# Patient Record
Sex: MALE | URBAN - METROPOLITAN AREA
[De-identification: names, ages, dates, MRNs, and addresses within clinical notes are randomized per-mention and may not be internally consistent; named-entity substitution may affect disease eponyms.]

---

## 2017-03-06 ENCOUNTER — APPOINTMENT (OUTPATIENT)
Age: 72
Setting detail: DERMATOLOGY
End: 2017-03-18

## 2017-03-06 DIAGNOSIS — L57.0 ACTINIC KERATOSIS: ICD-10-CM

## 2017-03-06 DIAGNOSIS — L82.0 INFLAMED SEBORRHEIC KERATOSIS: ICD-10-CM

## 2017-03-06 DIAGNOSIS — L82.1 OTHER SEBORRHEIC KERATOSIS: ICD-10-CM

## 2017-03-06 DIAGNOSIS — L85.3 XEROSIS CUTIS: ICD-10-CM

## 2017-03-06 DIAGNOSIS — L28.0 LICHEN SIMPLEX CHRONICUS: ICD-10-CM

## 2017-03-06 DIAGNOSIS — L81.4 OTHER MELANIN HYPERPIGMENTATION: ICD-10-CM

## 2017-03-06 DIAGNOSIS — D18.0 HEMANGIOMA: ICD-10-CM

## 2017-03-06 DIAGNOSIS — D22 MELANOCYTIC NEVI: ICD-10-CM

## 2017-03-06 PROBLEM — L30.9 DERMATITIS, UNSPECIFIED: Status: ACTIVE | Noted: 2017-03-06

## 2017-03-06 PROBLEM — D18.01 HEMANGIOMA OF SKIN AND SUBCUTANEOUS TISSUE: Status: ACTIVE | Noted: 2017-03-06

## 2017-03-06 PROBLEM — D22.5 MELANOCYTIC NEVI OF TRUNK: Status: ACTIVE | Noted: 2017-03-06

## 2017-03-06 PROBLEM — D48.5 NEOPLASM OF UNCERTAIN BEHAVIOR OF SKIN: Status: ACTIVE | Noted: 2017-03-06

## 2017-03-06 PROCEDURE — 17003 DESTRUCT PREMALG LES 2-14: CPT

## 2017-03-06 PROCEDURE — OTHER PRESCRIPTION: OTHER

## 2017-03-06 PROCEDURE — 11100: CPT | Mod: 59

## 2017-03-06 PROCEDURE — OTHER COUNSELING: OTHER

## 2017-03-06 PROCEDURE — 99214 OFFICE O/P EST MOD 30 MIN: CPT | Mod: 25

## 2017-03-06 PROCEDURE — 17000 DESTRUCT PREMALG LESION: CPT

## 2017-03-06 PROCEDURE — OTHER LIQUID NITROGEN: OTHER

## 2017-03-06 PROCEDURE — OTHER BIOPSY BY SHAVE METHOD: OTHER

## 2017-03-06 RX ORDER — TRIAMCINOLONE ACETONIDE 1 MG/ML
0.1% LOTION TOPICAL BID
Qty: 60 | Refills: 1 | Status: ERX | COMMUNITY
Start: 2017-03-06

## 2017-03-06 ASSESSMENT — LOCATION DETAILED DESCRIPTION DERM
LOCATION DETAILED: RIGHT INFERIOR MEDIAL UPPER BACK
LOCATION DETAILED: RIGHT MEDIAL UPPER BACK
LOCATION DETAILED: RIGHT LATERAL EYEBROW
LOCATION DETAILED: RIGHT SUPERIOR MEDIAL MIDBACK
LOCATION DETAILED: RIGHT SUPERIOR LATERAL MALAR CHEEK
LOCATION DETAILED: SUPERIOR THORACIC SPINE
LOCATION DETAILED: LEFT SUPERIOR LATERAL LOWER BACK

## 2017-03-06 ASSESSMENT — LOCATION SIMPLE DESCRIPTION DERM
LOCATION SIMPLE: RIGHT UPPER BACK
LOCATION SIMPLE: RIGHT LOWER BACK
LOCATION SIMPLE: UPPER BACK
LOCATION SIMPLE: RIGHT CHEEK
LOCATION SIMPLE: LEFT LOWER BACK
LOCATION SIMPLE: RIGHT EYEBROW

## 2017-03-06 ASSESSMENT — LOCATION ZONE DERM
LOCATION ZONE: TRUNK
LOCATION ZONE: FACE

## 2017-03-06 NOTE — PROCEDURE: BIOPSY BY SHAVE METHOD
Electrodesiccation And Curettage Text: The wound bed was treated with electrodesiccation and curettage after the biopsy was performed.
Wound Care: Vaseline
Dressing: bandage
Body Location Override (Optional - Billing Will Still Be Based On Selected Body Map Location If Applicable): R. Lateral upper lid
Cryotherapy Text: The wound bed was treated with cryotherapy after the biopsy was performed.
Destruction After The Procedure: No
Size Of Lesion In Cm: 0.5
Curettage Text: The wound bed was treated with curettage after the biopsy was performed.
Notification Instructions: Patient will be notified of biopsy results. However, patient instructed to call the office if not contacted within 2 weeks.
Billing Type: Third-Party Bill
Biopsy Method: Double edge Personna blades
Hemostasis: Drysol
X Size Of Lesion In Cm: 0
Biopsy Type: H and E
Electrodesiccation Text: The wound bed was treated with electrodesiccation after the biopsy was performed.
Consent: Written consent was obtained and risks were reviewed including but not limited to scarring, infection, bleeding, scabbing, incomplete removal, nerve damage and allergy to anesthesia.
Post-Care Instructions: I reviewed with the patient in detail post-care instructions. Patient is to keep the biopsy site dry overnight, and then apply bacitracin twice daily until healed. Patient may apply hydrogen peroxide soaks to remove any crusting.
Type Of Destruction Used: Curettage
Silver Nitrate Text: The wound bed was treated with silver nitrate after the biopsy was performed.
Detail Level: Detailed
Anesthesia Type: 1% lidocaine with epinephrine and a 1:10 solution of 8.4% sodium bicarbonate

## 2017-03-06 NOTE — PROCEDURE: LIQUID NITROGEN
Total Number Of Aks Treated: 5
Post-Care Instructions: I reviewed with the patient in detail post-care instructions. (Written instructions given) Patient is to wear sun protection, and avoid picking at any of the treated lesions. Pt may apply Vaseline to crusted or scabbing areas.
Number Of Freeze-Thaw Cycles: 1 freeze-thaw cycle
Detail Level: Simple
Duration Of Freeze Thaw-Cycle (Seconds): 10
Render Post-Care Instructions In Note?: no
Consent: The patient's consent was obtained including but not limited to risks of crusting, scabbing, blistering, scarring, darker or lighter pigmentary change, recurrence, incomplete removal and infection.

## 2021-01-23 ENCOUNTER — HOSPITAL ENCOUNTER (EMERGENCY)
Facility: CLINIC | Age: 76
Discharge: HOME | End: 2021-01-23
Attending: EMERGENCY MEDICINE | Admitting: EMERGENCY MEDICINE
Payer: COMMERCIAL

## 2021-01-23 ENCOUNTER — APPOINTMENT (OUTPATIENT)
Dept: GENERAL RADIOLOGY | Facility: CLINIC | Age: 76
End: 2021-01-23
Attending: EMERGENCY MEDICINE
Payer: COMMERCIAL

## 2021-01-23 VITALS
HEART RATE: 90 BPM | OXYGEN SATURATION: 95 % | RESPIRATION RATE: 18 BRPM | DIASTOLIC BLOOD PRESSURE: 73 MMHG | TEMPERATURE: 98.9 F | SYSTOLIC BLOOD PRESSURE: 100 MMHG

## 2021-01-23 DIAGNOSIS — S22.42XA CLOSED FRACTURE OF MULTIPLE RIBS OF LEFT SIDE, INITIAL ENCOUNTER: ICD-10-CM

## 2021-01-23 PROCEDURE — 71101 X-RAY EXAM UNILAT RIBS/CHEST: CPT | Mod: LT

## 2021-01-23 PROCEDURE — 250N000013 HC RX MED GY IP 250 OP 250 PS 637: Performed by: EMERGENCY MEDICINE

## 2021-01-23 PROCEDURE — 99285 EMERGENCY DEPT VISIT HI MDM: CPT

## 2021-01-23 RX ORDER — LIDOCAINE 4 G/G
1 PATCH TOPICAL EVERY 24 HOURS
Qty: 20 PATCH | Refills: 0 | Status: SHIPPED | OUTPATIENT
Start: 2021-01-23 | End: 2022-12-06

## 2021-01-23 RX ORDER — CYCLOBENZAPRINE HCL 10 MG
10 TABLET ORAL ONCE
Status: COMPLETED | OUTPATIENT
Start: 2021-01-23 | End: 2021-01-23

## 2021-01-23 RX ORDER — CYCLOBENZAPRINE HCL 10 MG
5 TABLET ORAL 3 TIMES DAILY PRN
Qty: 20 TABLET | Refills: 0 | Status: SHIPPED | OUTPATIENT
Start: 2021-01-23 | End: 2022-12-06

## 2021-01-23 RX ORDER — LIDOCAINE 4 G/G
1 PATCH TOPICAL ONCE
Status: DISCONTINUED | OUTPATIENT
Start: 2021-01-23 | End: 2021-01-23 | Stop reason: HOSPADM

## 2021-01-23 RX ORDER — OXYCODONE HYDROCHLORIDE 5 MG/1
2.5 TABLET ORAL EVERY 6 HOURS PRN
Qty: 12 TABLET | Refills: 0 | Status: SHIPPED | OUTPATIENT
Start: 2021-01-23 | End: 2022-11-19

## 2021-01-23 RX ORDER — NAPROXEN 250 MG/1
250 TABLET ORAL ONCE
Status: COMPLETED | OUTPATIENT
Start: 2021-01-23 | End: 2021-01-23

## 2021-01-23 RX ADMIN — NAPROXEN 250 MG: 250 TABLET ORAL at 09:26

## 2021-01-23 RX ADMIN — LIDOCAINE 1 PATCH: 560 PATCH PERCUTANEOUS; TOPICAL; TRANSDERMAL at 10:10

## 2021-01-23 RX ADMIN — CYCLOBENZAPRINE HYDROCHLORIDE 10 MG: 10 TABLET, FILM COATED ORAL at 10:08

## 2021-01-23 ASSESSMENT — ENCOUNTER SYMPTOMS
NECK PAIN: 0
BACK PAIN: 0
ABDOMINAL PAIN: 0

## 2021-01-23 NOTE — DISCHARGE INSTRUCTIONS
Alternate Tylenol and ibuprofen (dosage as directed on the bottle) every 4-6 hours as needed for pain.    Apply ice or heat to the painful area for 15 minutes 4-5 times a day.    Opioid Medication Information    You have been given a prescription for an opioid (narcotic) pain medicine and/or have received a pain medicine while here in the Emergency Department. These medicines can make you drowsy or impaired. You must not drive, operate dangerous equipment, or engage in any other dangerous activities while taking these medications. If you drive while taking these medications, you could be arrested for driving under the influence (DUI). Do not drink any alcohol while you are taking these medications.     Opioid pain medications can cause addiction. If you have a history of chemical dependency of any type, you are at a higher risk of becoming addicted to pain medications.  Only take these prescribed medications to treat your pain when all other options have been tried. Take it for as short a time and as few doses as possible. Store your pain pills in a secure place, as they are frequently stolen and provide a dangerous opportunity for children or visitors in your house to start abusing these powerful medications. We will not replace any lost or stolen medicine.    If you do not finish your medication, it is a good idea to get rid of it but please do not flush it down the toilet. Please dispose of the remaining medication at a local pharmacy or law enforcement facility. The [place] [employer] has additional information on medication disposal: https://www.pca.[email or web address]/living-green/managing-unwanted-medications.      Many prescription pain medications contain Tylenol  (acetaminophen), including Vicodin , Tylenol #3 , Norco , Lortab , and Percocet .  You should not take any extra pills of Tylenol  if you are using these prescription medications or you can get very sick.  Do not ever take more than 3000  mg of acetaminophen in any 24 hour period.    All opioids tend to cause constipation. Drink plenty of water and eat foods that have a lot of fiber, such as fruits, vegetables, prune juice, apple juice and high fiber cereal.  Take a laxative if you don t move your bowels at least every other day. Miralax , Milk of Magnesia, Colace , or Senna  can be used to keep you regular.

## 2021-01-23 NOTE — ED TRIAGE NOTES
Patient presents to the ED with left rib pain. States slipped and fell on Wednesday, landing on left side. States pain feels worse this morning.

## 2021-01-23 NOTE — ED PROVIDER NOTES
History   Chief Complaint:  Rib Pain       The history is provided by the patient.      Pacheco Torres is a 75 year old male with history of hypertension, hyperlipidemia, CAD who presents for evaluation of left sided rib pain starting three days ago after a slip and fall on his driveway. The patient states that the pain is worse at night and was worst this morning, noting pain with breathing that started today. Lying down exacerbates his pain while sitting down in the proper position improves his pain. The patient notes no head injury or loss of consciousness. He notes no neck pain, back pain or arm injury. He denies any abdominal pain or chest pain outside of his rib pain. The patient treated with 1 tablet of Percocet and 500mg Tylenol two hours prior to arrival. He notes that he usually takes oxycodone at night. The patient notes that he has broken 6 ribs in the past around 10 years ago and presents today concerned that he may have broken a rib.       Review of Systems   Cardiovascular: Negative for chest pain.   Gastrointestinal: Negative for abdominal pain.   Musculoskeletal: Negative for back pain and neck pain.        (+) left rib pain   Neurological:        (-) loss of consciousness    All other systems reviewed and are negative.        Allergies:  No Known Drug Allergies     Medications:  Percocet    Past Medical History:    Hypertension  CAD  Hyperlipidemia  Recurrent major depression  Morbid obesity  Osteoarthritis, left knee  ED   GERD  Arthritis    Past Surgical History:    Hernia repair  Coronary stent placement  [place] teeth extraction  Meniscectomy, left     Family History:    Alcoholism  Dementia  Kidney failure  Hypertension  Heart disease    Social History:  The patient presents to the emergency department alone.   Current smoker      Physical Exam     Patient Vitals for the past 24 hrs:   BP Temp Pulse Resp SpO2   01/23/21 0953 100/73 -- 90 -- 95 %   01/23/21 0855 (!) 163/89 98.9  F  (37.2  C) 90 18 95 %       Physical Exam  General: Alert, well appearing; occasional waves of pain noted  Neuro:  Gait stable, no focal deficits; GCS 15  HEENT:  Atraumatic. Moist mucous membranes. Conjunctiva normal.   CV:  RRR, no m/r/g, skin warm and well perfused  Pulm:  CTAB, no wheezes/ronchi/rales.  No acute distress, breathing comfortably  GI:  Soft, nontender, nondistended.  No rebound or guarding.  Normal bowel sounds  MSK:  Moving all extremities.  No cervical/thoracic/lumbar midline tenderness.  Left chest wall tenderness below left axilla and left breast fold.  No chest wall crepitus or skin bruising.  Skin:  WWP, no lower extremity edema, skin color normal, no diaphoresis    Emergency Department Course     Imaging:  XR Ribs, Unilateral 3 Views + PA Chest, left:  Acute nondisplaced fracture of the left anterior eighth and likely ninth ribs. No pneumothorax. Left basilar consolidation/atelectasis with left-sided pleural thickening remain stable. The right basilar atelectasis has cleared. Normal heart size and pulmonary vascularity.   As per radiology.     Emergency Department Course:    Reviewed:  0909: I reviewed the patient's nursing notes, vitals, past medical records, Care Everywhere.     Assessments:  0910: I assessed the patient and performed a physical exam at this time.  1029: I reassessed the patient. At this point I feel that the patient is safe for discharge, and the patient agrees.     Interventions:  0926 Naproxen 250mg PO  1008 Flexeril 10mg PO  1010 Lidocare 4% Patch 1 Patch Transdermal    Disposition:  The patient was discharged to home.     Impression & Plan     Medical Decision Making:  Pacheco Torres is a 75 year old male who presents for evaluation of left sided rib pain after a slip and fall in his driveway three days ago.  Given trauma and reproducible nature of his pain, I doubt another underlying cause of chest pain. X-ray shows 8th and 9th rib fractures without any sign of  pneumothorax. No abdominal pain or signs to suggest solid organ injury requiring CT imaging.  Patient did not sustain head injury with the fall, he has no neck pain or cervical tenderness.  Head and cervical spine are cleared clinically.  The patients head to toe trauma exam is otherwise normal at this time and no further trauma workup is needed as I believe there is no signs of serious head, neck, chest, spinal, extremity or abdominal injuries. Discussed findings with the patient. I offered hospital observation given his multiple rib fractures, but with shared decision making, patient would like to try symptom management at home which I feel is reasonable.  Patient was educated on natural course of this fracture, provided pain medication for breakthrough/severe pain but he will alternate Tylenol/ibupfoen as discussed at bedside and use ice/heat. The patient was instructed to follow up with his primary care provider as instructed in the discharge summary. The patient understood the plan and was discharged home in good condition. All questions answered.     Diagnosis:    ICD-10-CM    1. Closed fracture of multiple ribs of left side, initial encounter  S22.42XA        Discharge Medications:  New Prescriptions    CYCLOBENZAPRINE (FLEXERIL) 10 MG TABLET    Take 0.5 tablets (5 mg) by mouth 3 times daily as needed for muscle spasms    LIDOCAINE (LIDOCARE) 4 % PATCH    Place 1 patch onto the skin every 24 hours To prevent lidocaine toxicity, patient should be patch free for 12 hrs daily.    OXYCODONE (ROXICODONE) 5 MG TABLET    Take 0.5 tablets (2.5 mg) by mouth every 6 hours as needed for breakthrough pain or severe pain       Scribe Disclosure:  I, Carl Smith, am serving as a scribe at 9:09 AM on 1/23/2021 to document services personally performed by Gallito Cedeno MD based on my observations and the provider's statements to me.          Gallito Cedeno MD  01/23/21 4152

## 2021-03-03 ENCOUNTER — IMMUNIZATION (OUTPATIENT)
Dept: NURSING | Facility: CLINIC | Age: 76
End: 2021-03-03
Payer: COMMERCIAL

## 2021-03-03 PROCEDURE — 91301 PR COVID VAC MODERNA 100 MCG/0.5 ML IM: CPT

## 2021-03-03 PROCEDURE — 0011A PR COVID VAC MODERNA 100 MCG/0.5 ML IM: CPT

## 2021-03-14 ENCOUNTER — HEALTH MAINTENANCE LETTER (OUTPATIENT)
Age: 76
End: 2021-03-14

## 2021-03-31 ENCOUNTER — IMMUNIZATION (OUTPATIENT)
Dept: NURSING | Facility: CLINIC | Age: 76
End: 2021-03-31
Attending: INTERNAL MEDICINE
Payer: COMMERCIAL

## 2021-03-31 PROCEDURE — 91301 PR COVID VAC MODERNA 100 MCG/0.5 ML IM: CPT

## 2021-03-31 PROCEDURE — 0012A PR COVID VAC MODERNA 100 MCG/0.5 ML IM: CPT

## 2021-06-28 ENCOUNTER — APPOINTMENT (OUTPATIENT)
Dept: URBAN - METROPOLITAN AREA CLINIC 255 | Age: 76
Setting detail: DERMATOLOGY
End: 2021-06-29

## 2021-06-28 DIAGNOSIS — L82.1 OTHER SEBORRHEIC KERATOSIS: ICD-10-CM

## 2021-06-28 DIAGNOSIS — D22 MELANOCYTIC NEVI: ICD-10-CM

## 2021-06-28 DIAGNOSIS — L81.4 OTHER MELANIN HYPERPIGMENTATION: ICD-10-CM

## 2021-06-28 DIAGNOSIS — D84.9 IMMUNODEFICIENCY, UNSPECIFIED: ICD-10-CM

## 2021-06-28 DIAGNOSIS — D18.0 HEMANGIOMA: ICD-10-CM

## 2021-06-28 PROBLEM — D22.5 MELANOCYTIC NEVI OF TRUNK: Status: ACTIVE | Noted: 2021-06-28

## 2021-06-28 PROBLEM — D18.01 HEMANGIOMA OF SKIN AND SUBCUTANEOUS TISSUE: Status: ACTIVE | Noted: 2021-06-28

## 2021-06-28 PROCEDURE — OTHER COUNSELING: OTHER

## 2021-06-28 PROCEDURE — OTHER MIPS QUALITY: OTHER

## 2021-06-28 PROCEDURE — 99203 OFFICE O/P NEW LOW 30 MIN: CPT

## 2021-06-28 ASSESSMENT — LOCATION ZONE DERM
LOCATION ZONE: EYELID
LOCATION ZONE: FACE
LOCATION ZONE: TRUNK

## 2021-06-28 ASSESSMENT — LOCATION SIMPLE DESCRIPTION DERM
LOCATION SIMPLE: LEFT INFERIOR EYELID
LOCATION SIMPLE: RIGHT CHEEK
LOCATION SIMPLE: UPPER BACK
LOCATION SIMPLE: RIGHT UPPER BACK
LOCATION SIMPLE: LEFT FOREHEAD

## 2021-06-28 ASSESSMENT — LOCATION DETAILED DESCRIPTION DERM
LOCATION DETAILED: SUPERIOR THORACIC SPINE
LOCATION DETAILED: RIGHT SUPERIOR CENTRAL MALAR CHEEK
LOCATION DETAILED: RIGHT INFERIOR MEDIAL UPPER BACK
LOCATION DETAILED: INFERIOR THORACIC SPINE
LOCATION DETAILED: LEFT MEDIAL INFERIOR EYELID
LOCATION DETAILED: RIGHT MEDIAL UPPER BACK
LOCATION DETAILED: LEFT LATERAL FOREHEAD

## 2021-06-28 NOTE — PROCEDURE: MIPS QUALITY
Quality 431: Preventive Care And Screening: Unhealthy Alcohol Use - Screening: Patient screened for unhealthy alcohol use using a single question and scores 2 or greater episodes per year and brief intervention occurred
Quality 226: Preventive Care And Screening: Tobacco Use: Screening And Cessation Intervention: Patient screened for tobacco use, is a smoker AND received Cessation Counseling
Quality 130: Documentation Of Current Medications In The Medical Record: Current Medications Documented
Detail Level: Detailed
Quality 110: Preventive Care And Screening: Influenza Immunization: Influenza Immunization previously received during influenza season

## 2021-10-24 ENCOUNTER — HEALTH MAINTENANCE LETTER (OUTPATIENT)
Age: 76
End: 2021-10-24

## 2022-04-10 ENCOUNTER — HEALTH MAINTENANCE LETTER (OUTPATIENT)
Age: 77
End: 2022-04-10

## 2022-07-11 ENCOUNTER — APPOINTMENT (OUTPATIENT)
Dept: URBAN - METROPOLITAN AREA CLINIC 255 | Age: 77
Setting detail: DERMATOLOGY
End: 2022-07-25

## 2022-07-11 DIAGNOSIS — L82.1 OTHER SEBORRHEIC KERATOSIS: ICD-10-CM

## 2022-07-11 DIAGNOSIS — L81.4 OTHER MELANIN HYPERPIGMENTATION: ICD-10-CM

## 2022-07-11 DIAGNOSIS — D22 MELANOCYTIC NEVI: ICD-10-CM

## 2022-07-11 DIAGNOSIS — B07.8 OTHER VIRAL WARTS: ICD-10-CM

## 2022-07-11 DIAGNOSIS — L57.0 ACTINIC KERATOSIS: ICD-10-CM

## 2022-07-11 DIAGNOSIS — L91.8 OTHER HYPERTROPHIC DISORDERS OF THE SKIN: ICD-10-CM

## 2022-07-11 DIAGNOSIS — D84.9 IMMUNODEFICIENCY, UNSPECIFIED: ICD-10-CM

## 2022-07-11 DIAGNOSIS — D18.0 HEMANGIOMA: ICD-10-CM

## 2022-07-11 PROBLEM — D22.5 MELANOCYTIC NEVI OF TRUNK: Status: ACTIVE | Noted: 2022-07-11

## 2022-07-11 PROBLEM — D18.01 HEMANGIOMA OF SKIN AND SUBCUTANEOUS TISSUE: Status: ACTIVE | Noted: 2022-07-11

## 2022-07-11 PROCEDURE — OTHER COUNSELING: OTHER

## 2022-07-11 PROCEDURE — 17000 DESTRUCT PREMALG LESION: CPT

## 2022-07-11 PROCEDURE — OTHER LIQUID NITROGEN: OTHER

## 2022-07-11 PROCEDURE — OTHER MIPS QUALITY: OTHER

## 2022-07-11 PROCEDURE — 17003 DESTRUCT PREMALG LES 2-14: CPT

## 2022-07-11 PROCEDURE — 99213 OFFICE O/P EST LOW 20 MIN: CPT | Mod: 25

## 2022-07-11 ASSESSMENT — LOCATION SIMPLE DESCRIPTION DERM
LOCATION SIMPLE: LEFT UPPER BACK
LOCATION SIMPLE: LEFT CHEEK
LOCATION SIMPLE: LEFT LOWER BACK
LOCATION SIMPLE: LEFT FOREHEAD
LOCATION SIMPLE: RIGHT UPPER BACK
LOCATION SIMPLE: RIGHT FOREHEAD
LOCATION SIMPLE: RIGHT MIDDLE FINGER
LOCATION SIMPLE: RIGHT CHEEK
LOCATION SIMPLE: RIGHT TEMPLE
LOCATION SIMPLE: LEFT CALF

## 2022-07-11 ASSESSMENT — LOCATION DETAILED DESCRIPTION DERM
LOCATION DETAILED: RIGHT LATERAL MALAR CHEEK
LOCATION DETAILED: RIGHT SUPERIOR MEDIAL MALAR CHEEK
LOCATION DETAILED: RIGHT CENTRAL TEMPLE
LOCATION DETAILED: RIGHT MEDIAL UPPER BACK
LOCATION DETAILED: LEFT SUPERIOR MEDIAL UPPER BACK
LOCATION DETAILED: RIGHT INFERIOR CENTRAL MALAR CHEEK
LOCATION DETAILED: RIGHT MID DORSAL MIDDLE FINGER
LOCATION DETAILED: LEFT LATERAL FOREHEAD
LOCATION DETAILED: LEFT PROXIMAL CALF
LOCATION DETAILED: RIGHT INFERIOR MEDIAL UPPER BACK
LOCATION DETAILED: LEFT SUPERIOR CENTRAL MALAR CHEEK
LOCATION DETAILED: LEFT SUPERIOR MEDIAL LOWER BACK
LOCATION DETAILED: RIGHT LATERAL FOREHEAD

## 2022-07-11 ASSESSMENT — LOCATION ZONE DERM
LOCATION ZONE: TRUNK
LOCATION ZONE: FINGER
LOCATION ZONE: LEG
LOCATION ZONE: FACE

## 2022-07-11 NOTE — PROCEDURE: LIQUID NITROGEN
Duration Of Freeze Thaw-Cycle (Seconds): 10
Post-Care Instructions: I reviewed with the patient in detail post-care instructions. Patient is to wear sunprotection, and avoid picking at any of the treated lesions. Pt may apply Vaseline to crusted or scabbing areas.
Show Applicator Variable?: Yes
Detail Level: Detailed
Render Note In Bullet Format When Appropriate: No
Consent: The patient's consent was obtained including but not limited to risks of crusting, scabbing, blistering, scarring, darker or lighter pigmentary change, recurrence, incomplete removal and infection.
Number Of Freeze-Thaw Cycles: 1 freeze-thaw cycle

## 2022-07-11 NOTE — PROCEDURE: MIPS QUALITY
Quality 431: Preventive Care And Screening: Unhealthy Alcohol Use - Screening: Patient not identified as an unhealthy alcohol user when screened for unhealthy alcohol use using a systematic screening method
Detail Level: Detailed
Quality 130: Documentation Of Current Medications In The Medical Record: Current Medications Documented
Quality 110: Preventive Care And Screening: Influenza Immunization: Influenza Immunization previously received during influenza season
Quality 226: Preventive Care And Screening: Tobacco Use: Screening And Cessation Intervention: Patient screened for tobacco use, is a smoker AND received Cessation Counseling within the Previous 12 Months

## 2022-10-15 ENCOUNTER — HEALTH MAINTENANCE LETTER (OUTPATIENT)
Age: 77
End: 2022-10-15

## 2022-11-02 ENCOUNTER — HOSPITAL ENCOUNTER (EMERGENCY)
Facility: CLINIC | Age: 77
Discharge: LEFT BEFORE DISCHARGE | End: 2022-11-02
Attending: EMERGENCY MEDICINE | Admitting: EMERGENCY MEDICINE
Payer: COMMERCIAL

## 2022-11-02 ENCOUNTER — APPOINTMENT (OUTPATIENT)
Dept: GENERAL RADIOLOGY | Facility: CLINIC | Age: 77
End: 2022-11-02
Attending: EMERGENCY MEDICINE
Payer: COMMERCIAL

## 2022-11-02 VITALS
RESPIRATION RATE: 20 BRPM | TEMPERATURE: 98 F | OXYGEN SATURATION: 96 % | SYSTOLIC BLOOD PRESSURE: 173 MMHG | HEART RATE: 94 BPM | DIASTOLIC BLOOD PRESSURE: 104 MMHG

## 2022-11-02 DIAGNOSIS — R06.09 EXERTIONAL DYSPNEA: ICD-10-CM

## 2022-11-02 LAB
ALBUMIN SERPL BCG-MCNC: 3.7 G/DL (ref 3.5–5.2)
ALP SERPL-CCNC: 147 U/L (ref 40–129)
ALT SERPL W P-5'-P-CCNC: 14 U/L (ref 10–50)
ANION GAP SERPL CALCULATED.3IONS-SCNC: 9 MMOL/L (ref 7–15)
AST SERPL W P-5'-P-CCNC: 74 U/L (ref 10–50)
BASOPHILS # BLD AUTO: 0 10E3/UL (ref 0–0.2)
BASOPHILS NFR BLD AUTO: 0 %
BILIRUB SERPL-MCNC: 0.2 MG/DL
BUN SERPL-MCNC: 8.8 MG/DL (ref 8–23)
CALCIUM SERPL-MCNC: 10.1 MG/DL (ref 8.8–10.2)
CHLORIDE SERPL-SCNC: 94 MMOL/L (ref 98–107)
CREAT SERPL-MCNC: 0.69 MG/DL (ref 0.67–1.17)
D DIMER PPP FEU-MCNC: 1.47 UG/ML FEU (ref 0–0.5)
DEPRECATED HCO3 PLAS-SCNC: 29 MMOL/L (ref 22–29)
EOSINOPHIL # BLD AUTO: 0 10E3/UL (ref 0–0.7)
EOSINOPHIL NFR BLD AUTO: 0 %
ERYTHROCYTE [DISTWIDTH] IN BLOOD BY AUTOMATED COUNT: 12.7 % (ref 10–15)
FLUAV RNA SPEC QL NAA+PROBE: NEGATIVE
FLUBV RNA RESP QL NAA+PROBE: NEGATIVE
GFR SERPL CREATININE-BSD FRML MDRD: >90 ML/MIN/1.73M2
GLUCOSE SERPL-MCNC: 95 MG/DL (ref 70–99)
HCT VFR BLD AUTO: 36.1 % (ref 40–53)
HGB BLD-MCNC: 11.6 G/DL (ref 13.3–17.7)
HOLD SPECIMEN: NORMAL
IMM GRANULOCYTES # BLD: 0 10E3/UL
IMM GRANULOCYTES NFR BLD: 0 %
LYMPHOCYTES # BLD AUTO: 1 10E3/UL (ref 0.8–5.3)
LYMPHOCYTES NFR BLD AUTO: 21 %
MCH RBC QN AUTO: 34.9 PG (ref 26.5–33)
MCHC RBC AUTO-ENTMCNC: 32.1 G/DL (ref 31.5–36.5)
MCV RBC AUTO: 109 FL (ref 78–100)
MONOCYTES # BLD AUTO: 0.4 10E3/UL (ref 0–1.3)
MONOCYTES NFR BLD AUTO: 9 %
NEUTROPHILS # BLD AUTO: 3.4 10E3/UL (ref 1.6–8.3)
NEUTROPHILS NFR BLD AUTO: 70 %
NRBC # BLD AUTO: 0 10E3/UL
NRBC BLD AUTO-RTO: 0 /100
NT-PROBNP SERPL-MCNC: 151 PG/ML (ref 0–1800)
PLATELET # BLD AUTO: 342 10E3/UL (ref 150–450)
POTASSIUM SERPL-SCNC: 4.3 MMOL/L (ref 3.4–5.3)
PROT SERPL-MCNC: 7.1 G/DL (ref 6.4–8.3)
RBC # BLD AUTO: 3.32 10E6/UL (ref 4.4–5.9)
RSV RNA SPEC NAA+PROBE: NEGATIVE
SARS-COV-2 RNA RESP QL NAA+PROBE: NEGATIVE
SODIUM SERPL-SCNC: 132 MMOL/L (ref 136–145)
TROPONIN T SERPL HS-MCNC: 13 NG/L
TSH SERPL DL<=0.005 MIU/L-ACNC: 1.36 UIU/ML (ref 0.3–4.2)
WBC # BLD AUTO: 4.9 10E3/UL (ref 4–11)

## 2022-11-02 PROCEDURE — 80053 COMPREHEN METABOLIC PANEL: CPT | Performed by: EMERGENCY MEDICINE

## 2022-11-02 PROCEDURE — 36415 COLL VENOUS BLD VENIPUNCTURE: CPT | Performed by: EMERGENCY MEDICINE

## 2022-11-02 PROCEDURE — 99285 EMERGENCY DEPT VISIT HI MDM: CPT | Mod: 25

## 2022-11-02 PROCEDURE — 71046 X-RAY EXAM CHEST 2 VIEWS: CPT

## 2022-11-02 PROCEDURE — C9803 HOPD COVID-19 SPEC COLLECT: HCPCS

## 2022-11-02 PROCEDURE — 83880 ASSAY OF NATRIURETIC PEPTIDE: CPT | Performed by: EMERGENCY MEDICINE

## 2022-11-02 PROCEDURE — 84443 ASSAY THYROID STIM HORMONE: CPT | Performed by: EMERGENCY MEDICINE

## 2022-11-02 PROCEDURE — 87637 SARSCOV2&INF A&B&RSV AMP PRB: CPT | Performed by: EMERGENCY MEDICINE

## 2022-11-02 PROCEDURE — 84484 ASSAY OF TROPONIN QUANT: CPT | Performed by: EMERGENCY MEDICINE

## 2022-11-02 PROCEDURE — 85379 FIBRIN DEGRADATION QUANT: CPT | Performed by: EMERGENCY MEDICINE

## 2022-11-02 PROCEDURE — 93005 ELECTROCARDIOGRAM TRACING: CPT

## 2022-11-02 PROCEDURE — 85025 COMPLETE CBC W/AUTO DIFF WBC: CPT | Performed by: EMERGENCY MEDICINE

## 2022-11-02 ASSESSMENT — ACTIVITIES OF DAILY LIVING (ADL)
ADLS_ACUITY_SCORE: 35
ADLS_ACUITY_SCORE: 33

## 2022-11-02 ASSESSMENT — ENCOUNTER SYMPTOMS
DYSURIA: 0
WEAKNESS: 0
SHORTNESS OF BREATH: 1
PALPITATIONS: 0
ABDOMINAL PAIN: 0
BACK PAIN: 0
FREQUENCY: 0
FEVER: 0
FLANK PAIN: 0

## 2022-11-02 NOTE — ED TRIAGE NOTES
SOB per wife. When asked patient to explain SOB, he spoke a very long winded roundabout story without respiratory distress. Hx of anemia.    Per wife and patient, in disagreement if he is really sob. He states it is pain related from back pain.     Per wife, she notices change in his breathing in the last 3 months. 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 Sleeping more.    Hx of MGUS    No pain at rest

## 2022-11-02 NOTE — ED NOTES
Patient chose to leave AMA. The risks of leaving without continuing care were explained to the patient. The patient chose to leave anyway. He signed the AMA form

## 2022-11-02 NOTE — ED PROVIDER NOTES
History     Chief Complaint:  Shortness of Breath     HPI   Pacheco Torres is a 77 year old male who presents with shortness of breath progressive over the last 3 months.  Patient notes that in the last 1 week his shortness of breath has worsened.  His wife notes that he has been having puffing of his cheeks and increased respiratory rate in the last 1 week.  He has had a intermittent cough associated with this.  He does have a history of CAD with prior stents placed.  He also has a history of high blood pressure and high cholesterol.  He denies any history of diabetes.  Patient has been taking all of his meds compliantly.  Denies any history of heart failure.  Notes that he is overweight but has had relatively stable weights for the last several months.  He notes that he becomes exertionally dyspneic with any kind of movement in the house.  He does use a walker.  Of note he did have thoracic spine compression fractures for which she wears a TLSO device and is also working with [facility] orthopedics and recovery from these injuries.  There is no acute trauma related to these injuries.  He denies any chest pain, neck pain, arm pain, tingling or numbness.  He denies fever and constitutional symptoms.    ROS:  Review of Systems   Constitutional: Negative for fever.   Respiratory: Positive for shortness of breath.    Cardiovascular: Negative for chest pain and palpitations.   Gastrointestinal: Negative for abdominal pain.   Genitourinary: Negative for dysuria, flank pain and frequency.   Musculoskeletal: Negative for back pain.   Neurological: Negative for weakness.   All other systems reviewed and are negative.    Allergies:  No Known Allergies     Medications:    cyclobenzaprine (FLEXERIL) 10 MG tablet  Lidocaine (LIDOCARE) 4 % Patch  oxyCODONE (ROXICODONE) 5 MG tablet  oxyCODONE-acetaminophen (PERCOCET) 5-325 MG per tablet  oxyCODONE-acetaminophen (PERCOCET) 5-325 MG per tablet  oxyCODONE-acetaminophen  (PERCOCET) 5-325 MG per tablet        Past Medical History:    Prostate cancer  Hypertension  Hyperlipidemia  Obesity    Past Surgical History:    No pertinent past surgical history for this visit    Family History:    family history is not on file.    Social History:   reports that he quit smoking about 12 years ago. He does not have any smokeless tobacco history on file.  PCP: Stuart Wolfe   Presents with significant other, wife.  Arrives by private vehicle    Physical Exam     Patient Vitals for the past 24 hrs:   BP Temp Pulse Resp SpO2   11/02/22 1214 (!) 173/104 98  F (36.7  C) 94 20 96 %      Physical Exam  General: Alert, appears well-developed and well-nourished. Cooperative.     In mild distress  HEENT:  Head:  Atraumatic  Ears:  External ears are normal  Mouth/Throat:  Oropharynx is without erythema or exudate and mucous membranes are moist.   Eyes:   Conjunctivae normal and EOM are normal. No scleral icterus.  CV:  Normal rate, regular rhythm, normal heart sounds and radial pulses are 2+ and symmetric.  No murmur.  Resp:  Breath sounds are clear bilaterally, no wheezing.     Non-labored, no retractions or accessory muscle use  GI:  Obese.  Abdomen is soft, no distension, no tenderness. No rebound or guarding.  No CVA tenderness bilaterally  MS:  Normal range of motion. No edema.    Normal strength in all 4 extremities.     Back atraumatic.    No midline cervical, thoracic, or lumbar tenderness  Skin:  Warm and dry.  No rash or lesions noted.  Neuro: Alert. Normal strength.  GCS: 15  Psych:  Normal mood and affect.    Emergency Department Course   ECG:  ECG results from 11/02/22   EKG 12 lead     Value    Systolic Blood Pressure     Diastolic Blood Pressure     Ventricular Rate 88    Atrial Rate 88    NV Interval 182    QRS Duration 72        QTc 416    P Axis 32    R AXIS 4    T Axis 30    Interpretation ECG      Sinus rhythm  Normal ECG  When compared with ECG of 25-SEP-2008  07:11,  Premature supraventricular complexes are no longer Present  Vent. rate has increased BY  30 BPM         Imaging:  XR Chest 2 Views   Final Result   IMPRESSION: No focal infiltrate, pleural effusion or pneumothorax   normal heart size. Tortuous aorta with atherosclerotic calcifications,   stable.      EMELY MARTINEZ MD            SYSTEM ID:  [id number]         Report per radiology    Laboratory:  Labs Ordered and Resulted from Time of ED Arrival to Time of ED Departure   D DIMER QUANTITATIVE - Abnormal       Result Value    D-Dimer Quantitative 1.47 (*)    COMPREHENSIVE METABOLIC PANEL - Abnormal    Sodium 132 (*)     Potassium 4.3      Chloride 94 (*)     Carbon Dioxide (CO2) 29      Anion Gap 9      Urea Nitrogen 8.8      Creatinine 0.69      Calcium 10.1      Glucose 95      Alkaline Phosphatase 147 (*)     AST 74 (*)     ALT 14      Protein Total 7.1      Albumin 3.7      Bilirubin Total 0.2      GFR Estimate >90     CBC WITH PLATELETS AND DIFFERENTIAL - Abnormal    WBC Count 4.9      RBC Count 3.32 (*)     Hemoglobin 11.6 (*)     Hematocrit 36.1 (*)      (*)     MCH 34.9 (*)     MCHC 32.1      RDW 12.7      Platelet Count 342      % Neutrophils 70      % Lymphocytes 21      % Monocytes 9      % Eosinophils 0      % Basophils 0      % Immature Granulocytes 0      NRBCs per 100 WBC 0      Absolute Neutrophils 3.4      Absolute Lymphocytes 1.0      Absolute Monocytes 0.4      Absolute Eosinophils 0.0      Absolute Basophils 0.0      Absolute Immature Granulocytes 0.0      Absolute NRBCs 0.0     INFLUENZA A/B & SARS-COV2 PCR MULTIPLEX - Normal    Influenza A PCR Negative      Influenza B PCR Negative      RSV PCR Negative      SARS CoV2 PCR Negative     TROPONIN T, HIGH SENSITIVITY - Normal    Troponin T, High Sensitivity 13     NT PROBNP INPATIENT - Normal    N terminal Pro BNP Inpatient 151     TSH WITH FREE T4 REFLEX - Normal    TSH 1.36        Procedures     Emergency Department Course:      Reviewed:  I reviewed nursing notes, vitals and past medical history    Assessments:  1745 I obtained history and examined the patient as noted above.   1902 I attempted to recheck the patient but he had left after signing an AMA form.  I did leave a message for him over the phone.     Consults:   None    Interventions:  Medications - No data to display     Disposition:  The patient left AMA.     Impression & Plan    CMS Diagnoses: None    Medical Decision Making:  Patient is a 77-year-old male with a complex past medical history pertinent for prostate cancer, hypertension, hyperlipidemia, amongst other chronic medical problems who presents with 3 months of progressive shortness of breath.  Patient does seem quite dyspneic on initial examination with mild tachypnea.  Thankfully he has no wheezing on examination.  No evidence of heart murmur on initial examination.  Patient had no significant lower extremity swelling.  Given his history of ongoing and progressive dyspnea I did have concern for potential ACS versus pulmonary embolism versus pneumonia versus influenza versus CHF versus electrolyte abnormality versus renal failure.  Differential includes many other sinister diagnoses.  EKG reassuringly showed no acute ischemic changes although no prior EKGs to interpret alongside.  Initial laboratory work reassuring except for elevated D-dimer.  Thankfully his initial troponin returned at 13.  BNP within normal range.  Thyroid function appears normal.  CMP and CBC relatively unremarkable except for mild anemia.  Patient is not having any evidence of GI bleed tonight.  Influenza, COVID, and RSV negative.  Given the elevated D-dimer I did recommend CT imaging of the chest to evaluate for potential pulmonary embolism versus other sinister intrathoracic processes.  Chest x-ray was initially obtained relatively unremarkable.  There was a noted tortuous aorta with atherosclerotic calcifications although stable in comparison  with historic imaging.  Unfortunate the patient was unwilling to remain in the emergency department for further CT imaging of the chest.  I was not able to visit with the patient at bedside but did attempt to contact the patient via phone as he had left AGAINST MEDICAL ADVICE and signed paperwork prior to my reassessment of the patient in the ED.  Unfortunate he did not answer his phone but I did leave a voicemail communication encouraging the patient to return at any time for reassessment and continued assessment of his dyspnea that has been progressive and worsening over the last 3 months.  Patient left AGAINST MEDICAL ADVICE prior to completion of CT imaging and final ED cares.  Still remains unclear at this time as to what is causing the patient's persistent exertional dyspnea.  Although his clinical examination and BMP are unremarkable CHF certainly remains in the differential in addition to pulmonary embolism or other sinister intrathoracic etiologies.      Diagnosis:    ICD-10-CM    1. Exertional dyspnea  R06.09            Discharge Medications:  Discharge Medication List as of 11/2/2022  6:58 PM           11/2/2022   Baltazar Harris MD White, Scott, MD  11/03/22 0147       Baltazar Harris MD  11/03/22 0147

## 2022-11-02 NOTE — ED NOTES
Rapid Assessment Note    History:   Pacheco Torres is a 77 year old male who presents with shortness of breath. He notes that he feels short of breath when walking and has back pain due to several compression fractures in his spine. He denies chest pain/tightness, arm pain, diaphoresis, lightheadedness, nausea, cough, cold, rhinorrhea, taking blood thinners, taking diuretics, history of CHF, and history of any blood clots. His wife states that he has a breathing issues even when resting. The patient smokes.      Exam:   General:  Alert, interactive No leg swelling  Cardiovascular:  Well perfused. Heart sounds normal.   Lungs:  No respiratory distress, no accessory muscle use. Mild tachypnia  Neuro:  Moving all 4 extremities  Skin:  Warm, dry  Psych:  Normal affect    Plan of Care:   I evaluated the patient and developed an initial plan of care. I discussed this plan and explained that I, or one of my partners, would be returning to complete the evaluation.     Patient presents with shortness of breath over the past 3 months.  This is gotten worse.  He has had a recent PE study in September which was negative.  I did order cardiac labs, chest x-ray and COVID/influenza.    I, Zacarias Antwan, am serving as a scribe to document services personally performed by Yaron Farley MD  , based on my observations and the provider's statements to me.    11/2/2022  [employer]    Portions of this medical record were completed by a scribe. UPON MY REVIEW AND AUTHENTICATION BY ELECTRONIC SIGNATURE, this confirms (a) I performed the applicable clinical services, and (b) the record is accurate.        Yaron Farley MD  11/02/22 [id number]

## 2022-11-03 ENCOUNTER — APPOINTMENT (OUTPATIENT)
Dept: CT IMAGING | Facility: CLINIC | Age: 77
End: 2022-11-03
Attending: EMERGENCY MEDICINE
Payer: COMMERCIAL

## 2022-11-03 ENCOUNTER — HOSPITAL ENCOUNTER (EMERGENCY)
Facility: CLINIC | Age: 77
Discharge: HOME | End: 2022-11-03
Attending: EMERGENCY MEDICINE | Admitting: EMERGENCY MEDICINE
Payer: COMMERCIAL

## 2022-11-03 VITALS
DIASTOLIC BLOOD PRESSURE: 97 MMHG | TEMPERATURE: 97.4 F | HEART RATE: 99 BPM | SYSTOLIC BLOOD PRESSURE: 178 MMHG | OXYGEN SATURATION: 95 % | RESPIRATION RATE: 20 BRPM

## 2022-11-03 DIAGNOSIS — R91.8 PULMONARY NODULES: ICD-10-CM

## 2022-11-03 DIAGNOSIS — R06.02 SHORTNESS OF BREATH: Primary | ICD-10-CM

## 2022-11-03 LAB
ALBUMIN SERPL BCG-MCNC: 3.7 G/DL (ref 3.5–5.2)
ALP SERPL-CCNC: 143 U/L (ref 40–129)
ALT SERPL W P-5'-P-CCNC: 16 U/L (ref 10–50)
ANION GAP SERPL CALCULATED.3IONS-SCNC: 9 MMOL/L (ref 7–15)
AST SERPL W P-5'-P-CCNC: 86 U/L (ref 10–50)
ATRIAL RATE - MUSE: 88 BPM
BASOPHILS # BLD AUTO: 0 10E3/UL (ref 0–0.2)
BASOPHILS NFR BLD AUTO: 0 %
BILIRUB SERPL-MCNC: 0.3 MG/DL
BUN SERPL-MCNC: 10.4 MG/DL (ref 8–23)
CALCIUM SERPL-MCNC: 9.5 MG/DL (ref 8.8–10.2)
CHLORIDE SERPL-SCNC: 94 MMOL/L (ref 98–107)
CREAT SERPL-MCNC: 0.65 MG/DL (ref 0.67–1.17)
DEPRECATED HCO3 PLAS-SCNC: 28 MMOL/L (ref 22–29)
DIASTOLIC BLOOD PRESSURE - MUSE: NORMAL MMHG
EOSINOPHIL # BLD AUTO: 0 10E3/UL (ref 0–0.7)
EOSINOPHIL NFR BLD AUTO: 0 %
ERYTHROCYTE [DISTWIDTH] IN BLOOD BY AUTOMATED COUNT: 13.2 % (ref 10–15)
GFR SERPL CREATININE-BSD FRML MDRD: >90 ML/MIN/1.73M2
GLUCOSE SERPL-MCNC: 98 MG/DL (ref 70–99)
HCT VFR BLD AUTO: 35.3 % (ref 40–53)
HGB BLD-MCNC: 11.5 G/DL (ref 13.3–17.7)
IMM GRANULOCYTES # BLD: 0 10E3/UL
IMM GRANULOCYTES NFR BLD: 0 %
INTERPRETATION ECG - MUSE: NORMAL
LYMPHOCYTES # BLD AUTO: 1 10E3/UL (ref 0.8–5.3)
LYMPHOCYTES NFR BLD AUTO: 18 %
MAGNESIUM SERPL-MCNC: 2 MG/DL (ref 1.7–2.3)
MCH RBC QN AUTO: 35.3 PG (ref 26.5–33)
MCHC RBC AUTO-ENTMCNC: 32.6 G/DL (ref 31.5–36.5)
MCV RBC AUTO: 108 FL (ref 78–100)
MONOCYTES # BLD AUTO: 0.5 10E3/UL (ref 0–1.3)
MONOCYTES NFR BLD AUTO: 9 %
NEUTROPHILS # BLD AUTO: 3.7 10E3/UL (ref 1.6–8.3)
NEUTROPHILS NFR BLD AUTO: 73 %
NRBC # BLD AUTO: 0 10E3/UL
NRBC BLD AUTO-RTO: 0 /100
NT-PROBNP SERPL-MCNC: 114 PG/ML (ref 0–1800)
P AXIS - MUSE: 32 DEGREES
PLATELET # BLD AUTO: 306 10E3/UL (ref 150–450)
POTASSIUM SERPL-SCNC: 4.7 MMOL/L (ref 3.4–5.3)
PR INTERVAL - MUSE: 182 MS
PROT SERPL-MCNC: 7 G/DL (ref 6.4–8.3)
QRS DURATION - MUSE: 72 MS
QT - MUSE: 344 MS
QTC - MUSE: 416 MS
R AXIS - MUSE: 4 DEGREES
RBC # BLD AUTO: 3.26 10E6/UL (ref 4.4–5.9)
SODIUM SERPL-SCNC: 131 MMOL/L (ref 136–145)
SYSTOLIC BLOOD PRESSURE - MUSE: NORMAL MMHG
T AXIS - MUSE: 30 DEGREES
TROPONIN T SERPL HS-MCNC: 12 NG/L
VENTRICULAR RATE- MUSE: 88 BPM
WBC # BLD AUTO: 5.2 10E3/UL (ref 4–11)

## 2022-11-03 PROCEDURE — 80053 COMPREHEN METABOLIC PANEL: CPT | Performed by: EMERGENCY MEDICINE

## 2022-11-03 PROCEDURE — 84484 ASSAY OF TROPONIN QUANT: CPT | Performed by: EMERGENCY MEDICINE

## 2022-11-03 PROCEDURE — 36415 COLL VENOUS BLD VENIPUNCTURE: CPT | Performed by: EMERGENCY MEDICINE

## 2022-11-03 PROCEDURE — 99285 EMERGENCY DEPT VISIT HI MDM: CPT | Mod: 25

## 2022-11-03 PROCEDURE — 85025 COMPLETE CBC W/AUTO DIFF WBC: CPT | Performed by: EMERGENCY MEDICINE

## 2022-11-03 PROCEDURE — 250N000009 HC RX 250: Performed by: EMERGENCY MEDICINE

## 2022-11-03 PROCEDURE — 83735 ASSAY OF MAGNESIUM: CPT | Performed by: EMERGENCY MEDICINE

## 2022-11-03 PROCEDURE — 250N000011 HC RX IP 250 OP 636: Performed by: EMERGENCY MEDICINE

## 2022-11-03 PROCEDURE — 82040 ASSAY OF SERUM ALBUMIN: CPT | Performed by: EMERGENCY MEDICINE

## 2022-11-03 PROCEDURE — 71275 CT ANGIOGRAPHY CHEST: CPT

## 2022-11-03 PROCEDURE — 83880 ASSAY OF NATRIURETIC PEPTIDE: CPT | Performed by: EMERGENCY MEDICINE

## 2022-11-03 RX ORDER — IOPAMIDOL 755 MG/ML
500 INJECTION, SOLUTION INTRAVASCULAR ONCE
Status: COMPLETED | OUTPATIENT
Start: 2022-11-03 | End: 2022-11-03

## 2022-11-03 RX ADMIN — IOPAMIDOL 70 ML: 755 INJECTION, SOLUTION INTRAVENOUS at 13:01

## 2022-11-03 RX ADMIN — SODIUM CHLORIDE 88 ML: 9 INJECTION, SOLUTION INTRAVENOUS at 13:01

## 2022-11-03 ASSESSMENT — ENCOUNTER SYMPTOMS
BACK PAIN: 1
EYE PAIN: 0
SHORTNESS OF BREATH: 1
BLOOD IN STOOL: 0
NAUSEA: 0
PALPITATIONS: 0
SORE THROAT: 0
COUGH: 0
RHINORRHEA: 0
COLOR CHANGE: 0
DYSURIA: 0
HEMATURIA: 0
CHEST TIGHTNESS: 0
NECK PAIN: 0
DIARRHEA: 0
FEVER: 0
ABDOMINAL PAIN: 0
VOMITING: 0
FREQUENCY: 1
AGITATION: 0
CHILLS: 0

## 2022-11-03 ASSESSMENT — ACTIVITIES OF DAILY LIVING (ADL): ADLS_ACUITY_SCORE: 35

## 2022-11-03 NOTE — ED TRIAGE NOTES
"Seen in ED for SOB yesterday. Pt reports dimer was elevated and MD wanted to do a CT scan, but \"I was ready to go home, so I told the doctor I would be back in the morning to finish up.\" Reports SOB on exertion. Denies chest pain, N/V/D. VSS. ABC's intact.       "

## 2022-11-03 NOTE — ED PROVIDER NOTES
History   Chief Complaint:  Shortness of Breath    The history is provided by the patient.      Pacheco Torres is a 77 year old male who presents with shortness of breath. The patient reports experiencing shortness of breath for 3 months that worsened after onset of back pain last week. States that his shortness of breath is worsened when he gets up from sitting and with ambulation. Of note, the patient was evaluated here yesterday for shortness of breath and was called last evening to be notified of an elevated dimer level after he left. He reports that he had an elevated dimer one month ago after being diagnosed with 4 lumbar compression fractures. Reports history of prostate cancer and has been experiencing urinary frequency. Notes he was on hormone supplements for his cancer. Denies fever, chills, cough, sore throat, runny nose, congestion, chest pain, chest tightness, palpitations, nausea, vomiting, abdominal pain, dysuria, hematuria, blood in stool, and black stools.    Review of Systems   Constitutional: Negative for chills and fever.   HENT: Negative for congestion, rhinorrhea and sore throat.    Eyes: Negative for pain and visual disturbance.   Respiratory: Positive for shortness of breath. Negative for cough and chest tightness.    Cardiovascular: Negative for chest pain and palpitations.   Gastrointestinal: Negative for abdominal pain, blood in stool, diarrhea, nausea and vomiting.   Genitourinary: Positive for frequency. Negative for dysuria and hematuria.   Musculoskeletal: Positive for back pain (Compression fractures). Negative for neck pain.   Skin: Negative for color change and pallor.   Psychiatric/Behavioral: Negative for agitation and behavioral problems.   All other systems reviewed and are negative.    Allergies:  Codeine    Medications:  Lisinopril  Simvastatin  Tamsulosin  Sertraline   Nitroglycerin   Aspirin 81 MG    Past Medical History:     Monoclonal  gammopathy  Hypertension  Obesity  Polymyalgia rheumatica  CAD  Hypercholesterolemia  Depression  Erectile dysfunction  GERD  Nicotine dependence  Hernia  Coronary atherosclerosis  Osteoarthritis   Prostate cancer     Past Surgical History:    Bilateral hernia repair  Coronary stent placement  [place] teeth extraction  Meniscectomy, left    Family History:    Father- dementia, kidney failure  Mother- MI    Social History:  Presents with spouse  Presents via private vehicle     Physical Exam     Patient Vitals for the past 24 hrs:   BP Temp Temp src Pulse Resp SpO2   22 1400 -- -- -- 99 20 95 %   22 1012 (!) 178/97 97.4  F (36.3  C) Temporal 98 18 100 %     Physical Exam  Constitutional:       Appearance: Normal appearance.   HENT:      Head: Normocephalic and atraumatic.   Eyes:      Extraocular Movements: Extraocular movements intact.      Conjunctiva/sclera: Conjunctivae normal.   Cardiovascular:      Rate and Rhythm: Normal rate and regular rhythm.   Pulmonary:      Effort: Pulmonary effort is normal. Not in respiratory distress.      Breath sounds:   Abdominal:      General: Abdomen is flat. There is no distension.      Palpations: Abdomen is soft.      Tenderness: There is no abdominal tenderness.   Musculoskeletal:      General: Thoracic brace in place.     Cervical back: Normal range of motion. No rigidity.       Right lower le+ edema.      Left lower le+ edema.   Skin:     General: Skin is warm and dry.   Neurological:      General: No focal deficit present.      Mental Status: Alert and oriented to person, place, and time.   Psychiatric:         Mood and Affect: Mood normal.         Behavior: Behavior normal.    Emergency Department Course   Imaging:  CT Chest Pulmonary Embolism w Contrast   Final Result   IMPRESSION:    1. No evidence of pulmonary embolism.   2. Mild cardiomegaly and moderate atherosclerotic vascular   calcification of the coronary arteries.   3. 3 mm left upper lobe  nodule, as per Fleischner's Society criteria,   for low risk patient no routine follow-up is recommended, and for   high-risk patient, optional chest CT in 12 months can be considered.      YAKOV GRIFFIN MD            SYSTEM ID:  [id number]        Report per radiology    Laboratory:  Labs Ordered and Resulted from Time of ED Arrival to Time of ED Departure   COMPREHENSIVE METABOLIC PANEL - Abnormal       Result Value    Sodium 131 (*)     Potassium 4.7      Chloride 94 (*)     Carbon Dioxide (CO2) 28      Anion Gap 9      Urea Nitrogen 10.4      Creatinine 0.65 (*)     Calcium 9.5      Glucose 98      Alkaline Phosphatase 143 (*)     AST 86 (*)     ALT 16      Protein Total 7.0      Albumin 3.7      Bilirubin Total 0.3      GFR Estimate >90     CBC WITH PLATELETS AND DIFFERENTIAL - Abnormal    WBC Count 5.2      RBC Count 3.26 (*)     Hemoglobin 11.5 (*)     Hematocrit 35.3 (*)      (*)     MCH 35.3 (*)     MCHC 32.6      RDW 13.2      Platelet Count 306      % Neutrophils 73      % Lymphocytes 18      % Monocytes 9      % Eosinophils 0      % Basophils 0      % Immature Granulocytes 0      NRBCs per 100 WBC 0      Absolute Neutrophils 3.7      Absolute Lymphocytes 1.0      Absolute Monocytes 0.5      Absolute Eosinophils 0.0      Absolute Basophils 0.0      Absolute Immature Granulocytes 0.0      Absolute NRBCs 0.0     TROPONIN T, HIGH SENSITIVITY - Normal    Troponin T, High Sensitivity 12     MAGNESIUM - Normal    Magnesium 2.0     NT PROBNP INPATIENT - Normal    N terminal Pro BNP Inpatient 114        Emergency Department Course:  Reviewed:  I reviewed nursing notes, vitals, past medical history and Care Everywhere    ED Course as of 11/03/22 1936   Thu Nov 03, 2022   1402 CT Chest Pulmonary Embolism w Contrast  Neg PE. 3 mm left upper lobe nodule.   1405 Patient resting comfortably in chair.  In no acute distress.  Updated patient on lab and image findings.  We will ambulate patient with walking pulse  ox.  If negative, will discharge.  Patient inquired about reason behind elevated D-dimer.  I explained to the patient the potential causes of elevated D-dimer.  I also inquired about leg swelling or leg pain.  Patient has chronic right knee pain.  There was no unilateral lower extremity swelling or erythema on my examination.  However, I did offer patient duplex imaging of lower extremity.  Patient feels comfortable with going home at this time without further work-up.  Patient states that he would just follow-up with his primary care provider and scheduled outpatient appointment.  Discussed strict return precautions.  Answered all questions.  Patient voiced understanding and agreement with plan.   1416 Hemoglobin(!): 11.5  Stable unchanged   1428 Walking pulse ox 95% at discharge.     Disposition:  The patient was discharged to home.     Impression & Plan   Medical Decision Makin-year-old male as described above presents emergency department for increasing shortness of breath with exertion for multiple months.  Patient was recently diagnosed with compression fractures of the spine and is currently in a spine immobilizer.  Patient was in the ER yesterday for shortness of breath and was found to have an elevated D-dimer.  However, patient decided to sign out against medical advice due to long wait.  Patient returns today for CT imaging.  Patient hemodynamically stable at time evaluation.  Afebrile.  Saturating 100% on room air.  Differential diagnosis considered includes, but not limited to, shortness of breath secondary to compression fractures, shortness of breath due to difficulty with lungs management while wearing thoracic brace, ACS, heart failure, or pulmonary embolism.  Cardiac work-up repeat.  CT PE.  Discussed care plan with patient who voiced understanding and agreement with plan.  Answered all questions.  Additional work-up and orders as listed in chart.     Please refer to ED course above for  details on the patient's treatment course and any changes or updates in care plan beyond my initial evaluation and MDM.    Diagnosis:    ICD-10-CM    1. Shortness of breath  R06.02       2. Pulmonary nodules  R91.8         Scribe Disclosure:  I, Svetlana Chris, am serving as a scribe at 2:18 PM on 11/3/2022 to document services personally performed by John Weiss DO based on my observations and the provider's statements to me.      John Weiss DO  11/03/22 1936

## 2022-11-03 NOTE — ED NOTES
Rapid Assessment Note    History:   The patient reports experiencing shortness of breath for 3 months that worsened after onset of back pain last week. States that his shortness of breath is worsened when he gets up from sitting and with ambulation. Of note, the patient was evaluated here yesterday for shortness of breath and was called last evening to be notified of an elevated dimer level after he left. He reports that he had an elevated dimer one month ago after being diagnosed with 4 lumbar compression fractures. Reports history of prostate cancer and has been experiencing urinary frequency. Notes he was on hormone supplements for his cancer. Denies fever, chills, cough, sore throat, runny nose, congestion, chest pain, chest tightness, palpitations, nausea, vomiting, abdominal pain, dysuria, hematuria, blood in stool, and black stools.    Exam:   Constitutional:       Appearance: Normal appearance.   HENT:      Head: Normocephalic and atraumatic.   Eyes:      Extraocular Movements: Extraocular movements intact.      Conjunctiva/sclera: Conjunctivae normal.   Cardiovascular:      Rate and Rhythm: Normal rate and regular rhythm.   Pulmonary:      Effort: Pulmonary effort is normal. Not in respiratory distress.      Breath sounds:   Abdominal:      General: Abdomen is flat. There is no distension.      Palpations: Abdomen is soft.      Tenderness: There is no abdominal tenderness.   Musculoskeletal:      Cervical back: Normal range of motion. No rigidity.       Right lower le+ edema.      Left lower le+ edema.   Skin:     General: Skin is warm and dry.   Neurological:      General: No focal deficit present.      Mental Status: Alert and oriented to person, place, and time.   Psychiatric:         Mood and Affect: Mood normal.         Behavior: Behavior normal.    Plan of Care:   I evaluated the patient and developed an initial plan of care. I discussed this plan and explained that I, or one of my partners,  would be returning to complete the evaluation.     77-year-old male as described above presents emergency department for increasing shortness of breath with exertion for multiple months.  Patient was recently diagnosed with compression fractures of the spine and is currently in a spine immobilizer.  Patient was in the ER yesterday for shortness of breath and was found to have an elevated D-dimer.  However, patient decided to sign out against medical advice due to long wait.  Patient returns today for CT imaging.  Patient hemodynamically stable at time evaluation.  Afebrile.  Saturating 100% on room air.  Differential diagnosis considered includes, but not limited to, shortness of breath secondary to compression fractures, shortness of breath due to difficulty with lungs management while wearing thoracic brace, ACS, heart failure, or pulmonary embolism.  Cardiac work-up repeat.  CT PE.  Discussed care plan with patient who voiced understanding and agreement with plan.  Answered all questions.  Additional work-up and orders as listed in chart.     Care transitioned to one of my partners upon completion of rapid assessment.    ED Course as of 11/03/22 1936   Thu Nov 03, 2022   1402 CT Chest Pulmonary Embolism w Contrast  Neg PE. 3 mm left upper lobe nodule.   1405 Patient resting comfortably in chair.  In no acute distress.  Updated patient on lab and image findings.  We will ambulate patient with walking pulse ox.  If negative, will discharge.  Patient inquired about reason behind elevated D-dimer.  I explained to the patient the potential causes of elevated D-dimer.  I also inquired about leg swelling or leg pain.  Patient has chronic right knee pain.  There was no unilateral lower extremity swelling or erythema on my examination.  However, I did offer patient duplex imaging of lower extremity.  Patient feels comfortable with going home at this time without further work-up.  Patient states that he would just  follow-up with his primary care provider and scheduled outpatient appointment.  Discussed strict return precautions.  Answered all questions.  Patient voiced understanding and agreement with plan.   1416 Hemoglobin(!): 11.5  Stable unchanged   1428 Walking pulse ox 95% at discharge.       I, Svetlana Chris, am serving as a scribe to document services personally performed by John Weiss DO, based on my observations and the provider's statements to me.    11/5/2018  [employer]    Portions of this medical record were completed by a scribe. UPON MY REVIEW AND AUTHENTICATION BY ELECTRONIC SIGNATURE, this confirms (a) I performed the applicable clinical services, and (b) the record is accurate.      John Weiss DO  11/03/22 1936

## 2022-11-05 ENCOUNTER — HOSPITAL ENCOUNTER (EMERGENCY)
Facility: CLINIC | Age: 77
Discharge: HOME | End: 2022-11-05
Attending: EMERGENCY MEDICINE | Admitting: EMERGENCY MEDICINE
Payer: COMMERCIAL

## 2022-11-05 ENCOUNTER — APPOINTMENT (OUTPATIENT)
Dept: ULTRASOUND IMAGING | Facility: CLINIC | Age: 77
End: 2022-11-05
Attending: EMERGENCY MEDICINE
Payer: COMMERCIAL

## 2022-11-05 VITALS
DIASTOLIC BLOOD PRESSURE: 114 MMHG | HEART RATE: 97 BPM | SYSTOLIC BLOOD PRESSURE: 184 MMHG | OXYGEN SATURATION: 95 % | TEMPERATURE: 98.5 F | RESPIRATION RATE: 24 BRPM

## 2022-11-05 DIAGNOSIS — M54.31 SCIATICA OF RIGHT SIDE: ICD-10-CM

## 2022-11-05 PROCEDURE — 99284 EMERGENCY DEPT VISIT MOD MDM: CPT | Mod: 25

## 2022-11-05 PROCEDURE — 93971 EXTREMITY STUDY: CPT | Mod: RT

## 2022-11-05 RX ORDER — GABAPENTIN 100 MG/1
100 CAPSULE ORAL 3 TIMES DAILY
Qty: 90 CAPSULE | Refills: 0 | Status: ON HOLD | OUTPATIENT
Start: 2022-11-05 | End: 2022-12-20

## 2022-11-05 RX ORDER — METHYLPREDNISOLONE 4 MG
TABLET, DOSE PACK ORAL
Qty: 21 TABLET | Refills: 0 | Status: SHIPPED | OUTPATIENT
Start: 2022-11-05 | End: 2022-12-06

## 2022-11-05 ASSESSMENT — ENCOUNTER SYMPTOMS
CHILLS: 0
COUGH: 0
ABDOMINAL PAIN: 0
ARTHRALGIAS: 1
FEVER: 0

## 2022-11-05 ASSESSMENT — ACTIVITIES OF DAILY LIVING (ADL): ADLS_ACUITY_SCORE: 33

## 2022-11-05 NOTE — ED TRIAGE NOTES
Patient presents to the ED with right posterior leg pain. States began last night. Denies known injury. Went to [facility] today for eval, but was advised to come to ED to rule out DVT. Patient seen yesterday for dyspnea and had an elevated d-dimer.

## 2022-11-05 NOTE — ED PROVIDER NOTES
"  History   Chief Complaint:  Leg Pain       HPI   Pacheco Torres is a 77 year old male with history of coronary artery disease and hypercholesterolemia who presents with right posterior leg pain since last night. He described this pain as a \"pinched nerve\" in the calf region and currently in the right hip, rating pain 7/10. Denies pain to palpation. Notes compression fractures have caused some nerve pain in the leg previously. Reports taking codeine last night which did not help. Denies abdominal pain, urinary or bowel incontinence, fever, chills, cough, chest pain, left leg pain. This morning, he went to [facility] for evaluation but was advised to come to the emergency department for rule out of deep vein thrombosis. Of note, he was seen yesterday for dyspnea and had an elevated D-dimer.     Review of Systems   Constitutional: Negative for chills and fever.   Respiratory: Negative for cough.    Cardiovascular: Negative for chest pain.   Gastrointestinal: Negative for abdominal pain.   Musculoskeletal: Positive for arthralgias.        + right leg pain   All other systems reviewed and are negative.        Allergies:  The patient has no known allergies.     Medications:  Lisinopril  Simvastatin  Tamsulosin  Sertraline   Nitroglycerin   Aspirin 81 MG  Codeine     Past Medical History:     Monoclonal gammopathy  Hypertension  Obesity  Polymyalgia rheumatica  CAD  Hypercholesterolemia  Depression  Erectile dysfunction  GERD  Nicotine dependence  Hernia  Coronary atherosclerosis  Osteoarthritis   Prostate cancer      Past Surgical History:    Bilateral hernia repair  Coronary stent placement  [place] teeth extraction  Meniscectomy, left     Family History:    Father- dementia, kidney failure  Mother- MI    Social History:  The patient presents to the ED alone.  PCP: Stuart Wolfe   Reports he is a smoker.     Physical Exam     Patient Vitals for the past 24 hrs:   BP Temp Pulse Resp SpO2   11/05/22 " 0902 (!) 184/114 -- -- -- --   22 0856 -- 98.5  F (36.9  C) 97 24 95 %       Physical Exam  Constitutional: Well developed, nontox appearance  Head: Atraumatic.   Neck:  no stridor  Eyes: no scleral icterus  Cardiovascular: RRR, 2+ bilat radial, R DP and PT pulses  Pulmonary/Chest: nml resp effort, Clear BS bilat  Abdominal: ND, soft, NT, no rebound or guarding   Back: no L spine tenderness  Ext: Warm, well perfused, no edema  Neurological: A&O, symmetric facies, moves ext x4, 5/5 strength to RLE, Sensation grossly intact to light touch  Skin: Skin is warm and dry.   Psychiatric: Behavior is normal. Thought content normal.   Nursing note and vitals reviewed.    Emergency Department Course     Imaging:  US Lower Extremity Venous Duplex Right   Final Result   IMPRESSION:   1.  No deep venous thrombosis in the right lower extremity.        Report per radiology    Emergency Department Course:       Reviewed:  I reviewed nursing notes, vitals, past medical history and Care Everywhere    Assessments:  1050 I obtained history and examined the patient as noted above.     Disposition:  The patient was discharged to home.     Impression & Plan     Medical Decision Makin year old male presenting w/ R leg pain     The patient presented with radicular symptoms in RLE. The pain has improved with interventions in the emergency department. The patient did not sustain any trauma, therefore x-rays are not necessary due to the low likelihood of fracture or subluxation. Advanced imaging with CT/MRI is not indicated at this time, but may be indicated in the future if symptoms fail to resolve.  The patient has not had a fever, saddle/perineal anesthesia, bilateral foot numbness, or bowel or bladder dysfunction.  There is no clinical evidence of cauda equina syndrome, discitis, spinal/epidural space hematoma or abscess. The neurological exam is normal.  Duplex US to evaluated for DVT was negative.  At this time I feel the pt  is safe for discharge.  Recommendations given regarding follow up with PCP, primary orthopedic surgeon and return to the emergency department as needed for new or worsening symptoms.  Pt counseled on all results, disposition and diagnosis.  They are understanding and agreeable to plan. Patient discharged in stable condition.       Diagnosis:    ICD-10-CM    1. Sciatica of right side  M54.31           Discharge Medications:  New Prescriptions    ACETAMINOPHEN-CODEINE (TYLENOL #3) 300-30 MG TABLET    Take 1 tablet by mouth every 6 hours as needed for severe pain    GABAPENTIN (NEURONTIN) 100 MG CAPSULE    Take 1 capsule (100 mg) by mouth 3 times daily for 30 days    METHYLPREDNISOLONE (MEDROL DOSEPAK) 4 MG TABLET THERAPY PACK    Follow Package Directions       Scribe Disclosure:  Marzena SON, am serving as a scribe at 10:47 AM on 11/5/2022 to document services personally performed by Jamin Langston MD based on my observations and the provider's statements to me.          Jamin Langston MD  11/05/22 [id number]

## 2022-11-07 ENCOUNTER — NURSE TRIAGE (OUTPATIENT)
Dept: NURSING | Facility: CLINIC | Age: 77
End: 2022-11-07

## 2022-11-07 NOTE — TELEPHONE ENCOUNTER
Pt was seen in ED and is wanting to go over his radiology results from his CT chest on 11/03/2022    Pt will be making an appointment with his provider to discuss results     No triage     Ly Fan RN  [facility] Nurse Advisor  1:52 PM 11/7/2022      Reason for Disposition    Health Information question, no triage required and triager able to answer question    Additional Information    Negative: [1] Caller is not with the adult (patient) AND [2] reporting urgent symptoms    Negative: Lab result questions    Negative: Medication questions    Negative: Caller can't be reached by phone    Negative: Caller has already spoken to PCP or another triager    Negative: Requesting regular office appointment    Negative: [1] Caller requesting NON-URGENT health information AND [2] PCP's office is the best resource    Protocols used: INFORMATION ONLY CALL - NO TRIAGE-[facility]

## 2022-11-08 ENCOUNTER — TELEPHONE (OUTPATIENT)
Dept: NURSING | Facility: CLINIC | Age: 77
End: 2022-11-08

## 2022-11-08 NOTE — TELEPHONE ENCOUNTER
"Patient calling back again to discuss imaging results from ED visit on  11/3/22. Patient was seen in ED for shortness of breath and has questions about the imaging that was done specifically the \"numerous scattered pulmonary nodules\" and one noted to be 23mm in size. Patient is concerned that the impression doesn't line up with the findings.     Patient was hoping ED provider could clarify, but informed patient he needs to discuss with his Primary Care Provider.    Patient's PCP in not within [place].    No triage - patient declined breathing difficulty, chest pain or shortness of breath      Cinthia Dietz RN  11/08/22 9:41 AM  [facility] Nurse Advisor  "

## 2022-11-19 ENCOUNTER — HOSPITAL ENCOUNTER (EMERGENCY)
Facility: CLINIC | Age: 77
Discharge: HOME | End: 2022-11-19
Attending: EMERGENCY MEDICINE | Admitting: EMERGENCY MEDICINE
Payer: COMMERCIAL

## 2022-11-19 VITALS
HEART RATE: 84 BPM | SYSTOLIC BLOOD PRESSURE: 187 MMHG | OXYGEN SATURATION: 96 % | DIASTOLIC BLOOD PRESSURE: 97 MMHG | TEMPERATURE: 98.2 F | RESPIRATION RATE: 20 BRPM

## 2022-11-19 DIAGNOSIS — G89.29 CHRONIC RIGHT-SIDED LOW BACK PAIN WITH RIGHT-SIDED SCIATICA: ICD-10-CM

## 2022-11-19 DIAGNOSIS — M54.41 CHRONIC RIGHT-SIDED LOW BACK PAIN WITH RIGHT-SIDED SCIATICA: ICD-10-CM

## 2022-11-19 PROCEDURE — 99283 EMERGENCY DEPT VISIT LOW MDM: CPT

## 2022-11-19 PROCEDURE — 250N000013 HC RX MED GY IP 250 OP 250 PS 637: Performed by: EMERGENCY MEDICINE

## 2022-11-19 RX ORDER — OXYCODONE AND ACETAMINOPHEN 5; 325 MG/1; MG/1
1 TABLET ORAL ONCE
Status: COMPLETED | OUTPATIENT
Start: 2022-11-19 | End: 2022-11-19

## 2022-11-19 RX ORDER — OXYCODONE HYDROCHLORIDE 5 MG/1
5 TABLET ORAL 2 TIMES DAILY PRN
Qty: 12 TABLET | Refills: 0 | Status: SHIPPED | OUTPATIENT
Start: 2022-11-19 | End: 2022-11-22

## 2022-11-19 RX ADMIN — OXYCODONE HYDROCHLORIDE AND ACETAMINOPHEN 1 TABLET: 5; 325 TABLET ORAL at 04:45

## 2022-11-19 ASSESSMENT — ENCOUNTER SYMPTOMS
DIARRHEA: 0
CONSTIPATION: 0
DYSURIA: 0
BACK PAIN: 1
FEVER: 0

## 2022-11-19 ASSESSMENT — ACTIVITIES OF DAILY LIVING (ADL): ADLS_ACUITY_SCORE: 35

## 2022-11-19 NOTE — ED TRIAGE NOTES
Pt arrives from home via EMS with worsening back pain. Pt was having low back pain starting in September and was discovered to have lumbar compression fractures at that time per pt report. Recently his pain has been worsening and he says that he is no longer able to find relief from the pain with his current meds. Pt received 75mcg of fentanyl en route via EMS. VSS, A&Ox4.     Triage Assessment     Row Name 11/19/22 0402       Triage Assessment (Adult)    Airway WDL WDL       Respiratory WDL    Respiratory WDL WDL       Skin Circulation/Temperature WDL    Skin Circulation/Temperature WDL WDL       Cardiac WDL    Cardiac WDL WDL       Peripheral/Neurovascular WDL    Peripheral Neurovascular WDL WDL       Cognitive/Neuro/Behavioral WDL    Cognitive/Neuro/Behavioral WDL WDL              
Stable

## 2022-11-19 NOTE — DISCHARGE INSTRUCTIONS
Continue tylenol  You can still use muscle relaxer  Add oxycodone for pain as needed  Follow up with orthopedics on Tuesday for imaging, pulmonology (would consider doing pulmonary function testing)    Opioid Medication Information    You have been given a prescription for an opioid (narcotic) pain medicine and/or have received a pain medicine while here in the Emergency Department. These medicines can make you drowsy or impaired. You must not drive, operate dangerous equipment, or engage in any other dangerous activities while taking these medications. If you drive while taking these medications, you could be arrested for driving under the influence (DUI). Do not drink any alcohol while you are taking these medications.     Opioid pain medications can cause addiction. If you have a history of chemical dependency of any type, you are at a higher risk of becoming addicted to pain medications.  Only take these prescribed medications to treat your pain when all other options have been tried. Take it for as short a time and as few doses as possible. Store your pain pills in a secure place, as they are frequently stolen and provide a dangerous opportunity for children or visitors in your house to start abusing these powerful medications. We will not replace any lost or stolen medicine.    If you do not finish your medication, it is a good idea to get rid of it but please do not flush it down the toilet. Please dispose of the remaining medication at a local pharmacy or law enforcement facility. The [place] [employer] has additional information on medication disposal: https://www.pca.[facility].[place]/living-green/managing-unwanted-medications.      Many prescription pain medications contain Tylenol  (acetaminophen), including Vicodin , Tylenol #3 , Norco , Lortab , and Percocet .  You should not take any extra pills of Tylenol  if you are using these prescription medications or you can get very sick.  Do not  ever take more than 3000 mg of acetaminophen in any 24 hour period.    All opioids tend to cause constipation. Drink plenty of water and eat foods that have a lot of fiber, such as fruits, vegetables, prune juice, apple juice and high fiber cereal.  Take a laxative if you don t move your bowels at least every other day. Miralax , Milk of Magnesia, Colace , or Senna  can be used to keep you regular.

## 2022-11-19 NOTE — ED PROVIDER NOTES
History   Chief Complaint:  Back Pain       The history is provided by the patient.      Pacheco Torres is a 77 year old male with history of lumbar compression fracture, CAD, hyperlipidemia, and hypertension who presents via EMS with worsening back pain and pain to the right hip radiating down to the ankle consistent with sciatica pain. He also endorses pain to the left rib area but this is not new. He denies paraesthesia or weakness to the right leg. He reports onset of back pain in September, approximately 2 months ago. He was seen at [facility] and was found to have a lumbar compression fracture. He later followed up with [facility] in October where he was found to have 4 compression fractures on imaging. He has been taking flexeril and Tylenol without relief. He last took Tylenol 2 hours ago. He notes that last time he was seen here in the ED 2 weeks ago, he was given pain medication, which improved his symptoms. He was placed on a steroid regimen, which he has completed. Overall, he denies any new symptoms but rather endorses worsening pain. He denies any recent falls as well as fever, diarrhea, dysuria, and constipation.    Review of Systems   Constitutional: Negative for fever.   Gastrointestinal: Negative for constipation and diarrhea.   Genitourinary: Negative for dysuria.   Musculoskeletal: Positive for back pain.        (+) pain to right lower extremity   (+) pain to left rib   All other systems reviewed and are negative.    Allergies:  Codeine  Homeopathic products    Medications:  Gabapentin  Methylprednisolone   Aspirin   Sertraline  Flomax  Simvastatin   Lisinopril     Past Medical History:     Monoclonal gammopathy  Hypertension  Obesity  Polymyalgia rheumatica  CAD  Hyperlipidemia   Depression  Erectile dysfunction  GERD  Nicotine dependence  Hernia  Coronary atherosclerosis  Osteoarthritis   Prostate cancer     Past Surgical History:    Hernia repair, bilateral  Coronary stent placement  [place] teeth  extraction  Meniscectomy, left    Family History:    Alcoholism  Dementia   Kidney failure  Myocardial infarction     Social History:  Living Situation: lives in a house  The patient presents alone  The patient presents via EMS.  PCP: Stuart Wolfe     Physical Exam     Patient Vitals for the past 24 hrs:   BP Temp Temp src Pulse Resp SpO2   11/19/22 0359 (!) 165/97 98.2  F (36.8  C) Temporal 100 18 95 %       Physical Exam  General: Sitting up in bed  Eyes:  The pupils are equal and round    Conjunctivae and sclerae are normal  ENT:    Wearing a mask  Neck:  Normal range of motion  CV:  Regular rate, regular rhythm     Skin warm and well perfused   Resp:  Non labored breathing on room air    No tachypnea    No cough heard, lungs clear bilaterally.  GI:  Abdomen is soft, there is no rigidity    No distension    No rebound tenderness     No abdominal tenderness  MS:  Normal muscular tone.  No back tenderness  Skin:  No rash or acute skin lesions noted  Neuro:   Awake, alert.      Speech is normal and fluent.    Face is symmetric.     Moves all extremities equally, sensation intact to light touch in bilateral lower extremities    Patient is able to ambulate.  No weakness on bilateral lower extremities.  Psych: Normal affect.  Appropriate interactions.    Emergency Department Course     Emergency Department Course:    Reviewed:  I reviewed nursing notes, vitals, past medical history and Care Everywhere    Assessments:  0419 I obtained history and examined the patient as noted above.   0537 I rechecked the patient.   0605 I rechecked the patient. The patient feels improved.  0609 I spoke with the patient's wife on the phone.  0621 I spoke with the patient and his wife at bedside.    Interventions:  0445 Percocet 1 tablet PO    Disposition:  The patient was discharged to home.     Impression & Plan     Medical Decision Making:  This pt presents with back pain that I suspect represents sciatica.  This is not new  for him.  He has had this for several weeks.  He has had imaging at [facility] that showed compression fractures.  I am unable to view this image.  He is actually supposed to get new MRI in a few days through [facility].  He has no new symptoms to suggest need for emergent MRI at this time.  He has no weakness, fever, numbness or symptoms to suggest cauda equina.  No symptoms to suggest infection.   The pt has a nl neuro exam. There are no findings in hx or physical to increase my suspicion of renal pathology. They do not have red flags to increase my suspicion of aortic/vascular pathology..  He has been taking muscle relaxer which has not helped.  Also taking gabapentin at times.  Also taking Tylenol.  He tried to get a prescription for stronger pain medications through [facility] but was given a muscle relaxer which is not helping.  Discussed pain control with him and he would like to try oxycodone which he has used in the past.  Discussed the risks of this medication with him.  He understands the risks.  His significant other also raises the question of him having shortness of breath though this is not new for him.  He has shortness of breath for many weeks that has not changed.  He has had significant work-up for this already which includes CT chest, laboratory studies, EKG.  He declines any further work-up for this in the emergency department today such as laboratory studies, EKG or chest x-ray.  He is scheduled to see pulmonology as well as cardiology in the next few weeks.  He is not hypoxic.  He does not appear to have shortness of breath on my evaluation. I do wonder about possibility of COPD as he is long time smoker and still smokes. No wheezing on my examination today. He would like to go home with the pain medication without any further testing.    Diagnosis:    ICD-10-CM    1. Chronic right-sided low back pain with right-sided sciatica  M54.41     G89.29         Scribe Disclosure:  I, Rosa Isela Cloud, am serving as a scribe  at 4:16 AM on 11/19/2022 to document services personally performed by Giulia Salinas MD based on my observations and the provider's statements to me.          Giulia Salinas MD  11/19/22 0815

## 2022-11-28 ENCOUNTER — HOSPITAL ENCOUNTER (OUTPATIENT)
Facility: CLINIC | Age: 77
End: 2022-11-28
Payer: COMMERCIAL

## 2022-12-01 ENCOUNTER — HOSPITAL ENCOUNTER (OUTPATIENT)
Facility: CLINIC | Age: 77
End: 2022-12-01
Payer: COMMERCIAL

## 2022-12-03 ENCOUNTER — HEALTH MAINTENANCE LETTER (OUTPATIENT)
Age: 77
End: 2022-12-03

## 2022-12-06 ENCOUNTER — APPOINTMENT (OUTPATIENT)
Dept: CT IMAGING | Facility: CLINIC | Age: 77
DRG: 871 | End: 2022-12-06
Attending: EMERGENCY MEDICINE
Payer: COMMERCIAL

## 2022-12-06 ENCOUNTER — HOSPITAL ENCOUNTER (INPATIENT)
Facility: CLINIC | Age: 77
LOS: 14 days | Discharge: NURSING, REHAB OR LONG-TERM CARE | DRG: 871 | End: 2022-12-20
Attending: EMERGENCY MEDICINE | Admitting: INTERNAL MEDICINE
Payer: COMMERCIAL

## 2022-12-06 ENCOUNTER — APPOINTMENT (OUTPATIENT)
Dept: GENERAL RADIOLOGY | Facility: CLINIC | Age: 77
DRG: 871 | End: 2022-12-06
Attending: EMERGENCY MEDICINE
Payer: COMMERCIAL

## 2022-12-06 ENCOUNTER — APPOINTMENT (OUTPATIENT)
Dept: CARDIOLOGY | Facility: CLINIC | Age: 77
DRG: 871 | End: 2022-12-06
Attending: EMERGENCY MEDICINE
Payer: COMMERCIAL

## 2022-12-06 DIAGNOSIS — E66.01 MORBID EXOGENOUS OBESITY (H): ICD-10-CM

## 2022-12-06 DIAGNOSIS — R41.82 ALTERED MENTAL STATUS, UNSPECIFIED ALTERED MENTAL STATUS TYPE: ICD-10-CM

## 2022-12-06 DIAGNOSIS — K21.9 GASTROESOPHAGEAL REFLUX DISEASE, UNSPECIFIED WHETHER ESOPHAGITIS PRESENT: ICD-10-CM

## 2022-12-06 DIAGNOSIS — D47.2 MONOCLONAL PARAPROTEINEMIA: ICD-10-CM

## 2022-12-06 DIAGNOSIS — E86.1 HYPOVOLEMIA: ICD-10-CM

## 2022-12-06 DIAGNOSIS — M35.3 POLYMYALGIA RHEUMATICA (H): ICD-10-CM

## 2022-12-06 DIAGNOSIS — I21.4 NSTEMI (NON-ST ELEVATED MYOCARDIAL INFARCTION) (H): ICD-10-CM

## 2022-12-06 DIAGNOSIS — R65.21 SEPTIC SHOCK (H): ICD-10-CM

## 2022-12-06 DIAGNOSIS — F33.42 RECURRENT MAJOR DEPRESSION IN FULL REMISSION (H): ICD-10-CM

## 2022-12-06 DIAGNOSIS — D47.2 MGUS (MONOCLONAL GAMMOPATHY OF UNKNOWN SIGNIFICANCE): ICD-10-CM

## 2022-12-06 DIAGNOSIS — C61 PROSTATE CANCER (H): Primary | ICD-10-CM

## 2022-12-06 DIAGNOSIS — A41.9 SEPTIC SHOCK (H): ICD-10-CM

## 2022-12-06 PROBLEM — E55.9 VITAMIN D DEFICIENCY: Status: ACTIVE | Noted: 2018-02-19

## 2022-12-06 PROBLEM — M17.0 PRIMARY OSTEOARTHRITIS OF BOTH KNEES: Status: ACTIVE | Noted: 2018-02-19

## 2022-12-06 PROBLEM — E78.5 HYPERLIPIDEMIA: Status: ACTIVE | Noted: 2017-12-18

## 2022-12-06 PROBLEM — I10 PRIMARY HYPERTENSION: Status: ACTIVE | Noted: 2017-12-18

## 2022-12-06 PROBLEM — M16.0 BILATERAL PRIMARY OSTEOARTHRITIS OF HIP: Status: ACTIVE | Noted: 2018-02-19

## 2022-12-06 LAB
ABO/RH(D): NORMAL
ALBUMIN SERPL BCG-MCNC: 3.7 G/DL (ref 3.5–5.2)
ALBUMIN UR-MCNC: 30 MG/DL
ALP SERPL-CCNC: 147 U/L (ref 40–129)
ALT SERPL W P-5'-P-CCNC: 17 U/L (ref 10–50)
ANION GAP BLD CALCULATED.3IONS-SCNC: 17 MMOL/L (ref 3–14)
ANION GAP SERPL CALCULATED.3IONS-SCNC: 16 MMOL/L (ref 7–15)
ANTIBODY SCREEN: NEGATIVE
APPEARANCE UR: CLEAR
APTT PPP: 24 SECONDS (ref 22–38)
AST SERPL W P-5'-P-CCNC: 78 U/L (ref 10–50)
BASOPHILS # BLD AUTO: 0 10E3/UL (ref 0–0.2)
BASOPHILS NFR BLD AUTO: 0 %
BILIRUB SERPL-MCNC: 0.3 MG/DL
BILIRUB UR QL STRIP: NEGATIVE
BUN SERPL-MCNC: 17 MG/DL (ref 7–30)
BUN SERPL-MCNC: 17.2 MG/DL (ref 8–23)
CA-I BLD-MCNC: 5.2 MG/DL (ref 4.4–5.2)
CALCIUM SERPL-MCNC: 10.5 MG/DL (ref 8.8–10.2)
CHLORIDE BLD-SCNC: 92 MMOL/L (ref 94–109)
CHLORIDE SERPL-SCNC: 91 MMOL/L (ref 98–107)
CO2 BLD-SCNC: 27 MMOL/L (ref 20–32)
COLOR UR AUTO: YELLOW
CREAT BLD-MCNC: 1.2 MG/DL (ref 0.7–1.3)
CREAT SERPL-MCNC: 0.95 MG/DL (ref 0.67–1.17)
DEPRECATED HCO3 PLAS-SCNC: 25 MMOL/L (ref 22–29)
EOSINOPHIL # BLD AUTO: 0 10E3/UL (ref 0–0.7)
EOSINOPHIL NFR BLD AUTO: 0 %
ERYTHROCYTE [DISTWIDTH] IN BLOOD BY AUTOMATED COUNT: 12.7 % (ref 10–15)
ERYTHROCYTE [DISTWIDTH] IN BLOOD BY AUTOMATED COUNT: 13 % (ref 10–15)
ETHANOL SERPL-MCNC: <0.01 G/DL
FLUAV RNA SPEC QL NAA+PROBE: NEGATIVE
FLUBV RNA RESP QL NAA+PROBE: NEGATIVE
GFR SERPL CREATININE-BSD FRML MDRD: 83 ML/MIN/1.73M2
GLUCOSE BLD-MCNC: 187 MG/DL (ref 70–99)
GLUCOSE BLDC GLUCOMTR-MCNC: 137 MG/DL (ref 70–99)
GLUCOSE BLDC GLUCOMTR-MCNC: 138 MG/DL (ref 70–99)
GLUCOSE SERPL-MCNC: 193 MG/DL (ref 70–99)
GLUCOSE UR STRIP-MCNC: NEGATIVE MG/DL
HCO3 BLDV-SCNC: 28 MMOL/L (ref 21–28)
HCO3 BLDV-SCNC: 30 MMOL/L (ref 21–28)
HCT VFR BLD AUTO: 34 % (ref 40–53)
HCT VFR BLD AUTO: 37.3 % (ref 40–53)
HCT VFR BLD CALC: 36 % (ref 40–53)
HEMOCCULT STL QL: POSITIVE
HGB BLD-MCNC: 11.5 G/DL (ref 13.3–17.7)
HGB BLD-MCNC: 12.2 G/DL (ref 13.3–17.7)
HGB BLD-MCNC: 12.4 G/DL (ref 13.3–17.7)
HGB UR QL STRIP: ABNORMAL
IMM GRANULOCYTES # BLD: 0.1 10E3/UL
IMM GRANULOCYTES NFR BLD: 1 %
INR PPP: 1.03 (ref 0.85–1.15)
KETONES UR STRIP-MCNC: NEGATIVE MG/DL
LACTATE BLD-SCNC: 1.4 MMOL/L
LACTATE BLD-SCNC: 5.4 MMOL/L
LACTATE SERPL-SCNC: 1.3 MMOL/L (ref 0.7–2)
LACTATE SERPL-SCNC: 2.8 MMOL/L (ref 0.7–2)
LEUKOCYTE ESTERASE UR QL STRIP: NEGATIVE
LIPASE SERPL-CCNC: 143 U/L (ref 13–60)
LVEF ECHO: NORMAL
LYMPHOCYTES # BLD AUTO: 0.9 10E3/UL (ref 0.8–5.3)
LYMPHOCYTES NFR BLD AUTO: 11 %
MAGNESIUM SERPL-MCNC: 2.3 MG/DL (ref 1.7–2.3)
MCH RBC QN AUTO: 34.8 PG (ref 26.5–33)
MCH RBC QN AUTO: 35 PG (ref 26.5–33)
MCHC RBC AUTO-ENTMCNC: 33.2 G/DL (ref 31.5–36.5)
MCHC RBC AUTO-ENTMCNC: 33.8 G/DL (ref 31.5–36.5)
MCV RBC AUTO: 103 FL (ref 78–100)
MCV RBC AUTO: 105 FL (ref 78–100)
MONOCYTES # BLD AUTO: 0.5 10E3/UL (ref 0–1.3)
MONOCYTES NFR BLD AUTO: 7 %
MUCOUS THREADS #/AREA URNS LPF: PRESENT /LPF
NEUTROPHILS # BLD AUTO: 6.1 10E3/UL (ref 1.6–8.3)
NEUTROPHILS NFR BLD AUTO: 81 %
NITRATE UR QL: NEGATIVE
NRBC # BLD AUTO: 0 10E3/UL
NRBC BLD AUTO-RTO: 0 /100
PCO2 BLDV: 44 MM HG (ref 40–50)
PCO2 BLDV: 54 MM HG (ref 40–50)
PH BLDV: 7.35 [PH] (ref 7.32–7.43)
PH BLDV: 7.41 [PH] (ref 7.32–7.43)
PH UR STRIP: 7 [PH] (ref 5–7)
PLATELET # BLD AUTO: 277 10E3/UL (ref 150–450)
PLATELET # BLD AUTO: 331 10E3/UL (ref 150–450)
PO2 BLDV: 19 MM HG (ref 25–47)
PO2 BLDV: 30 MM HG (ref 25–47)
POTASSIUM BLD-SCNC: 3.4 MMOL/L (ref 3.4–5.3)
POTASSIUM SERPL-SCNC: 3.4 MMOL/L (ref 3.4–5.3)
PROCALCITONIN SERPL IA-MCNC: 0.15 NG/ML
PROT SERPL-MCNC: 6.7 G/DL (ref 6.4–8.3)
RBC # BLD AUTO: 3.3 10E6/UL (ref 4.4–5.9)
RBC # BLD AUTO: 3.54 10E6/UL (ref 4.4–5.9)
RBC URINE: 7 /HPF
RSV RNA SPEC NAA+PROBE: NEGATIVE
SAO2 % BLDV: 30 % (ref 94–100)
SAO2 % BLDV: 53 % (ref 94–100)
SARS-COV-2 RNA RESP QL NAA+PROBE: NEGATIVE
SODIUM BLD-SCNC: 131 MMOL/L (ref 133–144)
SODIUM SERPL-SCNC: 132 MMOL/L (ref 136–145)
SP GR UR STRIP: 1.03 (ref 1–1.03)
SPECIMEN EXPIRATION DATE: NORMAL
SQUAMOUS EPITHELIAL: <1 /HPF
TROPONIN T SERPL HS-MCNC: 222 NG/L
TROPONIN T SERPL HS-MCNC: 29 NG/L
TROPONIN T SERPL HS-MCNC: 493 NG/L
UFH PPP CHRO-ACNC: 0.4 IU/ML
UROBILINOGEN UR STRIP-MCNC: NORMAL MG/DL
WBC # BLD AUTO: 5.5 10E3/UL (ref 4–11)
WBC # BLD AUTO: 7.6 10E3/UL (ref 4–11)
WBC URINE: 1 /HPF

## 2022-12-06 PROCEDURE — 84484 ASSAY OF TROPONIN QUANT: CPT | Performed by: INTERNAL MEDICINE

## 2022-12-06 PROCEDURE — 96366 THER/PROPH/DIAG IV INF ADDON: CPT

## 2022-12-06 PROCEDURE — 93005 ELECTROCARDIOGRAM TRACING: CPT

## 2022-12-06 PROCEDURE — 84145 PROCALCITONIN (PCT): CPT | Performed by: EMERGENCY MEDICINE

## 2022-12-06 PROCEDURE — 3E043XZ INTRODUCTION OF VASOPRESSOR INTO CENTRAL VEIN, PERCUTANEOUS APPROACH: ICD-10-PCS | Performed by: EMERGENCY MEDICINE

## 2022-12-06 PROCEDURE — 200N000001 HC R&B ICU

## 2022-12-06 PROCEDURE — 81001 URINALYSIS AUTO W/SCOPE: CPT | Performed by: EMERGENCY MEDICINE

## 2022-12-06 PROCEDURE — 93308 TTE F-UP OR LMTD: CPT | Mod: 26 | Performed by: INTERNAL MEDICINE

## 2022-12-06 PROCEDURE — C9803 HOPD COVID-19 SPEC COLLECT: HCPCS

## 2022-12-06 PROCEDURE — 70450 CT HEAD/BRAIN W/O DYE: CPT

## 2022-12-06 PROCEDURE — 36556 INSERT NON-TUNNEL CV CATH: CPT

## 2022-12-06 PROCEDURE — 99291 CRITICAL CARE FIRST HOUR: CPT | Mod: 25

## 2022-12-06 PROCEDURE — 82077 ASSAY SPEC XCP UR&BREATH IA: CPT | Performed by: EMERGENCY MEDICINE

## 2022-12-06 PROCEDURE — 250N000011 HC RX IP 250 OP 636: Performed by: EMERGENCY MEDICINE

## 2022-12-06 PROCEDURE — 82272 OCCULT BLD FECES 1-3 TESTS: CPT | Performed by: EMERGENCY MEDICINE

## 2022-12-06 PROCEDURE — 99292 CRITICAL CARE ADDL 30 MIN: CPT

## 2022-12-06 PROCEDURE — 93321 DOPPLER ECHO F-UP/LMTD STD: CPT | Mod: 26 | Performed by: INTERNAL MEDICINE

## 2022-12-06 PROCEDURE — 86850 RBC ANTIBODY SCREEN: CPT | Performed by: EMERGENCY MEDICINE

## 2022-12-06 PROCEDURE — 99223 1ST HOSP IP/OBS HIGH 75: CPT | Mod: AI | Performed by: INTERNAL MEDICINE

## 2022-12-06 PROCEDURE — 96375 TX/PRO/DX INJ NEW DRUG ADDON: CPT

## 2022-12-06 PROCEDURE — 85014 HEMATOCRIT: CPT

## 2022-12-06 PROCEDURE — 86901 BLOOD TYPING SEROLOGIC RH(D): CPT | Performed by: EMERGENCY MEDICINE

## 2022-12-06 PROCEDURE — 250N000009 HC RX 250: Performed by: EMERGENCY MEDICINE

## 2022-12-06 PROCEDURE — 96365 THER/PROPH/DIAG IV INF INIT: CPT

## 2022-12-06 PROCEDURE — 71045 X-RAY EXAM CHEST 1 VIEW: CPT

## 2022-12-06 PROCEDURE — 85730 THROMBOPLASTIN TIME PARTIAL: CPT | Performed by: EMERGENCY MEDICINE

## 2022-12-06 PROCEDURE — 84484 ASSAY OF TROPONIN QUANT: CPT | Performed by: EMERGENCY MEDICINE

## 2022-12-06 PROCEDURE — 87040 BLOOD CULTURE FOR BACTERIA: CPT | Performed by: EMERGENCY MEDICINE

## 2022-12-06 PROCEDURE — 85025 COMPLETE CBC W/AUTO DIFF WBC: CPT | Performed by: EMERGENCY MEDICINE

## 2022-12-06 PROCEDURE — 83690 ASSAY OF LIPASE: CPT | Performed by: EMERGENCY MEDICINE

## 2022-12-06 PROCEDURE — 82435 ASSAY OF BLOOD CHLORIDE: CPT | Performed by: EMERGENCY MEDICINE

## 2022-12-06 PROCEDURE — 87637 SARSCOV2&INF A&B&RSV AMP PRB: CPT | Performed by: EMERGENCY MEDICINE

## 2022-12-06 PROCEDURE — 82803 BLOOD GASES ANY COMBINATION: CPT

## 2022-12-06 PROCEDURE — 93325 DOPPLER ECHO COLOR FLOW MAPG: CPT | Mod: 26 | Performed by: INTERNAL MEDICINE

## 2022-12-06 PROCEDURE — 36415 COLL VENOUS BLD VENIPUNCTURE: CPT | Performed by: EMERGENCY MEDICINE

## 2022-12-06 PROCEDURE — 83605 ASSAY OF LACTIC ACID: CPT

## 2022-12-06 PROCEDURE — 36415 COLL VENOUS BLD VENIPUNCTURE: CPT | Performed by: INTERNAL MEDICINE

## 2022-12-06 PROCEDURE — C9113 INJ PANTOPRAZOLE SODIUM, VIA: HCPCS | Performed by: EMERGENCY MEDICINE

## 2022-12-06 PROCEDURE — 85610 PROTHROMBIN TIME: CPT | Performed by: EMERGENCY MEDICINE

## 2022-12-06 PROCEDURE — 255N000002 HC RX 255 OP 636: Performed by: EMERGENCY MEDICINE

## 2022-12-06 PROCEDURE — 83735 ASSAY OF MAGNESIUM: CPT | Performed by: EMERGENCY MEDICINE

## 2022-12-06 PROCEDURE — 250N000013 HC RX MED GY IP 250 OP 250 PS 637: Performed by: EMERGENCY MEDICINE

## 2022-12-06 PROCEDURE — 02HV33Z INSERTION OF INFUSION DEVICE INTO SUPERIOR VENA CAVA, PERCUTANEOUS APPROACH: ICD-10-PCS | Performed by: EMERGENCY MEDICINE

## 2022-12-06 PROCEDURE — 85027 COMPLETE CBC AUTOMATED: CPT | Performed by: EMERGENCY MEDICINE

## 2022-12-06 PROCEDURE — 272N000007 HC KIT ART LINE INSERTION

## 2022-12-06 PROCEDURE — 83605 ASSAY OF LACTIC ACID: CPT | Performed by: INTERNAL MEDICINE

## 2022-12-06 PROCEDURE — 999N000065 XR CHEST PORT 1 VIEW

## 2022-12-06 PROCEDURE — 96367 TX/PROPH/DG ADDL SEQ IV INF: CPT

## 2022-12-06 PROCEDURE — 93005 ELECTROCARDIOGRAM TRACING: CPT | Mod: 76

## 2022-12-06 PROCEDURE — 74177 CT ABD & PELVIS W/CONTRAST: CPT

## 2022-12-06 PROCEDURE — 85520 HEPARIN ASSAY: CPT | Performed by: INTERNAL MEDICINE

## 2022-12-06 PROCEDURE — 258N000003 HC RX IP 258 OP 636: Performed by: EMERGENCY MEDICINE

## 2022-12-06 PROCEDURE — 250N000011 HC RX IP 250 OP 636: Performed by: INTERNAL MEDICINE

## 2022-12-06 PROCEDURE — 999N000157 HC STATISTIC RCP TIME EA 10 MIN

## 2022-12-06 PROCEDURE — 93321 DOPPLER ECHO F-UP/LMTD STD: CPT

## 2022-12-06 RX ORDER — ACETAMINOPHEN 325 MG/1
650 TABLET ORAL EVERY 6 HOURS PRN
Status: DISCONTINUED | OUTPATIENT
Start: 2022-12-06 | End: 2022-12-20 | Stop reason: HOSPADM

## 2022-12-06 RX ORDER — CHOLECALCIFEROL (VITAMIN D3) 50 MCG
1 TABLET ORAL DAILY
COMMUNITY

## 2022-12-06 RX ORDER — ONDANSETRON 2 MG/ML
4 INJECTION INTRAMUSCULAR; INTRAVENOUS EVERY 6 HOURS PRN
Status: DISCONTINUED | OUTPATIENT
Start: 2022-12-06 | End: 2022-12-20 | Stop reason: HOSPADM

## 2022-12-06 RX ORDER — ONDANSETRON 4 MG/1
4 TABLET, ORALLY DISINTEGRATING ORAL EVERY 6 HOURS PRN
Status: DISCONTINUED | OUTPATIENT
Start: 2022-12-06 | End: 2022-12-20 | Stop reason: HOSPADM

## 2022-12-06 RX ORDER — ACETAMINOPHEN 500 MG
1000 TABLET ORAL ONCE
Status: DISCONTINUED | OUTPATIENT
Start: 2022-12-06 | End: 2022-12-06

## 2022-12-06 RX ORDER — HYDROMORPHONE HYDROCHLORIDE 1 MG/ML
0.5 INJECTION, SOLUTION INTRAMUSCULAR; INTRAVENOUS; SUBCUTANEOUS
Status: COMPLETED | OUTPATIENT
Start: 2022-12-06 | End: 2022-12-06

## 2022-12-06 RX ORDER — PREDNISOLONE ACETATE 10 MG/ML
1 SUSPENSION/ DROPS OPHTHALMIC 3 TIMES DAILY
COMMUNITY
End: 2023-02-20

## 2022-12-06 RX ORDER — MAGNESIUM HYDROXIDE/ALUMINUM HYDROXICE/SIMETHICONE 120; 1200; 1200 MG/30ML; MG/30ML; MG/30ML
30 SUSPENSION ORAL EVERY 4 HOURS PRN
Status: DISCONTINUED | OUTPATIENT
Start: 2022-12-06 | End: 2022-12-20 | Stop reason: HOSPADM

## 2022-12-06 RX ORDER — PREDNISONE 1 MG/1
1 TABLET ORAL DAILY
COMMUNITY
Start: 2022-04-11 | End: 2022-12-06

## 2022-12-06 RX ORDER — ATROPINE SULFATE 10 MG/ML
1 SOLUTION/ DROPS OPHTHALMIC 2 TIMES DAILY
COMMUNITY
End: 2023-02-20

## 2022-12-06 RX ORDER — ACETAMINOPHEN 325 MG/1
325 TABLET ORAL ONCE
Status: COMPLETED | OUTPATIENT
Start: 2022-12-06 | End: 2022-12-06

## 2022-12-06 RX ORDER — SERTRALINE HYDROCHLORIDE 100 MG/1
100 TABLET, FILM COATED ORAL DAILY
COMMUNITY
Start: 2022-09-17 | End: 2023-02-20 | Stop reason: SINTOL

## 2022-12-06 RX ORDER — CYCLOBENZAPRINE HCL 5 MG
5 TABLET ORAL
COMMUNITY
Start: 2022-11-16 | End: 2024-05-08

## 2022-12-06 RX ORDER — LIDOCAINE 40 MG/G
CREAM TOPICAL
Status: DISCONTINUED | OUTPATIENT
Start: 2022-12-06 | End: 2022-12-20 | Stop reason: HOSPADM

## 2022-12-06 RX ORDER — ACETAMINOPHEN 650 MG/1
650 SUPPOSITORY RECTAL EVERY 6 HOURS PRN
Status: DISCONTINUED | OUTPATIENT
Start: 2022-12-06 | End: 2022-12-20 | Stop reason: HOSPADM

## 2022-12-06 RX ORDER — IOPAMIDOL 755 MG/ML
500 INJECTION, SOLUTION INTRAVASCULAR ONCE
Status: COMPLETED | OUTPATIENT
Start: 2022-12-06 | End: 2022-12-06

## 2022-12-06 RX ORDER — SIMVASTATIN 40 MG
40 TABLET ORAL AT BEDTIME
Status: ON HOLD | COMMUNITY
Start: 2022-10-21 | End: 2022-12-20

## 2022-12-06 RX ORDER — ASPIRIN 81 MG/1
324 TABLET, CHEWABLE ORAL ONCE
Status: COMPLETED | OUTPATIENT
Start: 2022-12-06 | End: 2022-12-06

## 2022-12-06 RX ORDER — ROPIVACAINE IN 0.9% SOD CHL/PF 0.1 %
.03-.125 PLASTIC BAG, INJECTION (ML) EPIDURAL CONTINUOUS
Status: DISCONTINUED | OUTPATIENT
Start: 2022-12-06 | End: 2022-12-06

## 2022-12-06 RX ORDER — HEPARIN SODIUM 10000 [USP'U]/100ML
0-5000 INJECTION, SOLUTION INTRAVENOUS CONTINUOUS
Status: DISCONTINUED | OUTPATIENT
Start: 2022-12-06 | End: 2022-12-13

## 2022-12-06 RX ORDER — TAMSULOSIN HYDROCHLORIDE 0.4 MG/1
0.4 CAPSULE ORAL DAILY
COMMUNITY
Start: 2022-10-21

## 2022-12-06 RX ORDER — SODIUM CHLORIDE, SODIUM LACTATE, POTASSIUM CHLORIDE, CALCIUM CHLORIDE 600; 310; 30; 20 MG/100ML; MG/100ML; MG/100ML; MG/100ML
INJECTION, SOLUTION INTRAVENOUS CONTINUOUS
Status: DISCONTINUED | OUTPATIENT
Start: 2022-12-06 | End: 2022-12-06

## 2022-12-06 RX ORDER — LIDOCAINE 4 G/G
2 PATCH TOPICAL ONCE
Status: COMPLETED | OUTPATIENT
Start: 2022-12-06 | End: 2022-12-07

## 2022-12-06 RX ORDER — LISINOPRIL 10 MG/1
10 TABLET ORAL DAILY
Status: ON HOLD | COMMUNITY
Start: 2022-10-21 | End: 2024-05-12

## 2022-12-06 RX ORDER — CALCIUM CARBONATE 500(1250)
2 TABLET ORAL DAILY
COMMUNITY

## 2022-12-06 RX ORDER — LANOLIN ALCOHOL/MO/W.PET/CERES
1000 CREAM (GRAM) TOPICAL DAILY
COMMUNITY

## 2022-12-06 RX ORDER — CEFAZOLIN SODIUM 1 G/50ML
2000 SOLUTION INTRAVENOUS ONCE
Status: COMPLETED | OUTPATIENT
Start: 2022-12-06 | End: 2022-12-06

## 2022-12-06 RX ORDER — ACETAMINOPHEN 500 MG
1000 TABLET ORAL EVERY 6 HOURS PRN
COMMUNITY

## 2022-12-06 RX ORDER — ACETAMINOPHEN 325 MG/1
650 TABLET ORAL ONCE
Status: COMPLETED | OUTPATIENT
Start: 2022-12-06 | End: 2022-12-06

## 2022-12-06 RX ORDER — NITROGLYCERIN 0.4 MG/1
0.4 TABLET SUBLINGUAL EVERY 5 MIN PRN
COMMUNITY
Start: 2021-05-10

## 2022-12-06 RX ADMIN — SODIUM CHLORIDE, PRESERVATIVE FREE 60 ML: 5 INJECTION INTRAVENOUS at 14:47

## 2022-12-06 RX ADMIN — Medication 0.03 MCG/KG/MIN: at 14:27

## 2022-12-06 RX ADMIN — PANTOPRAZOLE SODIUM 40 MG: 40 INJECTION, POWDER, FOR SOLUTION INTRAVENOUS at 19:49

## 2022-12-06 RX ADMIN — SODIUM CHLORIDE, POTASSIUM CHLORIDE, SODIUM LACTATE AND CALCIUM CHLORIDE: 600; 310; 30; 20 INJECTION, SOLUTION INTRAVENOUS at 19:07

## 2022-12-06 RX ADMIN — HEPARIN SODIUM 1200 UNITS/HR: 10000 INJECTION, SOLUTION INTRAVENOUS at 16:28

## 2022-12-06 RX ADMIN — HUMAN ALBUMIN MICROSPHERES AND PERFLUTREN 3 ML: 10; .22 INJECTION, SOLUTION INTRAVENOUS at 15:18

## 2022-12-06 RX ADMIN — HYDROMORPHONE HYDROCHLORIDE 0.5 MG: 1 INJECTION, SOLUTION INTRAMUSCULAR; INTRAVENOUS; SUBCUTANEOUS at 20:36

## 2022-12-06 RX ADMIN — HEPARIN SODIUM 7000 UNITS: 1000 INJECTION, SOLUTION INTRAVENOUS; SUBCUTANEOUS at 14:25

## 2022-12-06 RX ADMIN — VANCOMYCIN HYDROCHLORIDE 2000 MG: 1 INJECTION, POWDER, LYOPHILIZED, FOR SOLUTION INTRAVENOUS at 16:08

## 2022-12-06 RX ADMIN — ACETAMINOPHEN 650 MG: 325 TABLET, FILM COATED ORAL at 19:57

## 2022-12-06 RX ADMIN — IOPAMIDOL 80 ML: 755 INJECTION, SOLUTION INTRAVENOUS at 14:46

## 2022-12-06 RX ADMIN — TAZOBACTAM SODIUM AND PIPERACILLIN SODIUM 4.5 G: 500; 4 INJECTION, SOLUTION INTRAVENOUS at 15:13

## 2022-12-06 RX ADMIN — LIDOCAINE 2 PATCH: 246 PATCH TOPICAL at 20:38

## 2022-12-06 RX ADMIN — TAZOBACTAM SODIUM AND PIPERACILLIN SODIUM 3.38 G: 375; 3 INJECTION, SOLUTION INTRAVENOUS at 22:29

## 2022-12-06 RX ADMIN — SODIUM CHLORIDE 1000 ML: 9 INJECTION, SOLUTION INTRAVENOUS at 14:21

## 2022-12-06 RX ADMIN — ASPIRIN 81 MG CHEWABLE TABLET 324 MG: 81 TABLET CHEWABLE at 14:24

## 2022-12-06 RX ADMIN — ACETAMINOPHEN 325 MG: 325 TABLET, FILM COATED ORAL at 20:38

## 2022-12-06 ASSESSMENT — ACTIVITIES OF DAILY LIVING (ADL)
ADLS_ACUITY_SCORE: 47
ADLS_ACUITY_SCORE: 35

## 2022-12-06 NOTE — ED NOTES
Bed: ED03  Expected date: 12/6/22  Expected time: 2:07 PM  Means of arrival: Ambulance  Comments:  RED

## 2022-12-06 NOTE — ED PROVIDER NOTES
History   Chief Complaint:  Altered Mental Status       The history is provided by the EMS personnel and the spouse. The history is limited by the condition of the patient.      Pacheco Torres is a 77 year old male with past medical history as noted below who presents via EMS for altered mental status and generalized weakness. EMS reports he has had back pain due to recent spinal compression fractures and possible bony involvement of his MGUS. Over the last two days he has gotten increasingly more confused and weak. Patient reportedly had a recent fall at home but the details of this are not available. Patient is unable to provide further history at this time.     Review of Systems   Unable to perform ROS: Mental status change       Allergies:    No Known Allergies    Medications:  cyclobenzaprine (FLEXERIL) 10 MG tablet  lisinopril (ZESTRIL) 10 MG tablet  nitroGLYcerin (NITROSTAT) 0.4 MG sublingual tablet  sertraline (ZOLOFT) 100 MG tablet  simvastatin (ZOCOR) 40 MG tablet  tamsulosin (FLOMAX) 0.4 MG capsule  acetaminophen (TYLENOL) 500 MG tablet  aspirin (ASA) 81 MG EC tablet  cyclobenzaprine (FLEXERIL) 5 MG tablet  gabapentin (NEURONTIN) 100 MG capsule  Lidocaine (LIDOCARE) 4 % Patch        Past Medical History:       Patient Active Problem List   Diagnosis     Health Care Home     Bilateral primary osteoarthritis of hip     Coronary artery disease involving native coronary artery of native heart without angina pectoris     Gastroesophageal reflux disease     Hyperlipidemia     MGUS (monoclonal gammopathy of unknown significance)     Morbid exogenous obesity (H)     Polymyalgia rheumatica (H)     Primary hypertension     Primary malignant neoplasm of prostate (H)     Primary osteoarthritis of both knees     Recurrent major depression in full remission (H)     Vitamin D deficiency         Past Surgical History:    Hernia repair  Coronary stent placement.    Family History:    Noncontributory    Social  History:  Presents to the ED alone via EMS.   Social History     Socioeconomic History     Marital status: Single     Spouse name: Not on file     Number of children: Not on file     Years of education: Not on file     Highest education level: Not on file   Occupational History     Not on file   Tobacco Use     Smoking status: Former     Types: Cigarettes     Quit date: 2010     Years since quittin.8     Smokeless tobacco: Not on file   Substance and Sexual Activity     Alcohol use: Not on file     Drug use: Not on file     Sexual activity: Not on file   Other Topics Concern     Not on file   Social History Narrative     Not on file     Social Determinants of Health     Financial Resource Strain: Not on file   Food Insecurity: Not on file   Transportation Needs: Not on file   Physical Activity: Not on file   Stress: Not on file   Social Connections: Not on file   Intimate Partner Violence: Not on file   Housing Stability: Not on file       Physical Exam     Patient Vitals for the past 24 hrs:   BP Pulse Resp SpO2 Weight   22 1630 128/84 109 18 94 % --   22 1615 125/70 109 23 92 % --   22 1600 (!) 116/104 111 29 94 % --   22 1542 -- -- (!) 31 94 % --   22 1539 98/59 105 (!) 32 95 % --   22 1536 116/66 105 24 98 % --   22 1533 115/67 108 30 99 % --   22 1524 114/68 112 30 92 % --   22 1521 127/70 108 28 94 % --   22 1436 (!) 82/65 100 28 -- --   22 1422 (!) 78/60 90 25 93 % --   22 1417 (!) 78/60 105 (!) 35 94 % --   22 1415 (!) 67/46 102 (!) 33 93 % --   22 1412 -- -- -- -- 100 kg (220 lb 7.4 oz)       Physical Exam  General: Appears ill, confused.  Head:  Ecchymosis to the right lateral face and lateral periorbital soft tissues. Forehead soft tissue swelling and superficial abrasion.  Eyes:  Pupils are equal, round, reactive to light EOMI, no nystagmus    Conjunctivae non-injected and sclerae white  ENT:    The external  nose is normal    Pinnae are normal  Neck:  Normal range of motion    There is no rigidity noted    Trachea is in the midline  CV:  Tachycardic rate, regular rhythm     Normal S1/S2, no S3/S4    No murmur or rub. Radial pulses 2+ bilaterally.  Resp:  Lungs are clear and equal bilaterally  + tachypnea    No increased work of breathing    No rales, wheezing, or rhonchi  GI:  Abdomen is soft, obese. no rigidity or guarding    No distension, or mass    No tenderness or rebound tenderness   MS:  Normal muscular tone. Full painless ROM of all extremities. Extremities non tender to palpation and atraumatic.     Symmetric motor strength    No lower extremity edema  Skin:  No rash or acute skin lesions noted  Neuro: Awake and alert, oriented to person only. Unable to provide history. Answers simple questions and follows simple commands but has difficulty with any complex tasks. No facial droop. CN II-XII intact. Strength 4/5 and symmetric in all 4 extremities. Responds to tactile stimuli in all extremities.   Speech is normal and fluent  Moves all extremities spontaneously  Psych:  No agitation      Emergency Department Course   ECG  ECG taken at 1419, ECG read at 1419  Sinus tachycardia   Increased R/S ratio in V1, consider early transition or posterior infarct   ST elevation leads V1-V3  Rate 103 bpm. GA interval 170 ms. QRS duration 74 ms. QT/QTc 360/471 ms. P-R-T axes 36 39 32.     ECG  ECG taken at 1420, ECG read at 1420  Normal sinus rhythm   Increased R/S ratio in V1, consider early transition of posterior infarct   ST elevation leads V1-V3  Rate 100 bpm. GA interval 162 ms. QRS duration 66 ms. QT/QTc 360/464 ms. P-R-T axes 35 40 53.     ECG  ECG obtained at 1635 , ECG read at 1638  Sinus tachycardia   Otherwise normal ECG    Rate 109 bpm. GA interval 172 ms. QRS duration 66 ms. QT/QTc 340/457 ms. P-R-T axes 42 14  43.     ECG  ECG taken at 1824, ECG read at 1826  Sinus tachycardia   Increased R/S ratio in V1,  consider early transition or posterior infarct   Abnormal ECG    Rate 106 bpm. NC interval 150 ms. QRS duration 70 ms. QT/QTc 328/435 ms. P-R-T axes 32 -5 40.     Imaging:  XR Chest Port 1 View   Final Result   IMPRESSION: Right IJ central venous catheter tip in the low SVC. No   pneumothorax. Mild interstitial opacities in the lungs, could   represent pulmonary edema. No pleural effusion or pneumothorax.      EMELY MARTINEZ MD            SYSTEM ID:  [id number]      Echocardiogram Limited   Final Result      CT Aortic Survey w Contrast   Final Result   IMPRESSION:    1. No acute thoracic or abdominal aortic abnormality. No dissection.   Scattered areas of atherosclerotic disease identified. 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   2. Diffuse coronary artery calcifications.   3. No acute mediastinal abnormality is seen.   4. Stable pulmonary nodule on the left, see below for follow-up   imaging guidelines.    5. Left inguinal canal hernia containing herniated loops of the distal   descending colon and sigmoid. No upstream bowel obstruction. See above   for measurements.      Recommendations for one or multiple incidental lung nodules < 6mm:     Low risk patients: No routine follow-up.     High risk patients: Optional follow-up CT at 12 months; if   unchanged, no further follow-up.      *Low Risk: Minimal or absent history of smoking or other known risk   factors.   *Nonsolid (ground glass) or partly solid nodules may require longer   follow-up to exclude indolent adenocarcinoma.   *Recommendations based on Guidelines for the Management of Incidental   Pulmonary Nodules Detected at CT: From the Fleischner Society 2017,   Radiology 2017.      HAL BOGGS MD            SYSTEM ID:  [id number]      CT Head w/o Contrast   Final Result   IMPRESSION: Mild motion artifact. No evidence of hemorrhage. Volume   loss and patchy areas of white matter hypoattenuation which  likely   represent chronic small vessel ischemic change. Small area of focal   hypoattenuation within the central medulla, presumably artifactual   (brainstem infarct not excluded, MRI can be performed for basal   ganglia and thalamic lacunar infarcts, presumably chronic. No acute   osseous abnormality. Nonspecific right facial soft tissue swelling,   potentially contusion.      EVELYN VELAZQUEZ MD            SYSTEM ID:  [id number]      XR Chest Port 1 View   Final Result   IMPRESSION: Hypoinflated lungs and cardiomegaly. Mild bilateral   vascular congestion appears increased. Correlate with any evidence of   developing pulmonary edema.      HAL BOGGS MD            SYSTEM ID:  [id number]        Report per radiology.    Laboratory:  Labs Ordered and Resulted from Time of ED Arrival to Time of ED Departure   COMPREHENSIVE METABOLIC PANEL - Abnormal       Result Value    Sodium 132 (*)     Potassium 3.4      Chloride 91 (*)     Carbon Dioxide (CO2) 25      Anion Gap 16 (*)     Urea Nitrogen 17.2      Creatinine 0.95      Calcium 10.5 (*)     Glucose 193 (*)     Alkaline Phosphatase 147 (*)     AST 78 (*)     ALT 17      Protein Total 6.7      Albumin 3.7      Bilirubin Total 0.3      GFR Estimate 83     LIPASE - Abnormal    Lipase 143 (*)    TROPONIN T, HIGH SENSITIVITY - Abnormal    Troponin T, High Sensitivity 29 (*)    CBC WITH PLATELETS AND DIFFERENTIAL - Abnormal    WBC Count 7.6      RBC Count 3.30 (*)     Hemoglobin 11.5 (*)     Hematocrit 34.0 (*)      (*)     MCH 34.8 (*)     MCHC 33.8      RDW 12.7      Platelet Count 331      % Neutrophils 81      % Lymphocytes 11      % Monocytes 7      % Eosinophils 0      % Basophils 0      % Immature Granulocytes 1      NRBCs per 100 WBC 0      Absolute Neutrophils 6.1      Absolute Lymphocytes 0.9      Absolute Monocytes 0.5      Absolute Eosinophils 0.0      Absolute Basophils 0.0      Absolute Immature Granulocytes 0.1      Absolute NRBCs 0.0     ISTAT GASES  LACTATE VENOUS POCT - Abnormal    Lactic Acid POCT 5.4 (*)     Bicarbonate Venous POCT 28      O2 Sat, Venous POCT 30 (*)     pCO2V Venous POCT 44      pH Venous POCT 7.41      pO2 Venous POCT 19 (*)    ISTAT BASIC CHEM ICA HEMATOCRIT POCT - Abnormal    Chloride POCT 92 (*)     Potassium POCT 3.4      Sodium POCT 131 (*)     UREA NITROGEN POCT 17      Calcium, Ionized Whole Blood POCT 5.2      Glucose Whole Blood POCT 187 (*)     Anion Gap POCT 17.0 (*)     Hemoglobin POCT 12.2 (*)     Hematocrit POCT 36 (*)     Creatinine POCT 1.2      TOTAL CO2 POCT 27     PROCALCITONIN - Abnormal    Procalcitonin 0.15 (*)    ISTAT GASES LACTATE VENOUS POCT - Abnormal    Lactic Acid POCT 1.4      Bicarbonate Venous POCT 30 (*)     O2 Sat, Venous POCT 53 (*)     pCO2V Venous POCT 54 (*)     pH Venous POCT 7.35      pO2 Venous POCT 30     INR - Normal    INR 1.03     PARTIAL THROMBOPLASTIN TIME - Normal    aPTT 24     MAGNESIUM - Normal    Magnesium 2.3     ETHYL ALCOHOL LEVEL - Normal    Alcohol ethyl <0.01     INFLUENZA A/B & SARS-COV2 PCR MULTIPLEX - Normal    Influenza A PCR Negative      Influenza B PCR Negative      RSV PCR Negative      SARS CoV2 PCR Negative     GLUCOSE MONITOR NURSING POCT   ROUTINE UA WITH MICROSCOPIC REFLEX TO CULTURE   TROPONIN T, HIGH SENSITIVITY   LACTIC ACID WHOLE BLOOD   TYPE AND SCREEN, ADULT    ABO/RH(D) A POS      Antibody Screen Negative      SPECIMEN EXPIRATION DATE [id number]     BLOOD CULTURE   BLOOD CULTURE   ABO/RH TYPE AND SCREEN        [facility]    -Central Line    Date/Time: 12/6/2022 4:00 PM  Performed by: Cholo Guajardo MD  Authorized by: Cholo Guajardo MD     Emergent condition/consent implied      PRE-PROCEDURE DETAILS:     Hand hygiene: Hand hygiene performed prior to insertion      Sterile barrier technique: All elements of maximal sterile technique followed      Skin preparation:  2% chlorhexidine    Skin preparation agent: Skin preparation agent  completely dried prior to procedure         ANESTHESIA    Anesthesia: Local infiltration  Local Anesthetic:  Lidocaine 1% without epinephrine  Anesthetic Total (mL):  4    PROCEDURE DETAILS:     Location:  R internal jugular    Patient position:  Trendelenburg    Procedural supplies:  Triple lumen    Landmarks identified: yes      Ultrasound guidance: yes      Sterile ultrasound techniques: Sterile gel and sterile probe covers were used      Number of attempts:  1    Successful placement: yes      POST PROCEDURE DETAILS:      Post-procedure:  Dressing applied and line sutured    Assessment:  Blood return through all ports, no pneumothorax on x-ray, placement verified by x-ray and free fluid flow    PROCEDURE    Patient Tolerance:  Patient tolerated the procedure well with no immediate complications        Emergency Department Course:             Reviewed:  I reviewed nursing notes, vitals and past medical history    Assessments/Consults:  ED Course as of 12/06/22 2332   Tue Dec 06, 2022   1433 Case discussed with Dr Curry, cardiology. Given unclear clinical picture activation of the cath lab will be delayed for stat ECHO and CT   1441 Spoke with Carlos Enrique patient's significant other.   1451 Case discussed with Dr. Curry, cardiology.   1518 Spoke with Kory, patient's son [phone]     1603 Patient rechecked. BP improving after 2L IVF and norephinephrine. Mental status improving.    1604 Echo without clear wall motion abnormality. EF hyperdynamic. Per Dr. Curry no clear benefit for emergent cardiac angiogram at this time. ECG changes likely due to hypoperfusion 2/2 non-ACS process.    1606 Lactate improved from >5 to now 1.44   1606 VBG otherwise normal.   1637 With improvement in BP and perfusion ECG changes have resolved. 3rd ECG is normalized without evidence of ischemia.    1803 Case discussed with Tele-intensivist, Dr. Olmos. No ICU beds available in system. Will continue to try and wean  norepinephrine.   1825 Family updated at bedside. Patient rechecked. Patient off norepinephine. BP now hypertensive. Pain medications ordered.   1952 Case discussed with Dr. Lynn, hospitalist, who accepted the patient for admission.       Interventions:  Medications   norepinephrine (LEVOPHED) 4 mg in  mL PERIPHERAL infusion (0.03 mcg/kg/min × 100 kg Intravenous Rate/Dose Verify 12/6/22 1631)   vancomycin (VANCOCIN) 2,000 mg in sodium chloride 0.9 % 500 mL intermittent infusion (2,000 mg Intravenous New Bag 12/6/22 1608)   heparin 25,000 units in 0.45% NaCl 250 mL ANTICOAGULANT infusion (1,200 Units/hr Intravenous New Bag 12/6/22 1628)   0.9% sodium chloride BOLUS (0 mLs Intravenous Stopped 12/6/22 1431)   piperacillin-tazobactam (ZOSYN) intermittent infusion 4.5 g (0 g Intravenous Stopped 12/6/22 1608)   aspirin (ASA) chewable tablet 324 mg (324 mg Oral Given 12/6/22 1424)   heparin (porcine) injection 1000 units/mL (rounds in 500 unit increments) (7,000 Units Intravenous Given 12/6/22 1425)   iopamidol (ISOVUE-370) solution 500 mL (80 mLs Intravenous Given 12/6/22 1446)   CT scan flush use (60 mLs As instructed Given 12/6/22 1447)   perflutren diluted 1mL to 2mL with saline (OPTISON) diluted injection 3 mL (3 mLs Intravenous Given 12/6/22 1518)   sodium chloride (PF) 0.9% PF flush 10 mL (10 mLs Intracatheter Given 12/6/22 1518)       Disposition:  The patient was admitted to the hospital under the care of Dr. Lynn.     Impression & Plan     Medical Decision Making:  Pacheco Torres is a 77 year old male who presented to the emergency department for altered mental status and generalized weakness.  On arrival to the emergency department patient was found to be hypotensive with undifferentiated shock and poor perfusion.  He was confused and had difficulty mentating clearly but had no definite focal neurologic deficits to suggest stroke.  Initial EKG was concerning for ST segment elevation in leads V1 through  V3.  Code STEMI was called and patient was treated with aspirin, heparin, IV fluid resuscitation.  Patient continued to be hypotensive therefore he was started on norepinephrine initially through peripheral IV.  His blood pressure responded well to these interventions.  Case was discussed emergently with Dr. Curry of cardiology who reviewed the EKGs.  The patient's clinical presentation is not typical for that of STEMI and it was recommended that further investigation be undertaken prior to activation of the Cath Lab including stat echocardiogram and further imaging studies.  CT scan of the head, chest abdomen and pelvis was performed  These were thankfully negative for any evidence of acute findings of explain his presentation.  No evidence of definite stroke, intracranial hemorrhage or mass lesion.  CT scan of the chest abdomen pelvis without acute aortic pathology, acute intra-abdominal or thoracic process.  Patient had no evidence of any hemorrhage.  Stat echocardiogram revealed hyperdynamic ejection fraction without focal wall motion abnormality making an acute coronary syndrome leading to cardiogenic shock unlikely.  Following improvement in the patient's hemodynamics repeat EKGs revealed resolution of the prior ischemic changes.  Given these findings and in further discussion with cardiology it was decided the patient would not benefit from emergent coronary angiography at this time.  Heparin was continued for treatment of NSTEMI likely related to hypoperfusion in the setting of shock.  Patient was covered with broad-spectrum antibiotics.  A clear source of infection is not identified at this time.  Patient tested negative for influenza COVID-19 and RSV.  Initial lactic acid greater than 5 improved following resuscitation.  After ongoing volume resuscitation the norepinephrine was able to be weaned and ultimately stopped and the patient had stable perfusing hemodynamics off of norepinephrine for greater  than 3 hours.  The case was then discussed with the hospitalist and the patient was admitted for ongoing monitoring evaluation and treatment.  Ultimately the case is consistent with hypovolemia and likely underlying infectious process leading to septic shock and secondary NSTEMI in the setting of severe diffuse atherosclerosis and coronary artery disease.  No evidence of GI bleed or other hemorrhage.    Critical Care Time:   Upon my evaluation, this patient had a critical illness with high probability of imminent or life-threatening deterioration due to undifferentiated shock, which required my direct attention, intervention, and personal management.    I have personally provided 65 minutes of critical care time exclusive of time spent on separately billable procedures. Time includes review of laboratory data, radiology results, discussion with consultants, reassessment of the patient and monitoring for potential decompensation. Interventions were performed as documented above.       Diagnosis:    ICD-10-CM    1. NSTEMI (non-ST elevated myocardial infarction) (H)  I21.4       2. Altered mental status, unspecified altered mental status type  R41.82       3. Septic shock (H)  A41.9     R65.21       4. Hypovolemia  E86.1             This note was created in part using medical voice dictation software and accidental undetected word errors, substitutions, or other automated transcription errors may occur.      Cholo Guajardo MD  12/06/22 [id number]

## 2022-12-06 NOTE — ED TRIAGE NOTES
Pt arrives via EMS d/t hypotension and AMS. EMS had pt of 60/40, endital 20. 18 g L hand, 300 ml NS. 197 BG. Generalized weakness past few days. Orientated to self. Not on blood thinners.

## 2022-12-06 NOTE — PROGRESS NOTES
Respiratory Therapy Note        RT responded to red Room.  Placed Pt on 4 lpm NC, SpO2 93%, maintaining his own airway.    December 6, 2022 2:25 PM  Cholo Gómez, RT

## 2022-12-06 NOTE — ED NOTES
[facility]  ED Nurse Handoff Report    Pacheco Torres is a 77 year old male   ED Chief complaint: Altered Mental Status  . ED Diagnosis:   Final diagnoses:   None     Allergies: No Known Allergies    Code Status: Full Code  Activity level - Baseline/Home:  Independent. Activity Level - Current:   Assist X 2. Lift room needed: No. Bariatric: No   Needed: No   Isolation: No. Infection: Not Applicable.     Vital Signs:   Vitals:    12/06/22 1542 12/06/22 1600 12/06/22 1615 12/06/22 1630   BP:  (!) 116/104 125/70 128/84   Pulse:  111 109 109   Resp: (!) 31 29 23 18   SpO2: 94% 94% 92% 94%   Weight:           Cardiac Rhythm:  ,      Pain level:    Patient confused: Yes. Patient Falls Risk: Yes.   Elimination Status: Urethral catheter (hendrix) in place; refer to orders to discontinue as per protocol    Patient Report - Initial Complaint: Pt arrives via EMS d/t hypotension and AMS. EMS had pt of 60/40, endital 20. 18 g L hand, 300 ml NS. 197 BG. Generalized weakness past few days. Orientated to self. Not on blood thinners.    . Focused Assessment: Clearing. Internal jugular 3 lumen placed. Heparin running. Antibiotics given. Small amount of fluids running. Temp sensing hendrix placed. Lac clearing. Labs improving.    Tests Performed:   Labs Ordered and Resulted from Time of ED Arrival to Time of ED Departure   COMPREHENSIVE METABOLIC PANEL - Abnormal       Result Value    Sodium 132 (*)     Potassium 3.4      Chloride 91 (*)     Carbon Dioxide (CO2) 25      Anion Gap 16 (*)     Urea Nitrogen 17.2      Creatinine 0.95      Calcium 10.5 (*)     Glucose 193 (*)     Alkaline Phosphatase 147 (*)     AST 78 (*)     ALT 17      Protein Total 6.7      Albumin 3.7      Bilirubin Total 0.3      GFR Estimate 83     LIPASE - Abnormal    Lipase 143 (*)    TROPONIN T, HIGH SENSITIVITY - Abnormal    Troponin T, High Sensitivity 29 (*)    CBC WITH PLATELETS AND DIFFERENTIAL - Abnormal    WBC Count 7.6      RBC  Count 3.30 (*)     Hemoglobin 11.5 (*)     Hematocrit 34.0 (*)      (*)     MCH 34.8 (*)     MCHC 33.8      RDW 12.7      Platelet Count 331      % Neutrophils 81      % Lymphocytes 11      % Monocytes 7      % Eosinophils 0      % Basophils 0      % Immature Granulocytes 1      NRBCs per 100 WBC 0      Absolute Neutrophils 6.1      Absolute Lymphocytes 0.9      Absolute Monocytes 0.5      Absolute Eosinophils 0.0      Absolute Basophils 0.0      Absolute Immature Granulocytes 0.1      Absolute NRBCs 0.0     ISTAT GASES LACTATE VENOUS POCT - Abnormal    Lactic Acid POCT 5.4 (*)     Bicarbonate Venous POCT 28      O2 Sat, Venous POCT 30 (*)     pCO2V Venous POCT 44      pH Venous POCT 7.41      pO2 Venous POCT 19 (*)    ISTAT BASIC CHEM ICA HEMATOCRIT POCT - Abnormal    Chloride POCT 92 (*)     Potassium POCT 3.4      Sodium POCT 131 (*)     UREA NITROGEN POCT 17      Calcium, Ionized Whole Blood POCT 5.2      Glucose Whole Blood POCT 187 (*)     Anion Gap POCT 17.0 (*)     Hemoglobin POCT 12.2 (*)     Hematocrit POCT 36 (*)     Creatinine POCT 1.2      TOTAL CO2 POCT 27     PROCALCITONIN - Abnormal    Procalcitonin 0.15 (*)    ISTAT GASES LACTATE VENOUS POCT - Abnormal    Lactic Acid POCT 1.4      Bicarbonate Venous POCT 30 (*)     O2 Sat, Venous POCT 53 (*)     pCO2V Venous POCT 54 (*)     pH Venous POCT 7.35      pO2 Venous POCT 30     INR - Normal    INR 1.03     PARTIAL THROMBOPLASTIN TIME - Normal    aPTT 24     MAGNESIUM - Normal    Magnesium 2.3     ETHYL ALCOHOL LEVEL - Normal    Alcohol ethyl <0.01     INFLUENZA A/B & SARS-COV2 PCR MULTIPLEX - Normal    Influenza A PCR Negative      Influenza B PCR Negative      RSV PCR Negative      SARS CoV2 PCR Negative     GLUCOSE MONITOR NURSING POCT   ROUTINE UA WITH MICROSCOPIC REFLEX TO CULTURE   TROPONIN T, HIGH SENSITIVITY   LACTIC ACID WHOLE BLOOD   TYPE AND SCREEN, ADULT    ABO/RH(D) A POS      Antibody Screen Negative      SPECIMEN EXPIRATION DATE  [id number]     BLOOD CULTURE   BLOOD CULTURE   ABO/RH TYPE AND SCREEN     XR Chest Port 1 View   Final Result   IMPRESSION: Right IJ central venous catheter tip in the low SVC. No   pneumothorax. Mild interstitial opacities in the lungs, could   represent pulmonary edema. No pleural effusion or pneumothorax.      EMELY MARTINEZ MD            SYSTEM ID:  [id number]      Echocardiogram Limited   Final Result      CT Aortic Survey w Contrast   Final Result   IMPRESSION:    1. No acute thoracic or abdominal aortic abnormality. No dissection.   Scattered areas of atherosclerotic disease identified. 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   2. Diffuse coronary artery calcifications.   3. No acute mediastinal abnormality is seen.   4. Stable pulmonary nodule on the left, see below for follow-up   imaging guidelines.    5. Left inguinal canal hernia containing herniated loops of the distal   descending colon and sigmoid. No upstream bowel obstruction. See above   for measurements.      Recommendations for one or multiple incidental lung nodules < 6mm:     Low risk patients: No routine follow-up.     High risk patients: Optional follow-up CT at 12 months; if   unchanged, no further follow-up.      *Low Risk: Minimal or absent history of smoking or other known risk   factors.   *Nonsolid (ground glass) or partly solid nodules may require longer   follow-up to exclude indolent adenocarcinoma.   *Recommendations based on Guidelines for the Management of Incidental   Pulmonary Nodules Detected at CT: From the Fleischner Society 2017,   Radiology 2017.      HAL BOGGS MD            SYSTEM ID:  [id number]      CT Head w/o Contrast   Final Result   IMPRESSION: Mild motion artifact. No evidence of hemorrhage. Volume   loss and patchy areas of white matter hypoattenuation which likely   represent chronic small vessel ischemic change. Small area of focal   hypoattenuation  within the central medulla, presumably artifactual   (brainstem infarct not excluded, MRI can be performed for basal   ganglia and thalamic lacunar infarcts, presumably chronic. No acute   osseous abnormality. Nonspecific right facial soft tissue swelling,   potentially contusion.      EVELYN VELAZQUEZ MD            SYSTEM ID:  [id number]      XR Chest Port 1 View   Final Result   IMPRESSION: Hypoinflated lungs and cardiomegaly. Mild bilateral   vascular congestion appears increased. Correlate with any evidence of   developing pulmonary edema.      HAL BOGGS MD            SYSTEM ID:  [id number]        . Abnormal Results: see results.   Treatments provided: see MAR  Family Comments: Mother bedside.   OBS brochure/video discussed/provided to patient:  No  ED Medications:   Medications   norepinephrine (LEVOPHED) 4 mg in  mL PERIPHERAL infusion (0.03 mcg/kg/min × 100 kg Intravenous Rate/Dose Verify 12/6/22 1631)   vancomycin (VANCOCIN) 2,000 mg in sodium chloride 0.9 % 500 mL intermittent infusion (2,000 mg Intravenous New Bag 12/6/22 1608)   heparin 25,000 units in 0.45% NaCl 250 mL ANTICOAGULANT infusion (1,200 Units/hr Intravenous New Bag 12/6/22 1628)   0.9% sodium chloride BOLUS (0 mLs Intravenous Stopped 12/6/22 1431)   piperacillin-tazobactam (ZOSYN) intermittent infusion 4.5 g (0 g Intravenous Stopped 12/6/22 1608)   aspirin (ASA) chewable tablet 324 mg (324 mg Oral Given 12/6/22 1424)   heparin (porcine) injection 1000 units/mL (rounds in 500 unit increments) (7,000 Units Intravenous Given 12/6/22 1425)   iopamidol (ISOVUE-370) solution 500 mL (80 mLs Intravenous Given 12/6/22 1446)   CT scan flush use (60 mLs As instructed Given 12/6/22 1447)   perflutren diluted 1mL to 2mL with saline (OPTISON) diluted injection 3 mL (3 mLs Intravenous Given 12/6/22 1518)   sodium chloride (PF) 0.9% PF flush 10 mL (10 mLs Intracatheter Given 12/6/22 1518)     Drips infusing:  Yes  For the majority of the shift, the  patient's behavior Green. Interventions performed were NA.    Sepsis treatment initiated: Yes    Per the ED Provider, Time Zero for severe sepsis or septic shock is:  1426    3 Hour Severe Sepsis Bundle Completion:  1. Initial Lactic Acid Result:   Recent Labs   Lab Test 12/06/22  1604 12/06/22  1417   LACT 1.4 5.4*     2. Blood Cultures before Antibiotics: Yes  3. Broad Spectrum Antibiotics Administered:     Anti-infectives (From now, onward)      Start     Dose/Rate Route Frequency Ordered Stop    12/06/22 1500  vancomycin (VANCOCIN) 2,000 mg in sodium chloride 0.9 % 500 mL intermittent infusion         2,000 mg  over 2 Hours Intravenous ONCE 12/06/22 1444            4. 1000 ml of IV fluids have been given so far      6 Hour Severe Sepsis Bundle Completion:    1. Repeat Lactic Acid Level: Not drawn  2. Patient currently on Vasopressors = Yes     Patient tested for COVID 19 prior to admission: YES    ED Nurse Name/Phone Number: Chilo Isaacs RN,   4:35 PM

## 2022-12-06 NOTE — ED NOTES
DATE:  12/6/2022   TIME OF RECEIPT FROM LAB:  8667   LAB TEST:  trop   LAB VALUE:  222  RESULTS GIVEN WITH READ-BACK TO (PROVIDER):  Ilya   TIME LAB VALUE REPORTED TO PROVIDER:   0665

## 2022-12-06 NOTE — ED NOTES
Wife, Sobia at bedside. MD talking with Sobia. Plan for central line placement. Pt sleepy, open eyes to voice. Sating 90& RA. Placed 4 L NC sating 93%.

## 2022-12-06 NOTE — PHARMACY-ADMISSION MEDICATION HISTORY
Admission medication history interview status for this patient is complete. See [facility] admission navigator for allergy information, prior to admission medications and immunization status.     Medication history interview done, indicate source(s): Patient  Medication history resources (including written lists, pill bottles, clinic record): [employer] dispense records  Pharmacy: [id number] [address] [place] [address] [address]  [phone]      Changes made to PTA medication list:  Added: Atropine drops, Calcium, Prednisolone drops, Vit B12, Vit D  Changed:   1. Sertraline 150mg daily --> 100mg daily      Actions taken by pharmacist (provider contacted, etc):None     Additional medication history information:None    Medication reconciliation/reorder completed by provider prior to medication history? No      Prior to Admission medications    Medication Sig Last Dose Taking? Auth Provider Long Term End Date   acetaminophen (TYLENOL) 500 MG tablet Take 1,000 mg by mouth every 6 hours as needed for pain 12/6/2022 at AM Yes Reported, Patient     aspirin (ASA) 81 MG EC tablet Take 81 mg by mouth daily 12/6/2022 at AM Yes Reported, Patient     atropine 1 % ophthalmic solution Place 1 drop into the right eye 2 times daily Unknown Yes Unknown, Entered By History     calcium carbonate (OS-TAVO) 500 MG tablet Take 2 tablets by mouth daily 12/6/2022 at AM Yes Unknown, Entered By History     cyanocobalamin (VITAMIN B-12) 1000 MCG tablet Take 1,000 mcg by mouth daily 12/6/2022 at AM Yes Unknown, Entered By History     cyclobenzaprine (FLEXERIL) 5 MG tablet Take 5 mg by mouth nightly as needed Past Week Yes Reported, Patient     gabapentin (NEURONTIN) 100 MG capsule Take 1 capsule (100 mg) by mouth 3 times daily for 30 days Past Week Yes Jamin Langston MD Yes 12/6/22   lisinopril (ZESTRIL) 10 MG tablet Take 10 mg by mouth daily 12/5/2022 at PM Yes Reported, Patient Yes    nitroGLYcerin (NITROSTAT) 0.4 MG sublingual  tablet Place 0.4 mg under the tongue every 5 minutes as needed for chest pain  Yes Reported, Patient Yes    prednisoLONE acetate (PRED FORTE) 1 % ophthalmic suspension Place 1 drop into both eyes 3 times daily Unknown Yes Unknown, Entered By History     sertraline (ZOLOFT) 100 MG tablet Take 100 mg by mouth daily 12/6/2022 at AM Yes Reported, Patient Yes    simvastatin (ZOCOR) 40 MG tablet Take 40 mg by mouth At Bedtime 12/5/2022 at PM Yes Reported, Patient Yes    tamsulosin (FLOMAX) 0.4 MG capsule Take 0.4 mg by mouth daily 12/5/2022 at PM Yes Reported, Patient     vitamin D3 (CHOLECALCIFEROL) 50 mcg (2000 units) tablet Take 1 tablet by mouth daily 12/6/2022 at AM Yes Unknown, Entered By History

## 2022-12-07 ENCOUNTER — APPOINTMENT (OUTPATIENT)
Dept: CT IMAGING | Facility: CLINIC | Age: 77
DRG: 871 | End: 2022-12-07
Attending: INTERNAL MEDICINE
Payer: COMMERCIAL

## 2022-12-07 LAB
ANION GAP SERPL CALCULATED.3IONS-SCNC: 10 MMOL/L (ref 7–15)
ATRIAL RATE - MUSE: 100 BPM
ATRIAL RATE - MUSE: 103 BPM
ATRIAL RATE - MUSE: 109 BPM
ATRIAL RATE - MUSE: 73 BPM
BUN SERPL-MCNC: 26.8 MG/DL (ref 8–23)
CALCIUM SERPL-MCNC: 9.5 MG/DL (ref 8.8–10.2)
CHLORIDE SERPL-SCNC: 99 MMOL/L (ref 98–107)
CREAT SERPL-MCNC: 0.86 MG/DL (ref 0.67–1.17)
DEPRECATED HCO3 PLAS-SCNC: 24 MMOL/L (ref 22–29)
DIASTOLIC BLOOD PRESSURE - MUSE: NORMAL MMHG
ERYTHROCYTE [DISTWIDTH] IN BLOOD BY AUTOMATED COUNT: 13.1 % (ref 10–15)
GFR SERPL CREATININE-BSD FRML MDRD: 90 ML/MIN/1.73M2
GLUCOSE BLDC GLUCOMTR-MCNC: 101 MG/DL (ref 70–99)
GLUCOSE BLDC GLUCOMTR-MCNC: 103 MG/DL (ref 70–99)
GLUCOSE BLDC GLUCOMTR-MCNC: 111 MG/DL (ref 70–99)
GLUCOSE BLDC GLUCOMTR-MCNC: 111 MG/DL (ref 70–99)
GLUCOSE BLDC GLUCOMTR-MCNC: 117 MG/DL (ref 70–99)
GLUCOSE BLDC GLUCOMTR-MCNC: 122 MG/DL (ref 70–99)
GLUCOSE SERPL-MCNC: 138 MG/DL (ref 70–99)
HCT VFR BLD AUTO: 34.8 % (ref 40–53)
HGB BLD-MCNC: 11.6 G/DL (ref 13.3–17.7)
INTERPRETATION ECG - MUSE: NORMAL
MCH RBC QN AUTO: 34.8 PG (ref 26.5–33)
MCHC RBC AUTO-ENTMCNC: 33.3 G/DL (ref 31.5–36.5)
MCV RBC AUTO: 105 FL (ref 78–100)
P AXIS - MUSE: 35 DEGREES
P AXIS - MUSE: 36 DEGREES
P AXIS - MUSE: 42 DEGREES
P AXIS - MUSE: NORMAL DEGREES
PLATELET # BLD AUTO: 274 10E3/UL (ref 150–450)
POTASSIUM SERPL-SCNC: 4.4 MMOL/L (ref 3.4–5.3)
PR INTERVAL - MUSE: 162 MS
PR INTERVAL - MUSE: 170 MS
PR INTERVAL - MUSE: 172 MS
PR INTERVAL - MUSE: NORMAL MS
QRS DURATION - MUSE: 66 MS
QRS DURATION - MUSE: 66 MS
QRS DURATION - MUSE: 74 MS
QRS DURATION - MUSE: 92 MS
QT - MUSE: 322 MS
QT - MUSE: 340 MS
QT - MUSE: 360 MS
QT - MUSE: 360 MS
QTC - MUSE: 457 MS
QTC - MUSE: 464 MS
QTC - MUSE: 470 MS
QTC - MUSE: 471 MS
R AXIS - MUSE: -34 DEGREES
R AXIS - MUSE: 14 DEGREES
R AXIS - MUSE: 39 DEGREES
R AXIS - MUSE: 40 DEGREES
RBC # BLD AUTO: 3.33 10E6/UL (ref 4.4–5.9)
SODIUM SERPL-SCNC: 133 MMOL/L (ref 136–145)
SYSTOLIC BLOOD PRESSURE - MUSE: NORMAL MMHG
T AXIS - MUSE: 119 DEGREES
T AXIS - MUSE: 32 DEGREES
T AXIS - MUSE: 43 DEGREES
T AXIS - MUSE: 53 DEGREES
TROPONIN T SERPL HS-MCNC: 1047 NG/L
TROPONIN T SERPL HS-MCNC: 863 NG/L
UFH PPP CHRO-ACNC: 0.34 IU/ML
UFH PPP CHRO-ACNC: 0.45 IU/ML
VENTRICULAR RATE- MUSE: 100 BPM
VENTRICULAR RATE- MUSE: 103 BPM
VENTRICULAR RATE- MUSE: 109 BPM
VENTRICULAR RATE- MUSE: 128 BPM
WBC # BLD AUTO: 6.9 10E3/UL (ref 4–11)

## 2022-12-07 PROCEDURE — 250N000011 HC RX IP 250 OP 636: Performed by: INTERNAL MEDICINE

## 2022-12-07 PROCEDURE — 200N000001 HC R&B ICU

## 2022-12-07 PROCEDURE — 85520 HEPARIN ASSAY: CPT | Performed by: INTERNAL MEDICINE

## 2022-12-07 PROCEDURE — 99291 CRITICAL CARE FIRST HOUR: CPT | Performed by: INTERNAL MEDICINE

## 2022-12-07 PROCEDURE — 250N000013 HC RX MED GY IP 250 OP 250 PS 637: Performed by: CLINICAL NURSE SPECIALIST

## 2022-12-07 PROCEDURE — 99233 SBSQ HOSP IP/OBS HIGH 50: CPT | Performed by: INTERNAL MEDICINE

## 2022-12-07 PROCEDURE — 250N000013 HC RX MED GY IP 250 OP 250 PS 637: Performed by: INTERNAL MEDICINE

## 2022-12-07 PROCEDURE — 99223 1ST HOSP IP/OBS HIGH 75: CPT | Performed by: CLINICAL NURSE SPECIALIST

## 2022-12-07 PROCEDURE — 250N000011 HC RX IP 250 OP 636: Performed by: HOSPITALIST

## 2022-12-07 PROCEDURE — 250N000011 HC RX IP 250 OP 636: Performed by: CLINICAL NURSE SPECIALIST

## 2022-12-07 PROCEDURE — 74177 CT ABD & PELVIS W/CONTRAST: CPT

## 2022-12-07 PROCEDURE — 258N000003 HC RX IP 258 OP 636: Performed by: INTERNAL MEDICINE

## 2022-12-07 PROCEDURE — 80048 BASIC METABOLIC PNL TOTAL CA: CPT | Performed by: INTERNAL MEDICINE

## 2022-12-07 PROCEDURE — 85027 COMPLETE CBC AUTOMATED: CPT | Performed by: INTERNAL MEDICINE

## 2022-12-07 PROCEDURE — 84484 ASSAY OF TROPONIN QUANT: CPT | Performed by: INTERNAL MEDICINE

## 2022-12-07 RX ORDER — ASPIRIN 81 MG/1
243 TABLET, CHEWABLE ORAL ONCE
Status: COMPLETED | OUTPATIENT
Start: 2022-12-07 | End: 2022-12-07

## 2022-12-07 RX ORDER — PREGABALIN 25 MG/1
25 CAPSULE ORAL 3 TIMES DAILY
Status: DISCONTINUED | OUTPATIENT
Start: 2022-12-07 | End: 2022-12-20 | Stop reason: HOSPADM

## 2022-12-07 RX ORDER — POTASSIUM CHLORIDE 1500 MG/1
20 TABLET, EXTENDED RELEASE ORAL
Status: DISCONTINUED | OUTPATIENT
Start: 2022-12-07 | End: 2022-12-11

## 2022-12-07 RX ORDER — HEPARIN SODIUM 1000 [USP'U]/ML
INJECTION, SOLUTION INTRAVENOUS; SUBCUTANEOUS
Status: DISCONTINUED
Start: 2022-12-07 | End: 2022-12-13

## 2022-12-07 RX ORDER — ACETAMINOPHEN 325 MG/1
650 TABLET ORAL 3 TIMES DAILY
Status: DISCONTINUED | OUTPATIENT
Start: 2022-12-07 | End: 2022-12-09

## 2022-12-07 RX ORDER — LIDOCAINE 40 MG/G
CREAM TOPICAL
Status: DISCONTINUED | OUTPATIENT
Start: 2022-12-07 | End: 2022-12-08

## 2022-12-07 RX ORDER — NALOXONE HYDROCHLORIDE 0.4 MG/ML
0.4 INJECTION, SOLUTION INTRAMUSCULAR; INTRAVENOUS; SUBCUTANEOUS
Status: DISCONTINUED | OUTPATIENT
Start: 2022-12-07 | End: 2022-12-20 | Stop reason: HOSPADM

## 2022-12-07 RX ORDER — HYDROMORPHONE HYDROCHLORIDE 1 MG/ML
0.3 INJECTION, SOLUTION INTRAMUSCULAR; INTRAVENOUS; SUBCUTANEOUS ONCE
Status: COMPLETED | OUTPATIENT
Start: 2022-12-07 | End: 2022-12-07

## 2022-12-07 RX ORDER — HYDROMORPHONE HYDROCHLORIDE 2 MG/1
4 TABLET ORAL
Status: DISCONTINUED | OUTPATIENT
Start: 2022-12-07 | End: 2022-12-20 | Stop reason: HOSPADM

## 2022-12-07 RX ORDER — NALOXONE HYDROCHLORIDE 0.4 MG/ML
0.2 INJECTION, SOLUTION INTRAMUSCULAR; INTRAVENOUS; SUBCUTANEOUS
Status: DISCONTINUED | OUTPATIENT
Start: 2022-12-07 | End: 2022-12-20 | Stop reason: HOSPADM

## 2022-12-07 RX ORDER — LORAZEPAM 0.5 MG/1
0.5 TABLET ORAL
Status: COMPLETED | OUTPATIENT
Start: 2022-12-07 | End: 2022-12-08

## 2022-12-07 RX ORDER — HYDRALAZINE HYDROCHLORIDE 20 MG/ML
10 INJECTION INTRAMUSCULAR; INTRAVENOUS EVERY 4 HOURS PRN
Status: DISCONTINUED | OUTPATIENT
Start: 2022-12-07 | End: 2022-12-20 | Stop reason: HOSPADM

## 2022-12-07 RX ORDER — LORAZEPAM 2 MG/ML
0.5 INJECTION INTRAMUSCULAR
Status: COMPLETED | OUTPATIENT
Start: 2022-12-07 | End: 2022-12-08

## 2022-12-07 RX ORDER — LIDOCAINE HYDROCHLORIDE 10 MG/ML
INJECTION, SOLUTION EPIDURAL; INFILTRATION; INTRACAUDAL; PERINEURAL
Status: DISCONTINUED
Start: 2022-12-07 | End: 2022-12-13

## 2022-12-07 RX ORDER — IOPAMIDOL 755 MG/ML
90 INJECTION, SOLUTION INTRAVASCULAR ONCE
Status: COMPLETED | OUTPATIENT
Start: 2022-12-07 | End: 2022-12-07

## 2022-12-07 RX ORDER — HYDROMORPHONE HYDROCHLORIDE 1 MG/ML
0.3 INJECTION, SOLUTION INTRAMUSCULAR; INTRAVENOUS; SUBCUTANEOUS
Status: DISCONTINUED | OUTPATIENT
Start: 2022-12-07 | End: 2022-12-20 | Stop reason: HOSPADM

## 2022-12-07 RX ORDER — ASPIRIN 325 MG
325 TABLET ORAL ONCE
Status: COMPLETED | OUTPATIENT
Start: 2022-12-07 | End: 2022-12-07

## 2022-12-07 RX ORDER — NITROGLYCERIN 5 MG/ML
VIAL (ML) INTRAVENOUS
Status: DISCONTINUED
Start: 2022-12-07 | End: 2022-12-13

## 2022-12-07 RX ORDER — SODIUM CHLORIDE 9 MG/ML
INJECTION, SOLUTION INTRAVENOUS CONTINUOUS
Status: DISCONTINUED | OUTPATIENT
Start: 2022-12-07 | End: 2022-12-10

## 2022-12-07 RX ORDER — SODIUM CHLORIDE 9 MG/ML
INJECTION, SOLUTION INTRAVENOUS CONTINUOUS
Status: DISCONTINUED | OUTPATIENT
Start: 2022-12-07 | End: 2022-12-08

## 2022-12-07 RX ORDER — FENTANYL CITRATE 50 UG/ML
INJECTION, SOLUTION INTRAMUSCULAR; INTRAVENOUS
Status: DISCONTINUED
Start: 2022-12-07 | End: 2022-12-07 | Stop reason: WASHOUT

## 2022-12-07 RX ORDER — LIDOCAINE 4 G/G
2 PATCH TOPICAL
Status: DISCONTINUED | OUTPATIENT
Start: 2022-12-07 | End: 2022-12-20 | Stop reason: HOSPADM

## 2022-12-07 RX ORDER — METHOCARBAMOL 500 MG/1
500 TABLET, FILM COATED ORAL 4 TIMES DAILY
Status: DISCONTINUED | OUTPATIENT
Start: 2022-12-07 | End: 2022-12-09

## 2022-12-07 RX ORDER — HYDROMORPHONE HYDROCHLORIDE 2 MG/1
2 TABLET ORAL
Status: DISCONTINUED | OUTPATIENT
Start: 2022-12-07 | End: 2022-12-20 | Stop reason: HOSPADM

## 2022-12-07 RX ADMIN — DICLOFENAC SODIUM 4 G: 10 GEL TOPICAL at 21:38

## 2022-12-07 RX ADMIN — ASPIRIN 325 MG ORAL TABLET 325 MG: 325 PILL ORAL at 13:35

## 2022-12-07 RX ADMIN — MICONAZOLE NITRATE: 20 POWDER TOPICAL at 11:54

## 2022-12-07 RX ADMIN — DICLOFENAC SODIUM 4 G: 10 GEL TOPICAL at 18:25

## 2022-12-07 RX ADMIN — ACETAMINOPHEN 650 MG: 325 TABLET, FILM COATED ORAL at 16:14

## 2022-12-07 RX ADMIN — LIDOCAINE 2 PATCH: 246 PATCH TOPICAL at 21:37

## 2022-12-07 RX ADMIN — HYDROMORPHONE HYDROCHLORIDE 4 MG: 2 TABLET ORAL at 15:11

## 2022-12-07 RX ADMIN — LORAZEPAM 0.5 MG: 2 INJECTION INTRAMUSCULAR; INTRAVENOUS at 23:08

## 2022-12-07 RX ADMIN — HYDROMORPHONE HYDROCHLORIDE 0.3 MG: 1 INJECTION, SOLUTION INTRAMUSCULAR; INTRAVENOUS; SUBCUTANEOUS at 17:57

## 2022-12-07 RX ADMIN — SODIUM CHLORIDE: 9 INJECTION, SOLUTION INTRAVENOUS at 09:58

## 2022-12-07 RX ADMIN — TAZOBACTAM SODIUM AND PIPERACILLIN SODIUM 3.38 G: 375; 3 INJECTION, SOLUTION INTRAVENOUS at 16:15

## 2022-12-07 RX ADMIN — HYDROMORPHONE HYDROCHLORIDE 0.3 MG: 1 INJECTION, SOLUTION INTRAMUSCULAR; INTRAVENOUS; SUBCUTANEOUS at 12:27

## 2022-12-07 RX ADMIN — HYDROMORPHONE HYDROCHLORIDE 0.3 MG: 1 INJECTION, SOLUTION INTRAMUSCULAR; INTRAVENOUS; SUBCUTANEOUS at 08:10

## 2022-12-07 RX ADMIN — IOPAMIDOL 90 ML: 755 INJECTION, SOLUTION INTRAVENOUS at 12:43

## 2022-12-07 RX ADMIN — TAZOBACTAM SODIUM AND PIPERACILLIN SODIUM 3.38 G: 375; 3 INJECTION, SOLUTION INTRAVENOUS at 22:50

## 2022-12-07 RX ADMIN — HYDRALAZINE HYDROCHLORIDE 10 MG: 20 INJECTION INTRAMUSCULAR; INTRAVENOUS at 17:58

## 2022-12-07 RX ADMIN — TAZOBACTAM SODIUM AND PIPERACILLIN SODIUM 3.38 G: 375; 3 INJECTION, SOLUTION INTRAVENOUS at 09:58

## 2022-12-07 RX ADMIN — MICONAZOLE NITRATE: 20 POWDER TOPICAL at 21:37

## 2022-12-07 RX ADMIN — PREGABALIN 25 MG: 25 CAPSULE ORAL at 16:15

## 2022-12-07 RX ADMIN — HYDROMORPHONE HYDROCHLORIDE 0.3 MG: 1 INJECTION, SOLUTION INTRAMUSCULAR; INTRAVENOUS; SUBCUTANEOUS at 01:29

## 2022-12-07 RX ADMIN — METHOCARBAMOL 500 MG: 500 TABLET ORAL at 14:43

## 2022-12-07 RX ADMIN — HYDROMORPHONE HYDROCHLORIDE 0.3 MG: 1 INJECTION, SOLUTION INTRAMUSCULAR; INTRAVENOUS; SUBCUTANEOUS at 21:36

## 2022-12-07 RX ADMIN — SODIUM CHLORIDE: 9 INJECTION, SOLUTION INTRAVENOUS at 13:38

## 2022-12-07 RX ADMIN — HEPARIN SODIUM 1200 UNITS/HR: 10000 INJECTION, SOLUTION INTRAVENOUS at 11:18

## 2022-12-07 RX ADMIN — HYDROMORPHONE HYDROCHLORIDE 0.3 MG: 1 INJECTION, SOLUTION INTRAMUSCULAR; INTRAVENOUS; SUBCUTANEOUS at 11:17

## 2022-12-07 RX ADMIN — HYDROMORPHONE HYDROCHLORIDE 0.3 MG: 1 INJECTION, SOLUTION INTRAMUSCULAR; INTRAVENOUS; SUBCUTANEOUS at 04:29

## 2022-12-07 RX ADMIN — TAZOBACTAM SODIUM AND PIPERACILLIN SODIUM 3.38 G: 375; 3 INJECTION, SOLUTION INTRAVENOUS at 04:29

## 2022-12-07 ASSESSMENT — ACTIVITIES OF DAILY LIVING (ADL)
ADLS_ACUITY_SCORE: 47

## 2022-12-07 NOTE — ED NOTES
PRN medications given per MAR. Patient appears more confused, although answers A&O questions appropriately.

## 2022-12-07 NOTE — PRE-PROCEDURE
GENERAL PRE-PROCEDURE:   Procedure:  Heart catheterization possible intervention  Date/Time:  12/7/2022 2:10 PM    Written consent obtained?: Yes    Consent given by:  Patient  Expected level of sedation:  Moderate  risk benefit indication for cath poss intervention discussed with pt and family  Pt restless which may forbid procedure, pt and family aware of this possibility  All questions answered and they wish to proceed

## 2022-12-07 NOTE — CONSULTS
[facility]  Pain Service Consultation   Text Page    Date of Admission:  12/6/2022    Assessment & Plan   Pacheco Torres is a 77 year old male who was admitted on 12/6/2022.    Met Daniel as he was resting in bed grimacing in pain despite just having a dose of 0.3 mg IV Dilaudid. (Appreciate input of bedside nurse Jinny Maldonado RN). Daniel acknowledged his significant other, Nelida Goldberg at bedside would be able to assist in pain history given his current discomfort. Daniel was able to identify the various attempts in treating his low back pain as he was seen by [facility] in September and since has visited the emergency department 4 times with low back pain. Nelida acknowledged a recent rise in PSA, as Daniel had been treated at [place] for prostate cancer.    PLAN:   1)  Opioids:  Dilaudid 2 to 4 mg every 3 hours prn and IV Dilaudid 0.3 mg prn for breakthrough pain  Opioids Treatment Goal:   -Improvement in function  -Participate in PT  -Management of acute pain during hospitalization, expected 3-7 days needed at DC    2) Non-opioid multimodal medication therapy  -Topical:Lidocaine Patch 4% and Voltaren QID  -N-SAIDS: Avoid due to hypertension  -Muscle Relaxants:Methocarbamol 500 mg QID  -Adjuvants:Acetaminophen 650 mg QID, Pregabalin 25 mg TID  -Antidepressants/anxiolytics: Resume chronic Zoloft     3)  Non-medication interventions  Positioning, Heating pad PRN, Relaxation, Meditation, Distraction, Physical therapy when appropriate from a cardiac standpoint    4)  Constipation Prophylaxis  Senna-S prn currently on hold as patient experiencing frequently stooling    5) Medication Risk reduction strategies   - Monitor for sedation   - Capnography per protocol   - Narcan for opioid reversal     6)  Pain Education  -Opioid safe use, storage and disposal information included in DC AVS    7)  DC Planning   Discussed goal of opioid therapy as above with patient, bedside nurse Jinny Miller,  RN and Hospitalist Tino Pablo MD  Continued outpatient management of pain per PCP  Disposition: TBD - home  Support systems: Significant other, Elissa Aritarachelle   Outpatient Referrals: recommend pain clinic on follow up. I have contacted his PCP regarding referral   The following risk factors have been identified for unintentional overdose: patient is not expected to have previous tolerance given gap in opioid use, patient is > 65 years old, patient is overweight, patient has been switched to a new opioid medication and patient has compromised kidney or liver function . Discharge with intra-nasal naloxone if discharged to home with opioids  >40 mg MME/day.  Plan for education prior to discharge.    ASSESSMENT  1)  Acute on chronic low back pain - patient has history of prostate cancer with radiation treatment. Consider MRI     2)  Patient with chronic low back pain, prescriptions from various acute care providers   Patient has a possible opioid tolerance.     Patient's opioid use in past 24 hours: 1.1 mg IV Hydromorphone = 22 mg Daily Morphine Equivalent    3)  Risk factors for opioid related harms  -Renal insufficiency  -Hepatic insufficiency  - Age > 65 years old  -Anxiety/depression    4)  Opioid induced side-effects:  -Constipation - no as he is currently having frequent stools  -Nausea/Vomit - no/denies  -Sedation  -  Due to hypotension  -Urinary Retention - complains of pain and currently has hendrix catheter  -Respiratory Depression - No    5)  Other/Related:    -Depression/anxiety - resume Zoloft when appropriate    Time Spent on this Encounter   Total unit/floor time 11 AM until 12:10 AM, time consisted of the following, examination of the patient, reviewing the record and completing documentation. >50% of time spent in counseling and coordination of care.  Time spend counseling with patient and significant other consisted of the following topics, symptom management.  Time spent in coordination of care with  "Bedside Nurse Jinny Maldonado RN and Hospitalist Tino Pablo MD.     Kimi Roberts MS, RN, CNS, APRN, ACHPN, FAACVPR  Pain and Palliative Care  Pager [phone]  Office [phone]       Primary Care Physician   Primary Care Physician:Stuart Wolfe  Pain Specialist: None    Chief Complaint   Altered Mental Status    History is obtained from the patient and electronic health record    History of Present Illness   Pacheco Torres is a 77 year old male who was admitted 12/6/2022 for hypovolemic shock.  The patient has history of lumbar compression fracture for which he was seen by Catia Hansen MD at [facility] at the end of October, Primary Malignant Neoplasm Of Prostate (HCC) diagnosed 4/21/2022 prostate biopsy underwent radiation treatment at [place], polymyalgia rheumatica, CAD s/p stent, obesity, hyperlipidemia and hypertension.     CURRENT PAIN:  His pain is located in the low back and radiating down both legs  It is described as Aching, Exhausting, Shooting and Throbbing  He rates it as ranging between 8/10 and 10/10  The average is 8/10 on a scale of 0-10  Currently it is rated as 9/10  It improves by \"I don't know! It's just terrible in bed\"    It worsens by \"Anyone moving me\"  He has been compliant with the recommendations while in the hospital.      PAST PAIN TREATMENT:   Medications: Tylenol, Oxycodone, Tylenol #3, Zanaflex, Gabapentin  Non-pharmacologic modalities: Physical Therapy  Previous interventions/surgeries: no back surgery, but the prostate radiation treatment this past summer    [place] Board of Pharmacy Data Base Reviewed:    YES; As expected, no concern for misuse/abuse of controlled medications based on this report. Most recently prescribed Oxycodone 5 mg following ED visit on 11/19/2022  Overdose Risk Score = 290           D.I.R.E Score: Patient Selection for Chronic Opioid Analgesia    For each factor, rate the patient's score from 1 - 3 based on the " explanations on the right.       Diagnosis             3         1 = Benign chronic condition with minimal objective findings or no definite medical diagnosis.  Examples:  fibromyalgia, migraine, headaches, non-specific back pain.  2 = Slowly progressive condition concordant with moderate pain, or fixed condition with moderate objective findings.  Examples: failed back surgery syndrome, back pain with moderate degenerative changes, neuropathic pain.  3 = Advanced condition concordant with severe pain with objective findings.  Examples: severe ischemic vascular disease, advanced neuropathy, severe spinal stenosis.    Intractability             3         1 = Few therapies have been tried and the patient takes a passive role in his/her pain management process.   2 = Most costmary treatments have been tried but the patient is not fully engaged in the pain management process, or barriers prevent (insurance, transportation, medical illness)  3 = Patient fully engaged in a spectrum of appropriate treatments but with inadequate response.    Risk   (Risk = Total of P+C+R+S below)       Psychological             2         1 = Serious personality dysfunction or mental illness interfering with care.  Examples: personality disorder, severe affective disorder, significant personality issues.  2 = Personality or mental health interferes moderately.  Example: depression or anxiety disorder.  3 = Good communication with the clinic.  No significant personality dysfunction or mental illness.       Chemical      Health             3         1 = Active or very recent use of illicit drugs, excessive alcohol, or prescription drug abuse.  2 = Chemical coper (uses medications to cope with stress) or history of chemical dependency in remission.  3 = No CD history.  Not drug-focused or chemically reliant       Reliability             3         1 = History of numerous problems: medication misuse, missed appointments, rarely follows through.  2  = Occasional difficulties with compliance, but generally reliable.  3 = Highly reliable patient with medications, appointments and treatment.       Social      Support             3         1= Life in chaos.  Little family support and few close relationships.  Loss of most normal life roles.  2 = Reduction in some relationships and life roles.  3 = Supportive family/close relationships.  Involved in work or school and no social isolation.    Efficacy score             2         1 = Poor function or minimal pain relief despite moderate to high doses.  2 = Moderate benefit with function improved in a number of ways (or insufficient info - hasn't tried opioid yet or very low doses or too short a trial.  3 = Good improvement in pain and function and quality of life with stable doses over time.                                    20    Total score = D + I + R + E    Score 7-13: Not a suitable candidate for long-term opioid analgesia  Score 14-21: May be a good candidate for long-term opioid analgesia    Copyright 2013 Bryon Gregory MD, The DIRE Score: Predicting Outcomes of Opioid Prescribing for Chronic Pain. The Journal of Pain. 7(9) (September), 2006:671-681    Past Medical History   I have reviewed this patient's medical history and updated it with pertinent information if needed.   Past Medical History:   Diagnosis Date     Benign essential hypertension      Coronary artery disease involving autologous artery coronary bypass graft without angina pectoris     s/p DAYAN LAD 5/08     Depression      Gastroesophageal reflux disease with esophagitis      Hyperlipidemia LDL goal <70      Macrocytic anemia     11-12 range with MCV in the 105-107 range.  normal B12 and folate     Monoclonal gammopathy      Obesity      PMR (polymyalgia rheumatica) (H)      Tobacco abuse      Vitamin D deficiency        Past Surgical History   I have reviewed this patient's surgical history and updated it with pertinent information if  needed.  Past Surgical History:   Procedure Laterality Date     CORONARY ANGIOPLASTY  05/2008    with DAYAN to the LAD     HERNIA REPAIR           Prior to Admission Medications   Prior to Admission Medications   Prescriptions Last Dose Informant Patient Reported? Taking?   acetaminophen (TYLENOL) 500 MG tablet 12/6/2022 at AM  Yes Yes   Sig: Take 1,000 mg by mouth every 6 hours as needed for pain   aspirin (ASA) 81 MG EC tablet 12/6/2022 at AM  Yes Yes   Sig: Take 81 mg by mouth daily   atropine 1 % ophthalmic solution Unknown  Yes Yes   Sig: Place 1 drop into the right eye 2 times daily   calcium carbonate (OS-TAVO) 500 MG tablet 12/6/2022 at AM  Yes Yes   Sig: Take 2 tablets by mouth daily   cyanocobalamin (VITAMIN B-12) 1000 MCG tablet 12/6/2022 at AM  Yes Yes   Sig: Take 1,000 mcg by mouth daily   cyclobenzaprine (FLEXERIL) 5 MG tablet Past Week  Yes Yes   Sig: Take 5 mg by mouth nightly as needed   gabapentin (NEURONTIN) 100 MG capsule Past Week  No Yes   Sig: Take 1 capsule (100 mg) by mouth 3 times daily for 30 days   lisinopril (ZESTRIL) 10 MG tablet 12/5/2022 at PM  Yes Yes   Sig: Take 10 mg by mouth daily   nitroGLYcerin (NITROSTAT) 0.4 MG sublingual tablet   Yes Yes   Sig: Place 0.4 mg under the tongue every 5 minutes as needed for chest pain   prednisoLONE acetate (PRED FORTE) 1 % ophthalmic suspension Unknown  Yes Yes   Sig: Place 1 drop into both eyes 3 times daily   sertraline (ZOLOFT) 100 MG tablet 12/6/2022 at AM  Yes Yes   Sig: Take 100 mg by mouth daily   simvastatin (ZOCOR) 40 MG tablet 12/5/2022 at PM  Yes Yes   Sig: Take 40 mg by mouth At Bedtime   tamsulosin (FLOMAX) 0.4 MG capsule 12/5/2022 at PM  Yes Yes   Sig: Take 0.4 mg by mouth daily   vitamin D3 (CHOLECALCIFEROL) 50 mcg (2000 units) tablet 12/6/2022 at AM  Yes Yes   Sig: Take 1 tablet by mouth daily      Facility-Administered Medications: None     Allergies   No Known Allergies    Social History   I have reviewed this patient's social  history and updated it with pertinent information if needed. Pacheco Torres  reports that he quit smoking about 12 years ago. He does not have any smokeless tobacco history on file. Lives in a house     Family History   I have reviewed this patient's family history and updated it with pertinent information if needed.   Family History   Problem Relation Age of Onset     Coronary Artery Disease Mother      Coronary Artery Disease Father      Heart Failure Father      Alcoholism Brother      Family history of addiction YES- alcoholism    Review of Systems   The 10 point Review of Systems is negative other than noted in the HPI or here.    Denies Bowel or bladder dysfunction    Physical Exam   Temp:  [95.4  F (35.2  C)-101.1  F (38.4  C)] 98.8  F (37.1  C)  Pulse:  [] 106  Resp:  [14-35] 29  BP: ()/() 127/78  SpO2:  [86 %-99 %] 94 %  223 lbs 5.22 oz  GEN:  Alert, oriented x 3, appears uncomfortable due to low back pain and pain with urinating.  HEENT:  Normocephalic/atraumatic, no scleral icterus, no nasal discharge, mouth moist.  CV:  RRR, S1, S2; no murmurs or other irregularities noted.  +3 DP/PT pulses bilaterally; no edema bilateral lower extremities.  RESP:  Clear to auscultation bilaterally without rales/rhonchi/wheezing/retractions.  Symmetric chest rise on inhalation noted.  Normal respiratory effort.  ABD:  Rounded, soft, non-tender/non-distended.  +BS  EXT:  Color, moisture and sensation intact x 4.    M/S:  Low back is tender to palpation. Numbness noted at right lateral lower    SKIN:  Warm and dry to touch, no exanthems noted in the visualized areas.    NEURO: Symmetric strength +5/5.  Sensation to touch intact all extremities.   There is no area of allodynia or hyperesthesia.  PAIN BEHAVIOR: Cooperative  Psych:  Normal affect.  Calm, cooperative, conversant appropriately.       Data   Results for orders placed or performed during the hospital encounter of 12/06/22   -Central Line      Status: None    Narrative    Cholo Guajardo MD     12/6/2022 11:41 PM  [facility]    -Central Line    Date/Time: 12/6/2022 4:00 PM  Performed by: Cholo Guajardo MD  Authorized by: Cholo Guajardo MD     Emergent condition/consent implied      PRE-PROCEDURE DETAILS:     Hand hygiene: Hand hygiene performed prior to insertion      Sterile barrier technique: All elements of maximal sterile technique   followed      Skin preparation:  2% chlorhexidine    Skin preparation agent: Skin preparation agent completely dried prior to   procedure         ANESTHESIA    Anesthesia: Local infiltration  Local Anesthetic:  Lidocaine 1% without epinephrine  Anesthetic Total (mL):  4    PROCEDURE DETAILS:     Location:  R internal jugular    Patient position:  Trendelenburg    Procedural supplies:  Triple lumen    Landmarks identified: yes      Ultrasound guidance: yes      Sterile ultrasound techniques: Sterile gel and sterile probe covers were   used      Number of attempts:  1    Successful placement: yes      POST PROCEDURE DETAILS:      Post-procedure:  Dressing applied and line sutured    Assessment:  Blood return through all ports, no pneumothorax on x-ray,   placement verified by x-ray and free fluid flow    PROCEDURE    Patient Tolerance:  Patient tolerated the procedure well with no immediate   complications   CT Head w/o Contrast     Status: None    Narrative    CT SCAN OF THE HEAD WITHOUT CONTRAST December 6, 2022 3:07 PM     HISTORY: Altered mental status.    TECHNIQUE: Axial images of the head and coronal reformations without  IV contrast material. Radiation dose for this scan was reduced using  automated exposure control, adjustment of the mA and/or kV according  to patient size, or iterative reconstruction technique.    COMPARISON: None.      Impression    IMPRESSION: Mild motion artifact. No evidence of hemorrhage. Volume  loss and patchy areas of white matter hypoattenuation which  likely  represent chronic small vessel ischemic change. Small area of focal  hypoattenuation within the central medulla, presumably artifactual  (brainstem infarct not excluded, MRI can be performed for basal  ganglia and thalamic lacunar infarcts, presumably chronic. No acute  osseous abnormality. Nonspecific right facial soft tissue swelling,  potentially contusion.    EVELYN VELAZQEUZ MD         SYSTEM ID:  [id number]   XR Chest Port 1 View     Status: None    Narrative    CHEST PORTABLE ONE VIEW December 6, 2022 2:39 PM     HISTORY: STEMI. Altered mental status. Chest pain.    COMPARISON: 11/2/2022.      Impression    IMPRESSION: Hypoinflated lungs and cardiomegaly. Mild bilateral  vascular congestion appears increased. Correlate with any evidence of  developing pulmonary edema.    HAL BOGGS MD         SYSTEM ID:  [id number]   CT Aortic Survey w Contrast     Status: None    Narrative    CT AORTIC SURVEY WITH CONTRAST  12/6/2022 3:07 PM    HISTORY:  Back pain, shock, wide mediastinum on x-ray.    TECHNIQUE: CT scan obtained of the chest, abdomen, and pelvis with IV  contrast. Post contrast scanning performed during the arterial phase.  CT chest also obtained without IV contrast. 80 mL Isovue-370 IV  injected. Sagittal and coronal reformatted images acquired from the  source post contrast data. Radiation dose for this scan was reduced  using automated exposure control, adjustment of the mA and/or kV  according to patient size, or iterative reconstruction technique.    COMPARISON:  None.    FINDINGS:  Thoracic and abdominal aorta: No dissection involving the thoracic or  abdominal aorta. Patency of the main branch vessels from the thoracic  and abdominal aorta. Scattered areas of calcified atherosclerotic  disease noted. Atherosclerotic stenosis at the origins of the celiac  and superior mesenteric arteries but there is distal perfusion. No  periaortic hematoma or aneurysm.    Other vascular: Severe coronary artery  calcifications.    Chest: No adenopathy or acute mediastinal abnormality. No acute  mediastinal fluid collection. Patchy atelectasis at the posterior  right lower lobe. No effusions. No pneumothorax. Stable nodule  posterolateral left upper lobe measuring 0.3 cm, series 6 image 60.    Abdomen/pelvis: Liver, gallbladder, adrenals, spleen, pancreas, and  kidneys do not show any acute abnormalities. Left inguinal hernia  measures 9.1 x 5.8 cm, series 5 image 265. Herniated loops of the  distal descending colon and sigmoid within the hernia. No upstream  obstruction. No bowel inflammatory change. No enlarged lymph nodes. No  acute pelvic abnormality. Diffuse degenerative changes throughout the  spine. A degree of height loss involving multiple thoracic and lumbar  spine levels.      Impression    IMPRESSION:   1. No acute thoracic or abdominal aortic abnormality. No dissection.  Scattered areas of atherosclerotic disease identified. Atherosclerotic  stenosis at the origins of the celiac artery and superior mesenteric  artery.  2. Diffuse coronary artery calcifications.  3. No acute mediastinal abnormality is seen.  4. Stable pulmonary nodule on the left, see below for follow-up  imaging guidelines.   5. Left inguinal canal hernia containing herniated loops of the distal  descending colon and sigmoid. No upstream bowel obstruction. See above  for measurements.    Recommendations for one or multiple incidental lung nodules < 6mm:    Low risk patients: No routine follow-up.    High risk patients: Optional follow-up CT at 12 months; if  unchanged, no further follow-up.    *Low Risk: Minimal or absent history of smoking or other known risk  factors.  *Nonsolid (ground glass) or partly solid nodules may require longer  follow-up to exclude indolent adenocarcinoma.  *Recommendations based on Guidelines for the Management of Incidental  Pulmonary Nodules Detected at CT: From the Fleischner Society 2017,  Radiology  2017.    HAL BOGGS MD         SYSTEM ID:  [id number]   XR Chest Port 1 View     Status: None    Narrative    XR CHEST PORT 1 VIEW 12/6/2022 3:52 PM    HISTORY: CENTRAL LINE    COMPARISON: CT today      Impression    IMPRESSION: Right IJ central venous catheter tip in the low SVC. No  pneumothorax. Mild interstitial opacities in the lungs, could  represent pulmonary edema. No pleural effusion or pneumothorax.    EMELY MARTINEZ MD         SYSTEM ID:  [id number]   INR     Status: Normal   Result Value Ref Range    INR 1.03 0.85 - 1.15   Partial thromboplastin time     Status: Normal   Result Value Ref Range    aPTT 24 22 - 38 Seconds   Comprehensive metabolic panel     Status: Abnormal   Result Value Ref Range    Sodium 132 (L) 136 - 145 mmol/L    Potassium 3.4 3.4 - 5.3 mmol/L    Chloride 91 (L) 98 - 107 mmol/L    Carbon Dioxide (CO2) 25 22 - 29 mmol/L    Anion Gap 16 (H) 7 - 15 mmol/L    Urea Nitrogen 17.2 8.0 - 23.0 mg/dL    Creatinine 0.95 0.67 - 1.17 mg/dL    Calcium 10.5 (H) 8.8 - 10.2 mg/dL    Glucose 193 (H) 70 - 99 mg/dL    Alkaline Phosphatase 147 (H) 40 - 129 U/L    AST 78 (H) 10 - 50 U/L    ALT 17 10 - 50 U/L    Protein Total 6.7 6.4 - 8.3 g/dL    Albumin 3.7 3.5 - 5.2 g/dL    Bilirubin Total 0.3 <=1.2 mg/dL    GFR Estimate 83 >60 mL/min/1.73m2   Lipase     Status: Abnormal   Result Value Ref Range    Lipase 143 (H) 13 - 60 U/L   Troponin T, High Sensitivity     Status: Abnormal   Result Value Ref Range    Troponin T, High Sensitivity 29 (H) <=22 ng/L   Magnesium     Status: Normal   Result Value Ref Range    Magnesium 2.3 1.7 - 2.3 mg/dL   Ethyl Alcohol Level     Status: Normal   Result Value Ref Range    Alcohol ethyl <0.01 <=0.01 g/dL   UA with Microscopic reflex to Culture     Status: Abnormal    Specimen: Urine, Blanchard Catheter   Result Value Ref Range    Color Urine Yellow Colorless, Straw, Light Yellow, Yellow    Appearance Urine Clear Clear    Glucose Urine Negative Negative mg/dL    Bilirubin Urine  Negative Negative    Ketones Urine Negative Negative mg/dL    Specific Gravity Urine 1.032 1.003 - 1.035    Blood Urine Trace (A) Negative    pH Urine 7.0 5.0 - 7.0    Protein Albumin Urine 30 (A) Negative mg/dL    Urobilinogen Urine Normal Normal, 2.0 mg/dL    Nitrite Urine Negative Negative    Leukocyte Esterase Urine Negative Negative    Mucus Urine Present (A) None Seen /LPF    RBC Urine 7 (H) <=2 /HPF    WBC Urine 1 <=5 /HPF    Squamous Epithelials Urine <1 <=1 /HPF    Narrative    Urine Culture not indicated   Symptomatic Influenza A/B & SARS-CoV2 (COVID-19) Virus PCR Multiplex Nasopharyngeal     Status: Normal    Specimen: Nasopharyngeal; Swab   Result Value Ref Range    Influenza A PCR Negative Negative    Influenza B PCR Negative Negative    RSV PCR Negative Negative    SARS CoV2 PCR Negative Negative    Narrative    Testing was performed using the Xpert Xpress CoV2/Flu/RSV Assay on the [employer] GeneXpert Instrument. This test should be ordered for the detection of SARS-CoV-2 and influenza viruses in individuals who meet clinical and/or epidemiological criteria. Test performance is unknown in asymptomatic patients. This test is for in vitro diagnostic use under the FDA EUA for laboratories certified under CLIA to perform high or moderate complexity testing. This test has not been FDA cleared or approved. A negative result does not rule out the presence of PCR inhibitors in the specimen or target RNA in concentration below the limit of detection for the assay. If only one viral target is positive but coinfection with multiple targets is suspected, the sample should be re-tested with another FDA cleared, approved, or authorized test, if coinfection would change clinical management. This test was validated by the [facility]. These laboratories are certified under the Clinical Laboratory Improvement Amendments of 1988 (CLIA-88) as qualified to perform high complexity laboratory testing.   CBC  with platelets and differential     Status: Abnormal   Result Value Ref Range    WBC Count 7.6 4.0 - 11.0 10e3/uL    RBC Count 3.30 (L) 4.40 - 5.90 10e6/uL    Hemoglobin 11.5 (L) 13.3 - 17.7 g/dL    Hematocrit 34.0 (L) 40.0 - 53.0 %     (H) 78 - 100 fL    MCH 34.8 (H) 26.5 - 33.0 pg    MCHC 33.8 31.5 - 36.5 g/dL    RDW 12.7 10.0 - 15.0 %    Platelet Count 331 150 - 450 10e3/uL    % Neutrophils 81 %    % Lymphocytes 11 %    % Monocytes 7 %    % Eosinophils 0 %    % Basophils 0 %    % Immature Granulocytes 1 %    NRBCs per 100 WBC 0 <1 /100    Absolute Neutrophils 6.1 1.6 - 8.3 10e3/uL    Absolute Lymphocytes 0.9 0.8 - 5.3 10e3/uL    Absolute Monocytes 0.5 0.0 - 1.3 10e3/uL    Absolute Eosinophils 0.0 0.0 - 0.7 10e3/uL    Absolute Basophils 0.0 0.0 - 0.2 10e3/uL    Absolute Immature Granulocytes 0.1 <=0.4 10e3/uL    Absolute NRBCs 0.0 10e3/uL   iStat Gases (lactate) venous, POCT     Status: Abnormal   Result Value Ref Range    Lactic Acid POCT 5.4 (HH) <=2.0 mmol/L    Bicarbonate Venous POCT 28 21 - 28 mmol/L    O2 Sat, Venous POCT 30 (L) 94 - 100 %    pCO2V Venous POCT 44 40 - 50 mm Hg    pH Venous POCT 7.41 7.32 - 7.43    pO2 Venous POCT 19 (L) 25 - 47 mm Hg   iStat Basic Chem ICA Hematocrit, POCT     Status: Abnormal   Result Value Ref Range    Chloride POCT 92 (L) 94 - 109 mmol/L    Potassium POCT 3.4 3.4 - 5.3 mmol/L    Sodium POCT 131 (L) 133 - 144 mmol/L    UREA NITROGEN POCT 17 7 - 30 mg/dL    Calcium, Ionized Whole Blood POCT 5.2 4.4 - 5.2 mg/dL    Glucose Whole Blood POCT 187 (H) 70 - 99 mg/dL    Anion Gap POCT 17.0 (H) 3.0 - 14.0 mmol/L    Hemoglobin POCT 12.2 (L) 13.3 - 17.7 g/dL    Hematocrit POCT 36 (L) 40 - 53 %    Creatinine POCT 1.2 0.7 - 1.3 mg/dL    TOTAL CO2 POCT 27 20 - 32 mmol/L   Procalcitonin     Status: Abnormal   Result Value Ref Range    Procalcitonin 0.15 (H) <0.05 ng/mL   Troponin T, High Sensitivity     Status: Abnormal   Result Value Ref Range    Troponin T, High Sensitivity 222  (HH) <=22 ng/L   iStat Gases (lactate) venous, POCT     Status: Abnormal   Result Value Ref Range    Lactic Acid POCT 1.4 <=2.0 mmol/L    Bicarbonate Venous POCT 30 (H) 21 - 28 mmol/L    O2 Sat, Venous POCT 53 (L) 94 - 100 %    pCO2V Venous POCT 54 (H) 40 - 50 mm Hg    pH Venous POCT 7.35 7.32 - 7.43    pO2 Venous POCT 30 25 - 47 mm Hg   Lactic acid whole blood     Status: Abnormal   Result Value Ref Range    Lactic Acid 2.8 (H) 0.7 - 2.0 mmol/L   Troponin T, High Sensitivity     Status: Abnormal   Result Value Ref Range    Troponin T, High Sensitivity 493 (HH) <=22 ng/L   Occult blood stool     Status: Abnormal   Result Value Ref Range    Occult Blood Positive (A) Negative   CBC (platelets, no diff)     Status: Abnormal   Result Value Ref Range    WBC Count 5.5 4.0 - 11.0 10e3/uL    RBC Count 3.54 (L) 4.40 - 5.90 10e6/uL    Hemoglobin 12.4 (L) 13.3 - 17.7 g/dL    Hematocrit 37.3 (L) 40.0 - 53.0 %     (H) 78 - 100 fL    MCH 35.0 (H) 26.5 - 33.0 pg    MCHC 33.2 31.5 - 36.5 g/dL    RDW 13.0 10.0 - 15.0 %    Platelet Count 277 150 - 450 10e3/uL   Heparin Unfractionated Anti Xa Level     Status: Normal   Result Value Ref Range    Anti Xa Unfractionated Heparin 0.40 For Reference Range, See Comment IU/mL    Narrative    Therapeutic Range: UFH: 0.25-0.50 IU/mL for low intensity dosing,  0.30-0.70 IU/mL for high intensity dosing DVT and PE.  This test is not validated for other direct factor X inhibitors (e.g. rivaroxaban, apixaban, edoxaban, betrixaban, fondaparinux) and should not be used for monitoring of other medications.   Glucose by meter     Status: Abnormal   Result Value Ref Range    GLUCOSE BY METER POCT 138 (H) 70 - 99 mg/dL   Heparin Unfractionated Anti Xa Level     Status: Normal   Result Value Ref Range    Anti Xa Unfractionated Heparin 0.45 For Reference Range, See Comment IU/mL    Narrative    Therapeutic Range: UFH: 0.25-0.50 IU/mL for low intensity dosing,  0.30-0.70 IU/mL for high intensity  dosing DVT and PE.  This test is not validated for other direct factor X inhibitors (e.g. rivaroxaban, apixaban, edoxaban, betrixaban, fondaparinux) and should not be used for monitoring of other medications.   Troponin T, High Sensitivity     Status: Abnormal   Result Value Ref Range    Troponin T, High Sensitivity 1,047 (HH) <=22 ng/L   Lactic acid whole blood     Status: Normal   Result Value Ref Range    Lactic Acid 1.3 0.7 - 2.0 mmol/L   Glucose by meter     Status: Abnormal   Result Value Ref Range    GLUCOSE BY METER POCT 137 (H) 70 - 99 mg/dL   Basic metabolic panel     Status: Abnormal   Result Value Ref Range    Sodium 133 (L) 136 - 145 mmol/L    Potassium 4.4 3.4 - 5.3 mmol/L    Chloride 99 98 - 107 mmol/L    Carbon Dioxide (CO2) 24 22 - 29 mmol/L    Anion Gap 10 7 - 15 mmol/L    Urea Nitrogen 26.8 (H) 8.0 - 23.0 mg/dL    Creatinine 0.86 0.67 - 1.17 mg/dL    Calcium 9.5 8.8 - 10.2 mg/dL    Glucose 138 (H) 70 - 99 mg/dL    GFR Estimate 90 >60 mL/min/1.73m2   CBC with platelets     Status: Abnormal   Result Value Ref Range    WBC Count 6.9 4.0 - 11.0 10e3/uL    RBC Count 3.33 (L) 4.40 - 5.90 10e6/uL    Hemoglobin 11.6 (L) 13.3 - 17.7 g/dL    Hematocrit 34.8 (L) 40.0 - 53.0 %     (H) 78 - 100 fL    MCH 34.8 (H) 26.5 - 33.0 pg    MCHC 33.3 31.5 - 36.5 g/dL    RDW 13.1 10.0 - 15.0 %    Platelet Count 274 150 - 450 10e3/uL   Troponin T, High Sensitivity     Status: Abnormal   Result Value Ref Range    Troponin T, High Sensitivity 863 (HH) <=22 ng/L   Glucose by meter     Status: Abnormal   Result Value Ref Range    GLUCOSE BY METER POCT 117 (H) 70 - 99 mg/dL   Glucose by meter     Status: Abnormal   Result Value Ref Range    GLUCOSE BY METER POCT 122 (H) 70 - 99 mg/dL   EKG 12-lead, tracing only     Status: None   Result Value Ref Range    Systolic Blood Pressure  mmHg    Diastolic Blood Pressure  mmHg    Ventricular Rate 103 BPM    Atrial Rate 103 BPM    WV Interval 170 ms    QRS Duration 74 ms    QT  360 ms    QTc 471 ms    P Axis 36 degrees    R AXIS 39 degrees    T Axis 32 degrees    Interpretation ECG       Sinus tachycardia  Increased R/S ratio in V1, consider early transition or posterior infarct  Abnormal ECG  When compared with ECG of 06-DEC-2022 13:01, (unconfirmed)  Sinus rhythm has replaced Atrial fibrillation  Questionable change in QRS duration  Criteria for Anterior infarct are no longer Present  Criteria for Anterolateral infarct are no longer Present  Confirmed by - EMERGENCY ROOM, PHYSICIAN ([id number]),  GARFIELD VILLAFUERTE ([id number]) on 12/7/2022 6:43:26 AM     EKG 12 lead     Status: None   Result Value Ref Range    Systolic Blood Pressure  mmHg    Diastolic Blood Pressure  mmHg    Ventricular Rate 100 BPM    Atrial Rate 100 BPM    NM Interval 162 ms    QRS Duration 66 ms     ms    QTc 464 ms    P Axis 35 degrees    R AXIS 40 degrees    T Axis 53 degrees    Interpretation ECG       Sinus rhythm  Increased R/S ratio in V1, consider early transition or posterior infarct  Abnormal ECG  When compared with ECG of 06-DEC-2022 14:19, (unconfirmed)  No significant change was found     EKG 12 lead     Status: None   Result Value Ref Range    Systolic Blood Pressure  mmHg    Diastolic Blood Pressure  mmHg    Ventricular Rate 128 BPM    Atrial Rate 73 BPM    NM Interval  ms    QRS Duration 92 ms     ms    QTc 470 ms    P Axis  degrees    R AXIS -34 degrees    T Axis 119 degrees    Interpretation ECG       Atrial fibrillation with rapid ventricular response  Left axis deviation  Anterolateral infarct , age undetermined  Abnormal ECG  No previous ECGs available  Confirmed by - EMERGENCY ROOM, PHYSICIAN ([id number]),  GARFIELD VILLAFUERTE (1963) on 12/7/2022 6:43:27 AM     EKG 12-lead, tracing only     Status: None   Result Value Ref Range    Systolic Blood Pressure  mmHg    Diastolic Blood Pressure  mmHg    Ventricular Rate 109 BPM    Atrial Rate 109 BPM    NM Interval 172 ms    QRS Duration 66 ms      ms    QTc 457 ms    P Axis 42 degrees    R AXIS 14 degrees    T Axis 43 degrees    Interpretation ECG       Sinus tachycardia  Otherwise normal ECG  When compared with ECG of 06-DEC-2022 14:20, (unconfirmed)  Nonspecific T wave abnormality no longer evident in Lateral leads  Confirmed by - EMERGENCY ROOM, PHYSICIAN ([id number]),  GARFIELD VILLAFUERTE (1963) on 2022 6:43:15 AM     Echocardiogram Limited     Status: None   Result Value Ref Range    LVEF  75%     Narrative    [id number]  [id number]  [id number]  [id number]^[id number]^NERI     [facility]  Echocardiography Laboratory  [address] [place] [address]     Name: BERTIN LYNCH  MRN: [id number]  : 1945  Study Date: 2022 02:59 PM  Age: 77 yrs  Gender: Male  Patient Location: [facility]  Reason For Study: Chest Pain  Ordering Physician: MINOO MERINO  Performed By: Mariah Carranza RDCS     BSA: 2.1 m2  Height: 66 in  Weight: 220 lb  HR: 110  BP: 82/65 mmHg  ______________________________________________________________________________  Procedure  Limited Portable Echo Adult. Optison (NDC #[id number]) given intravenously.  (Emergent exam, abbreviated study performed).  ______________________________________________________________________________  Interpretation Summary     The visual ejection fraction is estimated at 75%.  Hyperdynamic left ventricular function  ER informed by phone that echo has been read. The study was technically  limited. Technically difficult, suboptimal study.  ______________________________________________________________________________  Left Ventricle  Grade I or early diastolic dysfunction. The visual ejection fraction is  estimated at 75%. Hyperdynamic left ventricular function.     Right Ventricle  The right ventricle is normal in size and function.     Atria  Normal left atrial size. Right atrial size is normal.     Mitral Valve  There is trace mitral regurgitation.     Tricuspid Valve  There is trace  tricuspid regurgitation. Right ventricular systolic pressure  could not be approximated due to inadequate tricuspid regurgitation.     Aortic Valve  The aortic valve is trileaflet. There is mild trileaflet aortic sclerosis. No  aortic regurgitation is present. No hemodynamically significant valvular  aortic stenosis.     Pulmonic Valve  Normal pulmonic valve velocity.     Vessels  IVC diameter >2.1 cm collapsing <50% with sniff suggests a high RA pressure  estimated at 15 mmHg or greater.     Pericardium  There is no pericardial effusion.     Rhythm  The rhythm was sinus tachycardia.  ______________________________________________________________________________  MMode/2D Measurements & Calculations     LA Volume (BP): 68.8 ml     Doppler Measurements & Calculations  MV E max dwayne: 93.3 cm/sec  MV A max dwayne: 121.6 cm/sec  MV E/A: 0.77  MV dec time: 0.12 sec     ______________________________________________________________________________  Report approved by: Meche English 12/06/2022 03:34 PM         Adult Type and Screen     Status: None   Result Value Ref Range    ABO/RH(D) A POS     Antibody Screen Negative Negative    SPECIMEN EXPIRATION DATE [id number]    Blood Culture Arm, Left     Status: Normal (Preliminary result)    Specimen: Arm, Left; Blood   Result Value Ref Range    Culture No growth after 12 hours    Blood Culture Peripheral Blood     Status: Normal (Preliminary result)    Specimen: Peripheral Blood   Result Value Ref Range    Culture No growth after 12 hours    CBC with platelets differential     Status: Abnormal    Narrative    The following orders were created for panel order CBC with platelets differential.  Procedure                               Abnormality         Status                     ---------                               -----------         ------                     CBC with platelets and d...[[id number]]  Abnormal            Final result                 Please view  results for these tests on the individual orders.   ABO/Rh type and screen     Status: None    Narrative    The following orders were created for panel order ABO/Rh type and screen.  Procedure                               Abnormality         Status                     ---------                               -----------         ------                     Adult Type and Screen[[id number]]                            Final result                 Please view results for these tests on the individual orders.

## 2022-12-07 NOTE — PRE-PROCEDURE
Discussed with dr palacios  The people here are NOT his family  Pt incapable of signing for informed consent...  His mental status/awareness level wax and wane and he is on narcotics  He cannot remain flat for even 5 min without moving around/flailing  We will hold on cath for now and reassess tomorrow where hopefully it will be safer to perform and get informed consent  He is HTN/sinus tach, no cp

## 2022-12-07 NOTE — PROGRESS NOTES
[facility]  Hospitalist Progress Note  Tino Pablo M.D., M.B.A.   12/07/2022    Reason for Stay/active problem list      Acute encephalopathy    Hypotension-likely from hypovolemia    Non- ST segment elevation MI    Dehydration    Acute on chronic back pain    Suspected acute colitis    Large left inguinal hernia, concern for mechanical bowel obstruction    Abnormally marked circumferential wall thickening of the entire esophagus suspicious for esophagitis         Assessment and Plan:        Summary of Stay:     Pacheco Torres is a 77 year old male with a history of htn/hlp, remote hx of CAD s/p DAYAN to the LAD-most recent echo performed in the ER with hyperdynamic EF 75 % and G1dd, hx of prostate cancer from earlier this year treated with XRT through [place], macrocytic anemia with hgb in the 11-12 range (normal B12 and folate), monoclonal gammopathy (no recent documentation of evaluation), PMR, obesity, hx of tob abuse  admitted on 12/6/2022 with hypovolemic shock of unclear etiology     He believes he passed out either the day prior or the day of admission.  Unable to fill in any details. States he hasn't been sleeping well but denies and n/v/d.  States po intake has been well. No f/c/s.  No epistaxis/bloody gums/stools or emesis.      Discussed with  Carlos Enrique Goldberg who states her hasn't been eating well for at least 6 weeks, she thinks his fluid intake is ok.  She reports that he's had some nausea but no vomiting.  No diarrhea in fact she thinks he may be constipated.      In the ER BPs in the 60's/40's with tachycardia and tachypnea, he was hypothermic at 95.4, he was given 2 L of NS without improvement and so started on NE via Right IJ.       EKG with ST elevation throughout septal/anterior leads-cards was contacted and the story just didn't seem to fit- he was without any CP or anginal equivalents. Initial trop 29. He was placed on heparin and stat echo completed showing hyperdynamic  cardiac function.     troponins continue to rise 29->222-493 but EKG has normalized    CMP with AG 2/2 elevated lactic acid with respiratory compensation.    Lactates 5.4->1.4 after IVF/NE  CBC with stable macrocytic anemia, no leukocytosis  procal 0.15     CXR with pulmonary edema  CT Ao survey-diffuse CAD, Left inguinal canal hernia containing loops of the distal colon     He was covered with pip-tazo/vanco for septic shock of unclear etiology        Problem List with Assessment and Plan:    Shock/syncope  --  Given initial EKG would certainly fit a CV shock picture although echo with hyperdynamic function.  -- Suspected combination of hypovolemia and sepsis.  --Hypotension resolved with IV fluid resuscitation and transient norepinephrine  --CT of chest abdomen pelvis obtained and December 7 showed evidence of colitis and esophagitis.  --Currently he is hypotensive, febrile and tachycardic.  Continue to monitor and antibiotic.  Follow cultures     Acute toxic metabolic metabolic encephalopathy   -- Likely combination of toxic metabolic and infectious encephalopathy.  --Improving, continue to monitor      NSTEMI   -- trops 29->222->493  Diffuse CAD noted on CT Ao survey.    --No CP and echo with hyperdynamic function.   -- Patient was treated with heparin and cardiology was consulted.  --Cardiology considering coronary angiogram today.  Monitor on telemetry    CAD  Htn/hlp   --pta on asa 81 mg/lisinopril 10 mg every day/simvastain 40  Mg  -hold while NPO , PRN hydralazine for accelerated hypertension      Large Left inguinal hernia with bowel contents  -- CT on December 7 showed concern for bowel obstruction.    --We will get general surgery consult   for further evaluation recommendation. will keep him NPO         Macrocytic anemia  --Being worked up by PCP  --Currently hemoglobin is 11.6.  Continue to monitor     Incidental pulmonary nodules  --Given heavy smoking history will need repeat CT in 3-6  "months    Dehydration  --Patient has evidence of dehydration with very dry tongue and buccal mucosa  Decreased intake PTA   -- Initiate IV fluid, monitor renal function    Acute on chronic back pain  --Severe acute on chronic back pain requiring narcotic medications.  History of prostate cancer.  --Continue pain medications per pain management team recommendations    Abnormal CT with concern for esophagitis and colitis   -- We will consult GI for further evaluation as appropriate       Chronic medical problems  Depression/gerd/prostate ca/pmr/monoclonal gammopathy   --Hold  pta sertraline while NPO      COVID 19  Negative  S/p 5 shot moderna series 9/2022 (biv)        Plan for today:    Pain team onsultation    GI consult    General surgery consult    Coronary angiogram per cardiology    Keep him n.p.o.      VTE Prophylaxis: Heparin    Code Status: Full Code     Diet: NPO per Anesthesia Guidelines for Procedure/Surgery Except for: Meds  NPO for Medical/Clinical Reasons Except for: Meds    Blanchard Catheter: PRESENT, indication: Retention    Family updated today: Yes      Disposition: Continue ICU care for now        Interval History (Subjective):        Patient is seen and examined by me today and medical record reviewed.Overnight events noted and care discussed with nursing staff.  Patient's care assumed today.  He is complaining of diffuse abdominal pain, back pain.  Denies any fever or chills.  Denies any chest pain.  Cardiology plan noted.  Further imaging study was obtained and care plan was discussed with pain management team and bedside nurse.              Physical Exam:        Last Vital Signs:  BP (!) 172/98 (BP Location: Left arm)   Pulse 110   Temp 99.9  F (37.7  C) (Bladder)   Resp 23   Ht 1.6 m (5' 3\")   Wt 101.3 kg (223 lb 5.2 oz)   SpO2 96%   BMI 39.56 kg/m      I/O last 3 completed shifts:  In: 1928.74 [I.V.:1928.74]  Out: 2325 [Urine:2325]    Wt Readings from Last 5 Encounters:   12/06/22 101.3 " kg (223 lb 5.2 oz)   04/03/12 97.1 kg (214 lb)        Constitutional: Awake, alert, cooperative, mild distress from pain     Respiratory: Clear to auscultation bilaterally, no crackles or wheezing   Cardiovascular: Regular rate and rhythm, normal S1 and S2, and no murmur noted   Abdomen: Normal bowel sounds, soft, non-distended, non-tender   Skin: No new rashes, no cyanosis, dry to touch   Neuro: Alert with  no new focal weakness   Extremities: No edema   Other(s):        All other systems: egative          Medications:        All current medications were reviewed with changes reflected in problem list.         Data:      All new lab and imaging data was reviewed.      Data reviewed today: I reviewed all new labs and imaging results over the last 24 hours. I personally reviewed       Recent Labs   Lab 12/07/22 0428 12/06/22  1951 12/06/22  1421   WBC 6.9 5.5 7.6   HGB 11.6* 12.4* 11.5*   HCT 34.8* 37.3* 34.0*   * 105* 103*    277 331     No results for input(s): CULT in the last 168 hours.  Recent Labs   Lab 12/07/22  1624 12/07/22  1206 12/07/22  0810 12/07/22  0428 12/06/22  2151 12/06/22  1421 12/06/22  1417   0000   NA  --   --   --  133*  --  132* 131*  --    POTASSIUM  --   --   --  4.4  --  3.4 3.4  --    CHLORIDE  --   --   --  99  --  91* 92*  --    CO2  --   --   --  24  --  25  --   --    ANIONGAP  --   --   --  10  --  16*  --   --    * 111* 122* 138*   < > 193* 187*   < >   BUN  --   --   --  26.8*  --  17.2 17  --    CR  --   --   --  0.86  --  0.95 1.2  --    GFRESTIMATED  --   --   --  90  --  83  --   --    TAVO  --   --   --  9.5  --  10.5*  --   --    MAG  --   --   --   --   --  2.3  --   --    PROTTOTAL  --   --   --   --   --  6.7  --   --    ALBUMIN  --   --   --   --   --  3.7  --   --    BILITOTAL  --   --   --   --   --  0.3  --   --    ALKPHOS  --   --   --   --   --  147*  --   --    AST  --   --   --   --   --  78*  --   --    ALT  --   --   --   --   --  17  --   --      < > = values in this interval not displayed.       Recent Labs   Lab 12/07/22  1624 12/07/22  1206 12/07/22  0810 12/07/22  0428 12/07/22  0353   * 111* 122* 138* 117*       Recent Labs   Lab 12/06/22  1421   INR 1.03         No results for input(s): TROPONIN, TROPI, TROPR in the last 168 hours.    Invalid input(s): TROP, TROPONINIES    Recent Results (from the past 48 hour(s))   XR Chest Port 1 View    Narrative    CHEST PORTABLE ONE VIEW December 6, 2022 2:39 PM     HISTORY: STEMI. Altered mental status. Chest pain.    COMPARISON: 11/2/2022.      Impression    IMPRESSION: Hypoinflated lungs and cardiomegaly. Mild bilateral  vascular congestion appears increased. Correlate with any evidence of  developing pulmonary edema.    HAL BOGGS MD         SYSTEM ID:  [id number]   CT Head w/o Contrast    Narrative    CT SCAN OF THE HEAD WITHOUT CONTRAST December 6, 2022 3:07 PM     HISTORY: Altered mental status.    TECHNIQUE: Axial images of the head and coronal reformations without  IV contrast material. Radiation dose for this scan was reduced using  automated exposure control, adjustment of the mA and/or kV according  to patient size, or iterative reconstruction technique.    COMPARISON: None.      Impression    IMPRESSION: Mild motion artifact. No evidence of hemorrhage. Volume  loss and patchy areas of white matter hypoattenuation which likely  represent chronic small vessel ischemic change. Small area of focal  hypoattenuation within the central medulla, presumably artifactual  (brainstem infarct not excluded, MRI can be performed for basal  ganglia and thalamic lacunar infarcts, presumably chronic. No acute  osseous abnormality. Nonspecific right facial soft tissue swelling,  potentially contusion.    EVELYN VELAZQUEZ MD         SYSTEM ID:  [id number]   CT Aortic Survey w Contrast    Narrative    CT AORTIC SURVEY WITH CONTRAST  12/6/2022 3:07 PM    HISTORY:  Back pain, shock, wide mediastinum on  x-ray.    TECHNIQUE: CT scan obtained of the chest, abdomen, and pelvis with IV  contrast. Post contrast scanning performed during the arterial phase.  CT chest also obtained without IV contrast. 80 mL Isovue-370 IV  injected. Sagittal and coronal reformatted images acquired from the  source post contrast data. Radiation dose for this scan was reduced  using automated exposure control, adjustment of the mA and/or kV  according to patient size, or iterative reconstruction technique.    COMPARISON:  None.    FINDINGS:  Thoracic and abdominal aorta: No dissection involving the thoracic or  abdominal aorta. Patency of the main branch vessels from the thoracic  and abdominal aorta. Scattered areas of calcified atherosclerotic  disease noted. Atherosclerotic stenosis at the origins of the celiac  and superior mesenteric arteries but there is distal perfusion. No  periaortic hematoma or aneurysm.    Other vascular: Severe coronary artery calcifications.    Chest: No adenopathy or acute mediastinal abnormality. No acute  mediastinal fluid collection. Patchy atelectasis at the posterior  right lower lobe. No effusions. No pneumothorax. Stable nodule  posterolateral left upper lobe measuring 0.3 cm, series 6 image 60.    Abdomen/pelvis: Liver, gallbladder, adrenals, spleen, pancreas, and  kidneys do not show any acute abnormalities. Left inguinal hernia  measures 9.1 x 5.8 cm, series 5 image 265. Herniated loops of the  distal descending colon and sigmoid within the hernia. No upstream  obstruction. No bowel inflammatory change. No enlarged lymph nodes. No  acute pelvic abnormality. Diffuse degenerative changes throughout the  spine. A degree of height loss involving multiple thoracic and lumbar  spine levels.      Impression    IMPRESSION:   1. No acute thoracic or abdominal aortic abnormality. No dissection.  Scattered areas of atherosclerotic disease identified. Atherosclerotic  stenosis at the origins of the celiac artery  and superior mesenteric  artery.  2. Diffuse coronary artery calcifications.  3. No acute mediastinal abnormality is seen.  4. Stable pulmonary nodule on the left, see below for follow-up  imaging guidelines.   5. Left inguinal canal hernia containing herniated loops of the distal  descending colon and sigmoid. No upstream bowel obstruction. See above  for measurements.    Recommendations for one or multiple incidental lung nodules < 6mm:    Low risk patients: No routine follow-up.    High risk patients: Optional follow-up CT at 12 months; if  unchanged, no further follow-up.    *Low Risk: Minimal or absent history of smoking or other known risk  factors.  *Nonsolid (ground glass) or partly solid nodules may require longer  follow-up to exclude indolent adenocarcinoma.  *Recommendations based on Guidelines for the Management of Incidental  Pulmonary Nodules Detected at CT: From the Fleischner Society 2017,  Radiology 2017.    HAL BOGGS MD         SYSTEM ID:  [id number]   Echocardiogram Limited   Result Value    LVEF  75%    Narrative    [id number]  [id number]  [id number]  [id number]^ANGELIQUE^MINOO^NERI     [facility]  Echocardiography Laboratory  [address] [place] [address]     Name: BERTIN LYNCH  MRN: [id number]  : 1945  Study Date: 2022 02:59 PM  Age: 77 yrs  Gender: Male  Patient Location: [facility]  Reason For Study: Chest Pain  Ordering Physician: MINOO MERINO  Performed By: Mariah Carranza RDCS     BSA: 2.1 m2  Height: 66 in  Weight: 220 lb  HR: 110  BP: 82/65 mmHg  ______________________________________________________________________________  Procedure  Limited Portable Echo Adult. Optison (NDC #6504-1076) given intravenously.  (Emergent exam, abbreviated study performed).  ______________________________________________________________________________  Interpretation Summary     The visual ejection fraction is estimated at 75%.  Hyperdynamic left ventricular function  ER  informed by phone that echo has been read. The study was technically  limited. Technically difficult, suboptimal study.  ______________________________________________________________________________  Left Ventricle  Grade I or early diastolic dysfunction. The visual ejection fraction is  estimated at 75%. Hyperdynamic left ventricular function.     Right Ventricle  The right ventricle is normal in size and function.     Atria  Normal left atrial size. Right atrial size is normal.     Mitral Valve  There is trace mitral regurgitation.     Tricuspid Valve  There is trace tricuspid regurgitation. Right ventricular systolic pressure  could not be approximated due to inadequate tricuspid regurgitation.     Aortic Valve  The aortic valve is trileaflet. There is mild trileaflet aortic sclerosis. No  aortic regurgitation is present. No hemodynamically significant valvular  aortic stenosis.     Pulmonic Valve  Normal pulmonic valve velocity.     Vessels  IVC diameter >2.1 cm collapsing <50% with sniff suggests a high RA pressure  estimated at 15 mmHg or greater.     Pericardium  There is no pericardial effusion.     Rhythm  The rhythm was sinus tachycardia.  ______________________________________________________________________________  MMode/2D Measurements & Calculations     LA Volume (BP): 68.8 ml     Doppler Measurements & Calculations  MV E max dwayne: 93.3 cm/sec  MV A max dwayne: 121.6 cm/sec  MV E/A: 0.77  MV dec time: 0.12 sec     ______________________________________________________________________________  Report approved by: Meche English 12/06/2022 03:34 PM         XR Chest Port 1 View    Narrative    XR CHEST PORT 1 VIEW 12/6/2022 3:52 PM    HISTORY: CENTRAL LINE    COMPARISON: CT today      Impression    IMPRESSION: Right IJ central venous catheter tip in the low SVC. No  pneumothorax. Mild interstitial opacities in the lungs, could  represent pulmonary edema. No pleural effusion or  pneumothorax.    EMELY MARTINEZ MD         SYSTEM ID:  [id number]   CT Chest/Abdomen/Pelvis w Contrast    Narrative    CT CHEST/ABDOMEN/PELVIS W CONTRAST 12/7/2022 1:00 PM    CLINICAL HISTORY: back pain, abdominal pain    TECHNIQUE: CT scan of the chest, abdomen, and pelvis was performed  following injection of IV contrast. Multiplanar reformats were  obtained. Dose reduction techniques were used.   CONTRAST: 90 mL of Isovue 370    COMPARISON: Chest CT on 11/3/2022    FINDINGS:   LUNGS AND PLEURA: Mild emphysema. Mild bibasilar dependent  atelectasis/infiltrate and trace bilateral pleural effusions.    MEDIASTINUM/AXILLAE: No lymphadenopathy. Right IJ central venous  catheter tip is in the low SVC. No filling defects in the central  pulmonary arteries. No aortic aneurysm or dissection. Severe coronary  artery calcifications. No pericardial effusion.     Diffuse circumferential esophageal wall thickening with surrounding  mediastinal stranding and fluid (series 2, image 34-77 and coronal  series 5, image 74-68).    HEPATOBILIARY: Normal.    PANCREAS: Normal.    SPLEEN: Normal.    ADRENAL GLANDS: Normal.    KIDNEYS/BLADDER: No calculi, hydronephrosis or perinephric stranding.  Urinary bladder is decompressed with a Blanchard catheter.    BOWEL: There are a few mildly dilated loops of small bowel in the mid  and left abdomen (series 2, image 138) with bowel loops dilated up to  3.5 cm. A transition point is not identified. Distal loops of ileum  are decompressed and the terminal ileum is decompressed with  transition likely in the mid/right lower quadrant.     No colonic obstruction. Marked circumferential wall thickening of the  splenic flexure, descending colon and sigmoid colon. Left lower  quadrant large inguinal hernia containing a loop of the sigmoid colon  with marked wall thickening of the loop within the hernia (series 5,  image 56) and pinching/narrowing of the bowel loop at the hernia  mouth. No pneumatosis or  extraluminal air.    Normal appendix.    PELVIC ORGANS: Atrophic prostate gland.    ADDITIONAL FINDINGS: No lymphadenopathy in the abdomen and pelvis. No  abdominal aortic aneurysm. Trace free fluid in the left lower  quadrant. No abscess or free air.    MUSCULOSKELETAL: Stable mild wedge compression deformity T7, T9, T10,  T11, T12 and L1 vertebral. No acute-appearing fractures. No  destructive lesions of the bones.      Impression    IMPRESSION:  1.  Marked circumferential wall thickening of the splenic flexure,  descending colon and sigmoid colon may be due to acute colitis, either  infectious, inflammatory or ischemic.   2.  Large left inguinal hernia containing a loop of the sigmoid colon  with narrowing/pinched appearance of the sigmoid colon at the hernia  mouth and wall thickening of the sigmoid in the inguinal hernia.  Superimposed strangulation of this loop of colon is not excluded.  Consider surgical consultation.  3.  Mechanical small bowel obstruction appears to be a separate  process. Gradual transition to normal caliber small bowel loops in the  right abdomen.  4.  Marked circumferential wall thickening of the entire esophagus,  suspicious for esophagitis.  5.  Bibasilar atelectasis/infiltrate and small bilateral pleural  effusions.    EMELY MARTINEZ MD         SYSTEM ID:  [id number]       COVID Status:  COVID-19 PCR Results    COVID-19 PCR Results 11/2/22 12/6/22   SARS CoV2 PCR Negative Negative      Comments are available for some flowsheets but are not being displayed.         COVID-19 Antibody Results, Testing for Immunity    COVID-19 Antibody Results, Testing for Immunity   No data to display.              Disclaimer: This note consists of symbols derived from keyboarding, dictation and/or voice recognition software. As a result, there may be errors in the script that have gone undetected. Please consider this when interpreting information found in this chart.

## 2022-12-07 NOTE — CONSULTS
[facility]    Cardiology Consultation     Date of Admission:  12/6/2022    Assessment & Plan   Pacheco Torres is a 77 year old male who was admitted on 12/6/2022.    A pleasant 77-year gentleman with history of CAD with history of mid LAD PCI and diagonal angioplasty in 2008 with coronary CT angiogram in 2019 showing patent stent and moderate RCA disease, microcytic anemia, dyslipidemia, MGUS who is now admitted with the shock which was felt to be possible hypovolemic shock of unclear etiology.  He was resuscitated but cardiology now consulted because of abnormal EKG as well as significant rise in troponin from 27 to 1000 and now downtrending.  I look at several EKGs the initial EKG today shows atrial fibrillation with rapid ventricular rate, the second EKG shows ST elevation in V1 to V3.  Apparently patient did not have any chest pain at that time.  Today on review with patient and his family at bedside looks like patient had some chest tightness yesterday intermittently.  Subsequent EKG shows normalization of ST-T changes.  Echocardiogram showed hyperdynamic LV systolic function without any significant valvular abnormalities.  CT chest yesterday showed new aortic abnormality or dissection diffuse coronary calcification noted consistent with known CAD.    1.  Admitted with shock appear to be hypovolemic shock vs septic shock of unclear etiology.  Procalcitonin elevated, on empiric antibiotics, blood pressure now normal.  Not on any pressors.  2.  Elevated troponin, transient EKG changes and in retrospect may be some chest discomfort yesterday.  Cannot completely exclude acute coronary syndrome although unlikely to explain the whole clinical presentation.  Discussed with patient and family at bedside about option of coronary angiogram as the most definite way for evaluation of coronary disease acute coronary syndrome.  Risk benefits of coronary angiogram with risks including but not limited  risk of stroke, MI, death, need for emergent bypass surgery, renal failure, PRBC transfusion were discussed with patient.  Patient understands the rational of coronary angiogram, the risk involved and the alternative and wishes to proceed with it.  3.  Newly diagnosed atrial fibrillation, now in sinus sinus tachycardia.  CHADS2 Vascor of 4.  On heparin.  Will need to address long-term anticoagulation subsequently.  3.  Metabolic encephalopathy.  However patient was appropriately conversant and appeared to clearly understand the rational of coronary angiogram and the risks involved.      Recommendations  Continue aspirin, heparin  Coronary angiogram with possible revascularization.  To be noted patient was newly diagnosed with atrial fibrillation this admission and will be eventually on oral anticoagulation.  If patient undergoes PCI 1 option would be to use dual antiplatelet therapy and anticoagulation for a week and then Plavix and anticoagulation but eventually will defer to interventional about final recommendation about antiplatelet therapy in case patient undergoes PCI.    Critical Care: Total critical care time spent: 40    Imer Morejon MD, MD    Primary Care Physician   Stuart Wolfe    Reason for Consult   Reason for consult: I was asked by Dr Lynn to evaluate this patient for elevated troponin, abnormal EKG.    History of Present Illness   Pacheco Torres is a 77 year old male who presents with hypotension and altered mental status.  He was diagnosed with shock probably hypovolemic versus septic shock.  Cardiology is now consulted because of rising troponin from 27 to more than thousand and now downtrending, also abnormal EKG.  Initial EKG showed atrial fibrillation with rapid ventricular rate, subsequent EKG shows ST elevation in V1 to V3 and then subsequent EKG shows normalization of ST-T changes.  Echocardiogram showed hyperdynamic LV systolic function.  At the time of this review patient has  some difficulty in conversation because of dry mouth but appears to be appropriately conversant.  He does endorse having some intermittent chest discomfort yesterday nothing now.  He also endorsed having some back discomfort yesterday.  He had CT chest that did not reveal any aortic dissection diffuse coronary calcification noted.  To be noted he has known CAD with history of mid LAD PCI and diagonal angioplasty in 2008.  Stress test in 2015 was negative coronary CT angiogram 2019 showed patent LAD stent and moderate RCA disease.    Past Medical History   Past Medical History:   Diagnosis Date     Benign essential hypertension      Coronary artery disease involving autologous artery coronary bypass graft without angina pectoris     s/p DAYAN LAD 5/08     Depression      Gastroesophageal reflux disease with esophagitis      Hyperlipidemia LDL goal <70      Macrocytic anemia     11-12 range with MCV in the 105-107 range.  normal B12 and folate     Monoclonal gammopathy      Obesity      PMR (polymyalgia rheumatica) (H)      Tobacco abuse      Vitamin D deficiency          Past Surgical History   Past Surgical History:   Procedure Laterality Date     CORONARY ANGIOPLASTY  05/2008    with DAYAN to the LAD     HERNIA REPAIR           Prior to Admission Medications   Prior to Admission Medications   Prescriptions Last Dose Informant Patient Reported? Taking?   acetaminophen (TYLENOL) 500 MG tablet 12/6/2022 at AM  Yes Yes   Sig: Take 1,000 mg by mouth every 6 hours as needed for pain   aspirin (ASA) 81 MG EC tablet 12/6/2022 at AM  Yes Yes   Sig: Take 81 mg by mouth daily   atropine 1 % ophthalmic solution Unknown  Yes Yes   Sig: Place 1 drop into the right eye 2 times daily   calcium carbonate (OS-TAVO) 500 MG tablet 12/6/2022 at AM  Yes Yes   Sig: Take 2 tablets by mouth daily   cyanocobalamin (VITAMIN B-12) 1000 MCG tablet 12/6/2022 at AM  Yes Yes   Sig: Take 1,000 mcg by mouth daily   cyclobenzaprine (FLEXERIL) 5 MG tablet  Past Week  Yes Yes   Sig: Take 5 mg by mouth nightly as needed   gabapentin (NEURONTIN) 100 MG capsule Past Week  No Yes   Sig: Take 1 capsule (100 mg) by mouth 3 times daily for 30 days   lisinopril (ZESTRIL) 10 MG tablet 12/5/2022 at PM  Yes Yes   Sig: Take 10 mg by mouth daily   nitroGLYcerin (NITROSTAT) 0.4 MG sublingual tablet   Yes Yes   Sig: Place 0.4 mg under the tongue every 5 minutes as needed for chest pain   prednisoLONE acetate (PRED FORTE) 1 % ophthalmic suspension Unknown  Yes Yes   Sig: Place 1 drop into both eyes 3 times daily   sertraline (ZOLOFT) 100 MG tablet 12/6/2022 at AM  Yes Yes   Sig: Take 100 mg by mouth daily   simvastatin (ZOCOR) 40 MG tablet 12/5/2022 at PM  Yes Yes   Sig: Take 40 mg by mouth At Bedtime   tamsulosin (FLOMAX) 0.4 MG capsule 12/5/2022 at PM  Yes Yes   Sig: Take 0.4 mg by mouth daily   vitamin D3 (CHOLECALCIFEROL) 50 mcg (2000 units) tablet 12/6/2022 at AM  Yes Yes   Sig: Take 1 tablet by mouth daily      Facility-Administered Medications: None     Current Facility-Administered Medications   Medication Dose Route Frequency     miconazole   Topical BID     piperacillin-tazobactam  3.375 g Intravenous Q6H     sodium chloride (PF)  3 mL Intracatheter Q8H     Current Facility-Administered Medications   Medication Last Rate     heparin 1,200 Units/hr (12/07/22 1118)     - MEDICATION INSTRUCTIONS -       sodium chloride 125 mL/hr at 12/07/22 0958     Allergies   No Known Allergies    Social History    reports that he quit smoking about 12 years ago. He does not have any smokeless tobacco history on file.      Family History   I have reviewed this patient's family history and updated it with pertinent information if needed.  Family History   Problem Relation Age of Onset     Coronary Artery Disease Mother      Coronary Artery Disease Father      Heart Failure Father      Alcoholism Brother           Review of Systems   A comprehensive review of system was performed and is  "negative other than that noted in the HPI or here.     Physical Exam   Vital Signs with Ranges  Temp:  [95.4  F (35.2  C)-101.1  F (38.4  C)] 98.8  F (37.1  C)  Pulse:  [] 106  Resp:  [14-35] 29  BP: ()/() 127/78  SpO2:  [86 %-99 %] 94 %  Wt Readings from Last 4 Encounters:   12/06/22 101.3 kg (223 lb 5.2 oz)   04/03/12 97.1 kg (214 lb)     I/O last 3 completed shifts:  In: 1212.4 [I.V.:1212.4]  Out: 1700 [Urine:1700]      Vitals: /78   Pulse 106   Temp 98.8  F (37.1  C)   Resp 29   Ht 1.6 m (5' 3\")   Wt 101.3 kg (223 lb 5.2 oz)   SpO2 94%   BMI 39.56 kg/m      Physical Exam:   General - Alert and oriented to time place and person in no acute distress  Eyes - No scleral icterus  HEENT - Neck supple, moist mucous membranes  Cardiovascular -S1-S2 normal no murmur rub or gallop  Extremities - There is no pitting pedal edema edema  Respiratory -clear to auscultation  Skin - No pallor or cyanosis  Gastrointestinal - Non tender and non distended without rebound or guarding  Psych - Appropriate affect   Neurological - No gross motor neurological focal deficits    No lab results found in last 7 days.    Invalid input(s): TROPONINIES    Recent Labs   Lab 12/07/22  0810 12/07/22  0428 12/07/22  0353 12/06/22  2151 12/06/22  1951 12/06/22  1421 12/06/22  1417   WBC  --  6.9  --   --  5.5 7.6  --    HGB  --  11.6*  --   --  12.4* 11.5* 12.2*   MCV  --  105*  --   --  105* 103*  --    PLT  --  274  --   --  277 331  --    INR  --   --   --   --   --  1.03  --    NA  --  133*  --   --   --  132* 131*   POTASSIUM  --  4.4  --   --   --  3.4 3.4   CHLORIDE  --  99  --   --   --  91* 92*   CO2  --  24  --   --   --  25  --    BUN  --  26.8*  --   --   --  17.2 17   CR  --  0.86  --   --   --  0.95 1.2   GFRESTIMATED  --  90  --   --   --  83  --    ANIONGAP  --  10  --   --   --  16*  --    TAVO  --  9.5  --   --   --  10.5*  --    * 138* 117*   < >  --  193* 187*   ALBUMIN  --   --   --   --   " --  3.7  --    PROTTOTAL  --   --   --   --   --  6.7  --    BILITOTAL  --   --   --   --   --  0.3  --    ALKPHOS  --   --   --   --   --  147*  --    ALT  --   --   --   --   --  17  --    AST  --   --   --   --   --  78*  --    LIPASE  --   --   --   --   --  143*  --     < > = values in this interval not displayed.     No results for input(s): CHOL, HDL, LDL, TRIG, CHOLHDLRATIO in the last 40124 hours.  Recent Labs   Lab 12/07/22 0428 12/06/22 1951 12/06/22  1421   WBC 6.9 5.5 7.6   HGB 11.6* 12.4* 11.5*   HCT 34.8* 37.3* 34.0*   * 105* 103*    277 331     Recent Labs   Lab 12/06/22  1604 12/06/22  1417   PH 7.35 7.41   HCO3V 30* 28     No results for input(s): NTBNPI, NTBNP in the last 168 hours.  No results for input(s): DD in the last 168 hours.  No results for input(s): SED, CRP in the last 168 hours.  Recent Labs   Lab 12/07/22 0428 12/06/22 1951 12/06/22  1421    277 331     No results for input(s): TSH in the last 168 hours.  Recent Labs   Lab 12/06/22  1608   COLOR Yellow   APPEARANCE Clear   URINEGLC Negative   URINEBILI Negative   URINEKETONE Negative   SG 1.032   UBLD Trace*   URINEPH 7.0   PROTEIN 30*   NITRITE Negative   LEUKEST Negative   RBCU 7*   WBCU 1       Imaging:  Recent Results (from the past 48 hour(s))   XR Chest Port 1 View    Narrative    CHEST PORTABLE ONE VIEW December 6, 2022 2:39 PM     HISTORY: STEMI. Altered mental status. Chest pain.    COMPARISON: 11/2/2022.      Impression    IMPRESSION: Hypoinflated lungs and cardiomegaly. Mild bilateral  vascular congestion appears increased. Correlate with any evidence of  developing pulmonary edema.    HAL BOGGS MD         SYSTEM ID:  [id number]   CT Head w/o Contrast    Narrative    CT SCAN OF THE HEAD WITHOUT CONTRAST December 6, 2022 3:07 PM     HISTORY: Altered mental status.    TECHNIQUE: Axial images of the head and coronal reformations without  IV contrast material. Radiation dose for this scan was reduced  using  automated exposure control, adjustment of the mA and/or kV according  to patient size, or iterative reconstruction technique.    COMPARISON: None.      Impression    IMPRESSION: Mild motion artifact. No evidence of hemorrhage. Volume  loss and patchy areas of white matter hypoattenuation which likely  represent chronic small vessel ischemic change. Small area of focal  hypoattenuation within the central medulla, presumably artifactual  (brainstem infarct not excluded, MRI can be performed for basal  ganglia and thalamic lacunar infarcts, presumably chronic. No acute  osseous abnormality. Nonspecific right facial soft tissue swelling,  potentially contusion.    EVELYN VELAZQUEZ MD         SYSTEM ID:  [id number]   CT Aortic Survey w Contrast    Narrative    CT AORTIC SURVEY WITH CONTRAST  12/6/2022 3:07 PM    HISTORY:  Back pain, shock, wide mediastinum on x-ray.    TECHNIQUE: CT scan obtained of the chest, abdomen, and pelvis with IV  contrast. Post contrast scanning performed during the arterial phase.  CT chest also obtained without IV contrast. 80 mL Isovue-370 IV  injected. Sagittal and coronal reformatted images acquired from the  source post contrast data. Radiation dose for this scan was reduced  using automated exposure control, adjustment of the mA and/or kV  according to patient size, or iterative reconstruction technique.    COMPARISON:  None.    FINDINGS:  Thoracic and abdominal aorta: No dissection involving the thoracic or  abdominal aorta. Patency of the main branch vessels from the thoracic  and abdominal aorta. Scattered areas of calcified atherosclerotic  disease noted. Atherosclerotic stenosis at the origins of the celiac  and superior mesenteric arteries but there is distal perfusion. No  periaortic hematoma or aneurysm.    Other vascular: Severe coronary artery calcifications.    Chest: No adenopathy or acute mediastinal abnormality. No acute  mediastinal fluid collection. Patchy atelectasis  at the posterior  right lower lobe. No effusions. No pneumothorax. Stable nodule  posterolateral left upper lobe measuring 0.3 cm, series 6 image 60.    Abdomen/pelvis: Liver, gallbladder, adrenals, spleen, pancreas, and  kidneys do not show any acute abnormalities. Left inguinal hernia  measures 9.1 x 5.8 cm, series 5 image 265. Herniated loops of the  distal descending colon and sigmoid within the hernia. No upstream  obstruction. No bowel inflammatory change. No enlarged lymph nodes. No  acute pelvic abnormality. Diffuse degenerative changes throughout the  spine. A degree of height loss involving multiple thoracic and lumbar  spine levels.      Impression    IMPRESSION:   1. No acute thoracic or abdominal aortic abnormality. No dissection.  Scattered areas of atherosclerotic disease identified. Atherosclerotic  stenosis at the origins of the celiac artery and superior mesenteric  artery.  2. Diffuse coronary artery calcifications.  3. No acute mediastinal abnormality is seen.  4. Stable pulmonary nodule on the left, see below for follow-up  imaging guidelines.   5. Left inguinal canal hernia containing herniated loops of the distal  descending colon and sigmoid. No upstream bowel obstruction. See above  for measurements.    Recommendations for one or multiple incidental lung nodules < 6mm:    Low risk patients: No routine follow-up.    High risk patients: Optional follow-up CT at 12 months; if  unchanged, no further follow-up.    *Low Risk: Minimal or absent history of smoking or other known risk  factors.  *Nonsolid (ground glass) or partly solid nodules may require longer  follow-up to exclude indolent adenocarcinoma.  *Recommendations based on Guidelines for the Management of Incidental  Pulmonary Nodules Detected at CT: From the Fleischner Society 2017,  Radiology 2017.    HAL BOGGS MD         SYSTEM ID:  [id number]   Echocardiogram Limited   Result Value    LVEF  75%    Narrative     [id number]  [id number]  [id number]  [id number]^ANGELIQUE^MINOO^NERI     [facility]  Echocardiography Laboratory  [address] [place] [address]     Name: BERTIN LYNCH  MRN: [id number]  : 1945  Study Date: 2022 02:59 PM  Age: 77 yrs  Gender: Male  Patient Location: [facility]  Reason For Study: Chest Pain  Ordering Physician: MINOO MERINO  Performed By: Mariah Carranza RDCS     BSA: 2.1 m2  Height: 66 in  Weight: 220 lb  HR: 110  BP: 82/65 mmHg  ______________________________________________________________________________  Procedure  Limited Portable Echo Adult. Optison (NDC #[id number]) given intravenously.  (Emergent exam, abbreviated study performed).  ______________________________________________________________________________  Interpretation Summary     The visual ejection fraction is estimated at 75%.  Hyperdynamic left ventricular function  ER informed by phone that echo has been read. The study was technically  limited. Technically difficult, suboptimal study.  ______________________________________________________________________________  Left Ventricle  Grade I or early diastolic dysfunction. The visual ejection fraction is  estimated at 75%. Hyperdynamic left ventricular function.     Right Ventricle  The right ventricle is normal in size and function.     Atria  Normal left atrial size. Right atrial size is normal.     Mitral Valve  There is trace mitral regurgitation.     Tricuspid Valve  There is trace tricuspid regurgitation. Right ventricular systolic pressure  could not be approximated due to inadequate tricuspid regurgitation.     Aortic Valve  The aortic valve is trileaflet. There is mild trileaflet aortic sclerosis. No  aortic regurgitation is present. No hemodynamically significant valvular  aortic stenosis.     Pulmonic Valve  Normal pulmonic valve velocity.     Vessels  IVC diameter >2.1 cm collapsing <50% with sniff suggests a high RA pressure  estimated at 15  "mmHg or greater.     Pericardium  There is no pericardial effusion.     Rhythm  The rhythm was sinus tachycardia.  ______________________________________________________________________________  MMode/2D Measurements & Calculations     LA Volume (BP): 68.8 ml     Doppler Measurements & Calculations  MV E max dwayne: 93.3 cm/sec  MV A max dwayne: 121.6 cm/sec  MV E/A: 0.77  MV dec time: 0.12 sec     ______________________________________________________________________________  Report approved by: Meche English 12/06/2022 03:34 PM         XR Chest Port 1 View    Narrative    XR CHEST PORT 1 VIEW 12/6/2022 3:52 PM    HISTORY: CENTRAL LINE    COMPARISON: CT today      Impression    IMPRESSION: Right IJ central venous catheter tip in the low SVC. No  pneumothorax. Mild interstitial opacities in the lungs, could  represent pulmonary edema. No pleural effusion or pneumothorax.    EMELY MARTINEZ MD         SYSTEM ID:  [id number]       Echo:  No results found for this or any previous visit (from the past 4320 hour(s)).    Clinically Significant Risk Factors Present on Admission        # Hyponatremia: Lowest Na = 131 mmol/L (Ref range: 136-145) in last 2 days, will monitor as appropriate   # Hypercalcemia: Highest Ca = 10.5 mg/dL (Ref range: 8.5 - 10.1 mg/dL) in last 2 days, will monitor as appropriate         # Circulatory Shock: currently requiring pressors for blood pressure support  # Obesity: Estimated body mass index is 39.56 kg/m  as calculated from the following:    Height as of this encounter: 1.6 m (5' 3\").    Weight as of this encounter: 101.3 kg (223 lb 5.2 oz).                    "

## 2022-12-07 NOTE — PLAN OF CARE
ICU End of Shift Summary.  For vital signs and complete assessments, please see documentation flowsheets.      Pertinent assessments: Pt admitted from ED at 2200 as medical teleoverflow. Pt restless and confused most of the night. Pt was calm/able to relax for 1-2 hrs after dilaudid was given. Mitts placed for line safety. Tele ST. BP hypertensive, normotensive when pt is relaxed/resting. Pt complains of significant back pain, dilaudid given x2 with relief. On 2L NC. Blanchard in place with adequate UOP. 2 small BMs this shift.     Major Shift Events:    -Trops 1,047 at 2330; 863 at 0430   -Tmax 101.1    Plan (Upcoming Events): cards consult  Discharge/Transfer Needs: tbd     Bedside Shift Report Completed: yes  Bedside Safety Check Completed: yes

## 2022-12-07 NOTE — H&P
History and Physical     Pacheco Torres MRN# [id number]   YOB: 1945 Age: 77 year old      Date of Admission:  12/6/2022    Primary care provider: Stuart Wolfe          Assessment and Plan:     Summary of Stay: Pacheco Torres is a 77 year old male with a history of htn/hlp, remote hx of CAD s/p DAYAN to the LAD-most recent echo performed in the ER TODAY with hyperdynamic EF 75 % and G1dd, hx of prostate cancer from earlier this year treated with XRT through [facility], macrocytic anemia with hgb in the 11-12 range (normal B12 and folate), monoclonal gammopathy (no recent documentation of evaluation), PMR, obesity, hx of tob abuse  admitted on 12/6/2022 with hypovolemic shock of unclear etiology    He believes he passed out either the day prior or the day of admission.  Unable to fill in any details. States he hasn't been sleeping well but denies and n/v/d.  States po intake has been well. No f/c/s.  No epistaxis/bloody gums/stools or emesis.     Discussed with SO Carlos Enrique Goldberg who states her hasn't been eating well for at least 6 weeks, she thinks his fluid intake is ok.  She reports that he's had some nausea but no vomiting.  No diarrhea in fact she thinks he may be constipated.     In the ER BPs in the 60's/40's with tachycardia and tachypnea, he was hypothermic at 95.4, he was given 2 L of NS without improvement and so started on NE via Right IJ.      EKG with ST elevation throughout septal/anterior leads-cards was contacted and the story just didn't seem to fit- he was without any CP or anginal equivalents. Initial trop 29. He was placed on heparin and stat echo completed showing hyperdynamic cardiac function.    troponins continue to rise 29->222-493 but EKG has normalized  (I reviewed all of the EKGs personally)  CMP with AG 2/2 elevated lactic acid with respiratory compensation.    Lactates 5.4->1.4 after IVF/NE, but now up to 2.8  CBC with stable macrocytic anemia, no leukocytosis  procal  0.15    CXR with pulmonary edema  CT Ao survey-diffuse CAD, Left inguinal canal hernia containing loops of the distal colon    He was covered with pip-tazo/vanco for septic shock of unclear etiology     Now his BPs are actually hypertensive, he's been weaned off the NE, and his mentation is much improved raising the possibility of hypovolemia shock of unclear etiology.  Of note had a positive guaiaic but the stool was brown in color    Cards has recommended continuing the heparin drip    Problem List:   Shock/syncope  Given initial EKG would certainly fit a CV shock picture although echo with hyperdynamic function.  No e/o acute infectious process on w/u so doubt distributive shock 2/2 sepsis.  Seemed to behave mostly as a hypovolemic shock initially unresponsive to IVF necessitating transient NE but now with hypertension.   Confusing picture to me  -cont with empiric abx for now  -no more IVF  -follow lactic acid     Metabolic encephalopathy  Im not totally sure what his baseline is but he seems somewhat confused to me although knows he's at the \[facility]\"" in [place], knows it's the 6th of dec 2022, and donato is the president.  He is just hard to get to stay on topic and gets quite repetitive.    NSTEMI  trops 29->222->493  Diffuse CAD noted on CT Ao survey.  No CP and echo with hyperdynamic function.  Likely T2MI with demand ischemia due to hypotension in the setting of diffuse CAD  -cont to trend troponins  -heparin gtt per cards recommendations  -formal cards consult in am and will keep NPO overnight     CAD  Htn/hlp   pta on asa 81 mg/lisinopril 10 mg every day/simvastain 40  Mg  Will resume all with parameters on holding ACE in the am if BPs start to drop again     Large Left inguinal hernia with bowel contents  Without e/o strangulation/bowel inflammation       Macrocytic anemia  Being worked up by PCP    Incidental pulmonary nodules  Given heavy smoking history will need repeat CT in 3-6  months    Chronic medical problems  Depression/gerd/prostate ca/pmr/monoclonal gammopathy   Cont with pta sertraline, not on an antacid.      COVID 19  Negative  S/p 5 shot moderna series 9/2022 (biv)    DVT Prophylaxis: currently on heparin gtt  Code Status: Full Code  Functional Status:  Lives with SO and ambulates with a cane  Blanchard: monitor for retention   Access:   PIV                     Chief Complaint:     Syncope/shock        History of Present Illness:   Pacheco Torres is a 77 year old male with a history of htn/hlp, remote hx of CAD s/p DAYAN to the LAD-most recent echo performed in the ER TODAY with hyperdynamic EF 75 % and G1dd, hx of prostate cancer from earlier this year treated with XRT through [facility], macrocytic anemia with hgb in the 11-12 range (normal B12 and folate), monoclonal gammopathy (no recent documentation of evaluation), PMR, obesity, hx of tob abuse  admitted on 12/6/2022 with hypovolemic shock of unclear etiology    He believes he passed out either the day prior or the day of admission.  Unable to fill in any details. States he hasn't been sleeping well but denies and n/v/d.  States po intake has been well. No f/c/s.  No epistaxis/bloody gums/stools or emesis.     Discussed with SO Carlos Enrique Lombardiricardarachelle who states her hasn't been eating well for at least 6 weeks, she thinks his fluid intake is ok.  She reports that he's had some nausea but no vomiting.  No diarrhea in fact she thinks he may be constipated.     In the ER BPs in the 60's/40's with tachycardia and tachypnea, he was hypothermic at 95.4, he was given 2 L of NS without improvement and so started on NE via Right IJ.      EKG with ST elevation throughout septal/anterior leads-cards was contacted and the story just didn't seem to fit- he was without any CP or anginal equivalents. Initial trop 29. He was placed on heparin and stat echo completed showing hyperdynamic cardiac function.    troponins continue to rise 29->222-493 but EKG has  normalized  (I reviewed all of the EKGs personally)  CMP with AG 2/2 elevated lactic acid with respiratory compensation.    Lactates 5.4->1.4 after IVF/NE, but now up to 2.8  CBC with stable macrocytic anemia, no leukocytosis  procal 0.15    CXR with pulmonary edema  CT Ao survey-diffuse CAD, Left inguinal canal hernia containing loops of the distal colon    He was covered with pip-tazo/vanco for septic shock of unclear etiology     Now his BPs are actually hypertensive, he's been weaned off the NE, and his mentation is much improved raising the possibility of hypovolemia shock of unclear etiology.  Of note had a positive guaiaic but the stool was brown in color    Cards has recommended continuing the heparin drip      The history is obtained in discussion with the ER provider Dr Guajardo and the patient        Epic and Care everywhere were extensively reviewed        Past Medical History:     Past Medical History:   Diagnosis Date     Benign essential hypertension      Coronary artery disease involving autologous artery coronary bypass graft without angina pectoris     s/p DAYAN LAD      Depression      Gastroesophageal reflux disease with esophagitis      Hyperlipidemia LDL goal <70      Macrocytic anemia     11-12 range with MCV in the 105-107 range.  normal B12 and folate     Monoclonal gammopathy      Obesity      PMR (polymyalgia rheumatica) (H)      Tobacco abuse      Vitamin D deficiency              Past Surgical History:     Past Surgical History:   Procedure Laterality Date     CORONARY ANGIOPLASTY  2008    with DAYAN to the LAD     HERNIA REPAIR               Social History:     Social History     Tobacco Use     Smoking status: Former     Types: Cigarettes     Quit date: 2010     Years since quittin.8     Smokeless tobacco: Not on file   Substance Use Topics     Alcohol use: Not on file   Code Status: Full Code  Functional Status:  Lives with SO and ambulates with a cane          Family  History:     Family History   Problem Relation Age of Onset     Coronary Artery Disease Mother      Coronary Artery Disease Father      Heart Failure Father      Alcoholism Brother             Allergies:   No Known Allergies          Medications:     Prior to Admission medications    Medication Sig Last Dose Taking? Auth Provider Long Term End Date   acetaminophen (TYLENOL) 500 MG tablet Take 1,000 mg by mouth every 6 hours as needed for pain 12/6/2022 at AM Yes Reported, Patient     aspirin (ASA) 81 MG EC tablet Take 81 mg by mouth daily 12/6/2022 at AM Yes Reported, Patient     atropine 1 % ophthalmic solution Place 1 drop into the right eye 2 times daily Unknown Yes Unknown, Entered By History     calcium carbonate (OS-TAVO) 500 MG tablet Take 2 tablets by mouth daily 12/6/2022 at AM Yes Unknown, Entered By History     cyanocobalamin (VITAMIN B-12) 1000 MCG tablet Take 1,000 mcg by mouth daily 12/6/2022 at AM Yes Unknown, Entered By History     cyclobenzaprine (FLEXERIL) 5 MG tablet Take 5 mg by mouth nightly as needed Past Week Yes Reported, Patient     gabapentin (NEURONTIN) 100 MG capsule Take 1 capsule (100 mg) by mouth 3 times daily for 30 days Past Week Yes Jamin Langston MD Yes 12/6/22   lisinopril (ZESTRIL) 10 MG tablet Take 10 mg by mouth daily 12/5/2022 at PM Yes Reported, Patient Yes    nitroGLYcerin (NITROSTAT) 0.4 MG sublingual tablet Place 0.4 mg under the tongue every 5 minutes as needed for chest pain  Yes Reported, Patient Yes    prednisoLONE acetate (PRED FORTE) 1 % ophthalmic suspension Place 1 drop into both eyes 3 times daily Unknown Yes Unknown, Entered By History     sertraline (ZOLOFT) 100 MG tablet Take 100 mg by mouth daily 12/6/2022 at AM Yes Reported, Patient Yes    simvastatin (ZOCOR) 40 MG tablet Take 40 mg by mouth At Bedtime 12/5/2022 at PM Yes Reported, Patient Yes    tamsulosin (FLOMAX) 0.4 MG capsule Take 0.4 mg by mouth daily 12/5/2022 at PM Yes Reported, Patient      vitamin D3 (CHOLECALCIFEROL) 50 mcg (2000 units) tablet Take 1 tablet by mouth daily 12/6/2022 at AM Yes Unknown, Entered By History                   Review of Systems:     A Comprehensive greater than 10 system review of systems was carried out.  Pertinent positives and negatives are noted above.  Otherwise negative for contributory information.           Physical Exam:   Blood pressure (!) 144/132, pulse 103, temperature 99.9  F (37.7  C), resp. rate 25, weight 100 kg (220 lb 7.4 oz), SpO2 95 %.  Exam:    General:  Laying on gurney in ER with periodic twinges of back pain (chronic),   HEENT:  Head nc/at sclera clear PERRL O/P:  Mask in place Neck is supple  Lungs: cta b nl effort   CV:  RRR no m/r/g no le edema  Abd:  S/nt/nd no r/g  Neuro:  Cn 2-12 grossly intact and coleman  Alert and oriented although gets fixated on things/somewhat repetitive as well  affect appropriate   Skin:  W/d no c/c               Data:     Results for orders placed or performed during the hospital encounter of 12/06/22   CT Head w/o Contrast     Status: None    Narrative    CT SCAN OF THE HEAD WITHOUT CONTRAST December 6, 2022 3:07 PM     HISTORY: Altered mental status.    TECHNIQUE: Axial images of the head and coronal reformations without  IV contrast material. Radiation dose for this scan was reduced using  automated exposure control, adjustment of the mA and/or kV according  to patient size, or iterative reconstruction technique.    COMPARISON: None.      Impression    IMPRESSION: Mild motion artifact. No evidence of hemorrhage. Volume  loss and patchy areas of white matter hypoattenuation which likely  represent chronic small vessel ischemic change. Small area of focal  hypoattenuation within the central medulla, presumably artifactual  (brainstem infarct not excluded, MRI can be performed for basal  ganglia and thalamic lacunar infarcts, presumably chronic. No acute  osseous abnormality. Nonspecific right facial soft tissue  swelling,  potentially contusion.    EVELYN VELAZQUEZ MD         SYSTEM ID:  [id number]   XR Chest Port 1 View     Status: None    Narrative    CHEST PORTABLE ONE VIEW December 6, 2022 2:39 PM     HISTORY: STEMI. Altered mental status. Chest pain.    COMPARISON: 11/2/2022.      Impression    IMPRESSION: Hypoinflated lungs and cardiomegaly. Mild bilateral  vascular congestion appears increased. Correlate with any evidence of  developing pulmonary edema.    HAL BOGGS MD         SYSTEM ID:  [id number]   CT Aortic Survey w Contrast     Status: None    Narrative    CT AORTIC SURVEY WITH CONTRAST  12/6/2022 3:07 PM    HISTORY:  Back pain, shock, wide mediastinum on x-ray.    TECHNIQUE: CT scan obtained of the chest, abdomen, and pelvis with IV  contrast. Post contrast scanning performed during the arterial phase.  CT chest also obtained without IV contrast. 80 mL Isovue-370 IV  injected. Sagittal and coronal reformatted images acquired from the  source post contrast data. Radiation dose for this scan was reduced  using automated exposure control, adjustment of the mA and/or kV  according to patient size, or iterative reconstruction technique.    COMPARISON:  None.    FINDINGS:  Thoracic and abdominal aorta: No dissection involving the thoracic or  abdominal aorta. Patency of the main branch vessels from the thoracic  and abdominal aorta. Scattered areas of calcified atherosclerotic  disease noted. Atherosclerotic stenosis at the origins of the celiac  and superior mesenteric arteries but there is distal perfusion. No  periaortic hematoma or aneurysm.    Other vascular: Severe coronary artery calcifications.    Chest: No adenopathy or acute mediastinal abnormality. No acute  mediastinal fluid collection. Patchy atelectasis at the posterior  right lower lobe. No effusions. No pneumothorax. Stable nodule  posterolateral left upper lobe measuring 0.3 cm, series 6 image 60.    Abdomen/pelvis: Liver, gallbladder, adrenals,  spleen, pancreas, and  kidneys do not show any acute abnormalities. Left inguinal hernia  measures 9.1 x 5.8 cm, series 5 image 265. Herniated loops of the  distal descending colon and sigmoid within the hernia. No upstream  obstruction. No bowel inflammatory change. No enlarged lymph nodes. No  acute pelvic abnormality. Diffuse degenerative changes throughout the  spine. A degree of height loss involving multiple thoracic and lumbar  spine levels.      Impression    IMPRESSION:   1. No acute thoracic or abdominal aortic abnormality. No dissection.  Scattered areas of atherosclerotic disease identified. Atherosclerotic  stenosis at the origins of the celiac artery and superior mesenteric  artery.  2. Diffuse coronary artery calcifications.  3. No acute mediastinal abnormality is seen.  4. Stable pulmonary nodule on the left, see below for follow-up  imaging guidelines.   5. Left inguinal canal hernia containing herniated loops of the distal  descending colon and sigmoid. No upstream bowel obstruction. See above  for measurements.    Recommendations for one or multiple incidental lung nodules < 6mm:    Low risk patients: No routine follow-up.    High risk patients: Optional follow-up CT at 12 months; if  unchanged, no further follow-up.    *Low Risk: Minimal or absent history of smoking or other known risk  factors.  *Nonsolid (ground glass) or partly solid nodules may require longer  follow-up to exclude indolent adenocarcinoma.  *Recommendations based on Guidelines for the Management of Incidental  Pulmonary Nodules Detected at CT: From the Fleischner Society 2017,  Radiology 2017.    HAL BOGGS MD         SYSTEM ID:  [id number]   XR Chest Port 1 View     Status: None    Narrative    XR CHEST PORT 1 VIEW 12/6/2022 3:52 PM    HISTORY: CENTRAL LINE    COMPARISON: CT today      Impression    IMPRESSION: Right IJ central venous catheter tip in the low SVC. No  pneumothorax. Mild interstitial opacities in the lungs,  could  represent pulmonary edema. No pleural effusion or pneumothorax.    EMELY MARTINEZ MD         SYSTEM ID:  [id number]   INR     Status: Normal   Result Value Ref Range    INR 1.03 0.85 - 1.15   Partial thromboplastin time     Status: Normal   Result Value Ref Range    aPTT 24 22 - 38 Seconds   Comprehensive metabolic panel     Status: Abnormal   Result Value Ref Range    Sodium 132 (L) 136 - 145 mmol/L    Potassium 3.4 3.4 - 5.3 mmol/L    Chloride 91 (L) 98 - 107 mmol/L    Carbon Dioxide (CO2) 25 22 - 29 mmol/L    Anion Gap 16 (H) 7 - 15 mmol/L    Urea Nitrogen 17.2 8.0 - 23.0 mg/dL    Creatinine 0.95 0.67 - 1.17 mg/dL    Calcium 10.5 (H) 8.8 - 10.2 mg/dL    Glucose 193 (H) 70 - 99 mg/dL    Alkaline Phosphatase 147 (H) 40 - 129 U/L    AST 78 (H) 10 - 50 U/L    ALT 17 10 - 50 U/L    Protein Total 6.7 6.4 - 8.3 g/dL    Albumin 3.7 3.5 - 5.2 g/dL    Bilirubin Total 0.3 <=1.2 mg/dL    GFR Estimate 83 >60 mL/min/1.73m2   Lipase     Status: Abnormal   Result Value Ref Range    Lipase 143 (H) 13 - 60 U/L   Troponin T, High Sensitivity     Status: Abnormal   Result Value Ref Range    Troponin T, High Sensitivity 29 (H) <=22 ng/L   Magnesium     Status: Normal   Result Value Ref Range    Magnesium 2.3 1.7 - 2.3 mg/dL   Ethyl Alcohol Level     Status: Normal   Result Value Ref Range    Alcohol ethyl <0.01 <=0.01 g/dL   UA with Microscopic reflex to Culture     Status: Abnormal    Specimen: Urine, Blanchard Catheter   Result Value Ref Range    Color Urine Yellow Colorless, Straw, Light Yellow, Yellow    Appearance Urine Clear Clear    Glucose Urine Negative Negative mg/dL    Bilirubin Urine Negative Negative    Ketones Urine Negative Negative mg/dL    Specific Gravity Urine 1.032 1.003 - 1.035    Blood Urine Trace (A) Negative    pH Urine 7.0 5.0 - 7.0    Protein Albumin Urine 30 (A) Negative mg/dL    Urobilinogen Urine Normal Normal, 2.0 mg/dL    Nitrite Urine Negative Negative    Leukocyte Esterase Urine Negative Negative     Mucus Urine Present (A) None Seen /LPF    RBC Urine 7 (H) <=2 /HPF    WBC Urine 1 <=5 /HPF    Squamous Epithelials Urine <1 <=1 /HPF    Narrative    Urine Culture not indicated   Symptomatic Influenza A/B & SARS-CoV2 (COVID-19) Virus PCR Multiplex Nasopharyngeal     Status: Normal    Specimen: Nasopharyngeal; Swab   Result Value Ref Range    Influenza A PCR Negative Negative    Influenza B PCR Negative Negative    RSV PCR Negative Negative    SARS CoV2 PCR Negative Negative    Narrative    Testing was performed using the Xpert Xpress CoV2/Flu/RSV Assay on the Cepheid GeneXpert Instrument. This test should be ordered for the detection of SARS-CoV-2 and influenza viruses in individuals who meet clinical and/or epidemiological criteria. Test performance is unknown in asymptomatic patients. This test is for in vitro diagnostic use under the FDA EUA for laboratories certified under CLIA to perform high or moderate complexity testing. This test has not been FDA cleared or approved. A negative result does not rule out the presence of PCR inhibitors in the specimen or target RNA in concentration below the limit of detection for the assay. If only one viral target is positive but coinfection with multiple targets is suspected, the sample should be re-tested with another FDA cleared, approved, or authorized test, if coinfection would change clinical management. This test was validated by the [facility]. These laboratories are certified under the Clinical Laboratory Improvement Amendments of 1988 (CLIA-88) as qualified to perform high complexity laboratory testing.   CBC with platelets and differential     Status: Abnormal   Result Value Ref Range    WBC Count 7.6 4.0 - 11.0 10e3/uL    RBC Count 3.30 (L) 4.40 - 5.90 10e6/uL    Hemoglobin 11.5 (L) 13.3 - 17.7 g/dL    Hematocrit 34.0 (L) 40.0 - 53.0 %     (H) 78 - 100 fL    MCH 34.8 (H) 26.5 - 33.0 pg    MCHC 33.8 31.5 - 36.5 g/dL    RDW 12.7 10.0 -  15.0 %    Platelet Count 331 150 - 450 10e3/uL    % Neutrophils 81 %    % Lymphocytes 11 %    % Monocytes 7 %    % Eosinophils 0 %    % Basophils 0 %    % Immature Granulocytes 1 %    NRBCs per 100 WBC 0 <1 /100    Absolute Neutrophils 6.1 1.6 - 8.3 10e3/uL    Absolute Lymphocytes 0.9 0.8 - 5.3 10e3/uL    Absolute Monocytes 0.5 0.0 - 1.3 10e3/uL    Absolute Eosinophils 0.0 0.0 - 0.7 10e3/uL    Absolute Basophils 0.0 0.0 - 0.2 10e3/uL    Absolute Immature Granulocytes 0.1 <=0.4 10e3/uL    Absolute NRBCs 0.0 10e3/uL   iStat Gases (lactate) venous, POCT     Status: Abnormal   Result Value Ref Range    Lactic Acid POCT 5.4 (HH) <=2.0 mmol/L    Bicarbonate Venous POCT 28 21 - 28 mmol/L    O2 Sat, Venous POCT 30 (L) 94 - 100 %    pCO2V Venous POCT 44 40 - 50 mm Hg    pH Venous POCT 7.41 7.32 - 7.43    pO2 Venous POCT 19 (L) 25 - 47 mm Hg   iStat Basic Chem ICA Hematocrit, POCT     Status: Abnormal   Result Value Ref Range    Chloride POCT 92 (L) 94 - 109 mmol/L    Potassium POCT 3.4 3.4 - 5.3 mmol/L    Sodium POCT 131 (L) 133 - 144 mmol/L    UREA NITROGEN POCT 17 7 - 30 mg/dL    Calcium, Ionized Whole Blood POCT 5.2 4.4 - 5.2 mg/dL    Glucose Whole Blood POCT 187 (H) 70 - 99 mg/dL    Anion Gap POCT 17.0 (H) 3.0 - 14.0 mmol/L    Hemoglobin POCT 12.2 (L) 13.3 - 17.7 g/dL    Hematocrit POCT 36 (L) 40 - 53 %    Creatinine POCT 1.2 0.7 - 1.3 mg/dL    TOTAL CO2 POCT 27 20 - 32 mmol/L   Procalcitonin     Status: Abnormal   Result Value Ref Range    Procalcitonin 0.15 (H) <0.05 ng/mL   Troponin T, High Sensitivity     Status: Abnormal   Result Value Ref Range    Troponin T, High Sensitivity 222 (HH) <=22 ng/L   iStat Gases (lactate) venous, POCT     Status: Abnormal   Result Value Ref Range    Lactic Acid POCT 1.4 <=2.0 mmol/L    Bicarbonate Venous POCT 30 (H) 21 - 28 mmol/L    O2 Sat, Venous POCT 53 (L) 94 - 100 %    pCO2V Venous POCT 54 (H) 40 - 50 mm Hg    pH Venous POCT 7.35 7.32 - 7.43    pO2 Venous POCT 30 25 - 47 mm Hg    Lactic acid whole blood     Status: Abnormal   Result Value Ref Range    Lactic Acid 2.8 (H) 0.7 - 2.0 mmol/L   Troponin T, High Sensitivity     Status: Abnormal   Result Value Ref Range    Troponin T, High Sensitivity 493 (HH) <=22 ng/L   Occult blood stool     Status: Abnormal   Result Value Ref Range    Occult Blood Positive (A) Negative   CBC (platelets, no diff)     Status: Abnormal   Result Value Ref Range    WBC Count 5.5 4.0 - 11.0 10e3/uL    RBC Count 3.54 (L) 4.40 - 5.90 10e6/uL    Hemoglobin 12.4 (L) 13.3 - 17.7 g/dL    Hematocrit 37.3 (L) 40.0 - 53.0 %     (H) 78 - 100 fL    MCH 35.0 (H) 26.5 - 33.0 pg    MCHC 33.2 31.5 - 36.5 g/dL    RDW 13.0 10.0 - 15.0 %    Platelet Count 277 150 - 450 10e3/uL   Heparin Unfractionated Anti Xa Level     Status: Normal   Result Value Ref Range    Anti Xa Unfractionated Heparin 0.40 For Reference Range, See Comment IU/mL    Narrative    Therapeutic Range: UFH: 0.25-0.50 IU/mL for low intensity dosing,  0.30-0.70 IU/mL for high intensity dosing DVT and PE.  This test is not validated for other direct factor X inhibitors (e.g. rivaroxaban, apixaban, edoxaban, betrixaban, fondaparinux) and should not be used for monitoring of other medications.   Glucose by meter     Status: Abnormal   Result Value Ref Range    GLUCOSE BY METER POCT 138 (H) 70 - 99 mg/dL   EKG 12-lead, tracing only     Status: None (Preliminary result)   Result Value Ref Range    Systolic Blood Pressure  mmHg    Diastolic Blood Pressure  mmHg    Ventricular Rate 103 BPM    Atrial Rate 103 BPM    WA Interval 170 ms    QRS Duration 74 ms     ms    QTc 471 ms    P Axis 36 degrees    R AXIS 39 degrees    T Axis 32 degrees    Interpretation ECG       Sinus tachycardia  Increased R/S ratio in V1, consider early transition or posterior infarct  Abnormal ECG  When compared with ECG of 06-DEC-2022 13:01, (unconfirmed)  Sinus rhythm has replaced Atrial fibrillation  Questionable change in QRS  duration  Criteria for Anterior infarct are no longer Present  Criteria for Anterolateral infarct are no longer Present     EKG 12 lead     Status: None (Preliminary result)   Result Value Ref Range    Systolic Blood Pressure  mmHg    Diastolic Blood Pressure  mmHg    Ventricular Rate 100 BPM    Atrial Rate 100 BPM    ME Interval 162 ms    QRS Duration 66 ms     ms    QTc 464 ms    P Axis 35 degrees    R AXIS 40 degrees    T Axis 53 degrees    Interpretation ECG       Sinus rhythm  Increased R/S ratio in V1, consider early transition or posterior infarct  Abnormal ECG  When compared with ECG of 06-DEC-2022 14:19, (unconfirmed)  No significant change was found     EKG 12 lead     Status: None (Preliminary result)   Result Value Ref Range    Systolic Blood Pressure  mmHg    Diastolic Blood Pressure  mmHg    Ventricular Rate 128 BPM    Atrial Rate 73 BPM    ME Interval  ms    QRS Duration 92 ms     ms    QTc 470 ms    P Axis  degrees    R AXIS -34 degrees    T Axis 119 degrees    Interpretation ECG       Atrial fibrillation with rapid ventricular response  Left axis deviation  Anterolateral infarct , age undetermined  Abnormal ECG  No previous ECGs available     EKG 12-lead, tracing only     Status: None (Preliminary result)   Result Value Ref Range    Systolic Blood Pressure  mmHg    Diastolic Blood Pressure  mmHg    Ventricular Rate 109 BPM    Atrial Rate 109 BPM    ME Interval 172 ms    QRS Duration 66 ms     ms    QTc 457 ms    P Axis 42 degrees    R AXIS 14 degrees    T Axis 43 degrees    Interpretation ECG       Sinus tachycardia  Otherwise normal ECG  When compared with ECG of 06-DEC-2022 14:20, (unconfirmed)  Nonspecific T wave abnormality no longer evident in Lateral leads     Echocardiogram Limited     Status: None   Result Value Ref Range    LVEF  75%     Narrative    [id number]  [id number]  [id number]  [id number]^ANGELIQUE^MINOO^NERI     [facility]  Echocardiography Laboratory  [address]  [place] [address]     Name: BERTIN LYNCH  MRN: [id number]  : 1945  Study Date: 2022 02:59 PM  Age: 77 yrs  Gender: Male  Patient Location: [facility]  Reason For Study: Chest Pain  Ordering Physician: MINOO MERINO  Performed By: Mariah Carranza RDCS     BSA: 2.1 m2  Height: 66 in  Weight: 220 lb  HR: 110  BP: 82/65 mmHg  ______________________________________________________________________________  Procedure  Limited Portable Echo Adult. Optison (NDC #[id number]) given intravenously.  (Emergent exam, abbreviated study performed).  ______________________________________________________________________________  Interpretation Summary     The visual ejection fraction is estimated at 75%.  Hyperdynamic left ventricular function  ER informed by phone that echo has been read. The study was technically  limited. Technically difficult, suboptimal study.  ______________________________________________________________________________  Left Ventricle  Grade I or early diastolic dysfunction. The visual ejection fraction is  estimated at 75%. Hyperdynamic left ventricular function.     Right Ventricle  The right ventricle is normal in size and function.     Atria  Normal left atrial size. Right atrial size is normal.     Mitral Valve  There is trace mitral regurgitation.     Tricuspid Valve  There is trace tricuspid regurgitation. Right ventricular systolic pressure  could not be approximated due to inadequate tricuspid regurgitation.     Aortic Valve  The aortic valve is trileaflet. There is mild trileaflet aortic sclerosis. No  aortic regurgitation is present. No hemodynamically significant valvular  aortic stenosis.     Pulmonic Valve  Normal pulmonic valve velocity.     Vessels  IVC diameter >2.1 cm collapsing <50% with sniff suggests a high RA pressure  estimated at 15 mmHg or greater.     Pericardium  There is no pericardial effusion.     Rhythm  The rhythm was sinus  tachycardia.  ______________________________________________________________________________  MMode/2D Measurements & Calculations     LA Volume (BP): 68.8 ml     Doppler Measurements & Calculations  MV E max dwayne: 93.3 cm/sec  MV A max dwayne: 121.6 cm/sec  MV E/A: 0.77  MV dec time: 0.12 sec     ______________________________________________________________________________  Report approved by: Meche English 12/06/2022 03:34 PM         Adult Type and Screen     Status: None   Result Value Ref Range    ABO/RH(D) A POS     Antibody Screen Negative Negative    SPECIMEN EXPIRATION DATE [id number]    CBC with platelets differential     Status: Abnormal    Narrative    The following orders were created for panel order CBC with platelets differential.  Procedure                               Abnormality         Status                     ---------                               -----------         ------                     CBC with platelets and d...[[id number]]  Abnormal            Final result                 Please view results for these tests on the individual orders.   ABO/Rh type and screen     Status: None    Narrative    The following orders were created for panel order ABO/Rh type and screen.  Procedure                               Abnormality         Status                     ---------                               -----------         ------                     Adult Type and Screen[[id number]]                            Final result                 Please view results for these tests on the individual orders.

## 2022-12-07 NOTE — PROGRESS NOTES
SPIRITUAL HEALTH SERVICES Progress Note  [facility] ICU    Briefly met with pt SO, Carlos Enrique and her son, Kory outside pt room as he received cares.  Carlos Enrique stated pt is Tenriism.  Oriented to Spiritual Health Services for support during hospital stay.    Plan: Spiritual Health Services remains available for additional emotional/spiritual support.    Kavon Deleon MA  Staff   [phone]   On Site Tu/We/Th    [facility] remains available 24/7 for emergent requests/referrals, either by having the on-call  paged or by entering an ASAP/STAT consult in Epic (this will also page the on-call ). Routine Epic consults receive an initial response within 24 hours.

## 2022-12-08 ENCOUNTER — APPOINTMENT (OUTPATIENT)
Dept: GENERAL RADIOLOGY | Facility: CLINIC | Age: 77
DRG: 871 | End: 2022-12-08
Attending: INTERNAL MEDICINE
Payer: COMMERCIAL

## 2022-12-08 LAB
ANION GAP SERPL CALCULATED.3IONS-SCNC: 6 MMOL/L (ref 7–15)
BASE EXCESS BLDV CALC-SCNC: 2.8 MMOL/L (ref -7.7–1.9)
BASE EXCESS BLDV CALC-SCNC: 6.1 MMOL/L (ref -7.7–1.9)
BUN SERPL-MCNC: 22.1 MG/DL (ref 8–23)
CALCIUM SERPL-MCNC: 9.8 MG/DL (ref 8.8–10.2)
CHLORIDE SERPL-SCNC: 104 MMOL/L (ref 98–107)
CREAT SERPL-MCNC: 0.72 MG/DL (ref 0.67–1.17)
DEPRECATED HCO3 PLAS-SCNC: 29 MMOL/L (ref 22–29)
ERYTHROCYTE [DISTWIDTH] IN BLOOD BY AUTOMATED COUNT: 13.8 % (ref 10–15)
GFR SERPL CREATININE-BSD FRML MDRD: >90 ML/MIN/1.73M2
GLUCOSE BLDC GLUCOMTR-MCNC: 114 MG/DL (ref 70–99)
GLUCOSE BLDC GLUCOMTR-MCNC: 120 MG/DL (ref 70–99)
GLUCOSE BLDC GLUCOMTR-MCNC: 121 MG/DL (ref 70–99)
GLUCOSE BLDC GLUCOMTR-MCNC: 124 MG/DL (ref 70–99)
GLUCOSE BLDC GLUCOMTR-MCNC: 127 MG/DL (ref 70–99)
GLUCOSE SERPL-MCNC: 115 MG/DL (ref 70–99)
HCO3 BLDV-SCNC: 30 MMOL/L (ref 21–28)
HCO3 BLDV-SCNC: 33 MMOL/L (ref 21–28)
HCT VFR BLD AUTO: 32.3 % (ref 40–53)
HGB BLD-MCNC: 10.5 G/DL (ref 13.3–17.7)
MCH RBC QN AUTO: 35.2 PG (ref 26.5–33)
MCHC RBC AUTO-ENTMCNC: 32.5 G/DL (ref 31.5–36.5)
MCV RBC AUTO: 108 FL (ref 78–100)
O2/TOTAL GAS SETTING VFR VENT: 4 %
O2/TOTAL GAS SETTING VFR VENT: 5 %
PCO2 BLDV: 56 MM HG (ref 40–50)
PCO2 BLDV: 59 MM HG (ref 40–50)
PH BLDV: 7.33 [PH] (ref 7.32–7.43)
PH BLDV: 7.36 [PH] (ref 7.32–7.43)
PLATELET # BLD AUTO: 232 10E3/UL (ref 150–450)
PO2 BLDV: 44 MM HG (ref 25–47)
PO2 BLDV: 45 MM HG (ref 25–47)
POTASSIUM SERPL-SCNC: 3.6 MMOL/L (ref 3.4–5.3)
RBC # BLD AUTO: 2.98 10E6/UL (ref 4.4–5.9)
SODIUM SERPL-SCNC: 139 MMOL/L (ref 136–145)
UFH PPP CHRO-ACNC: 0.15 IU/ML
UFH PPP CHRO-ACNC: 0.26 IU/ML
UFH PPP CHRO-ACNC: 0.47 IU/ML
WBC # BLD AUTO: 5.5 10E3/UL (ref 4–11)

## 2022-12-08 PROCEDURE — 85520 HEPARIN ASSAY: CPT | Performed by: INTERNAL MEDICINE

## 2022-12-08 PROCEDURE — 250N000011 HC RX IP 250 OP 636: Performed by: HOSPITALIST

## 2022-12-08 PROCEDURE — 250N000011 HC RX IP 250 OP 636: Performed by: INTERNAL MEDICINE

## 2022-12-08 PROCEDURE — 999N000157 HC STATISTIC RCP TIME EA 10 MIN

## 2022-12-08 PROCEDURE — 85027 COMPLETE CBC AUTOMATED: CPT | Performed by: INTERNAL MEDICINE

## 2022-12-08 PROCEDURE — 99291 CRITICAL CARE FIRST HOUR: CPT | Performed by: INTERNAL MEDICINE

## 2022-12-08 PROCEDURE — 250N000013 HC RX MED GY IP 250 OP 250 PS 637: Performed by: CLINICAL NURSE SPECIALIST

## 2022-12-08 PROCEDURE — 250N000013 HC RX MED GY IP 250 OP 250 PS 637: Performed by: INTERNAL MEDICINE

## 2022-12-08 PROCEDURE — 258N000003 HC RX IP 258 OP 636: Performed by: INTERNAL MEDICINE

## 2022-12-08 PROCEDURE — 99233 SBSQ HOSP IP/OBS HIGH 50: CPT | Performed by: INTERNAL MEDICINE

## 2022-12-08 PROCEDURE — 94640 AIRWAY INHALATION TREATMENT: CPT

## 2022-12-08 PROCEDURE — 200N000001 HC R&B ICU

## 2022-12-08 PROCEDURE — 82803 BLOOD GASES ANY COMBINATION: CPT | Performed by: INTERNAL MEDICINE

## 2022-12-08 PROCEDURE — 71045 X-RAY EXAM CHEST 1 VIEW: CPT

## 2022-12-08 PROCEDURE — 94640 AIRWAY INHALATION TREATMENT: CPT | Mod: 76

## 2022-12-08 PROCEDURE — 250N000009 HC RX 250: Performed by: INTERNAL MEDICINE

## 2022-12-08 PROCEDURE — 250N000011 HC RX IP 250 OP 636: Performed by: PHYSICIAN ASSISTANT

## 2022-12-08 PROCEDURE — 99222 1ST HOSP IP/OBS MODERATE 55: CPT | Performed by: SURGERY

## 2022-12-08 PROCEDURE — C9113 INJ PANTOPRAZOLE SODIUM, VIA: HCPCS | Performed by: PHYSICIAN ASSISTANT

## 2022-12-08 PROCEDURE — 99291 CRITICAL CARE FIRST HOUR: CPT | Performed by: SURGERY

## 2022-12-08 PROCEDURE — 80048 BASIC METABOLIC PNL TOTAL CA: CPT | Performed by: INTERNAL MEDICINE

## 2022-12-08 RX ORDER — IPRATROPIUM BROMIDE AND ALBUTEROL SULFATE 2.5; .5 MG/3ML; MG/3ML
3 SOLUTION RESPIRATORY (INHALATION)
Status: DISCONTINUED | OUTPATIENT
Start: 2022-12-08 | End: 2022-12-11

## 2022-12-08 RX ORDER — LORAZEPAM 2 MG/ML
0.5 INJECTION INTRAMUSCULAR ONCE
Status: COMPLETED | OUTPATIENT
Start: 2022-12-08 | End: 2022-12-08

## 2022-12-08 RX ORDER — IPRATROPIUM BROMIDE AND ALBUTEROL SULFATE 2.5; .5 MG/3ML; MG/3ML
3 SOLUTION RESPIRATORY (INHALATION)
Status: DISCONTINUED | OUTPATIENT
Start: 2022-12-08 | End: 2022-12-14

## 2022-12-08 RX ORDER — PANTOPRAZOLE SODIUM 40 MG/10ML
40 INJECTION, POWDER, LYOPHILIZED, FOR SOLUTION INTRAVENOUS 2 TIMES DAILY
Status: DISCONTINUED | OUTPATIENT
Start: 2022-12-08 | End: 2022-12-12

## 2022-12-08 RX ORDER — ATORVASTATIN CALCIUM 40 MG/1
40 TABLET, FILM COATED ORAL EVERY EVENING
Status: DISCONTINUED | OUTPATIENT
Start: 2022-12-08 | End: 2022-12-20 | Stop reason: HOSPADM

## 2022-12-08 RX ORDER — DEXTROSE MONOHYDRATE, SODIUM CHLORIDE, AND POTASSIUM CHLORIDE 50; 1.49; 4.5 G/1000ML; G/1000ML; G/1000ML
INJECTION, SOLUTION INTRAVENOUS CONTINUOUS
Status: DISCONTINUED | OUTPATIENT
Start: 2022-12-08 | End: 2022-12-10

## 2022-12-08 RX ORDER — FUROSEMIDE 10 MG/ML
40 INJECTION INTRAMUSCULAR; INTRAVENOUS ONCE
Status: COMPLETED | OUTPATIENT
Start: 2022-12-08 | End: 2022-12-08

## 2022-12-08 RX ORDER — HALOPERIDOL 5 MG/ML
2-4 INJECTION INTRAMUSCULAR EVERY 6 HOURS PRN
Status: DISCONTINUED | OUTPATIENT
Start: 2022-12-08 | End: 2022-12-20 | Stop reason: HOSPADM

## 2022-12-08 RX ORDER — DEXMEDETOMIDINE HYDROCHLORIDE 4 UG/ML
.1-1.2 INJECTION, SOLUTION INTRAVENOUS CONTINUOUS
Status: DISCONTINUED | OUTPATIENT
Start: 2022-12-08 | End: 2022-12-10

## 2022-12-08 RX ORDER — METHYLPREDNISOLONE SODIUM SUCCINATE 40 MG/ML
40 INJECTION, POWDER, LYOPHILIZED, FOR SOLUTION INTRAMUSCULAR; INTRAVENOUS EVERY 12 HOURS
Status: DISCONTINUED | OUTPATIENT
Start: 2022-12-08 | End: 2022-12-09

## 2022-12-08 RX ADMIN — HALOPERIDOL LACTATE 4 MG: 5 INJECTION, SOLUTION INTRAMUSCULAR at 10:58

## 2022-12-08 RX ADMIN — TAZOBACTAM SODIUM AND PIPERACILLIN SODIUM 3.38 G: 375; 3 INJECTION, SOLUTION INTRAVENOUS at 10:00

## 2022-12-08 RX ADMIN — METHYLPREDNISOLONE SODIUM SUCCINATE 40 MG: 40 INJECTION, POWDER, FOR SOLUTION INTRAMUSCULAR; INTRAVENOUS at 22:10

## 2022-12-08 RX ADMIN — DICLOFENAC SODIUM 4 G: 10 GEL TOPICAL at 13:27

## 2022-12-08 RX ADMIN — TAZOBACTAM SODIUM AND PIPERACILLIN SODIUM 3.38 G: 375; 3 INJECTION, SOLUTION INTRAVENOUS at 03:45

## 2022-12-08 RX ADMIN — IPRATROPIUM BROMIDE AND ALBUTEROL SULFATE 3 ML: 2.5; .5 SOLUTION RESPIRATORY (INHALATION) at 16:25

## 2022-12-08 RX ADMIN — MICONAZOLE NITRATE: 20 POWDER TOPICAL at 08:11

## 2022-12-08 RX ADMIN — DICLOFENAC SODIUM 4 G: 10 GEL TOPICAL at 08:11

## 2022-12-08 RX ADMIN — TAZOBACTAM SODIUM AND PIPERACILLIN SODIUM 3.38 G: 375; 3 INJECTION, SOLUTION INTRAVENOUS at 22:10

## 2022-12-08 RX ADMIN — PANTOPRAZOLE SODIUM 40 MG: 40 INJECTION, POWDER, FOR SOLUTION INTRAVENOUS at 20:34

## 2022-12-08 RX ADMIN — HYDRALAZINE HYDROCHLORIDE 10 MG: 20 INJECTION INTRAMUSCULAR; INTRAVENOUS at 03:09

## 2022-12-08 RX ADMIN — LORAZEPAM 0.5 MG: 2 INJECTION INTRAMUSCULAR; INTRAVENOUS at 02:42

## 2022-12-08 RX ADMIN — PANTOPRAZOLE SODIUM 40 MG: 40 INJECTION, POWDER, FOR SOLUTION INTRAVENOUS at 09:56

## 2022-12-08 RX ADMIN — HYDROMORPHONE HYDROCHLORIDE 0.3 MG: 1 INJECTION, SOLUTION INTRAMUSCULAR; INTRAVENOUS; SUBCUTANEOUS at 04:03

## 2022-12-08 RX ADMIN — POTASSIUM CHLORIDE, DEXTROSE MONOHYDRATE AND SODIUM CHLORIDE: 150; 5; 450 INJECTION, SOLUTION INTRAVENOUS at 09:59

## 2022-12-08 RX ADMIN — ACETAMINOPHEN 650 MG: 325 TABLET, FILM COATED ORAL at 10:33

## 2022-12-08 RX ADMIN — HEPARIN SODIUM 1200 UNITS/HR: 10000 INJECTION, SOLUTION INTRAVENOUS at 09:37

## 2022-12-08 RX ADMIN — HYDROMORPHONE HYDROCHLORIDE 0.3 MG: 1 INJECTION, SOLUTION INTRAMUSCULAR; INTRAVENOUS; SUBCUTANEOUS at 08:10

## 2022-12-08 RX ADMIN — FUROSEMIDE 40 MG: 10 INJECTION, SOLUTION INTRAMUSCULAR; INTRAVENOUS at 02:44

## 2022-12-08 RX ADMIN — IPRATROPIUM BROMIDE AND ALBUTEROL SULFATE 3 ML: 2.5; .5 SOLUTION RESPIRATORY (INHALATION) at 19:57

## 2022-12-08 RX ADMIN — HALOPERIDOL LACTATE 4 MG: 5 INJECTION, SOLUTION INTRAMUSCULAR at 03:40

## 2022-12-08 RX ADMIN — LORAZEPAM 0.5 MG: 2 INJECTION INTRAMUSCULAR; INTRAVENOUS at 09:56

## 2022-12-08 RX ADMIN — IPRATROPIUM BROMIDE AND ALBUTEROL SULFATE 3 ML: 2.5; .5 SOLUTION RESPIRATORY (INHALATION) at 11:17

## 2022-12-08 RX ADMIN — DICLOFENAC SODIUM 4 G: 10 GEL TOPICAL at 22:17

## 2022-12-08 RX ADMIN — MICONAZOLE NITRATE: 20 POWDER TOPICAL at 20:19

## 2022-12-08 RX ADMIN — DEXMEDETOMIDINE HYDROCHLORIDE 0.2 MCG/KG/HR: 400 INJECTION INTRAVENOUS at 11:23

## 2022-12-08 RX ADMIN — METHYLPREDNISOLONE SODIUM SUCCINATE 40 MG: 40 INJECTION, POWDER, FOR SOLUTION INTRAMUSCULAR; INTRAVENOUS at 09:36

## 2022-12-08 RX ADMIN — LIDOCAINE 2 PATCH: 246 PATCH TOPICAL at 20:14

## 2022-12-08 RX ADMIN — DICLOFENAC SODIUM 4 G: 10 GEL TOPICAL at 18:38

## 2022-12-08 RX ADMIN — HYDROMORPHONE HYDROCHLORIDE 4 MG: 2 TABLET ORAL at 11:12

## 2022-12-08 RX ADMIN — TAZOBACTAM SODIUM AND PIPERACILLIN SODIUM 3.38 G: 375; 3 INJECTION, SOLUTION INTRAVENOUS at 16:18

## 2022-12-08 RX ADMIN — HYDROMORPHONE HYDROCHLORIDE 0.3 MG: 1 INJECTION, SOLUTION INTRAMUSCULAR; INTRAVENOUS; SUBCUTANEOUS at 00:47

## 2022-12-08 ASSESSMENT — ACTIVITIES OF DAILY LIVING (ADL)
ADLS_ACUITY_SCORE: 49
FALL_HISTORY_WITHIN_LAST_SIX_MONTHS: YES
ADLS_ACUITY_SCORE: 49
ADLS_ACUITY_SCORE: 50
NUMBER_OF_TIMES_PATIENT_HAS_FALLEN_WITHIN_LAST_SIX_MONTHS: 1
ADLS_ACUITY_SCORE: 47
TRANSFERRING: 1-->ASSISTANCE (EQUIPMENT/PERSON) NEEDED (NOT DEVELOPMENTALLY APPROPRIATE)
WEAR_GLASSES_OR_BLIND: OTHER (SEE COMMENTS)
DIFFICULTY_EATING/SWALLOWING: NO
ADLS_ACUITY_SCORE: 47
ADLS_ACUITY_SCORE: 43
BATHING: 1-->ASSISTANCE NEEDED
CHANGE_IN_FUNCTIONAL_STATUS_SINCE_ONSET_OF_CURRENT_ILLNESS/INJURY: YES
TRANSFERRING: 1-->ASSISTANCE (EQUIPMENT/PERSON) NEEDED
DOING_ERRANDS_INDEPENDENTLY_DIFFICULTY: YES
EQUIPMENT_CURRENTLY_USED_AT_HOME: CANE, STRAIGHT;WALKER, STANDARD
WALKING_OR_CLIMBING_STAIRS: AMBULATION DIFFICULTY, REQUIRES EQUIPMENT;STAIR CLIMBING DIFFICULTY, REQUIRES EQUIPMENT;TRANSFERRING DIFFICULTY, REQUIRES EQUIPMENT
DRESSING/BATHING: BATHING DIFFICULTY, ASSISTANCE 1 PERSON;BATHING DIFFICULTY, REQUIRES EQUIPMENT
WALKING_OR_CLIMBING_STAIRS_DIFFICULTY: YES
DRESS: 1-->ASSISTANCE (EQUIPMENT/PERSON) NEEDED (NOT DEVELOPMENTALLY APPROPRIATE)
ADLS_ACUITY_SCORE: 43
DRESS: 1-->ASSISTANCE (EQUIPMENT/PERSON) NEEDED
TOILETING_ISSUES: NO
CONCENTRATING,_REMEMBERING_OR_MAKING_DECISIONS_DIFFICULTY: YES
DRESSING/BATHING_DIFFICULTY: YES
ADLS_ACUITY_SCORE: 49
ADLS_ACUITY_SCORE: 43
ADLS_ACUITY_SCORE: 47

## 2022-12-08 NOTE — PROGRESS NOTES
ICU staff  DOS 12/8/2022    This 76 y/o man with a history of CAD was admitted overnight 12/6-7 with hypotension thought to be secondary to hypovolemia. He was also found to have an NSTEMI and was started on a heparin infusion. He has a large left inguinal hernia which is chronic. His hypotension has resolved but he has had worsening mental status and respiratory performance over the last 24 hours. He is unable to answer many of my questions -- responses are confused/inappropriate -- but denies pain, dyspnea and nausea.    PMHx notable for hypertension, CAD s/p DAYAN 2008, GERD with hx esophagitis, dyslipidemia, macrocytic anemia, morbid obesity, monoclonal gammopathy, prostate cancer treated with XRT, PMR, tobacco use (though quit in 2010).     Vitals:   Temp:  [98.4  F (36.9  C)-99.9  F (37.7  C)] 99.9  F (37.7  C)  Pulse:  [105-131] 128  Resp:  [16-53] 36  BP: (100-193)/() 177/109  SpO2:  [89 %-97 %] 89 %     Resp: (!) 36    I/O last 3 completed shifts:  In: 2476.62 [I.V.:2476.62]  Out: 3550 [Urine:3550]    Dexmedetomidine started this morning    Exam:  Gen: Alert, mildly tachypneic, no apparent distress  HEENT: NC/AT, anicteric  Pulm: Clear bilaterally  Cor: Regular tachycardia  Abdomen/GI: Soft, obese, mildly distended, not apparently tender  : Left inguinal hernia extending to scrotum, mildly tender and not reducible  Extremities: Warm, nonedematous  Skin: Tinea cruris in inguinal folds/pannus  Neuro: Alert, interactive but answers are indistinct/inappropriate, EDDY  Psych: Appeared calm    Data:   Labs reviewed   - Troponin peaked at 1047 -> 863  - VBG this morning 7.33/56/44/39 and not substantially changed from admission  - Hb 10.5,   - Procalcitonin indeterminately low at 0.15  Imaging personally reviewed  - CXR from this morning with new left-side infiltrates  - CT C/A/P shows esophageal wall thickening, some dilated loops of small bowel, LLQ hernia containing sigmoid colon with wall  thickening and a small amount of fluid, some mild inflammatory stranding about the hernia sac; there is air/fluid in the colon downstream from the hernia  Echo on admission showed hyperdynamic LV  Nothing on cultures right now    Assessment/plan:  76 y/o man with multiple comorbid conditions admitted 12/6-12/7 with hypotension and NSTEMI. Remains in ICU with worsening encephalopathy and hypoxemic respiratory failure.   CNS: Alert but confused and mildly agitated.  # Toxic/metabolic encephalopathy  # ?Syncope -- presumably secondary to hypotension  - Started on dex for agitation, continue for now  Pulm: Increased O2 needs this morning -- up to 10L by facemask, with some tachypnea  # Acute hypoxemic respiratory failure  # Smoking history, pulmonary nodules  # New infiltrate -- ?pneumonia  - Not using accessory muscles and doesn't appear to be imminently failing but also has increased respiratory effort; doesn't need to be intubated at this point but could progress  - Got furosemide this morning with good effect, consider repeating dose  - Continue steroids, duonebs, antbiotics  CV: Hypertensive and tachycardic.  # NSTEMI  # Hypertension  # Tachycardia -- atrial fibrillation, sinus tachycardia  # Shock state, resolved  # CAD s/p stenting (2008)  # Dyslipidemia  - Shock was thought to be secondary to dehydration/hypovolemia from poor intake, no other etiology identified so far  - Continues on heparin infusion with medical management for NSTEMI planned  - Heparin for atrial fibrillation  - With hypertension, might benefit from some metoprolol for rate control (though dex seems to be helping in this regard as well)  - Would keep central line in place for now as he may need multiple IV meds if he requires intubation  GI: Abdomen soft/NT/ND, hernia is tender and not reducible (but is chronic)  # GERD with esophagitis  # Colitis  # Inguinal hernia containing sigmoid colon  # Morbid obesity (BMI 40)  - Agree with NPO for  now  - PPI  - Imaging not suggestive of mechanical small or large bowel obstruction (there is decompressed ileum downstream from the air-filled proximal bowel, there is also air/fluid downstream from the neck of the inguinal hernia)  - Surgery has seen the patient and plans on following hernia for now  - Continues on pip/tazo, which should cover infectious colitis  : UOP brisk overnight after diuresis  # ?Hypervolemia  - Was fluid-resuscitated on admission, but may be on the full side now -- BP did not drop despite good response to furosemide so could consider re-dosing given respiratory change  Heme: Hb 10.5 (11.6)  # Macrocytic anemia, workup in process  # MGUS  - No indication to transfuse  Msk: Extremities warm, well-perfused.   Endo: Glucose 111-124  ID: Afebrile, WBCs 5, nothing on culture.  # ?Pneumonia  - On pip/tazo, could consider adding vancomycin to cover resistant organisms  - Could be aspiration-related as well  ICU: Heparin, PPI    This patient is critically ill to my assessment and requires ICU monitoring and cares. A total of 50 minutes critical care time spent on 12/8/2022, exclusive of procedures.     Alex Forrester MD, PhD  Surgical critical care  12/8/2022, 12:34 PM

## 2022-12-08 NOTE — PROVIDER NOTIFICATION
0200- Cross cover paged d/t pt having increased WOB and increased restlessness. Pt currently has mitts on d/t line pulling. RR in high 30s. Denied SOB, LS clear. VBG ordered and drawn. Chest xray ordered and complete. MD also assessed at bedside. Plan to stop IVF and give 40 of lasix.     0330- Pt remained restless despite receiving 0.5 of ativan 50 mins prior. Now requiring a bedside attendant for safety. Pt additionally starting to get agitated. Crosscover paged for additional PRNs. IV haldol ordered. 4mg of IV haldol administered.

## 2022-12-08 NOTE — CONSULTS
Chief complaint:  Left inguinal hernia  Small bowel obstruction    HPI:  This patient is a 77 year old male who presents with a clinical picture consistent with shock.  He had passed out either the day of or the day prior to admission.  He is a poor historian.  He reportedly had not been eating well for at least 6 weeks.  The patient has had some nausea, but no vomiting.  He has had smears of bowel movement since he was in the hospital.  In the emergency room, the patient was found to have blood pressures in the 60s over 40s.  He had some EKG changes as well as an elevated lactic acid, which improved after hydration.  The patient has had some confusion and is pulling at lines.  He is not able to give much detailed history.  CT scan was obtained yesterday which showed a large left inguinal hernia.  There is also showed some thickening or hardening splenic flexure of the colon as well as some colon within the hernia.  There appears to be some surrounding inflammatory change as well.  This seems to be a change from his aortic survey performed on the day of admission.    Past Medical History:   has a past medical history of Benign essential hypertension, Coronary artery disease involving autologous artery coronary bypass graft without angina pectoris, Depression, Gastroesophageal reflux disease with esophagitis, Hyperlipidemia LDL goal <70, Macrocytic anemia, Monoclonal gammopathy, Obesity, PMR (polymyalgia rheumatica) (H), Tobacco abuse, and Vitamin D deficiency.    Past Surgical History:  Past Surgical History:   Procedure Laterality Date     CORONARY ANGIOPLASTY  05/2008    with DAYAN to the LAD     HERNIA REPAIR          Social History:  Social History     Socioeconomic History     Marital status: Single     Spouse name: Not on file     Number of children: Not on file     Years of education: Not on file     Highest education level: Not on file   Occupational History     Not on file   Tobacco Use     Smoking status:  Former     Types: Cigarettes     Quit date: 2010     Years since quittin.8     Smokeless tobacco: Not on file   Substance and Sexual Activity     Alcohol use: Not on file     Drug use: Not on file     Sexual activity: Not on file   Other Topics Concern     Not on file   Social History Narrative     Not on file     Social Determinants of Health     Financial Resource Strain: Not on file   Food Insecurity: Not on file   Transportation Needs: Not on file   Physical Activity: Not on file   Stress: Not on file   Social Connections: Not on file   Intimate Partner Violence: Not on file   Housing Stability: Not on file        Family History:  Family History   Problem Relation Age of Onset     Coronary Artery Disease Mother      Coronary Artery Disease Father      Heart Failure Father      Alcoholism Brother        Review of Systems:  The 10 point Review of Systems is negative other than noted in the HPI and above.    Physical Exam:  General - This is a well developed obese male in no obvious discomfort.  HEENT - Normocephalic, atraumatic.  No scleral icterus.  Neck - supple without masses  Lungs - clear to ascultation.    Heart - Regular rate & rhythm without murmur  Abdomen:   soft, mildly distended with tenderness noted on the left side, with most of this near his difficult to palpate left inguinal hernia.  I attempted to reduce his hernia and was able to remove some gas within the hernia, suggesting that this is not strangulated.  I was not able to completely reduce the hernia.  There is mild to moderate scrotal edema.  The hernia does not extend down into the left scrotum.    Extremities - warm without edema  Neurologic - nonfocal    Relevant labs:  White blood cell count is 5500.    Imaging:  CT scan shows thickening of the colon at the splenic flexure, circumferential thickening of the esophagus and thickening of the colon within the large left inguinal hernia.  There is a broad defect to the left inguinal  hernia.  There appears to be some inflammatory change in the tissue around the colon both in the hernia, and near the splenic flexure.    Assessment and Plan:  This is a patient with a longstanding large left inguinal hernia.  He has now presented with a sepsis picture, but this seems less likely related to his abdomen.  There have been changes in the abdominal tissue, however.  Specifically, there is thickening of the splenic flexure of the colon with some surrounding soft tissue stranding as well as thickening of the colon in the hernia and some surrounding soft tissue stranding.  This is a change between the 12/6/2022 and 12/7/2022 scans.  Given the fact that the hernia is longstanding and the patient's symptoms predated the CT findings, it seems relatively unlikely that his abdominal issues are his primary problem.  I suspect his distended small bowel is more likely to be ileus than obstruction.  I am able to move some gas within the left inguinal hernia, suggesting that this is not strangulated.  The patient does indicate discomfort in the area, though he also has some confusion.  The case is discussed with the ICU team.  At this point, we will not plan on any surgical intervention, though if his abdominal or inguinal findings change, we may need to reconsider that.  My strong suspicion is that he has a chronically incarcerated hernia which now has some incidental abnormalities within it.      Nelson Ruiz MD  [facility]

## 2022-12-08 NOTE — PROGRESS NOTES
[facility]    Hospitalist Cross Cover Note  Name: Pacheco Torres    MRN: [id number]  Provider: Nikki Horn MD  Date of Service: 12/08/2022    Assessment & Plan   Summary of Stay: Pacheco Torres is a 77 year old male who was admitted on 12/6/2022 for shock toxic metabolic encephalopathy and non-ST elevation MI.  Cross cover page as patient had increased work of breathing still requiring 4 L of supplemental O2 per    Acute respiratory failure with increased work of breathing and respiratory rate in 30s  -On exam patient with evidence of congestion  -Hypertensive tachycardic  -Discontinued IV fluids  -Ordered chest x-ray stat: Looks like worsening congestion pulmonary edema  -IV Lasix 40 mg x 1  -Blood gas with PCO2 56 not much different from prior levels  -Patient also anxious we will also try Ativan if needed  -Continue to monitor      DVT Prophylaxis: On IV hep  Code Status: Full Code  Total critical care time spent in direct patient care is more than 35 minutes      Interval History   Cross cover page for increasing work of breathing and patient being restless.  Patient has been pulling lines and was requiring meds.  Was able to talk in full sentences seems restless and agitated.    -Data reviewed today: I reviewed all new labs and imaging reports over the last 24 hours. I personally reviewed the chest x-ray image(s) showing Worsening congestion.    Physical Exam   Temp: 99.5  F (37.5  C) Temp src: Bladder BP: (!) 137/91 Pulse: 115   Resp: 27 SpO2: 94 % O2 Device: Nasal cannula Oxygen Delivery: 4 LPM  Vitals:    12/06/22 1412 12/06/22 2200   Weight: 100 kg (220 lb 7.4 oz) 101.3 kg (223 lb 5.2 oz)     Vital Signs with Ranges  Temp:  [94.8  F (34.9  C)-101.1  F (38.4  C)] 99.5  F (37.5  C)  Pulse:  [] 115  Resp:  [14-35] 27  BP: ()/() 137/91  SpO2:  [86 %-96 %] 94 %  I/O last 3 completed shifts:  In: 1974.27 [I.V.:1974.27]  Out: 1975 [Urine:1975]      GEN: Awake restless  tachypneic  HEENT:  Normocephalic/atraumatic, no scleral icterus, no nasal discharge, mouth dry  CV: Tachycardic.  S1 + S2 noted, no S3 or S4.  LUNGS: Poor inspiratory effort bibasilar.  Symmetric chest rise on inhalation noted.  ABD:  Active bowel sounds, soft abdomen distended no rebound/guarding/rigidity.  EXT:  trace edema.     Medications     heparin 1,200 Units/hr (12/08/22 0000)     - MEDICATION INSTRUCTIONS -       - MEDICATION INSTRUCTIONS -       sodium chloride 125 mL/hr at 12/08/22 0000     sodium chloride Stopped (12/07/22 1444)       acetaminophen  650 mg Oral TID     diclofenac  4 g Topical 4x Daily     heparin (porcine)         lidocaine  2 patch Transdermal Q24h    And     lidocaine   Transdermal Q8H YOMI     lidocaine (PF)         [Held by provider] methocarbamol  500 mg Oral 4x Daily     miconazole   Topical BID     nitroGLYcerin in D5W         piperacillin-tazobactam  3.375 g Intravenous Q6H     [Held by provider] pregabalin  25 mg Oral TID     sodium chloride (PF)  3 mL Intracatheter Q8H     sodium chloride (PF)  3 mL Intracatheter Q8H     Data     Recent Labs   Lab 12/08/22  0207 12/06/22  1604 12/06/22  1417   PH  --  7.35 7.41   PHV 7.33  --   --    PO2V 44  --   --    PCO2V 56*  --   --    HCO3V 30* 30* 28     Recent Labs   Lab 12/07/22  0428 12/06/22  1951 12/06/22  1421   WBC 6.9 5.5 7.6   HGB 11.6* 12.4* 11.5*   HCT 34.8* 37.3* 34.0*   * 105* 103*    277 331     Recent Labs   Lab 12/07/22  2345 12/07/22  1943 12/07/22  1624 12/07/22  0810 12/07/22  0428 12/06/22  2151 12/06/22  1421 12/06/22  1417   0000   NA  --   --   --   --  133*  --  132* 131*  --    POTASSIUM  --   --   --   --  4.4  --  3.4 3.4  --    CHLORIDE  --   --   --   --  99  --  91* 92*  --    CO2  --   --   --   --  24  --  25  --   --    ANIONGAP  --   --   --   --  10  --  16*  --   --    * 103* 101*   < > 138*   < > 193* 187*   < >   BUN  --   --   --   --  26.8*  --  17.2 17  --    CR  --   --   --    --  0.86  --  0.95 1.2  --    GFRESTIMATED  --   --   --   --  90  --  83  --   --    TAVO  --   --   --   --  9.5  --  10.5*  --   --     < > = values in this interval not displayed.     7-Day Micro Results     Collected Updated Procedure Result Status      12/06/2022 1424 12/07/2022 1603 Blood Culture Peripheral Blood [[id number]]   Peripheral Blood    Preliminary result Component Value   Culture No growth after 1 day  [P]                12/06/2022 1424 12/06/2022 1506 Symptomatic Influenza A/B & SARS-CoV2 (COVID-19) Virus PCR Multiplex Nasopharyngeal [[id number]]    Swab from Nasopharyngeal    Final result Component Value   Influenza A PCR Negative   Influenza B PCR Negative   RSV PCR Negative   SARS CoV2 PCR Negative   NEGATIVE: SARS-CoV-2 (COVID-19) RNA not detected, presumed negative.            12/06/2022 1422 12/07/2022 1603 Blood Culture Arm, Left [[id number]]   Blood from Arm, Left    Preliminary result Component Value   Culture No growth after 1 day  [P]                    No results for input(s): NTBNPI, NTBNP in the last 168 hours.    Recent Results (from the past 24 hour(s))   CT Chest/Abdomen/Pelvis w Contrast    Narrative    CT CHEST/ABDOMEN/PELVIS W CONTRAST 12/7/2022 1:00 PM    CLINICAL HISTORY: back pain, abdominal pain    TECHNIQUE: CT scan of the chest, abdomen, and pelvis was performed  following injection of IV contrast. Multiplanar reformats were  obtained. Dose reduction techniques were used.   CONTRAST: 90 mL of Isovue 370    COMPARISON: Chest CT on 11/3/2022    FINDINGS:   LUNGS AND PLEURA: Mild emphysema. Mild bibasilar dependent  atelectasis/infiltrate and trace bilateral pleural effusions.    MEDIASTINUM/AXILLAE: No lymphadenopathy. Right IJ central venous  catheter tip is in the low SVC. No filling defects in the central  pulmonary arteries. No aortic aneurysm or dissection. Severe coronary  artery calcifications. No pericardial effusion.     Diffuse circumferential esophageal  wall thickening with surrounding  mediastinal stranding and fluid (series 2, image [id number] and coronal  series 5, image 74-68).    HEPATOBILIARY: Normal.    PANCREAS: Normal.    SPLEEN: Normal.    ADRENAL GLANDS: Normal.    KIDNEYS/BLADDER: No calculi, hydronephrosis or perinephric stranding.  Urinary bladder is decompressed with a Blanchard catheter.    BOWEL: There are a few mildly dilated loops of small bowel in the mid  and left abdomen (series 2, image 138) with bowel loops dilated up to  3.5 cm. A transition point is not identified. Distal loops of ileum  are decompressed and the terminal ileum is decompressed with  transition likely in the mid/right lower quadrant.     No colonic obstruction. Marked circumferential wall thickening of the  splenic flexure, descending colon and sigmoid colon. Left lower  quadrant large inguinal hernia containing a loop of the sigmoid colon  with marked wall thickening of the loop within the hernia (series 5,  image 56) and pinching/narrowing of the bowel loop at the hernia  mouth. No pneumatosis or extraluminal air.    Normal appendix.    PELVIC ORGANS: Atrophic prostate gland.    ADDITIONAL FINDINGS: No lymphadenopathy in the abdomen and pelvis. No  abdominal aortic aneurysm. Trace free fluid in the left lower  quadrant. No abscess or free air.    MUSCULOSKELETAL: Stable mild wedge compression deformity T7, T9, T10,  T11, T12 and L1 vertebral. No acute-appearing fractures. No  destructive lesions of the bones.      Impression    IMPRESSION:  1.  Marked circumferential wall thickening of the splenic flexure,  descending colon and sigmoid colon may be due to acute colitis, either  infectious, inflammatory or ischemic.   2.  Large left inguinal hernia containing a loop of the sigmoid colon  with narrowing/pinched appearance of the sigmoid colon at the hernia  mouth and wall thickening of the sigmoid in the inguinal hernia.  Superimposed strangulation of this loop of colon is not  excluded.  Consider surgical consultation.  3.  Mechanical small bowel obstruction appears to be a separate  process. Gradual transition to normal caliber small bowel loops in the  right abdomen.  4.  Marked circumferential wall thickening of the entire esophagus,  suspicious for esophagitis.  5.  Bibasilar atelectasis/infiltrate and small bilateral pleural  effusions.    EMELY MARTINEZ MD         SYSTEM ID:  [id number]

## 2022-12-08 NOTE — PLAN OF CARE
Pt was restless/agitated and confused this morning. Reporting back pain. Back pain increases with turns . Gave I.V and PO dilaudid for pain. Putting voltaren cream on back. Pt 02 demands increased from 4 L NC to 10 L oximask. Started precedex gtt then pt rr and work of breathing decreased. Weaned o2 down to 5 L oximask. Per pts S.O pt has not slept in months. Plan is to let pt rest while on precedex gtt tonight. Consider seroquel tomorrow. Pt's family is concerned about pts ongoing back pain. Mentioned that pt could not get MRI of back due to too much pain and restlessness. Family would like to know if MRI of back can be done here in hospital while pt is calm on precedex gtt. Discussed this with MD and plan is to readdress tomorrow but will focus on pts acute issues at this time. Pt is being following by Cardiology, Surgery, Pain and GI.  No procedures planned for today. Plan to continue heparin gtt for today and Cardiology will consider discontinuing gtt tomorrow.   Goal Outcome Evaluation:      Plan of Care Reviewed With: patient, family

## 2022-12-08 NOTE — CONSULTS
GASTROENTEROLOGY CONSULTATION      Pacheco Torres  [address]  [facility] [address]  77 year old male     Admission Date/Time: 12/6/2022  Primary Care Provider: Stuart Wolfe  Referring / Attending Physician: Dr. Pablo      We were asked to see the patient in consultation by Dr. Pablo for evaluation of esophagitis.        HPI:  Pacheco Torres is a 77 year old male with medical history of hypertension, hyperlipidemia, history of cigarette smoking now quit, history of prostate cancer, RA, and coronary artery disease who presented to the emergency room on 12/6 with reported syncope found to be in hypovolemic shock of unclear etiology.  Patient has since had an elevated troponin, newly diagnosed atrial fibrillation, with plans for possible coronary angiogram.    During the course of his evaluation, a 12/6 CT scan of the chest/abdomen/pelvis revealed diffuse circumferential esophageal wall thickening.  Gastroenterology was consulted for an opinion on esophagitis.    Patient is a poor historian.  He appears unable to give me accurate information.  He mentions a history of trouble swallowing.  He denies any chest pain currently.  Patient is restless in the ICU and in restraints.  There were initial plans for coronary angiogram but this was delayed due to restlessness and agitation.  He does not use a PPI or H2 RA at home for his medication list.  He does take 81 mg aspirin.  Here in the ICU he was started on IV heparin.  He is also being covered on empiric antibiotics with Zosyn.  He does not appear he is ever had a upper endoscopy in the past.       PAST MEDICAL HISTORY:  Patient Active Problem List    Diagnosis Date Noted     Hypovolemia 12/06/2022     Priority: Medium     NSTEMI (non-ST elevated myocardial infarction) (H) 12/06/2022     Priority: Medium     Septic shock (H) 12/06/2022     Priority: Medium     Altered mental status, unspecified altered mental status type 12/06/2022     Priority:  Medium     Primary malignant neoplasm of prostate (H) 05/24/2022     Priority: Medium     Morbid exogenous obesity (H) 04/08/2019     Priority: Medium     Bilateral primary osteoarthritis of hip 02/19/2018     Priority: Medium     Primary osteoarthritis of both knees 02/19/2018     Priority: Medium     Vitamin D deficiency 02/19/2018     Priority: Medium     MGUS (monoclonal gammopathy of unknown significance) 12/21/2017     Priority: Medium     Gastroesophageal reflux disease 12/18/2017     Priority: Medium     Hyperlipidemia 12/18/2017     Priority: Medium     Primary hypertension 12/18/2017     Priority: Medium     Polymyalgia rheumatica (H) 06/21/2017     Priority: Medium     Coronary artery disease involving native coronary artery of native heart without angina pectoris 05/18/2016     Priority: Medium     Recurrent major depression in full remission (H) 05/18/2016     Priority: Medium     Health Care Home 04/04/2012     Priority: Medium     [facility] for .  Rebecca Hernandez RN-BC    [phone]   DX V65.8 REPLACED WITH [id number] HEALTH CARE HOME (04/08/2013)            ROS: A comprehensive ten point review of systems was negative aside from those in mentioned in the HPI.       MEDICATIONS:   Prior to Admission medications    Medication Sig Start Date End Date Taking? Authorizing Provider   acetaminophen (TYLENOL) 500 MG tablet Take 1,000 mg by mouth every 6 hours as needed for pain   Yes Reported, Patient   aspirin (ASA) 81 MG EC tablet Take 81 mg by mouth daily   Yes Reported, Patient   atropine 1 % ophthalmic solution Place 1 drop into the right eye 2 times daily   Yes Unknown, Entered By History   calcium carbonate (OS-TAVO) 500 MG tablet Take 2 tablets by mouth daily   Yes Unknown, Entered By History   cyanocobalamin (VITAMIN B-12) 1000 MCG tablet Take 1,000 mcg by mouth daily   Yes Unknown, Entered By History   cyclobenzaprine (FLEXERIL) 5 MG tablet Take 5 mg by mouth nightly as needed 11/16/22  " Yes Reported, Patient   gabapentin (NEURONTIN) 100 MG capsule Take 1 capsule (100 mg) by mouth 3 times daily for 30 days 22 Yes Jamin Langston MD   lisinopril (ZESTRIL) 10 MG tablet Take 10 mg by mouth daily 10/21/22  Yes Reported, Patient   nitroGLYcerin (NITROSTAT) 0.4 MG sublingual tablet Place 0.4 mg under the tongue every 5 minutes as needed for chest pain 5/10/21  Yes Reported, Patient   prednisoLONE acetate (PRED FORTE) 1 % ophthalmic suspension Place 1 drop into both eyes 3 times daily   Yes Unknown, Entered By History   sertraline (ZOLOFT) 100 MG tablet Take 100 mg by mouth daily 22  Yes Reported, Patient   simvastatin (ZOCOR) 40 MG tablet Take 40 mg by mouth At Bedtime 10/21/22  Yes Reported, Patient   tamsulosin (FLOMAX) 0.4 MG capsule Take 0.4 mg by mouth daily 10/21/22  Yes Reported, Patient   vitamin D3 (CHOLECALCIFEROL) 50 mcg (2000 units) tablet Take 1 tablet by mouth daily   Yes Unknown, Entered By History        ALLERGIES:   Allergies   Allergen Reactions     Gabapentin Visual Disturbance        SOCIAL HISTORY:  Social History     Tobacco Use     Smoking status: Former     Types: Cigarettes     Quit date: 2010     Years since quittin.8        FAMILY HISTORY:  Family History   Problem Relation Age of Onset     Coronary Artery Disease Mother      Coronary Artery Disease Father      Heart Failure Father      Alcoholism Brother         PHYSICAL EXAM:     BP (!) 140/107   Pulse (!) 123   Temp 98.8  F (37.1  C) (Oral)   Resp (!) 31   Ht 1.6 m (5' 3\")   Wt 101.3 kg (223 lb 5.2 oz)   SpO2 92%   BMI 39.56 kg/m       PHYSICAL EXAM:  GENERAL:    SKIN: no suspicious lesions, rashes, jaundice  HEAD: Normocephalic. Atraumatic.  NECK: Neck supple. No adenopathy.   EYES: No scleral icterus  RESPIRATORY: Fair transmission. CTA bilaterally- anterior chest .   CARDIOVASCULAR: tachycardic, normal S1, S2,  No murmur appreciated  GASTROINTESTINAL: +BS, soft, non tender, non " distended, no guarding/rebound  JOINT/EXTREMITIES:  no gross deformities noted, normal muscle tone  NEURO: CN 2-12 grossly intact, no focal deficits  PSYCH: Agitation, confusion       ADDITIONAL COMMENTS:   I reviewed the patient's new clinical lab test results.     Recent Labs   Lab 12/08/22  0530 12/07/22  0428 12/06/22  1951 12/06/22  1421   WBC 5.5 6.9 5.5 7.6   RBC 2.98* 3.33* 3.54* 3.30*   HGB 10.5* 11.6* 12.4* 11.5*   HCT 32.3* 34.8* 37.3* 34.0*   * 105* 105* 103*   MCH 35.2* 34.8* 35.0* 34.8*   MCHC 32.5 33.3 33.2 33.8   RDW 13.8 13.1 13.0 12.7    274 277 331     Recent Labs   Lab Test 12/08/22  0530 12/07/22  0428 12/06/22  1421   POTASSIUM 3.6 4.4 3.4   CHLORIDE 104 99 91*   CO2 29 24 25   BUN 22.1 26.8* 17.2   ANIONGAP 6* 10 16*     Recent Labs   Lab Test 12/06/22  1608 12/06/22  1421 11/03/22  1221 11/02/22  1550   ALBUMIN  --  3.7 3.7 3.7   BILITOTAL  --  0.3 0.3 0.2   ALT  --  17 16 14   AST  --  78* 86* 74*   PROTEIN 30*  --   --   --    LIPASE  --  143*  --   --        Recent Labs   Lab 12/06/22  1421   INR 1.03     Recent Labs   Lab 12/06/22  1421   LIPASE 143*        IMAGING / ENDOSCOPY    Recent Results (from the past 24 hour(s))   CT Chest/Abdomen/Pelvis w Contrast    Narrative    CT CHEST/ABDOMEN/PELVIS W CONTRAST 12/7/2022 1:00 PM    CLINICAL HISTORY: back pain, abdominal pain    TECHNIQUE: CT scan of the chest, abdomen, and pelvis was performed  following injection of IV contrast. Multiplanar reformats were  obtained. Dose reduction techniques were used.   CONTRAST: 90 mL of Isovue 370    COMPARISON: Chest CT on 11/3/2022    FINDINGS:   LUNGS AND PLEURA: Mild emphysema. Mild bibasilar dependent  atelectasis/infiltrate and trace bilateral pleural effusions.    MEDIASTINUM/AXILLAE: No lymphadenopathy. Right IJ central venous  catheter tip is in the low SVC. No filling defects in the central  pulmonary arteries. No aortic aneurysm or dissection. Severe coronary  artery  calcifications. No pericardial effusion.     Diffuse circumferential esophageal wall thickening with surrounding  mediastinal stranding and fluid (series 2, image 34-77 and coronal  series 5, image 74-68).    HEPATOBILIARY: Normal.    PANCREAS: Normal.    SPLEEN: Normal.    ADRENAL GLANDS: Normal.    KIDNEYS/BLADDER: No calculi, hydronephrosis or perinephric stranding.  Urinary bladder is decompressed with a Blanchard catheter.    BOWEL: There are a few mildly dilated loops of small bowel in the mid  and left abdomen (series 2, image 138) with bowel loops dilated up to  3.5 cm. A transition point is not identified. Distal loops of ileum  are decompressed and the terminal ileum is decompressed with  transition likely in the mid/right lower quadrant.     No colonic obstruction. Marked circumferential wall thickening of the  splenic flexure, descending colon and sigmoid colon. Left lower  quadrant large inguinal hernia containing a loop of the sigmoid colon  with marked wall thickening of the loop within the hernia (series 5,  image 56) and pinching/narrowing of the bowel loop at the hernia  mouth. No pneumatosis or extraluminal air.    Normal appendix.    PELVIC ORGANS: Atrophic prostate gland.    ADDITIONAL FINDINGS: No lymphadenopathy in the abdomen and pelvis. No  abdominal aortic aneurysm. Trace free fluid in the left lower  quadrant. No abscess or free air.    MUSCULOSKELETAL: Stable mild wedge compression deformity T7, T9, T10,  T11, T12 and L1 vertebral. No acute-appearing fractures. No  destructive lesions of the bones.      Impression    IMPRESSION:  1.  Marked circumferential wall thickening of the splenic flexure,  descending colon and sigmoid colon may be due to acute colitis, either  infectious, inflammatory or ischemic.   2.  Large left inguinal hernia containing a loop of the sigmoid colon  with narrowing/pinched appearance of the sigmoid colon at the hernia  mouth and wall thickening of the sigmoid in the  inguinal hernia.  Superimposed strangulation of this loop of colon is not excluded.  Consider surgical consultation.  3.  Mechanical small bowel obstruction appears to be a separate  process. Gradual transition to normal caliber small bowel loops in the  right abdomen.  4.  Marked circumferential wall thickening of the entire esophagus,  suspicious for esophagitis.  5.  Bibasilar atelectasis/infiltrate and small bilateral pleural  effusions.    EMELY MARTINEZ MD         SYSTEM ID:  [id number]   XR Chest Port 1 View    Narrative    EXAM: XR CHEST PORT 1 VIEW  LOCATION: [facility]  DATE/TIME: 12/8/2022 2:28 AM    INDICATION: Shortness of breath.  COMPARISON: 12/6/2022.    FINDINGS: Right IJ central venous catheter. No pneumothorax. The heart is at the upper limits of normal in size. There is no pulmonary edema. There is new infiltrate in the left mid and lower lung. Mild probable scarring at the lung apices. Old right rib   fractures.      Impression    IMPRESSION: New left lung infiltrate.         CONSULTATION ASSESSMENT AND PLAN:    Pacheco Torres is a 77 year old with medical history of hypertension, hyperlipidemia, history of cigarette smoking now quit, history of prostate cancer, RA, and coronary artery disease who presented to the emergency room on 12/6 with reported syncope found to be in hypovolemic shock of unclear etiology.  Patient has since had an elevated troponin, newly diagnosed atrial fibrillation, with plans for possible coronary angiogram, and left lung infiltrate with CT evidence of esophagitis.    1.  Esophagitis: Patient does have history of cigarette smoking.  Medical record he has not smoked for the past 12 years.  It is difficult to ascertain if he is experiencing dysphagia or odynophagia given his current mental status.  CT as mentioned above with circumferential wall thickening of the entire esophagus. Concern for reflux esophagitis/erosive esophagitis, infectious, pill  esophagitis, motility disorder, or malignancy.    -- Start IV PPI twice a day  -- Would favor conservative management at this time given multiple comorbid conditions and unclear etiology of shock.   -- No evidence of overt GI bleeding.  -- Recommend EGD for further evaluation once medically stable.  This could be completed inpatient or as an outpatient.      I discussed the patient plan with Dr. Alvarez, GI staff physician. Thank you for asking us to participate in the care of this patient.    Approximately 45 min of total time was spent providing patient care, including patient evaluation, reviewing documentation/ test results, and .     Jammie Slater PA-C  [facility] ( [facility])

## 2022-12-08 NOTE — PLAN OF CARE
Goal Outcome Evaluation:  ICU End of Shift Summary.  For vital signs and complete assessments, please see documentation flowsheets.      Pertinent assessments: Alert and oriented to self and place, very forgetful. Afebrile- Restless entire shift. C/O severe back pain- see MAR for meds given.Blanchard draining adequate amounts of clear yellow urine.  Several liquid brown stools. Abd round and tender. NPO- lungs clear- 4LNC-   SO, brother here and updated on plan of care.  Major Shift Events:  angio cancelled d/t confusion and restlessness, pt unable to consent.  CT of chest abd  pelvis done ( see results) surgery consulted.  Pain and palliative team saw patient for pain control.  Hydralazine ordered for hypertension.  Plan (Upcoming Events): continue with plan of care- await surgical consult.  Discharge/Transfer Needs: tbd     Bedside Shift Report Completed : yes  Bedside Safety Check Completed: yes

## 2022-12-08 NOTE — PROGRESS NOTES
XC    Still agitated and confused despite 0.5 mg IV ativan. EKG with QTc 450, will trial IV haldol 2-4 mg q6h PRN.    Jewel Mejía, DO

## 2022-12-08 NOTE — PLAN OF CARE
ICU End of Shift Summary.  For vital signs and complete assessments, please see documentation flowsheets.      Pertinent assessments: Pt extremely restless and agitated at times throughout the shift. Mitts on for line pulling. Required a bedside attendant for safety.  Received 0.5 of ativan x2 without effect. Pt did not fall asleep until 4am after receiving 4mg of IV haldol. Pt forgetful and requires frequent reminders. Tele sinus tach. BP hypertensive, received hydralazine x1. Complaints of pain in back, Dilaudid given with some relief. Voltaren gel and lidocaine patches on back. On 4L NC. Increased WOB this shift, lungs clear, pt denies SOB. Chest xray done, IVF stopped, 40mg IV lasix x1. Blanchard in place with good UOP, especially after receiving lasix. BS+, 1 small soft BM and 1 smear this shift. Strict NPO.     Major Shift Events: see previous note      Plan (Upcoming Events): possible angio, GI and surgery consult  Discharge/Transfer Needs: tbd     Bedside Shift Report Completed : yes  Bedside Safety Check Completed: yes

## 2022-12-08 NOTE — PROGRESS NOTES
[facility]  Hospitalist Progress Note  Tino Pablo M.D., M.B.A.   12/08/2022    Reason for Stay/active problem list      Acute encephalopathy    Hypotension-likely from hypovolemia    Non- ST segment elevation MI    Dehydration    Acute on chronic back pain    Suspected acute colitis    Large left inguinal hernia, concern for mechanical bowel obstruction    Abnormally marked circumferential wall thickening of the entire esophagus suspicious for esophagitis         Assessment and Plan:        Summary of Stay:     Pacheco Torres is a 77 year old male with a history of htn/hlp, remote hx of CAD s/p DAYAN to the LAD-most recent echo performed in the ER with hyperdynamic EF 75 % and G1dd, hx of prostate cancer from earlier this year treated with XRT through [facility], macrocytic anemia with hgb in the 11-12 range (normal B12 and folate), monoclonal gammopathy (no recent documentation of evaluation), PMR, obesity, hx of tob abuse  admitted on 12/6/2022 with hypovolemic shock of unclear etiology     He believes he passed out either the day prior or the day of admission.  Unable to fill in any details. States he hasn't been sleeping well but denies and n/v/d.  States po intake has been well. No f/c/s.  No epistaxis/bloody gums/stools or emesis.      Discussed with  Carlos Enrique Goldberg who states her hasn't been eating well for at least 6 weeks, she thinks his fluid intake is ok.  She reports that he's had some nausea but no vomiting.  No diarrhea in fact she thinks he may be constipated.      In the ER BPs in the 60's/40's with tachycardia and tachypnea, he was hypothermic at 95.4, he was given 2 L of NS without improvement and so started on NE via Right IJ.       EKG with ST elevation throughout septal/anterior leads-cards was contacted and the story just didn't seem to fit- he was without any CP or anginal equivalents. Initial trop 29. He was placed on heparin and stat echo completed showing hyperdynamic  Returned to patient's dtr/representative. Scabies was being ruled out by Dermatology. Dtr. reports washing bedding daily due to incontinence issues anyways.       ? Switching medication.  OV note from 7/28: Today she again reports consistent diffuse pruritus. She describes this as a constant physical itchiness coming from inside her body. Prior uric acid normal. LFTs acceptable. Does not appear jaundiced. Pruritus does not respond to antihistamine or topical cream. She sees dermatology tomorrow. Will see what they have to say. If no derm cause, could consider holding medications to rule out medication effect. Will switch to separate donepezil 10 mg nightly and memantine 10 mg b.i.d. in the event this is the case. If no offending medication identified, this may just be abnormal compulsive behavior secondary to the dementia. Could try low dose depakote  mg nightly to address that.    Of note the itching/scratching has been, has always been a \".\" she is back in adult day care doing more activities.     Dtr has concerns for side effects on Depakote - would like to be sure doesn't interact with other medications. RN discussed use of medication.     Derm started on   Ivermectin for 3 weeks.      Pharmacy station [place].        cardiac function.     troponins continue to rise 29->222-493 but EKG has normalized    CMP with AG 2/2 elevated lactic acid with respiratory compensation.    Lactates 5.4->1.4 after IVF/NE  CBC with stable macrocytic anemia, no leukocytosis  procal 0.15     CXR with pulmonary edema  CT Ao survey-diffuse CAD, Left inguinal canal hernia containing loops of the distal colon     He was covered with pip-tazo/vanco for septic shock of unclear etiology        Problem List with Assessment and Plan:    Shock/syncope  --  Given initial EKG would certainly fit a CV shock picture although echo with hyperdynamic function.  -- Suspected combination of hypovolemia and sepsis.  --Hypotension resolved with IV fluid resuscitation and transient norepinephrine  --CT of chest abdomen pelvis obtained and December 7 showed evidence of colitis and esophagitis.  --Currently he is hypertensive ,afebrile and tachycardic.  Continue to monitor and antibiotic.  Follow cultures     Acute toxic metabolic metabolic encephalopathy   -- Likely combination of toxic metabolic and infectious encephalopathy.  -- Patient is currently tachycardic, alert and oriented x3.  Intermittently confused and restless.  Suspect this is multifactorial.  He required Haldol antibiotic this morning.  Continue pain control, as needed Haldol for now.  May use Seroquel after seen by surgery team.  Addendum-patient required Precedex for sedation which improved his condition.  Continue Precedex for now and may schedule Seroquel when he is able to take orally    NSTEMI   -- trops 29->222->493  Diffuse CAD noted on CT Ao survey.    --No CP and echo with hyperdynamic function.   -- Patient was treated with heparin and cardiology was consulted.  -- Patient is in sinus tachycardia otherwise no significant tachyarrhythmia.  Events from yesterday reviewed and patient was not able to undergo coronary angiogram due to confusion and failure to consent.  I do not think he is stable  enough for further cardiology work-up at this time.  Continue heparin and medication per cardiology.    CAD  Htn/hlp   --pta on asa 81 mg/lisinopril 10 mg every day/simvastain 40  Mg  -hold while NPO , PRN hydralazine for accelerated hypertension      Large Left inguinal hernia with bowel contents  -- CT on December 7 showed concern for bowel obstruction.    -- Patient is restless and agitated.  Tender left lower quadrant.  Concern for bowel obstruction or strangulation.  I spoke with Dr. Ruiz from general surgery who is planning to see him this morning.  We will keep him n.p.o. starting IV fluid currently Ativan and pain medication as needed           Macrocytic anemia  --Being worked up by PCP   --Currently hemoglobin is 10.5._Positive stool.  Esophageal thickening noted on CT scan.  GI consulted and recommendation obtained for no intervention at this time.       Incidental pulmonary nodules  --Given heavy smoking history will need repeat CT in 3-6 months    Dehydration  --Patient has evidence of dehydration with very dry tongue and buccal mucosa  Decreased intake PTA   -- Initiate IV fluid, monitor renal function    Acute on chronic back pain  --Severe acute on chronic back pain requiring narcotic medications.  History of prostate cancer.  --Continue pain medications per pain management team recommendations    Abnormal CT with concern for esophagitis and colitis   -- GI consulted, recommendation noted       Chronic medical problems  Depression/gerd/prostate ca/pmr/monoclonal gammopathy   --Hold  pta sertraline while NPO      COVID 19  Negative  S/p 5 shot moderna series 9/2022 (biv)    Acute hypoxic respiratory failure  -- Patient is a smoker, has increased work of breathing and hypoxic requiring supplemental oxygen.  -- He has not been on CT scan.  I suspect mild acute exacerbation of COPD.  We will start him on steroid, DuoNeb, supplemental oxygen and monitor closely.  VBG showed CO2 retention    Plan for  "today:  --Patient remains restless and agitated.  Ativan as needed, pain control, surgery consult pending  -- I do not think he is ready for cardiac work-up at this time.  Cardiology to see for further medication recommendations  -- We will discuss with family regarding patient's care plan.      Addendum  --Discussed with family and their questions and concerns addressed.  Also spoke with general surgery regarding concern for inguinal hernia.  No immediate surgical intervention is warranted at this time.  GI was consulted and recommendation obtained    VTE Prophylaxis: Heparin    Code Status: Full Code     Diet: NPO for Medical/Clinical Reasons Except for: Ice Chips, Meds    Blanchard Catheter: PRESENT, indication: Retention    Family updated today: Yes      Disposition: Patient is not stable, will continue to monitor in ICU.          Interval History (Subjective):        Patient is seen and examined by me today and medical record reviewed.Overnight events noted and care discussed with nursing staff.  Patient was agitated last night requiring Haldol.  Patient was also given Lasix for concern of pulmonary edema.  This morning he continues to be restless requiring restraints.  Patient denies shortness of breath but he has increased work of breathing and tender abdomen.  He is oriented x3 but occasionally confused and continues to process.  I spoke with surgery team over the phone.               Physical Exam:        Last Vital Signs:  BP (!) 140/107   Pulse (!) 123   Temp 98.8  F (37.1  C) (Oral)   Resp (!) 31   Ht 1.6 m (5' 3\")   Wt 101.3 kg (223 lb 5.2 oz)   SpO2 92%   BMI 39.56 kg/m      I/O last 3 completed shifts:  In: 2476.62 [I.V.:2476.62]  Out: 3550 [Urine:3550]    Wt Readings from Last 5 Encounters:   12/06/22 101.3 kg (223 lb 5.2 oz)   04/03/12 97.1 kg (214 lb)        Constitutional:  Awake, alert but restless and occasionally confused.     Respiratory:  Increased work of breathing, diffuse rhonchi.  No " wheezing.   Cardiovascular: Regular rate and rhythm, normal S1 and S2, and no murmur noted   Abdomen: Normal bowel sounds, soft, non-distended, left lower quadrant abdominal tenderness.  No rebound tenderness, rigidity or guarding.   Skin: No new rashes, no cyanosis, dry to touch   Neuro: Alert with  no new focal weakness   Extremities: No edema   Other(s):        All other systems: egative          Medications:        All current medications were reviewed with changes reflected in problem list.         Data:      All new lab and imaging data was reviewed.      Data reviewed today: I reviewed all new labs and imaging results over the last 24 hours. I personally reviewed       Recent Labs   Lab 12/08/22  0530 12/07/22 0428 12/06/22  1951   WBC 5.5 6.9 5.5   HGB 10.5* 11.6* 12.4*   HCT 32.3* 34.8* 37.3*   * 105* 105*    274 277     No results for input(s): CULT in the last 168 hours.  Recent Labs   Lab 12/08/22  0808 12/08/22  0539 12/08/22  0530 12/07/22  0810 12/07/22  0428 12/06/22  2151 12/06/22  1421   NA  --   --  139  --  133*  --  132*   POTASSIUM  --   --  3.6  --  4.4  --  3.4   CHLORIDE  --   --  104  --  99  --  91*   CO2  --   --  29  --  24  --  25   ANIONGAP  --   --  6*  --  10  --  16*   * 120* 115*   < > 138*   < > 193*   BUN  --   --  22.1  --  26.8*  --  17.2   CR  --   --  0.72  --  0.86  --  0.95   GFRESTIMATED  --   --  >90  --  90  --  83   TAVO  --   --  9.8  --  9.5  --  10.5*   MAG  --   --   --   --   --   --  2.3   PROTTOTAL  --   --   --   --   --   --  6.7   ALBUMIN  --   --   --   --   --   --  3.7   BILITOTAL  --   --   --   --   --   --  0.3   ALKPHOS  --   --   --   --   --   --  147*   AST  --   --   --   --   --   --  78*   ALT  --   --   --   --   --   --  17    < > = values in this interval not displayed.       Recent Labs   Lab 12/08/22  0808 12/08/22  0539 12/08/22  0530 12/07/22  2345 12/07/22  1943   * 120* 115* 111* 103*       Recent Labs   Lab  12/06/22  1421   INR 1.03         No results for input(s): TROPONIN, TROPI, TROPR in the last 168 hours.    Invalid input(s): TROP, TROPONINIES    Recent Results (from the past 48 hour(s))   XR Chest Port 1 View    Narrative    CHEST PORTABLE ONE VIEW December 6, 2022 2:39 PM     HISTORY: STEMI. Altered mental status. Chest pain.    COMPARISON: 11/2/2022.      Impression    IMPRESSION: Hypoinflated lungs and cardiomegaly. Mild bilateral  vascular congestion appears increased. Correlate with any evidence of  developing pulmonary edema.    HAL BOGGS MD         SYSTEM ID:  [id number]   CT Head w/o Contrast    Narrative    CT SCAN OF THE HEAD WITHOUT CONTRAST December 6, 2022 3:07 PM     HISTORY: Altered mental status.    TECHNIQUE: Axial images of the head and coronal reformations without  IV contrast material. Radiation dose for this scan was reduced using  automated exposure control, adjustment of the mA and/or kV according  to patient size, or iterative reconstruction technique.    COMPARISON: None.      Impression    IMPRESSION: Mild motion artifact. No evidence of hemorrhage. Volume  loss and patchy areas of white matter hypoattenuation which likely  represent chronic small vessel ischemic change. Small area of focal  hypoattenuation within the central medulla, presumably artifactual  (brainstem infarct not excluded, MRI can be performed for basal  ganglia and thalamic lacunar infarcts, presumably chronic. No acute  osseous abnormality. Nonspecific right facial soft tissue swelling,  potentially contusion.    EVELYN VELAZQUEZ MD         SYSTEM ID:  [id number]   CT Aortic Survey w Contrast    Narrative    CT AORTIC SURVEY WITH CONTRAST  12/6/2022 3:07 PM    HISTORY:  Back pain, shock, wide mediastinum on x-ray.    TECHNIQUE: CT scan obtained of the chest, abdomen, and pelvis with IV  contrast. Post contrast scanning performed during the arterial phase.  CT chest also obtained without IV contrast. 80 mL Isovue-370  IV  injected. Sagittal and coronal reformatted images acquired from the  source post contrast data. Radiation dose for this scan was reduced  using automated exposure control, adjustment of the mA and/or kV  according to patient size, or iterative reconstruction technique.    COMPARISON:  None.    FINDINGS:  Thoracic and abdominal aorta: No dissection involving the thoracic or  abdominal aorta. Patency of the main branch vessels from the thoracic  and abdominal aorta. Scattered areas of calcified atherosclerotic  disease noted. Atherosclerotic stenosis at the origins of the celiac  and superior mesenteric arteries but there is distal perfusion. No  periaortic hematoma or aneurysm.    Other vascular: Severe coronary artery calcifications.    Chest: No adenopathy or acute mediastinal abnormality. No acute  mediastinal fluid collection. Patchy atelectasis at the posterior  right lower lobe. No effusions. No pneumothorax. Stable nodule  posterolateral left upper lobe measuring 0.3 cm, series 6 image 60.    Abdomen/pelvis: Liver, gallbladder, adrenals, spleen, pancreas, and  kidneys do not show any acute abnormalities. Left inguinal hernia  measures 9.1 x 5.8 cm, series 5 image 265. Herniated loops of the  distal descending colon and sigmoid within the hernia. No upstream  obstruction. No bowel inflammatory change. No enlarged lymph nodes. No  acute pelvic abnormality. Diffuse degenerative changes throughout the  spine. A degree of height loss involving multiple thoracic and lumbar  spine levels.      Impression    IMPRESSION:   1. No acute thoracic or abdominal aortic abnormality. No dissection.  Scattered areas of atherosclerotic disease identified. Atherosclerotic  stenosis at the origins of the celiac artery and superior mesenteric  artery.  2. Diffuse coronary artery calcifications.  3. No acute mediastinal abnormality is seen.  4. Stable pulmonary nodule on the left, see below for follow-up  imaging guidelines.    5. Left inguinal canal hernia containing herniated loops of the distal  descending colon and sigmoid. No upstream bowel obstruction. See above  for measurements.    Recommendations for one or multiple incidental lung nodules < 6mm:    Low risk patients: No routine follow-up.    High risk patients: Optional follow-up CT at 12 months; if  unchanged, no further follow-up.    *Low Risk: Minimal or absent history of smoking or other known risk  factors.  *Nonsolid (ground glass) or partly solid nodules may require longer  follow-up to exclude indolent adenocarcinoma.  *Recommendations based on Guidelines for the Management of Incidental  Pulmonary Nodules Detected at CT: From the Fleischner Society 2017,  Radiology 2017.    HAL BOGGS MD         SYSTEM ID:  [id number]   Echocardiogram Limited   Result Value    LVEF  75%    Narrative    [id number]  [id number]  [id number]  [id number]^ANGELIQUE^MINOO^NERI     [facility]  Echocardiography Laboratory  [address] [place] [address]     Name: BERTIN LYNCH  MRN: [id number]  : 1945  Study Date: 2022 02:59 PM  Age: 77 yrs  Gender: Male  Patient Location: [facility]  Reason For Study: Chest Pain  Ordering Physician: MINOO MERINO  Performed By: Mariah Carranza RDCS     BSA: 2.1 m2  Height: 66 in  Weight: 220 lb  HR: 110  BP: 82/65 mmHg  ______________________________________________________________________________  Procedure  Limited Portable Echo Adult. Optison (NDC #3334-1941) given intravenously.  (Emergent exam, abbreviated study performed).  ______________________________________________________________________________  Interpretation Summary     The visual ejection fraction is estimated at 75%.  Hyperdynamic left ventricular function  ER informed by phone that echo has been read. The study was technically  limited. Technically difficult, suboptimal study.  ______________________________________________________________________________  Left  Ventricle  Grade I or early diastolic dysfunction. The visual ejection fraction is  estimated at 75%. Hyperdynamic left ventricular function.     Right Ventricle  The right ventricle is normal in size and function.     Atria  Normal left atrial size. Right atrial size is normal.     Mitral Valve  There is trace mitral regurgitation.     Tricuspid Valve  There is trace tricuspid regurgitation. Right ventricular systolic pressure  could not be approximated due to inadequate tricuspid regurgitation.     Aortic Valve  The aortic valve is trileaflet. There is mild trileaflet aortic sclerosis. No  aortic regurgitation is present. No hemodynamically significant valvular  aortic stenosis.     Pulmonic Valve  Normal pulmonic valve velocity.     Vessels  IVC diameter >2.1 cm collapsing <50% with sniff suggests a high RA pressure  estimated at 15 mmHg or greater.     Pericardium  There is no pericardial effusion.     Rhythm  The rhythm was sinus tachycardia.  ______________________________________________________________________________  MMode/2D Measurements & Calculations     LA Volume (BP): 68.8 ml     Doppler Measurements & Calculations  MV E max dwayne: 93.3 cm/sec  MV A max dwayne: 121.6 cm/sec  MV E/A: 0.77  MV dec time: 0.12 sec     ______________________________________________________________________________  Report approved by: Meche English 12/06/2022 03:34 PM         XR Chest Port 1 View    Narrative    XR CHEST PORT 1 VIEW 12/6/2022 3:52 PM    HISTORY: CENTRAL LINE    COMPARISON: CT today      Impression    IMPRESSION: Right IJ central venous catheter tip in the low SVC. No  pneumothorax. Mild interstitial opacities in the lungs, could  represent pulmonary edema. No pleural effusion or pneumothorax.    EMELY MARTINEZ MD         SYSTEM ID:  [id number]   CT Chest/Abdomen/Pelvis w Contrast    Narrative    CT CHEST/ABDOMEN/PELVIS W CONTRAST 12/7/2022 1:00 PM    CLINICAL HISTORY: back pain, abdominal  pain    TECHNIQUE: CT scan of the chest, abdomen, and pelvis was performed  following injection of IV contrast. Multiplanar reformats were  obtained. Dose reduction techniques were used.   CONTRAST: 90 mL of Isovue 370    COMPARISON: Chest CT on 11/3/2022    FINDINGS:   LUNGS AND PLEURA: Mild emphysema. Mild bibasilar dependent  atelectasis/infiltrate and trace bilateral pleural effusions.    MEDIASTINUM/AXILLAE: No lymphadenopathy. Right IJ central venous  catheter tip is in the low SVC. No filling defects in the central  pulmonary arteries. No aortic aneurysm or dissection. Severe coronary  artery calcifications. No pericardial effusion.     Diffuse circumferential esophageal wall thickening with surrounding  mediastinal stranding and fluid (series 2, image 34-77 and coronal  series 5, image 74-68).    HEPATOBILIARY: Normal.    PANCREAS: Normal.    SPLEEN: Normal.    ADRENAL GLANDS: Normal.    KIDNEYS/BLADDER: No calculi, hydronephrosis or perinephric stranding.  Urinary bladder is decompressed with a Blanchard catheter.    BOWEL: There are a few mildly dilated loops of small bowel in the mid  and left abdomen (series 2, image 138) with bowel loops dilated up to  3.5 cm. A transition point is not identified. Distal loops of ileum  are decompressed and the terminal ileum is decompressed with  transition likely in the mid/right lower quadrant.     No colonic obstruction. Marked circumferential wall thickening of the  splenic flexure, descending colon and sigmoid colon. Left lower  quadrant large inguinal hernia containing a loop of the sigmoid colon  with marked wall thickening of the loop within the hernia (series 5,  image 56) and pinching/narrowing of the bowel loop at the hernia  mouth. No pneumatosis or extraluminal air.    Normal appendix.    PELVIC ORGANS: Atrophic prostate gland.    ADDITIONAL FINDINGS: No lymphadenopathy in the abdomen and pelvis. No  abdominal aortic aneurysm. Trace free fluid in the left  lower  quadrant. No abscess or free air.    MUSCULOSKELETAL: Stable mild wedge compression deformity T7, T9, T10,  T11, T12 and L1 vertebral. No acute-appearing fractures. No  destructive lesions of the bones.      Impression    IMPRESSION:  1.  Marked circumferential wall thickening of the splenic flexure,  descending colon and sigmoid colon may be due to acute colitis, either  infectious, inflammatory or ischemic.   2.  Large left inguinal hernia containing a loop of the sigmoid colon  with narrowing/pinched appearance of the sigmoid colon at the hernia  mouth and wall thickening of the sigmoid in the inguinal hernia.  Superimposed strangulation of this loop of colon is not excluded.  Consider surgical consultation.  3.  Mechanical small bowel obstruction appears to be a separate  process. Gradual transition to normal caliber small bowel loops in the  right abdomen.  4.  Marked circumferential wall thickening of the entire esophagus,  suspicious for esophagitis.  5.  Bibasilar atelectasis/infiltrate and small bilateral pleural  effusions.    EMELY MARTINEZ MD         SYSTEM ID:  [id number]       COVID Status:  COVID-19 PCR Results    COVID-19 PCR Results 11/2/22 12/6/22   SARS CoV2 PCR Negative Negative      Comments are available for some flowsheets but are not being displayed.         COVID-19 Antibody Results, Testing for Immunity    COVID-19 Antibody Results, Testing for Immunity   No data to display.              Disclaimer: This note consists of symbols derived from keyboarding, dictation and/or voice recognition software. As a result, there may be errors in the script that have gone undetected. Please consider this when interpreting information found in this chart.

## 2022-12-08 NOTE — PROGRESS NOTES
[facility] Progress Note     Imer Morejon MD  12/08/2022         Interval History:      Continues to have altered mental status, denies any chest discomfort, in restraints.       Assessment and Plan:      77-year-old gentleman with history of CAD with history of mid LAD PCI and diagonal angioplasty in 2008 with coronary CT angiogram in 2019 showing patent stent and moderate RCA disease admitted with shock and altered mental status and shock was felt to be possible hypovolemic shock versus septic shock.  He had troponin elevation up to slightly more than thousand on high sensitive troponin and some transient EKG changes.  No clear-cut chest discomfort.  Plan was coronary angiogram but due to significant altered mental status this was appropriately canceled.  He continues to struggle with altered mental status.  He is also been diagnosed with colitis and strangulated inguinal hernia and esophagitis.  It looks like conservative medical management has been planned for these issues.  At this time given overall clinical situation and no active chest pain recommend conservative medical management for possible non-STEMI.      1.  Admitted with shock appear to be hypovolemic shock vs septic shock of unclear etiology.  Procalcitonin elevated, on empiric antibiotics, blood pressure now normal to hypertensive.  Not on any pressors.  2.  Elevated troponin, transient EKG changes and in retrospect may be some chest discomfort yesterday.  Cannot completely exclude acute coronary syndrome although unlikely to explain the whole clinical presentation.  Plan was: Angiogram but due to altered mental status and overall clinical situation this has been appropriately deferred . recommend medical management for possible non-STEMI.  Once clinical situation significantly improves can consider coronary angiogram.    3.  Newly diagnosed atrial fibrillation, now in sinus sinus tachycardia.  CHADS2 Vascor of 4.  On heparin.  Will need to address  "long-term anticoagulation subsequently.  3.    Altered mental status  4.  Esophagitis, colitis and strangulated hernia on CT being followed by GI and surgery..  Looks like conservative management has been recommended      Recommendations  Continue heparin drip.  Given that he recently had evidence of atrial fibrillation with elevated CHADS2 Vascor would recommend continuing heparin till able to take orally consistently.  Can transition to an oral anticoagulation in future  Continue medical management for possible acute coronary syndrome.  Patient denies any chest discomfort.  Continue aspirin, heparin.  Although heparin is only indicated for 48 hours for medical management given underlying atrial fibrillation would recommend continuing heparin.    30 minutes of critical care time spent         Physical Exam:       , Blood pressure (!) 177/109, pulse (!) 126, temperature 98.8  F (37.1  C), temperature source Oral, resp. rate (!) 32, height 1.6 m (5' 3\"), weight 101.3 kg (223 lb 5.2 oz), SpO2 93 %.  Vitals:    12/06/22 1412 12/06/22 2200   Weight: 100 kg (220 lb 7.4 oz) 101.3 kg (223 lb 5.2 oz)     Vital Signs with Ranges  Temp:  [94.8  F (34.9  C)-99.9  F (37.7  C)] 98.8  F (37.1  C)  Pulse:  [105-131] 126  Resp:  [16-53] 32  BP: (100-193)/() 177/109  SpO2:  [90 %-97 %] 93 %  I/O's Last 24 hours  I/O last 3 completed shifts:  In: 2476.62 [I.V.:2476.62]  Out: 3550 [Urine:3550]  General, altered mental status however denies any chest discomfort  Cardiovascular tachycardia, no murmur rub or gallop  Respiratory clear to auscultation  Extremities restrained no edema             Medications:          acetaminophen  650 mg Oral TID     diclofenac  4 g Topical 4x Daily     heparin (porcine)         ipratropium - albuterol 0.5 mg/2.5 mg/3 mL  3 mL Nebulization 4x daily     lidocaine  2 patch Transdermal Q24h    And     lidocaine   Transdermal Q8H YOMI     lidocaine (PF)         methocarbamol  500 mg Oral 4x Daily     " methylPREDNISolone  40 mg Intravenous Q12H     miconazole   Topical BID     nitroGLYcerin in D5W         pantoprazole  40 mg Intravenous BID     piperacillin-tazobactam  3.375 g Intravenous Q6H     pregabalin  25 mg Oral TID     sodium chloride (PF)  3 mL Intracatheter Q8H     sodium chloride (PF)  3 mL Intracatheter Q8H     PRN Meds: acetaminophen **OR** acetaminophen, alum & mag hydroxide-simethicone, haloperidol lactate, hydrALAZINE, HYDROmorphone **OR** HYDROmorphone, HYDROmorphone, ipratropium - albuterol 0.5 mg/2.5 mg/3 mL, lidocaine 4%, lidocaine (buffered or not buffered), magnesium hydroxide, naloxone **OR** naloxone **OR** naloxone **OR** naloxone, ondansetron **OR** ondansetron, - MEDICATION INSTRUCTIONS -, - MEDICATION INSTRUCTIONS -, potassium chloride, sodium chloride (PF), sodium chloride (PF), sodium chloride (PF)         Data:      All new lab and imaging data was reviewed.   Recent Labs   Lab Test 12/08/22  0530 12/07/22  0428 12/06/22  1951 12/06/22  1421   WBC 5.5 6.9 5.5 7.6   HGB 10.5* 11.6* 12.4* 11.5*   * 105* 105* 103*    274 277 331   INR  --   --   --  1.03      Recent Labs   Lab Test 12/08/22  0808 12/08/22  0539 12/08/22  0530 12/07/22  0810 12/07/22  0428 12/06/22  2151 12/06/22  1421   NA  --   --  139  --  133*  --  132*   POTASSIUM  --   --  3.6  --  4.4  --  3.4   CHLORIDE  --   --  104  --  99  --  91*   CO2  --   --  29  --  24  --  25   BUN  --   --  22.1  --  26.8*  --  17.2   CR  --   --  0.72  --  0.86  --  0.95   ANIONGAP  --   --  6*  --  10  --  16*   TAVO  --   --  9.8  --  9.5  --  10.5*   * 120* 115*   < > 138*   < > 193*    < > = values in this interval not displayed.     No lab results found.    Invalid input(s): TROP, SAMIR Morejon MD  12/8/2022  Pager:  [phone]

## 2022-12-09 LAB
BASE EXCESS BLDV CALC-SCNC: 6.1 MMOL/L (ref -7.7–1.9)
ERYTHROCYTE [DISTWIDTH] IN BLOOD BY AUTOMATED COUNT: 13.3 % (ref 10–15)
ERYTHROCYTE [DISTWIDTH] IN BLOOD BY AUTOMATED COUNT: 13.4 % (ref 10–15)
GLUCOSE BLDC GLUCOMTR-MCNC: 112 MG/DL (ref 70–99)
GLUCOSE BLDC GLUCOMTR-MCNC: 114 MG/DL (ref 70–99)
GLUCOSE BLDC GLUCOMTR-MCNC: 116 MG/DL (ref 70–99)
GLUCOSE BLDC GLUCOMTR-MCNC: 122 MG/DL (ref 70–99)
GLUCOSE BLDC GLUCOMTR-MCNC: 126 MG/DL (ref 70–99)
HCO3 BLDV-SCNC: 33 MMOL/L (ref 21–28)
HCT VFR BLD AUTO: 31.2 % (ref 40–53)
HCT VFR BLD AUTO: 31.3 % (ref 40–53)
HGB BLD-MCNC: 10.1 G/DL (ref 13.3–17.7)
HGB BLD-MCNC: 10.2 G/DL (ref 13.3–17.7)
MCH RBC QN AUTO: 34.8 PG (ref 26.5–33)
MCH RBC QN AUTO: 35.2 PG (ref 26.5–33)
MCHC RBC AUTO-ENTMCNC: 32.3 G/DL (ref 31.5–36.5)
MCHC RBC AUTO-ENTMCNC: 32.7 G/DL (ref 31.5–36.5)
MCV RBC AUTO: 108 FL (ref 78–100)
MCV RBC AUTO: 108 FL (ref 78–100)
O2/TOTAL GAS SETTING VFR VENT: 40 %
PCO2 BLDV: 59 MM HG (ref 40–50)
PH BLDV: 7.36 [PH] (ref 7.32–7.43)
PLATELET # BLD AUTO: 225 10E3/UL (ref 150–450)
PLATELET # BLD AUTO: 265 10E3/UL (ref 150–450)
PO2 BLDV: 42 MM HG (ref 25–47)
RBC # BLD AUTO: 2.9 10E6/UL (ref 4.4–5.9)
RBC # BLD AUTO: 2.9 10E6/UL (ref 4.4–5.9)
UFH PPP CHRO-ACNC: 0.33 IU/ML
WBC # BLD AUTO: 6 10E3/UL (ref 4–11)
WBC # BLD AUTO: 6.2 10E3/UL (ref 4–11)

## 2022-12-09 PROCEDURE — 94640 AIRWAY INHALATION TREATMENT: CPT | Mod: 76

## 2022-12-09 PROCEDURE — 200N000001 HC R&B ICU

## 2022-12-09 PROCEDURE — 94640 AIRWAY INHALATION TREATMENT: CPT

## 2022-12-09 PROCEDURE — 99233 SBSQ HOSP IP/OBS HIGH 50: CPT | Performed by: INTERNAL MEDICINE

## 2022-12-09 PROCEDURE — C9113 INJ PANTOPRAZOLE SODIUM, VIA: HCPCS | Performed by: PHYSICIAN ASSISTANT

## 2022-12-09 PROCEDURE — 250N000012 HC RX MED GY IP 250 OP 636 PS 637: Performed by: INTERNAL MEDICINE

## 2022-12-09 PROCEDURE — 250N000013 HC RX MED GY IP 250 OP 250 PS 637: Performed by: CLINICAL NURSE SPECIALIST

## 2022-12-09 PROCEDURE — 250N000011 HC RX IP 250 OP 636: Performed by: INTERNAL MEDICINE

## 2022-12-09 PROCEDURE — 250N000011 HC RX IP 250 OP 636: Performed by: HOSPITALIST

## 2022-12-09 PROCEDURE — 85520 HEPARIN ASSAY: CPT | Performed by: INTERNAL MEDICINE

## 2022-12-09 PROCEDURE — 99231 SBSQ HOSP IP/OBS SF/LOW 25: CPT | Performed by: SURGERY

## 2022-12-09 PROCEDURE — 250N000013 HC RX MED GY IP 250 OP 250 PS 637: Performed by: INTERNAL MEDICINE

## 2022-12-09 PROCEDURE — 250N000011 HC RX IP 250 OP 636: Performed by: PHYSICIAN ASSISTANT

## 2022-12-09 PROCEDURE — 85027 COMPLETE CBC AUTOMATED: CPT | Performed by: INTERNAL MEDICINE

## 2022-12-09 PROCEDURE — 250N000009 HC RX 250: Performed by: INTERNAL MEDICINE

## 2022-12-09 PROCEDURE — 250N000013 HC RX MED GY IP 250 OP 250 PS 637: Performed by: NURSE PRACTITIONER

## 2022-12-09 PROCEDURE — 82803 BLOOD GASES ANY COMBINATION: CPT | Performed by: INTERNAL MEDICINE

## 2022-12-09 PROCEDURE — 99231 SBSQ HOSP IP/OBS SF/LOW 25: CPT | Performed by: PHYSICIAN ASSISTANT

## 2022-12-09 PROCEDURE — 999N000157 HC STATISTIC RCP TIME EA 10 MIN

## 2022-12-09 PROCEDURE — 99232 SBSQ HOSP IP/OBS MODERATE 35: CPT | Performed by: NURSE PRACTITIONER

## 2022-12-09 RX ORDER — TAMSULOSIN HYDROCHLORIDE 0.4 MG/1
0.4 CAPSULE ORAL EVERY EVENING
Status: DISCONTINUED | OUTPATIENT
Start: 2022-12-09 | End: 2022-12-20 | Stop reason: HOSPADM

## 2022-12-09 RX ORDER — METOPROLOL TARTRATE 1 MG/ML
2.5 INJECTION, SOLUTION INTRAVENOUS ONCE
Status: COMPLETED | OUTPATIENT
Start: 2022-12-09 | End: 2022-12-09

## 2022-12-09 RX ORDER — ATROPINE SULFATE 10 MG/ML
1 SOLUTION/ DROPS OPHTHALMIC 2 TIMES DAILY
Status: DISCONTINUED | OUTPATIENT
Start: 2022-12-09 | End: 2022-12-20 | Stop reason: HOSPADM

## 2022-12-09 RX ORDER — NITROGLYCERIN 0.4 MG/1
0.4 TABLET SUBLINGUAL EVERY 5 MIN PRN
Status: DISCONTINUED | OUTPATIENT
Start: 2022-12-09 | End: 2022-12-20 | Stop reason: HOSPADM

## 2022-12-09 RX ORDER — PREDNISONE 20 MG/1
40 TABLET ORAL DAILY
Status: DISCONTINUED | OUTPATIENT
Start: 2022-12-09 | End: 2022-12-16

## 2022-12-09 RX ORDER — VITAMIN B COMPLEX
50 TABLET ORAL DAILY
Status: DISCONTINUED | OUTPATIENT
Start: 2022-12-09 | End: 2022-12-20 | Stop reason: HOSPADM

## 2022-12-09 RX ORDER — LANOLIN ALCOHOL/MO/W.PET/CERES
1000 CREAM (GRAM) TOPICAL DAILY
Status: DISCONTINUED | OUTPATIENT
Start: 2022-12-09 | End: 2022-12-20 | Stop reason: HOSPADM

## 2022-12-09 RX ORDER — METHOCARBAMOL 500 MG/1
250-500 TABLET ORAL 4 TIMES DAILY
Status: DISCONTINUED | OUTPATIENT
Start: 2022-12-09 | End: 2022-12-20 | Stop reason: HOSPADM

## 2022-12-09 RX ORDER — LISINOPRIL 10 MG/1
10 TABLET ORAL EVERY EVENING
Status: DISCONTINUED | OUTPATIENT
Start: 2022-12-09 | End: 2022-12-09

## 2022-12-09 RX ORDER — ACETAMINOPHEN 500 MG
1000 TABLET ORAL EVERY 8 HOURS
Status: DISCONTINUED | OUTPATIENT
Start: 2022-12-09 | End: 2022-12-20 | Stop reason: HOSPADM

## 2022-12-09 RX ORDER — CYCLOBENZAPRINE HYDROCHLORIDE 5 MG/1
5 TABLET, FILM COATED ORAL
Status: DISCONTINUED | OUTPATIENT
Start: 2022-12-09 | End: 2022-12-20 | Stop reason: HOSPADM

## 2022-12-09 RX ORDER — SERTRALINE HYDROCHLORIDE 100 MG/1
100 TABLET, FILM COATED ORAL DAILY
Status: DISCONTINUED | OUTPATIENT
Start: 2022-12-09 | End: 2022-12-20 | Stop reason: HOSPADM

## 2022-12-09 RX ORDER — MULTIPLE VITAMINS W/ MINERALS TAB 9MG-400MCG
1 TAB ORAL DAILY
Status: DISCONTINUED | OUTPATIENT
Start: 2022-12-09 | End: 2022-12-20 | Stop reason: HOSPADM

## 2022-12-09 RX ORDER — SIMVASTATIN 20 MG
40 TABLET ORAL AT BEDTIME
Status: DISCONTINUED | OUTPATIENT
Start: 2022-12-09 | End: 2022-12-09 | Stop reason: ALTCHOICE

## 2022-12-09 RX ORDER — QUETIAPINE FUMARATE 25 MG/1
25 TABLET, FILM COATED ORAL 2 TIMES DAILY
Status: DISCONTINUED | OUTPATIENT
Start: 2022-12-09 | End: 2022-12-10

## 2022-12-09 RX ORDER — CALCIUM CARBONATE 500(1250)
2 TABLET ORAL DAILY
Status: DISCONTINUED | OUTPATIENT
Start: 2022-12-09 | End: 2022-12-20 | Stop reason: HOSPADM

## 2022-12-09 RX ORDER — LISINOPRIL 10 MG/1
10 TABLET ORAL EVERY EVENING
Status: DISCONTINUED | OUTPATIENT
Start: 2022-12-10 | End: 2022-12-10

## 2022-12-09 RX ORDER — LISINOPRIL 10 MG/1
10 TABLET ORAL DAILY
Status: COMPLETED | OUTPATIENT
Start: 2022-12-09 | End: 2022-12-09

## 2022-12-09 RX ORDER — PREDNISOLONE ACETATE 10 MG/ML
1 SUSPENSION/ DROPS OPHTHALMIC 3 TIMES DAILY
Status: DISCONTINUED | OUTPATIENT
Start: 2022-12-09 | End: 2022-12-20 | Stop reason: HOSPADM

## 2022-12-09 RX ORDER — ASPIRIN 81 MG/1
81 TABLET ORAL DAILY
Status: DISCONTINUED | OUTPATIENT
Start: 2022-12-09 | End: 2022-12-20 | Stop reason: HOSPADM

## 2022-12-09 RX ADMIN — LISINOPRIL 10 MG: 10 TABLET ORAL at 11:36

## 2022-12-09 RX ADMIN — METHOCARBAMOL 500 MG: 500 TABLET ORAL at 09:21

## 2022-12-09 RX ADMIN — Medication: at 18:22

## 2022-12-09 RX ADMIN — DICLOFENAC SODIUM 4 G: 10 GEL TOPICAL at 18:22

## 2022-12-09 RX ADMIN — DEXMEDETOMIDINE HYDROCHLORIDE 0.3 MCG/KG/HR: 400 INJECTION INTRAVENOUS at 10:35

## 2022-12-09 RX ADMIN — MICONAZOLE NITRATE: 20 POWDER TOPICAL at 07:52

## 2022-12-09 RX ADMIN — PREGABALIN 25 MG: 25 CAPSULE ORAL at 09:20

## 2022-12-09 RX ADMIN — ATORVASTATIN CALCIUM 40 MG: 40 TABLET, FILM COATED ORAL at 19:54

## 2022-12-09 RX ADMIN — METOPROLOL TARTRATE 12.5 MG: 25 TABLET, FILM COATED ORAL at 19:56

## 2022-12-09 RX ADMIN — HYDRALAZINE HYDROCHLORIDE 10 MG: 20 INJECTION INTRAMUSCULAR; INTRAVENOUS at 15:41

## 2022-12-09 RX ADMIN — IPRATROPIUM BROMIDE AND ALBUTEROL SULFATE 3 ML: 2.5; .5 SOLUTION RESPIRATORY (INHALATION) at 16:40

## 2022-12-09 RX ADMIN — ASPIRIN 81 MG: 81 TABLET, COATED ORAL at 10:22

## 2022-12-09 RX ADMIN — HYDROMORPHONE HYDROCHLORIDE 0.3 MG: 1 INJECTION, SOLUTION INTRAMUSCULAR; INTRAVENOUS; SUBCUTANEOUS at 07:57

## 2022-12-09 RX ADMIN — HALOPERIDOL LACTATE 4 MG: 5 INJECTION, SOLUTION INTRAMUSCULAR at 22:05

## 2022-12-09 RX ADMIN — PREDNISOLONE ACETATE 1 DROP: 10 SUSPENSION/ DROPS OPHTHALMIC at 22:07

## 2022-12-09 RX ADMIN — HYDROMORPHONE HYDROCHLORIDE 4 MG: 2 TABLET ORAL at 11:39

## 2022-12-09 RX ADMIN — ATROPINE SULFATE 1 DROP: 10 SOLUTION/ DROPS OPHTHALMIC at 19:54

## 2022-12-09 RX ADMIN — LIDOCAINE 2 PATCH: 246 PATCH TOPICAL at 19:54

## 2022-12-09 RX ADMIN — IPRATROPIUM BROMIDE AND ALBUTEROL SULFATE 3 ML: 2.5; .5 SOLUTION RESPIRATORY (INHALATION) at 08:26

## 2022-12-09 RX ADMIN — Medication 500 MG: at 22:10

## 2022-12-09 RX ADMIN — ACETAMINOPHEN 1000 MG: 500 TABLET, FILM COATED ORAL at 10:22

## 2022-12-09 RX ADMIN — PANTOPRAZOLE SODIUM 40 MG: 40 INJECTION, POWDER, FOR SOLUTION INTRAVENOUS at 08:01

## 2022-12-09 RX ADMIN — PANTOPRAZOLE SODIUM 40 MG: 40 INJECTION, POWDER, FOR SOLUTION INTRAVENOUS at 20:01

## 2022-12-09 RX ADMIN — PREGABALIN 25 MG: 25 CAPSULE ORAL at 15:32

## 2022-12-09 RX ADMIN — HEPARIN SODIUM 1350 UNITS/HR: 10000 INJECTION, SOLUTION INTRAVENOUS at 05:57

## 2022-12-09 RX ADMIN — HYDROMORPHONE HYDROCHLORIDE 4 MG: 2 TABLET ORAL at 15:32

## 2022-12-09 RX ADMIN — PREGABALIN 25 MG: 25 CAPSULE ORAL at 22:07

## 2022-12-09 RX ADMIN — MICONAZOLE NITRATE: 20 POWDER TOPICAL at 19:56

## 2022-12-09 RX ADMIN — SERTRALINE HYDROCHLORIDE 100 MG: 100 TABLET ORAL at 10:23

## 2022-12-09 RX ADMIN — HYDROMORPHONE HYDROCHLORIDE 0.3 MG: 1 INJECTION, SOLUTION INTRAMUSCULAR; INTRAVENOUS; SUBCUTANEOUS at 00:41

## 2022-12-09 RX ADMIN — TAZOBACTAM SODIUM AND PIPERACILLIN SODIUM 3.38 G: 375; 3 INJECTION, SOLUTION INTRAVENOUS at 04:38

## 2022-12-09 RX ADMIN — DEXMEDETOMIDINE HYDROCHLORIDE 0.4 MCG/KG/HR: 400 INJECTION INTRAVENOUS at 01:10

## 2022-12-09 RX ADMIN — Medication 500 MG: at 17:51

## 2022-12-09 RX ADMIN — QUETIAPINE FUMARATE 25 MG: 25 TABLET ORAL at 10:22

## 2022-12-09 RX ADMIN — IPRATROPIUM BROMIDE AND ALBUTEROL SULFATE 3 ML: 2.5; .5 SOLUTION RESPIRATORY (INHALATION) at 11:25

## 2022-12-09 RX ADMIN — HYDROMORPHONE HYDROCHLORIDE 4 MG: 2 TABLET ORAL at 19:53

## 2022-12-09 RX ADMIN — CALCIUM 1000 MG: 500 TABLET ORAL at 10:22

## 2022-12-09 RX ADMIN — PREDNISOLONE ACETATE 1 DROP: 10 SUSPENSION/ DROPS OPHTHALMIC at 15:33

## 2022-12-09 RX ADMIN — QUETIAPINE FUMARATE 25 MG: 25 TABLET ORAL at 19:55

## 2022-12-09 RX ADMIN — METHOCARBAMOL 500 MG: 500 TABLET ORAL at 13:25

## 2022-12-09 RX ADMIN — Medication: at 22:07

## 2022-12-09 RX ADMIN — HYDROMORPHONE HYDROCHLORIDE 4 MG: 2 TABLET ORAL at 23:37

## 2022-12-09 RX ADMIN — DICLOFENAC SODIUM 4 G: 10 GEL TOPICAL at 07:52

## 2022-12-09 RX ADMIN — METOPROLOL TARTRATE 2.5 MG: 1 INJECTION, SOLUTION INTRAVENOUS at 16:47

## 2022-12-09 RX ADMIN — TAZOBACTAM SODIUM AND PIPERACILLIN SODIUM 3.38 G: 375; 3 INJECTION, SOLUTION INTRAVENOUS at 15:42

## 2022-12-09 RX ADMIN — DICLOFENAC SODIUM 4 G: 10 GEL TOPICAL at 22:07

## 2022-12-09 RX ADMIN — HYDROMORPHONE HYDROCHLORIDE 0.3 MG: 1 INJECTION, SOLUTION INTRAMUSCULAR; INTRAVENOUS; SUBCUTANEOUS at 03:21

## 2022-12-09 RX ADMIN — HYDRALAZINE HYDROCHLORIDE 10 MG: 20 INJECTION INTRAMUSCULAR; INTRAVENOUS at 03:13

## 2022-12-09 RX ADMIN — TAZOBACTAM SODIUM AND PIPERACILLIN SODIUM 3.38 G: 375; 3 INJECTION, SOLUTION INTRAVENOUS at 22:11

## 2022-12-09 RX ADMIN — PREDNISONE 40 MG: 20 TABLET ORAL at 10:22

## 2022-12-09 RX ADMIN — CYANOCOBALAMIN TAB 1000 MCG 1000 MCG: 1000 TAB at 10:23

## 2022-12-09 RX ADMIN — Medication 50 MCG: at 10:22

## 2022-12-09 RX ADMIN — ATROPINE SULFATE 1 DROP: 10 SOLUTION/ DROPS OPHTHALMIC at 11:35

## 2022-12-09 RX ADMIN — ACETAMINOPHEN 1000 MG: 500 TABLET, FILM COATED ORAL at 17:51

## 2022-12-09 RX ADMIN — HYDRALAZINE HYDROCHLORIDE 10 MG: 20 INJECTION INTRAMUSCULAR; INTRAVENOUS at 07:56

## 2022-12-09 RX ADMIN — NITROGLYCERIN 0.4 MG: 0.4 TABLET SUBLINGUAL at 16:21

## 2022-12-09 RX ADMIN — IPRATROPIUM BROMIDE AND ALBUTEROL SULFATE 3 ML: 2.5; .5 SOLUTION RESPIRATORY (INHALATION) at 19:24

## 2022-12-09 RX ADMIN — TAZOBACTAM SODIUM AND PIPERACILLIN SODIUM 3.38 G: 375; 3 INJECTION, SOLUTION INTRAVENOUS at 10:23

## 2022-12-09 RX ADMIN — TAMSULOSIN HYDROCHLORIDE 0.4 MG: 0.4 CAPSULE ORAL at 19:53

## 2022-12-09 RX ADMIN — MULTIPLE VITAMINS W/ MINERALS TAB 1 TABLET: TAB at 15:32

## 2022-12-09 RX ADMIN — HEPARIN SODIUM 1350 UNITS/HR: 10000 INJECTION, SOLUTION INTRAVENOUS at 22:16

## 2022-12-09 RX ADMIN — DICLOFENAC SODIUM 4 G: 10 GEL TOPICAL at 13:25

## 2022-12-09 ASSESSMENT — ACTIVITIES OF DAILY LIVING (ADL)
ADLS_ACUITY_SCORE: 50
ADLS_ACUITY_SCORE: 53
ADLS_ACUITY_SCORE: 54
ADLS_ACUITY_SCORE: 50
ADLS_ACUITY_SCORE: 50
ADLS_ACUITY_SCORE: 54
ADLS_ACUITY_SCORE: 50
ADLS_ACUITY_SCORE: 50

## 2022-12-09 NOTE — PROGRESS NOTES
Notified MD at 1729 regarding exp wheeze, pursed lip breathing, and ABD/accessory muscle use when breathing. Do you want a VBG?.      Spoke with: Dr. Pablo    Orders were obtained. Get VBG    Comments: NA.

## 2022-12-09 NOTE — PROGRESS NOTES
"[facility]   General Surgery Progress Note           Assessment and Plan:   Assessment:   Admission for shock/syncope  Large left inguinal hernia      Plan:   -OK to ADAT from surgical perspective  -continue non-operative management for left inguinal hernia         Interval History:   Patient is pleasant, wife at bedside. Denies abd pain or nausea today. Tolerating clear liquid diet, having BM smears per chart.          Physical Exam:   Blood pressure (!) 165/102, pulse 114, temperature 99.2  F (37.3  C), temperature source Axillary, resp. rate (!) 46, height 1.6 m (5' 3\"), weight 98.2 kg (216 lb 7.9 oz), SpO2 99 %.    I/O last 3 completed shifts:  In: 631.06 [I.V.:631.06]  Out: 1255 [Urine:1255]    Abdomen:   Soft, rounded, non-tender and normal bowel sounds. Left inguinal hernia noted not reducible, mildly tender.          Data:   Blood culture:  Results for orders placed or performed during the hospital encounter of 12/06/22   Blood Culture Peripheral Blood    Specimen: Peripheral Blood   Result Value Ref Range    Culture No growth after 2 days    Blood Culture Arm, Left    Specimen: Arm, Left; Blood   Result Value Ref Range    Culture No growth after 2 days       Urine culture:  No results found for this or any previous visit.  Recent Labs   Lab 12/09/22  0541 12/09/22  0449 12/08/22  0530   WBC 6.2 6.0 5.5   HGB 10.2* 10.1* 10.5*   HCT 31.2* 31.3* 32.3*   * 108* 108*    225 232       Nehal Valles PA-C     Seen and agree,    Nelson Ruiz MD  [facility]    "

## 2022-12-09 NOTE — PLAN OF CARE
ICU End of Shift Summary.  For vital signs and complete assessments, please see documentation flowsheets.        Pertinent assessments:   Neuro: On precedex gtt, RASS 0 to -1. Pt has been cooperative but restless this shift. Easily redirected. Dilaudid 2x this shift for pain.   Cardiac: ST, BP HTN received hydralizine 1x this shift  Resp: LS clear, diminished. Occasional cough. 5L oximask. SOB with exertion and   GI: BS active, smear of BM this shift  : Blanchard intact, adequate output.   Skin: scrotal edema +2  Lines: R internal jugular, PIV  Drips: Dex, heparin, TKO and ABX    Major Shift Events:   Restless but cooperative this shift. Given hydralizine 1x this shift.   1120 called telehub to get order for VBG as pt increased restlessness, tachypnic, tachycardia and HTN.   VBG remained unremarkable, continue with pain medication and titrating precedex.    Plan (Upcoming Events): Decrease work of breathing and continue with hep and ABX  Discharge/Transfer Needs: TBD     Bedside Shift Report Completed :   Bedside Safety Check Completed:

## 2022-12-09 NOTE — PROGRESS NOTES
"CLINICAL NUTRITION SERVICES - ASSESSMENT NOTE     Nutrition Prescription      Malnutrition Status:    % Intake: < 75% for >/= 1 month (moderate)  % Weight Loss: Up to 7.5% in 3 months (non-severe)  Subcutaneous Fat Loss: Unable to assess--writer not onsite d/t illness  Muscle Loss: Unable to assess  Fluid Accumulation/Edema: Mild  Malnutrition Diagnosis: Moderate malnutrition in the context of acute illness    Recommendations already ordered by Registered Dietitian (RD):  Medical food supplement therapy- Ensure Max once daily  Multivitamin daily       REASON FOR ASSESSMENT  Pacheco Torres is a/an 77 year old male assessed by the dietitian for MST score of 2: unsure for weight loss.    Pt admitted d/t hypovolemic shock of unclear etiology, chronically incarcerated hernia that is not strangulated, NSTEMI. PMH significant for  htn/hlp, remote hx of CAD s/p DAYAN to the LAD-most recent echo performed in the ER with hyperdynamic EF 75 % and G1dd, hx of prostate cancer from earlier this year treated with XRT through [facility], macrocytic anemia with hgb in the 11-12 range (normal B12 and folate), monoclonal gammopathy (no recent documentation of evaluation), PMR, obesity, hx of tob abuse    Pt thought to have esophagitis per CT, will follow up with EGD outpatient, reportedly has had poor PO for about 6 weeks.    NUTRITION HISTORY  - Information obtained from chart; unable to reach patient or S.O.  - Poor PO reportedly x 6 weeks, taking adequate fluid  - Allergies: NKFA    CURRENT NUTRITION ORDERS  Diet: Full Liquid  Intake/Tolerance: none documented, diet advanced to full liquids today    LABS- reviewed    MEDICATIONS- Lipitor, Solu-Medrol, pantoprazole    ANTHROPOMETRICS  Height: 160 cm (5' 3\")  Most Recent Weight: 98.2 kg (216 lb 7.9 oz) , all weights obtained on bedscale, ptis notably down 2.3 L since admission, which would account for bulk of weight change  Vitals:    12/06/22 1412 12/06/22 2200 12/09/22 0600   Weight: 100 " kg (220 lb 7.4 oz) 101.3 kg (223 lb 5.2 oz) 98.2 kg (216 lb 7.9 oz)     IBW: 56.4 kg  Body mass index is 38.35 kg/m .  BMI: Obesity Grade II BMI 35-39.9  Weight History:   Wt Readings from Last 10 Encounters:   12/09/22 98.2 kg (216 lb 7.9 oz)   04/03/12 97.1 kg (214 lb)     Per Care Everywhere:  09/30/22 106.7 kg (235 lb 3.2 oz)  07/06/22 109 kg (241 lb 2.9 oz)  03/17/22 107.4 kg (236 lb 12.8 oz)  09/22/21 110 kg (242 lb 9.6 oz)    5-7.9% weight loss over 3 months (using highest and lowest weights this admission, variability noted)    Dosing Weight: 98.2 kg    ASSESSED NUTRITION NEEDS  Estimated Energy Needs: 1935+ kcals/day ([place] Jeor x AF 1.2)  Justification: Maintenance  Estimated Protein Needs: 98+ grams protein/day (1+g/kg)  Justification: Repletion--no adjustment for obesity in calculation  Estimated Fluid Needs: Per provider pending fluid status    PHYSICAL FINDINGS  See malnutrition section above.    MALNUTRITION  As noted above    NUTRITION DIAGNOSIS  Inadequate oral intake related to esophagitis and AMS as evidenced by PO meeting <50% of needs since admission, reported poor PO prior to admission with associated weight loss      INTERVENTIONS  Implementation  Nutrition Education: Unable to complete due to patient and S.O. unavailable   Medical food supplement therapy- Ensure Max once daily  Multivitamin daily    Goals  Patient to consume % of nutritionally adequate meal trays TID, or the equivalent with supplements/snacks.     Monitoring/Evaluation  Progress toward goals will be monitored and evaluated per protocol.  Alexandria Rosado, RD, LD, [facility]  Pager - 3rd floor/ICU: [phone]  Pager - All other floors: [phone]  Pager - Weekend/holiday: [phone]  Office: [phone]

## 2022-12-09 NOTE — PROGRESS NOTES
Notified MD at 1615 regarding Pt HR sustained in the 120-130s, BP also elevated -190s better after hydralazine, no change in HR. Now complaining of 2/10 chest tightness. Do you want to add a beta blocker? Also OK to give PRN nitroglycerin?      Spoke with: Dr. Pablo    Orders were obtained. Give nitroglycerin and 1x IV metoprolol. Will schedule PO metoprolol.    Comments:

## 2022-12-09 NOTE — PLAN OF CARE
"Goal Outcome Evaluation:      Plan of Care Reviewed With: patient, family    Overall Patient Progress: no changeOverall Patient Progress: no change    ICU End of Shift Summary.  For vital signs and complete assessments, please see documentation flowsheets.      Pertinent assessments: To start shift, pt A&O x3, disoriented to place. Pt restless and fidgeting with cords within his reach. As shift progressed pt became more restless and now only oriented to self with increased restlessness, HR, and BP, addressed with MD. Remains pleasant and redirectable. Precedex was titrated off today, restlessness seems more due to pain and discomfort. R eyeis bruised and pupil fixed at 3mm, recent cateract surgery, family states eye is not healing as his surgeon would like. Pt remains tachy -130s and BP elevated, lisinopril restarted, PRN hydralazine given and beta blocker added for continued elevated HR and BP. On NC with humidification at 5 LPM, exp wheezing and pursed lip breathing noted. Blanchard remains in place with adequate UO. Small dried blood noted on tip of penis. No BM this shift. Started on a full liquid diet, pt has decreased appetite and is only drinking water, denies further hunger. Will advance as tolerated. Remains in chair all day for pt comfort, repositioned and boosted in chair q2h. Scrotum is red and edemotous, rash/slight skin breakdown in ABD folds cleansed and powder applied.     Pursed lip breathing and ABD/Accessory muscles used as well as continued exp wheezing post neb, VBG ordered.   Pt increasingly restless and \"need(s) to change position\", ceiling lift used to place pt back in bed this evening.     Major Shift Events: Home medications restarted. Beta blocker added d/t elevated HR and continued elevated BP. Increased confusion as shift progressed. Changes to pain control. Started on a diet, advance as tolerated. VBG    Plan (Upcoming Events): Continue heparin, possible angio Monday if pt more " mentally clear.   Discharge/Transfer Needs: decreased confusion, stable BP, decrease pain.

## 2022-12-09 NOTE — PROGRESS NOTES
"SPIRITUAL HEALTH SERVICES  SPIRITUAL ASSESSMENT Progress Note  [facility]. Unit ICU     REFERRAL SOURCE: RN note of request for spiritual care    Pt busy with cares, spoke with 3 family members, and also with Nelida who affirmed [employer] [facility], that he declined a visit for communion earlier this week, and that he would \"likely decline  support as he feels this is only for when people are dying.\" Writer provided education regarding [employer] anointing of the sick as also provided upon new diagnoses or significant changes in health.    Plan: Family is aware of how to request further support via RN.    Eliza Crespo MDiv  Staff   [facility] routine referrals *[phone]  [facility] available 24/7 for emergent requests/referrals, either by having the on-call  paged or by entering an ASAP/STAT consult in Epic (this will also page the on-call ).     "

## 2022-12-09 NOTE — PROGRESS NOTES
[place] Progress Note     Imer Morejon MD  12/09/2022         Interval History:      Some improvement in mental status although still continues to have intermittent confusion.  Blood pressure running high.  Denies any chest discomfort.  Telemetry shows mostly sinus tachycardia.       Assessment and Plan:      77-year-old gentleman with history of CAD with history of mid LAD PCI and diagonal angioplasty in 2008 with coronary CT angiogram in 2019 showing patent stent and moderate RCA disease admitted with shock and altered mental status and shock was felt to be possible hypovolemic shock versus septic shock.  He had troponin elevation up to slightly more than thousand on high sensitive troponin and some transient EKG changes.  No clear-cut chest discomfort.  Plan was coronary angiogram but due to significant altered mental status this was appropriately canceled.    Mental status has improved however he continues to struggle with altered mental status.  He is also been diagnosed with colitis and strangulated inguinal hernia and esophagitis.  It looks like conservative medical management has been planned for these issues.  At this time given overall clinical situation and no active chest pain and still some confusion and altered mental status recommend conservative medical management for possible non-STEMI.       1.  Admitted with shock appear to be hypovolemic shock vs septic shock of unclear etiology.  Procalcitonin elevated, on empiric antibiotics, hypertensive now.  2.  Elevated troponin, transient EKG changes and in retrospect may be some chest discomfort yesterday.  Cannot completely exclude acute coronary syndrome although unlikely to explain the whole clinical presentation.  Plan was coronary angiogram but due to altered mental status and overall clinical situation this has been appropriately deferred . recommend medical management for possible non-STEMI.  Once clinical situation significantly improves can  "consider coronary angiogram.    3.  Newly diagnosed atrial fibrillation, now in sinus sinus tachycardia.  CHADS2 Vascor of 4.  On heparin.  Will need to address long-term anticoagulation subsequently.  3.    Altered mental status  4.  Esophagitis, colitis and strangulated hernia on CT being followed by GI and surgery..  Conservative management has been recommended      Recommendations  For now continue heparin because of newly diagnosed paroxysmal atrial fibrillation elevated CHADS2 Vascor.  This can be subsequently converted to an oral anticoagulation.  Continue aspirin, Lipitor  Discussed with the inpatient medicine ICU team -patient has blood pressure running consistently high can resume his prior outpatient medication lisinopril and if needed can also add further medication like carvedilol for blood pressure control as needed.  Patient's primary cardiologist is Dr Flynn at [facility].           Physical Exam:       , Blood pressure (!) 172/103, pulse 97, temperature 99.2  F (37.3  C), temperature source Axillary, resp. rate 24, height 1.6 m (5' 3\"), weight 98.2 kg (216 lb 7.9 oz), SpO2 95 %.  Vitals:    12/06/22 1412 12/06/22 2200 12/09/22 0600   Weight: 100 kg (220 lb 7.4 oz) 101.3 kg (223 lb 5.2 oz) 98.2 kg (216 lb 7.9 oz)     Vital Signs with Ranges  Temp:  [97.4  F (36.3  C)-100  F (37.8  C)] 99.2  F (37.3  C)  Pulse:  [] 97  Resp:  [13-46] 24  BP: ()/() 172/103  SpO2:  [89 %-99 %] 95 %  I/O's Last 24 hours  I/O last 3 completed shifts:  In: 631.06 [I.V.:631.06]  Out: 1255 [Urine:1255]  General, oriented x1, altered mental status overall better than yesterday  Cardiovascular system S1-S2 normal no murmur rub or gallop  Respiratory system clear to auscultation             Medications:          acetaminophen  1,000 mg Oral Q8H     aspirin  81 mg Oral Daily     atorvastatin  40 mg Oral QPM     atropine  1 drop Right Eye BID     calcium carbonate 500 mg (elemental)  2 tablet Oral " Daily     cyanocobalamin  1,000 mcg Oral Daily     diclofenac  4 g Topical 4x Daily     heparin (porcine)         ipratropium - albuterol 0.5 mg/2.5 mg/3 mL  3 mL Nebulization 4x daily     lidocaine  2 patch Transdermal Q24h    And     lidocaine   Transdermal Q8H YOMI     lidocaine (PF)         lisinopril  10 mg Oral QPM     methocarbamol  500 mg Oral 4x Daily     miconazole   Topical BID     nitroGLYcerin in D5W         pantoprazole  40 mg Intravenous BID     piperacillin-tazobactam  3.375 g Intravenous Q6H     prednisoLONE acetate  1 drop Both Eyes TID     predniSONE  40 mg Oral Daily     pregabalin  25 mg Oral TID     QUEtiapine  25 mg Oral BID     sertraline  100 mg Oral Daily     sodium chloride (PF)  3 mL Intracatheter Q8H     sodium chloride (PF)  3 mL Intracatheter Q8H     tamsulosin  0.4 mg Oral QPM     vitamin D3  50 mcg Oral Daily     PRN Meds: acetaminophen **OR** acetaminophen, alum & mag hydroxide-simethicone, cyclobenzaprine, haloperidol lactate, hydrALAZINE, HYDROmorphone **OR** HYDROmorphone, HYDROmorphone, ipratropium - albuterol 0.5 mg/2.5 mg/3 mL, lidocaine 4%, lidocaine (buffered or not buffered), magnesium hydroxide, naloxone **OR** naloxone **OR** naloxone **OR** naloxone, nitroGLYcerin, ondansetron **OR** ondansetron, - MEDICATION INSTRUCTIONS -, - MEDICATION INSTRUCTIONS -, potassium chloride, sodium chloride (PF), sodium chloride (PF), sodium chloride (PF)         Data:      All new lab and imaging data was reviewed.   Recent Labs   Lab Test 12/09/22  0541 12/09/22  0449 12/08/22  0530 12/06/22  1951 12/06/22  1421   WBC 6.2 6.0 5.5   < > 7.6   HGB 10.2* 10.1* 10.5*   < > 11.5*   * 108* 108*   < > 103*    225 232   < > 331   INR  --   --   --   --  1.03    < > = values in this interval not displayed.      Recent Labs   Lab Test 12/09/22  0943 12/09/22  0420 12/08/22  2350 12/08/22  0539 12/08/22  0530 12/07/22  0810 12/07/22  0428 12/06/22  2151 12/06/22  1421   NA  --   --    --   --  139  --  133*  --  132*   POTASSIUM  --   --   --   --  3.6  --  4.4  --  3.4   CHLORIDE  --   --   --   --  104  --  99  --  91*   CO2  --   --   --   --  29  --  24  --  25   BUN  --   --   --   --  22.1  --  26.8*  --  17.2   CR  --   --   --   --  0.72  --  0.86  --  0.95   ANIONGAP  --   --   --   --  6*  --  10  --  16*   TAVO  --   --   --   --  9.8  --  9.5  --  10.5*   * 116* 114*   < > 115*   < > 138*   < > 193*    < > = values in this interval not displayed.     No lab results found.    Invalid input(s): TROPSAMIR MD  12/9/2022  Pager:  [phone]

## 2022-12-09 NOTE — PROGRESS NOTES
[facility]  Hospitalist Progress Note  Tino Pablo M.D., M.B.A.   12/09/2022    Reason for Stay/active problem list      Acute encephalopathy    Hypotension-likely from hypovolemia    Non- ST segment elevation MI    Dehydration    Acute on chronic back pain    Suspected acute colitis    Large left inguinal hernia, concern for mechanical bowel obstruction    Abnormally marked circumferential wall thickening of the entire esophagus suspicious for esophagitis         Assessment and Plan:        Summary of Stay:     Pacheco Torres is a 77 year old male with a history of htn/hlp, remote hx of CAD s/p DAYAN to the LAD-most recent echo performed in the ER with hyperdynamic EF 75 % and G1dd, hx of prostate cancer from earlier this year treated with XRT through [place], macrocytic anemia with hgb in the 11-12 range (normal B12 and folate), monoclonal gammopathy (no recent documentation of evaluation), PMR, obesity, hx of tob abuse  admitted on 12/6/2022 with hypovolemic shock of unclear etiology     He believes he passed out either the day prior or the day of admission.  Unable to fill in any details. States he hasn't been sleeping well but denies and n/v/d.  States po intake has been well. No f/c/s.  No epistaxis/bloody gums/stools or emesis.      Discussed with  Carlos Enrique Goldberg who states her hasn't been eating well for at least 6 weeks, she thinks his fluid intake is ok.  She reports that he's had some nausea but no vomiting.  No diarrhea in fact she thinks he may be constipated.      In the ER BPs in the 60's/40's with tachycardia and tachypnea, he was hypothermic at 95.4, he was given 2 L of NS without improvement and so started on NE via Right IJ.       EKG with ST elevation throughout septal/anterior leads-cards was contacted and the story just didn't seem to fit- he was without any CP or anginal equivalents. Initial trop 29. He was placed on heparin and stat echo completed showing hyperdynamic  cardiac function.     troponins continue to rise 29->222-493 but EKG has normalized    CMP with AG 2/2 elevated lactic acid with respiratory compensation.    Lactates 5.4->1.4 after IVF/NE  CBC with stable macrocytic anemia, no leukocytosis  procal 0.15     CXR with pulmonary edema  CT Ao survey-diffuse CAD, Left inguinal canal hernia containing loops of the distal colon     He was covered with pip-tazo/vanco for septic shock of unclear etiology        Problem List with Assessment and Plan:    Shock/syncope  Acute colitis on CT imaging  --  Given initial EKG would certainly fit a CV shock picture although echo with hyperdynamic function.  -- Suspected combination of hypovolemia and sepsis.  --Hypotension resolved with IV fluid resuscitation and transient norepinephrine  --CT of chest abdomen pelvis obtained and December 7 showed evidence of colitis and esophagitis.  --Currently he is hypertensive ,afebrile and tachycardic.  Continue to monitor and antibiotic with Zosyn.  Follow cultures     Acute toxic metabolic metabolic encephalopathy   -- Likely combination of toxic metabolic and infectious encephalopathy.  -- Patient could have underlying cognitive deficit.  Chronic insomnia due to back pain issues.  --Patient was agitated on December 8 and required Precedex drip.  -- Currently she is alert but very talkative and appears to be confused at times.  We will schedule Seroquel, continue Seroquel as needed.  May wean off Precedex    NSTEMI   -- trops 29->222->493  Diffuse CAD noted on CT Ao survey.    --No CP and echo with hyperdynamic function.   -- Patient was treated with heparin and cardiology was consulted.  -- Patient was seen by cardiology and initially recommended coronary angiogram but patient was confused unable to provide consent and lay still for the procedure.  Coronary angiogram plan was consulted and patient was treated with heparin, started on aspirin.  --Currently no significant tachyarrhythmias or  complaint of chest pain.  Patient was seen by cardiologist and recommendation obtained to continue heparin infusion resume home medication including lisinopril and monitor patient on telemetry.      CAD  Htn/hlp   --pta on asa 81 mg/lisinopril 10 mg every day/simvastain 40  Mg  -Home medications resumed       Large Left inguinal hernia with bowel contents-initial concern for small bowel obstruction.  -- CT on December 7 showed concern for bowel obstruction.  Patient had left lower quadrant abdominal tenderness but no rigidity or rebound tenderness.  Surgery was consulted and patient was seen and examined by Dr. Ruiz who recommended to continue to monitor for.  --No evidence of bowel obstruction, currently no complaint of abdominal pain or ischemic bowel concern.  Continue to monitor.  Advance diet as tolerate     Macrocytic anemia  --Being worked up by PCP   --Currently hemoglobin is 10.2.Positive stool.  Esophageal thickening noted on CT scan.  GI consulted and recommendation obtained for no intervention at this time.       Incidental pulmonary nodules  --Given heavy smoking history will need repeat CT in 3-6 months    Dehydration  --Patient has evidence of dehydration with very dry tongue and buccal mucosa  Decreased intake PTA   --Advance diet as tolerated, encourage oral fluids    Acute on chronic back pain  --Severe acute on chronic back pain requiring narcotic medications.  History of prostate cancer.  -- Patient was seen by pain management team.  Pain medications recommended.  Continue current pain regimen.  Patient may need MRI study of his thoracic and lumbar spine for evaluation of compression fractures and pathology and is more stable    Abnormal CT with concern for esophagitis and colitis   -- GI consulted, recommendation noted    Chronic medical problems  Depression/gerd/prostate ca/pmr/monoclonal gammopathy   -- Continue pta sertraline     COVID 19  Negative  S/p 5 shot moderna series 9/2022  "(biv)    Acute hypoxic respiratory failure  -- Patient is a smoker, has increased work of breathing and hypoxic requiring supplemental oxygen.  -- He has evidence of emphysema on CT scan . Mild acute exacerbation of COPD suspected  -- Patient was treated with DuoNeb, supplemental oxygen, steroid.  --Currently he is not wheezing, hypoxia is improving, continue to monitor on current regimen      Plan for today:  -- Wean off Precedex as tolerated.  Scheduled Seroquel and continue to monitor mental function.  Acute delirium protocol  -- Get PT assessment  -- Continue heparin per cardiology    VTE Prophylaxis: Heparin    Code Status: Full Code     Diet: Advance Diet as Tolerated: Full Liquid Diet    Blanchard Catheter: PRESENT, indication: Retention    Family updated today: Yes      Disposition: Continue to monitor in ICU.            Interval History (Subjective):        Patient is seen and examined by me today and medical record reviewed.Overnight events noted and care discussed with nursing staff.  Patient states agitated and confused at times but improved this morning.  He is very talkative and restless and still requiring Precedex drip.  Care plan was discussed with cardiology team, bedside nurse and recommendation noted.  Family at bedside.             Physical Exam:        Last Vital Signs:  BP (!) 185/152 (BP Location: Left arm)   Pulse 105   Temp 99  F (37.2  C) (Axillary)   Resp 25   Ht 1.6 m (5' 3\")   Wt 98.2 kg (216 lb 7.9 oz)   SpO2 95%   BMI 38.35 kg/m      I/O last 3 completed shifts:  In: 631.06 [I.V.:631.06]  Out: 1255 [Urine:1255]    Wt Readings from Last 5 Encounters:   12/09/22 98.2 kg (216 lb 7.9 oz)   04/03/12 97.1 kg (214 lb)        Constitutional:  Awake, alert but restless and occasionally confused.     Respiratory:  Increased work of breathing, diffuse rhonchi.  No wheezing.   Cardiovascular: Regular rate and rhythm, normal S1 and S2, and no murmur noted   Abdomen: Normal bowel sounds, soft, " non-distended, left lower quadrant abdominal tenderness.  No rebound tenderness, rigidity or guarding.   Skin: No new rashes, no cyanosis, dry to touch   Neuro: Alert with  no new focal weakness   Extremities: No edema   Other(s):        All other systems: egative          Medications:        All current medications were reviewed with changes reflected in problem list.         Data:      All new lab and imaging data was reviewed.      Data reviewed today: I reviewed all new labs and imaging results over the last 24 hours. I personally reviewed       Recent Labs   Lab 12/09/22  0541 12/09/22  0449 12/08/22  0530   WBC 6.2 6.0 5.5   HGB 10.2* 10.1* 10.5*   HCT 31.2* 31.3* 32.3*   * 108* 108*    225 232     No results for input(s): CULT in the last 168 hours.  Recent Labs   Lab 12/09/22  1207 12/09/22  0943 12/09/22  0420 12/08/22  0539 12/08/22  0530 12/07/22  0810 12/07/22  0428 12/06/22  2151 12/06/22  1421   NA  --   --   --   --  139  --  133*  --  132*   POTASSIUM  --   --   --   --  3.6  --  4.4  --  3.4   CHLORIDE  --   --   --   --  104  --  99  --  91*   CO2  --   --   --   --  29  --  24  --  25   ANIONGAP  --   --   --   --  6*  --  10  --  16*   * 112* 116*   < > 115*   < > 138*   < > 193*   BUN  --   --   --   --  22.1  --  26.8*  --  17.2   CR  --   --   --   --  0.72  --  0.86  --  0.95   GFRESTIMATED  --   --   --   --  >90  --  90  --  83   TAVO  --   --   --   --  9.8  --  9.5  --  10.5*   MAG  --   --   --   --   --   --   --   --  2.3   PROTTOTAL  --   --   --   --   --   --   --   --  6.7   ALBUMIN  --   --   --   --   --   --   --   --  3.7   BILITOTAL  --   --   --   --   --   --   --   --  0.3   ALKPHOS  --   --   --   --   --   --   --   --  147*   AST  --   --   --   --   --   --   --   --  78*   ALT  --   --   --   --   --   --   --   --  17    < > = values in this interval not displayed.       Recent Labs   Lab 12/09/22  1207 12/09/22  0943 12/09/22  1960 12/08/22  6090  12/08/22 2000   * 112* 116* 114* 127*       Recent Labs   Lab 12/06/22  1421   INR 1.03         No results for input(s): TROPONIN, TROPI, TROPR in the last 168 hours.    Invalid input(s): TROP, TROPONINIES    Recent Results (from the past 48 hour(s))   XR Chest Port 1 View    Narrative    CHEST PORTABLE ONE VIEW December 6, 2022 2:39 PM     HISTORY: STEMI. Altered mental status. Chest pain.    COMPARISON: 11/2/2022.      Impression    IMPRESSION: Hypoinflated lungs and cardiomegaly. Mild bilateral  vascular congestion appears increased. Correlate with any evidence of  developing pulmonary edema.    HAL BOGGS MD         SYSTEM ID:  [id number]   CT Head w/o Contrast    Narrative    CT SCAN OF THE HEAD WITHOUT CONTRAST December 6, 2022 3:07 PM     HISTORY: Altered mental status.    TECHNIQUE: Axial images of the head and coronal reformations without  IV contrast material. Radiation dose for this scan was reduced using  automated exposure control, adjustment of the mA and/or kV according  to patient size, or iterative reconstruction technique.    COMPARISON: None.      Impression    IMPRESSION: Mild motion artifact. No evidence of hemorrhage. Volume  loss and patchy areas of white matter hypoattenuation which likely  represent chronic small vessel ischemic change. Small area of focal  hypoattenuation within the central medulla, presumably artifactual  (brainstem infarct not excluded, MRI can be performed for basal  ganglia and thalamic lacunar infarcts, presumably chronic. No acute  osseous abnormality. Nonspecific right facial soft tissue swelling,  potentially contusion.    EVELYN VELAZQUEZ MD         SYSTEM ID:  [id number]   CT Aortic Survey w Contrast    Narrative    CT AORTIC SURVEY WITH CONTRAST  12/6/2022 3:07 PM    HISTORY:  Back pain, shock, wide mediastinum on x-ray.    TECHNIQUE: CT scan obtained of the chest, abdomen, and pelvis with IV  contrast. Post contrast scanning performed during the arterial  phase.  CT chest also obtained without IV contrast. 80 mL Isovue-370 IV  injected. Sagittal and coronal reformatted images acquired from the  source post contrast data. Radiation dose for this scan was reduced  using automated exposure control, adjustment of the mA and/or kV  according to patient size, or iterative reconstruction technique.    COMPARISON:  None.    FINDINGS:  Thoracic and abdominal aorta: No dissection involving the thoracic or  abdominal aorta. Patency of the main branch vessels from the thoracic  and abdominal aorta. Scattered areas of calcified atherosclerotic  disease noted. Atherosclerotic stenosis at the origins of the celiac  and superior mesenteric arteries but there is distal perfusion. No  periaortic hematoma or aneurysm.    Other vascular: Severe coronary artery calcifications.    Chest: No adenopathy or acute mediastinal abnormality. No acute  mediastinal fluid collection. Patchy atelectasis at the posterior  right lower lobe. No effusions. No pneumothorax. Stable nodule  posterolateral left upper lobe measuring 0.3 cm, series 6 image 60.    Abdomen/pelvis: Liver, gallbladder, adrenals, spleen, pancreas, and  kidneys do not show any acute abnormalities. Left inguinal hernia  measures 9.1 x 5.8 cm, series 5 image 265. Herniated loops of the  distal descending colon and sigmoid within the hernia. No upstream  obstruction. No bowel inflammatory change. No enlarged lymph nodes. No  acute pelvic abnormality. Diffuse degenerative changes throughout the  spine. A degree of height loss involving multiple thoracic and lumbar  spine levels.      Impression    IMPRESSION:   1. No acute thoracic or abdominal aortic abnormality. No dissection.  Scattered areas of atherosclerotic disease identified. Atherosclerotic  stenosis at the origins of the celiac artery and superior mesenteric  artery.  2. Diffuse coronary artery calcifications.  3. No acute mediastinal abnormality is seen.  4. Stable pulmonary  nodule on the left, see below for follow-up  imaging guidelines.   5. Left inguinal canal hernia containing herniated loops of the distal  descending colon and sigmoid. No upstream bowel obstruction. See above  for measurements.    Recommendations for one or multiple incidental lung nodules < 6mm:    Low risk patients: No routine follow-up.    High risk patients: Optional follow-up CT at 12 months; if  unchanged, no further follow-up.    *Low Risk: Minimal or absent history of smoking or other known risk  factors.  *Nonsolid (ground glass) or partly solid nodules may require longer  follow-up to exclude indolent adenocarcinoma.  *Recommendations based on Guidelines for the Management of Incidental  Pulmonary Nodules Detected at CT: From the Fleischner Society 2017,  Radiology 2017.    HAL BOGGS MD         SYSTEM ID:  [id number]   Echocardiogram Limited   Result Value    LVEF  75%    Narrative    [id number]  [id number]  [id number]  [id number]^ANGELIQUE^MINOO^NERI     [facility]  Echocardiography Laboratory  [address] [place] [address]     Name: BERTIN LYNCH  MRN: [id number]  : 1945  Study Date: 2022 02:59 PM  Age: 77 yrs  Gender: Male  Patient Location: [facility]  Reason For Study: Chest Pain  Ordering Physician: MINOO MERINO  Performed By: Mariah Carranza RDCS     BSA: 2.1 m2  Height: 66 in  Weight: 220 lb  HR: 110  BP: 82/65 mmHg  ______________________________________________________________________________  Procedure  Limited Portable Echo Adult. Optison (NDC #9905-7038) given intravenously.  (Emergent exam, abbreviated study performed).  ______________________________________________________________________________  Interpretation Summary     The visual ejection fraction is estimated at 75%.  Hyperdynamic left ventricular function  ER informed by phone that echo has been read. The study was technically  limited. Technically difficult, suboptimal  study.  ______________________________________________________________________________  Left Ventricle  Grade I or early diastolic dysfunction. The visual ejection fraction is  estimated at 75%. Hyperdynamic left ventricular function.     Right Ventricle  The right ventricle is normal in size and function.     Atria  Normal left atrial size. Right atrial size is normal.     Mitral Valve  There is trace mitral regurgitation.     Tricuspid Valve  There is trace tricuspid regurgitation. Right ventricular systolic pressure  could not be approximated due to inadequate tricuspid regurgitation.     Aortic Valve  The aortic valve is trileaflet. There is mild trileaflet aortic sclerosis. No  aortic regurgitation is present. No hemodynamically significant valvular  aortic stenosis.     Pulmonic Valve  Normal pulmonic valve velocity.     Vessels  IVC diameter >2.1 cm collapsing <50% with sniff suggests a high RA pressure  estimated at 15 mmHg or greater.     Pericardium  There is no pericardial effusion.     Rhythm  The rhythm was sinus tachycardia.  ______________________________________________________________________________  MMode/2D Measurements & Calculations     LA Volume (BP): 68.8 ml     Doppler Measurements & Calculations  MV E max dwayne: 93.3 cm/sec  MV A max dwayne: 121.6 cm/sec  MV E/A: 0.77  MV dec time: 0.12 sec     ______________________________________________________________________________  Report approved by: Meche English 12/06/2022 03:34 PM         XR Chest Port 1 View    Narrative    XR CHEST PORT 1 VIEW 12/6/2022 3:52 PM    HISTORY: CENTRAL LINE    COMPARISON: CT today      Impression    IMPRESSION: Right IJ central venous catheter tip in the low SVC. No  pneumothorax. Mild interstitial opacities in the lungs, could  represent pulmonary edema. No pleural effusion or pneumothorax.    EMELY MARTINEZ MD         SYSTEM ID:  [id number]   CT Chest/Abdomen/Pelvis w Contrast    Narrative    CT  CHEST/ABDOMEN/PELVIS W CONTRAST 12/7/2022 1:00 PM    CLINICAL HISTORY: back pain, abdominal pain    TECHNIQUE: CT scan of the chest, abdomen, and pelvis was performed  following injection of IV contrast. Multiplanar reformats were  obtained. Dose reduction techniques were used.   CONTRAST: 90 mL of Isovue 370    COMPARISON: Chest CT on 11/3/2022    FINDINGS:   LUNGS AND PLEURA: Mild emphysema. Mild bibasilar dependent  atelectasis/infiltrate and trace bilateral pleural effusions.    MEDIASTINUM/AXILLAE: No lymphadenopathy. Right IJ central venous  catheter tip is in the low SVC. No filling defects in the central  pulmonary arteries. No aortic aneurysm or dissection. Severe coronary  artery calcifications. No pericardial effusion.     Diffuse circumferential esophageal wall thickening with surrounding  mediastinal stranding and fluid (series 2, image 34-77 and coronal  series 5, image 74-68).    HEPATOBILIARY: Normal.    PANCREAS: Normal.    SPLEEN: Normal.    ADRENAL GLANDS: Normal.    KIDNEYS/BLADDER: No calculi, hydronephrosis or perinephric stranding.  Urinary bladder is decompressed with a Blanchard catheter.    BOWEL: There are a few mildly dilated loops of small bowel in the mid  and left abdomen (series 2, image 138) with bowel loops dilated up to  3.5 cm. A transition point is not identified. Distal loops of ileum  are decompressed and the terminal ileum is decompressed with  transition likely in the mid/right lower quadrant.     No colonic obstruction. Marked circumferential wall thickening of the  splenic flexure, descending colon and sigmoid colon. Left lower  quadrant large inguinal hernia containing a loop of the sigmoid colon  with marked wall thickening of the loop within the hernia (series 5,  image 56) and pinching/narrowing of the bowel loop at the hernia  mouth. No pneumatosis or extraluminal air.    Normal appendix.    PELVIC ORGANS: Atrophic prostate gland.    ADDITIONAL FINDINGS: No lymphadenopathy  in the abdomen and pelvis. No  abdominal aortic aneurysm. Trace free fluid in the left lower  quadrant. No abscess or free air.    MUSCULOSKELETAL: Stable mild wedge compression deformity T7, T9, T10,  T11, T12 and L1 vertebral. No acute-appearing fractures. No  destructive lesions of the bones.      Impression    IMPRESSION:  1.  Marked circumferential wall thickening of the splenic flexure,  descending colon and sigmoid colon may be due to acute colitis, either  infectious, inflammatory or ischemic.   2.  Large left inguinal hernia containing a loop of the sigmoid colon  with narrowing/pinched appearance of the sigmoid colon at the hernia  mouth and wall thickening of the sigmoid in the inguinal hernia.  Superimposed strangulation of this loop of colon is not excluded.  Consider surgical consultation.  3.  Mechanical small bowel obstruction appears to be a separate  process. Gradual transition to normal caliber small bowel loops in the  right abdomen.  4.  Marked circumferential wall thickening of the entire esophagus,  suspicious for esophagitis.  5.  Bibasilar atelectasis/infiltrate and small bilateral pleural  effusions.    EMELY MARTINEZ MD         SYSTEM ID:  [id number]       COVID Status:  COVID-19 PCR Results    COVID-19 PCR Results 11/2/22 12/6/22   SARS CoV2 PCR Negative Negative      Comments are available for some flowsheets but are not being displayed.         COVID-19 Antibody Results, Testing for Immunity    COVID-19 Antibody Results, Testing for Immunity   No data to display.              Disclaimer: This note consists of symbols derived from keyboarding, dictation and/or voice recognition software. As a result, there may be errors in the script that have gone undetected. Please consider this when interpreting information found in this chart.

## 2022-12-09 NOTE — PROGRESS NOTES
[facility]  Pain Management Progress Note  Text Page     Assessment & Plan   Pacheco Torres is a 77 year old male who was admitted on 12/6/2022.     PLAN:   1)  Opioids:  -Dilaudid 2 to 4 mg every 3 hours prn  -Dilaudid 0.3 mg prn for breakthrough pain  Opioids Treatment Goal:   -Improvement in function  -Participate in PT  -Management of acute pain during hospitalization, expected 3-7 days needed at DC     2) Non-opioid multimodal medication therapy  -Topical:Lidocaine Patch 4% and Voltaren/bengay QID  -N-SAIDS: Avoid due to hypertension  -Muscle Relaxants:Methocarbamolreduced to 250- 500 mg QID due to sedation at times  -Adjuvants:Acetaminophen 1000 mg every 8 hours, Pregabalin 25 mg TID  -Antidepressants/anxiolytics: Resume chronic Zoloft      3)  Non-medication interventions  Positioning, Heating pad PRN, Relaxation, Meditation, Distraction, Physical therapy when appropriate from a cardiac standpoint     4)  Constipation Prophylaxis  Senna-S prn currently on hold as patient experiencing frequently stooling     5) Medication Risk reduction strategies   - Monitor for sedation   - Capnography per protocol   - Narcan for opioid reversal      6)  Pain Education  -Opioid safe use, storage and disposal information included in DC AVS     7)  DC Planning   Discussed goal of opioid therapy as above with patient, bedside nurse Jinny Miller RN and Hospitalist Tino Pablo MD  Continued outpatient management of pain per PCP  Disposition: TBD - home  Support systems: Significant other, Elissa Goldberg   Outpatient Referrals: recommend pain clinic on follow up. I have contacted his PCP regarding referral   The following risk factors have been identified for unintentional overdose: patient is not expected to have previous tolerance given gap in opioid use, patient is > 65 years old, patient is overweight, patient has been switched to a new opioid medication and patient has compromised kidney  or liver function . Discharge with intra-nasal naloxone if discharged to home with opioids  >40 mg MME/day.  Plan for education prior to discharge.     ASSESSMENT  1)  Acute on chronic low back pain - patient has history of prostate cancer with radiation treatment. Consider MRI      2)  Patient with chronic low back pain, prescriptions from various acute care providers   Patient has a possible opioid tolerance.      Patient's opioid use in past 24 hours: Hydromorphone PO 4 mg, Hydromorphone IV 0.9mg = 34 mg Daily Morphine Equivalent     3)  Risk factors for opioid related harms  -Renal insufficiency  -Hepatic insufficiency  - Age > 65 years old  -Anxiety/depression     4)  Opioid induced side-effects:  -Constipation - no as he is currently having frequent stools  -Nausea/Vomit - no/denies  -Sedation  -  Due to hypotension  -Urinary Retention - complains of pain and currently has hednrix catheter  -Respiratory Depression - No     5)  Other/Related:    -Depression/anxiety - resume Zoloft when appropriate    Luzma Yen NP  Pain Management and Palliative Care  [facility]  Pgr: [phone]    Time Spent on this Encounter   I spent  12 minutes is assessment of the patient and discussion with the patient and family.  Another 15 minutes in review of chart, documentation and discussion with the health care team.    Interval History   Patient with increased pain, also agitation. Could be related to uncontrolled pain. Encouraged the use of opioids. All oral medication restarted today so that should help. Medications made.    Review of Systems   Unable to assess due to confusion    Physical Exam   Temp:  [97.4  F (36.3  C)-100  F (37.8  C)] 99.2  F (37.3  C)  Pulse:  [] 114  Resp:  [13-46] 46  BP: ()/() 165/102  SpO2:  [89 %-99 %] 99 %  216 lbs 7.87 oz  Exam:   GEN:  Alert, oriented to self, appears uncomfortable at times  CV:  Tachy 100's  RESP:  Clear to auscultation bilaterally  without rales/rhonchi/wheezing/retractions.  Symmetric chest rise on inhalation noted.  Normal respiratory effort.  ABD:  Rounded, soft, non-tender/non-distended.  +BS    PAIN BEHAVIOR: Cooperative  Psych:  Restless at times, lethargic at other times    Medications     dexmedetomidine 0.4 mcg/kg/hr (12/09/22 0900)     [Held by provider] dextrose 5% and 0.45% NaCl + KCl 20 mEq/L Stopped (12/08/22 1126)     heparin 1,350 Units/hr (12/09/22 0900)     - MEDICATION INSTRUCTIONS -       - MEDICATION INSTRUCTIONS -       sodium chloride Stopped (12/07/22 1444)       acetaminophen  1,000 mg Oral Q8H     aspirin  81 mg Oral Daily     atorvastatin  40 mg Oral QPM     atropine  1 drop Right Eye BID     calcium carbonate 500 mg (elemental)  2 tablet Oral Daily     cyanocobalamin  1,000 mcg Oral Daily     diclofenac  4 g Topical 4x Daily     heparin (porcine)         ipratropium - albuterol 0.5 mg/2.5 mg/3 mL  3 mL Nebulization 4x daily     lidocaine  2 patch Transdermal Q24h    And     lidocaine   Transdermal Q8H YOMI     lidocaine (PF)         lisinopril  10 mg Oral QPM     methocarbamol  500 mg Oral 4x Daily     miconazole   Topical BID     nitroGLYcerin in D5W         pantoprazole  40 mg Intravenous BID     piperacillin-tazobactam  3.375 g Intravenous Q6H     prednisoLONE acetate  1 drop Both Eyes TID     predniSONE  40 mg Oral Daily     pregabalin  25 mg Oral TID     QUEtiapine  25 mg Oral BID     sertraline  100 mg Oral Daily     sodium chloride (PF)  3 mL Intracatheter Q8H     sodium chloride (PF)  3 mL Intracatheter Q8H     tamsulosin  0.4 mg Oral QPM     vitamin D3  50 mcg Oral Daily       Data   Results for orders placed or performed during the hospital encounter of 12/06/22 (from the past 24 hour(s))   Glucose by meter   Result Value Ref Range    GLUCOSE BY METER POCT 124 (H) 70 - 99 mg/dL   Heparin Unfractionated Anti Xa Level   Result Value Ref Range    Anti Xa Unfractionated Heparin 0.15 For Reference Range, See  Comment IU/mL    Narrative    Therapeutic Range: UFH: 0.25-0.50 IU/mL for low intensity dosing,  0.30-0.70 IU/mL for high intensity dosing DVT and PE.  This test is not validated for other direct factor X inhibitors (e.g. rivaroxaban, apixaban, edoxaban, betrixaban, fondaparinux) and should not be used for monitoring of other medications.   Glucose by meter   Result Value Ref Range    GLUCOSE BY METER POCT 121 (H) 70 - 99 mg/dL   Glucose by meter   Result Value Ref Range    GLUCOSE BY METER POCT 127 (H) 70 - 99 mg/dL   Heparin Unfractionated Anti Xa Level   Result Value Ref Range    Anti Xa Unfractionated Heparin 0.47 For Reference Range, See Comment IU/mL    Narrative    Therapeutic Range: UFH: 0.25-0.50 IU/mL for low intensity dosing,  0.30-0.70 IU/mL for high intensity dosing DVT and PE.  This test is not validated for other direct factor X inhibitors (e.g. rivaroxaban, apixaban, edoxaban, betrixaban, fondaparinux) and should not be used for monitoring of other medications.   Blood gas venous   Result Value Ref Range    pH Venous 7.36 7.32 - 7.43    pCO2 Venous 59 (H) 40 - 50 mm Hg    pO2 Venous 45 25 - 47 mm Hg    Bicarbonate Venous 33 (H) 21 - 28 mmol/L    Base Excess/Deficit (+/-) 6.1 (H) -7.7 - 1.9 mmol/L    FIO2 5    Glucose by meter   Result Value Ref Range    GLUCOSE BY METER POCT 114 (H) 70 - 99 mg/dL   Heparin Unfractionated Anti Xa Level   Result Value Ref Range    Anti Xa Unfractionated Heparin 0.33 For Reference Range, See Comment IU/mL    Narrative    Therapeutic Range: UFH: 0.25-0.50 IU/mL for low intensity dosing,  0.30-0.70 IU/mL for high intensity dosing DVT and PE.  This test is not validated for other direct factor X inhibitors (e.g. rivaroxaban, apixaban, edoxaban, betrixaban, fondaparinux) and should not be used for monitoring of other medications.   Glucose by meter   Result Value Ref Range    GLUCOSE BY METER POCT 116 (H) 70 - 99 mg/dL   CBC with platelets   Result Value Ref Range    WBC  Count 6.0 4.0 - 11.0 10e3/uL    RBC Count 2.90 (L) 4.40 - 5.90 10e6/uL    Hemoglobin 10.1 (L) 13.3 - 17.7 g/dL    Hematocrit 31.3 (L) 40.0 - 53.0 %     (H) 78 - 100 fL    MCH 34.8 (H) 26.5 - 33.0 pg    MCHC 32.3 31.5 - 36.5 g/dL    RDW 13.4 10.0 - 15.0 %    Platelet Count 225 150 - 450 10e3/uL   CBC with platelets   Result Value Ref Range    WBC Count 6.2 4.0 - 11.0 10e3/uL    RBC Count 2.90 (L) 4.40 - 5.90 10e6/uL    Hemoglobin 10.2 (L) 13.3 - 17.7 g/dL    Hematocrit 31.2 (L) 40.0 - 53.0 %     (H) 78 - 100 fL    MCH 35.2 (H) 26.5 - 33.0 pg    MCHC 32.7 31.5 - 36.5 g/dL    RDW 13.3 10.0 - 15.0 %    Platelet Count 265 150 - 450 10e3/uL   Glucose by meter   Result Value Ref Range    GLUCOSE BY METER POCT 112 (H) 70 - 99 mg/dL

## 2022-12-10 ENCOUNTER — APPOINTMENT (OUTPATIENT)
Dept: GENERAL RADIOLOGY | Facility: CLINIC | Age: 77
DRG: 871 | End: 2022-12-10
Attending: INTERNAL MEDICINE
Payer: COMMERCIAL

## 2022-12-10 LAB
ANION GAP SERPL CALCULATED.3IONS-SCNC: 8 MMOL/L (ref 7–15)
BUN SERPL-MCNC: 23.8 MG/DL (ref 8–23)
CALCIUM SERPL-MCNC: 9.8 MG/DL (ref 8.8–10.2)
CHLORIDE SERPL-SCNC: 102 MMOL/L (ref 98–107)
CREAT SERPL-MCNC: 0.59 MG/DL (ref 0.67–1.17)
DEPRECATED HCO3 PLAS-SCNC: 32 MMOL/L (ref 22–29)
ERYTHROCYTE [DISTWIDTH] IN BLOOD BY AUTOMATED COUNT: 13.9 % (ref 10–15)
GFR SERPL CREATININE-BSD FRML MDRD: >90 ML/MIN/1.73M2
GLUCOSE BLDC GLUCOMTR-MCNC: 104 MG/DL (ref 70–99)
GLUCOSE BLDC GLUCOMTR-MCNC: 104 MG/DL (ref 70–99)
GLUCOSE BLDC GLUCOMTR-MCNC: 112 MG/DL (ref 70–99)
GLUCOSE BLDC GLUCOMTR-MCNC: 117 MG/DL (ref 70–99)
GLUCOSE BLDC GLUCOMTR-MCNC: 117 MG/DL (ref 70–99)
GLUCOSE BLDC GLUCOMTR-MCNC: 128 MG/DL (ref 70–99)
GLUCOSE BLDC GLUCOMTR-MCNC: 143 MG/DL (ref 70–99)
GLUCOSE SERPL-MCNC: 119 MG/DL (ref 70–99)
HCT VFR BLD AUTO: 31.8 % (ref 40–53)
HGB BLD-MCNC: 9.9 G/DL (ref 13.3–17.7)
MCH RBC QN AUTO: 34.7 PG (ref 26.5–33)
MCHC RBC AUTO-ENTMCNC: 31.1 G/DL (ref 31.5–36.5)
MCV RBC AUTO: 112 FL (ref 78–100)
PLATELET # BLD AUTO: 249 10E3/UL (ref 150–450)
POTASSIUM SERPL-SCNC: 3.5 MMOL/L (ref 3.4–5.3)
RBC # BLD AUTO: 2.85 10E6/UL (ref 4.4–5.9)
SODIUM SERPL-SCNC: 142 MMOL/L (ref 136–145)
UFH PPP CHRO-ACNC: 0.39 IU/ML
WBC # BLD AUTO: 6.2 10E3/UL (ref 4–11)

## 2022-12-10 PROCEDURE — 94640 AIRWAY INHALATION TREATMENT: CPT | Mod: 76

## 2022-12-10 PROCEDURE — 85520 HEPARIN ASSAY: CPT | Performed by: INTERNAL MEDICINE

## 2022-12-10 PROCEDURE — 77075 RADEX OSSEOUS SURVEY COMPL: CPT

## 2022-12-10 PROCEDURE — 250N000013 HC RX MED GY IP 250 OP 250 PS 637: Performed by: NURSE PRACTITIONER

## 2022-12-10 PROCEDURE — 250N000013 HC RX MED GY IP 250 OP 250 PS 637: Performed by: INTERNAL MEDICINE

## 2022-12-10 PROCEDURE — 999N000157 HC STATISTIC RCP TIME EA 10 MIN

## 2022-12-10 PROCEDURE — 200N000001 HC R&B ICU

## 2022-12-10 PROCEDURE — 94640 AIRWAY INHALATION TREATMENT: CPT

## 2022-12-10 PROCEDURE — 93010 ELECTROCARDIOGRAM REPORT: CPT | Mod: 59 | Performed by: INTERNAL MEDICINE

## 2022-12-10 PROCEDURE — 82310 ASSAY OF CALCIUM: CPT | Performed by: INTERNAL MEDICINE

## 2022-12-10 PROCEDURE — 250N000011 HC RX IP 250 OP 636: Performed by: PHYSICIAN ASSISTANT

## 2022-12-10 PROCEDURE — 258N000003 HC RX IP 258 OP 636: Performed by: INTERNAL MEDICINE

## 2022-12-10 PROCEDURE — 250N000011 HC RX IP 250 OP 636: Performed by: INTERNAL MEDICINE

## 2022-12-10 PROCEDURE — 250N000012 HC RX MED GY IP 250 OP 636 PS 637: Performed by: INTERNAL MEDICINE

## 2022-12-10 PROCEDURE — 99233 SBSQ HOSP IP/OBS HIGH 50: CPT | Performed by: INTERNAL MEDICINE

## 2022-12-10 PROCEDURE — 250N000013 HC RX MED GY IP 250 OP 250 PS 637: Performed by: CLINICAL NURSE SPECIALIST

## 2022-12-10 PROCEDURE — 85027 COMPLETE CBC AUTOMATED: CPT | Performed by: INTERNAL MEDICINE

## 2022-12-10 PROCEDURE — 99231 SBSQ HOSP IP/OBS SF/LOW 25: CPT | Performed by: SURGERY

## 2022-12-10 PROCEDURE — C9113 INJ PANTOPRAZOLE SODIUM, VIA: HCPCS | Performed by: PHYSICIAN ASSISTANT

## 2022-12-10 PROCEDURE — 250N000009 HC RX 250: Performed by: INTERNAL MEDICINE

## 2022-12-10 RX ORDER — METOPROLOL TARTRATE 25 MG/1
25 TABLET, FILM COATED ORAL 2 TIMES DAILY
Status: DISCONTINUED | OUTPATIENT
Start: 2022-12-10 | End: 2022-12-11

## 2022-12-10 RX ORDER — LISINOPRIL 10 MG/1
10 TABLET ORAL 2 TIMES DAILY
Status: DISCONTINUED | OUTPATIENT
Start: 2022-12-10 | End: 2022-12-11

## 2022-12-10 RX ORDER — BENZTROPINE MESYLATE 1 MG/1
1 TABLET ORAL 3 TIMES DAILY PRN
Status: DISCONTINUED | OUTPATIENT
Start: 2022-12-10 | End: 2022-12-20 | Stop reason: HOSPADM

## 2022-12-10 RX ORDER — POTASSIUM CHLORIDE 1500 MG/1
20 TABLET, EXTENDED RELEASE ORAL ONCE
Status: COMPLETED | OUTPATIENT
Start: 2022-12-10 | End: 2022-12-10

## 2022-12-10 RX ORDER — QUETIAPINE FUMARATE 50 MG/1
50 TABLET, FILM COATED ORAL 2 TIMES DAILY
Status: DISCONTINUED | OUTPATIENT
Start: 2022-12-10 | End: 2022-12-17

## 2022-12-10 RX ORDER — HYDROMORPHONE HYDROCHLORIDE 2 MG/1
2 TABLET ORAL EVERY 6 HOURS
Status: DISCONTINUED | OUTPATIENT
Start: 2022-12-10 | End: 2022-12-18

## 2022-12-10 RX ORDER — POTASSIUM CHLORIDE 1500 MG/1
40 TABLET, EXTENDED RELEASE ORAL ONCE
Status: DISCONTINUED | OUTPATIENT
Start: 2022-12-10 | End: 2022-12-10

## 2022-12-10 RX ORDER — ROPINIROLE 0.25 MG/1
0.25 TABLET, FILM COATED ORAL 3 TIMES DAILY
Status: DISCONTINUED | OUTPATIENT
Start: 2022-12-10 | End: 2022-12-17

## 2022-12-10 RX ADMIN — MICONAZOLE NITRATE: 20 POWDER TOPICAL at 21:13

## 2022-12-10 RX ADMIN — PANTOPRAZOLE SODIUM 40 MG: 40 INJECTION, POWDER, FOR SOLUTION INTRAVENOUS at 08:21

## 2022-12-10 RX ADMIN — HYDROMORPHONE HYDROCHLORIDE 2 MG: 2 TABLET ORAL at 11:04

## 2022-12-10 RX ADMIN — PREDNISOLONE ACETATE 1 DROP: 10 SUSPENSION/ DROPS OPHTHALMIC at 21:15

## 2022-12-10 RX ADMIN — OLANZAPINE 2.5 MG: 5 TABLET, ORALLY DISINTEGRATING ORAL at 10:48

## 2022-12-10 RX ADMIN — ACETAMINOPHEN 1000 MG: 500 TABLET, FILM COATED ORAL at 09:15

## 2022-12-10 RX ADMIN — POTASSIUM CHLORIDE 20 MEQ: 1500 TABLET, EXTENDED RELEASE ORAL at 10:48

## 2022-12-10 RX ADMIN — TAZOBACTAM SODIUM AND PIPERACILLIN SODIUM 3.38 G: 375; 3 INJECTION, SOLUTION INTRAVENOUS at 04:15

## 2022-12-10 RX ADMIN — TAZOBACTAM SODIUM AND PIPERACILLIN SODIUM 3.38 G: 375; 3 INJECTION, SOLUTION INTRAVENOUS at 10:46

## 2022-12-10 RX ADMIN — PREDNISOLONE ACETATE 1 DROP: 10 SUSPENSION/ DROPS OPHTHALMIC at 17:19

## 2022-12-10 RX ADMIN — TAZOBACTAM SODIUM AND PIPERACILLIN SODIUM 3.38 G: 375; 3 INJECTION, SOLUTION INTRAVENOUS at 22:10

## 2022-12-10 RX ADMIN — ATROPINE SULFATE 1 DROP: 10 SOLUTION/ DROPS OPHTHALMIC at 21:15

## 2022-12-10 RX ADMIN — Medication 50 MCG: at 08:22

## 2022-12-10 RX ADMIN — PREGABALIN 25 MG: 25 CAPSULE ORAL at 08:22

## 2022-12-10 RX ADMIN — PANTOPRAZOLE SODIUM 40 MG: 40 INJECTION, POWDER, FOR SOLUTION INTRAVENOUS at 21:19

## 2022-12-10 RX ADMIN — ROPINIROLE HYDROCHLORIDE 0.25 MG: 0.25 TABLET, FILM COATED ORAL at 11:04

## 2022-12-10 RX ADMIN — HEPARIN SODIUM 1350 UNITS/HR: 10000 INJECTION, SOLUTION INTRAVENOUS at 20:09

## 2022-12-10 RX ADMIN — LIDOCAINE 2 PATCH: 246 PATCH TOPICAL at 21:22

## 2022-12-10 RX ADMIN — PREDNISOLONE ACETATE 1 DROP: 10 SUSPENSION/ DROPS OPHTHALMIC at 08:25

## 2022-12-10 RX ADMIN — TAZOBACTAM SODIUM AND PIPERACILLIN SODIUM 3.38 G: 375; 3 INJECTION, SOLUTION INTRAVENOUS at 17:11

## 2022-12-10 RX ADMIN — Medication 500 MG: at 08:22

## 2022-12-10 RX ADMIN — ACETAMINOPHEN 1000 MG: 500 TABLET, FILM COATED ORAL at 01:56

## 2022-12-10 RX ADMIN — MULTIPLE VITAMINS W/ MINERALS TAB 1 TABLET: TAB at 08:22

## 2022-12-10 RX ADMIN — SERTRALINE HYDROCHLORIDE 100 MG: 100 TABLET ORAL at 08:22

## 2022-12-10 RX ADMIN — Medication: at 08:23

## 2022-12-10 RX ADMIN — HYDROMORPHONE HYDROCHLORIDE 4 MG: 2 TABLET ORAL at 08:21

## 2022-12-10 RX ADMIN — METOPROLOL TARTRATE 25 MG: 25 TABLET, FILM COATED ORAL at 09:15

## 2022-12-10 RX ADMIN — ATROPINE SULFATE 1 DROP: 10 SOLUTION/ DROPS OPHTHALMIC at 08:22

## 2022-12-10 RX ADMIN — CYANOCOBALAMIN TAB 1000 MCG 1000 MCG: 1000 TAB at 08:22

## 2022-12-10 RX ADMIN — IPRATROPIUM BROMIDE AND ALBUTEROL SULFATE 3 ML: 2.5; .5 SOLUTION RESPIRATORY (INHALATION) at 09:32

## 2022-12-10 RX ADMIN — QUETIAPINE FUMARATE 50 MG: 50 TABLET ORAL at 09:15

## 2022-12-10 RX ADMIN — MICONAZOLE NITRATE: 20 POWDER TOPICAL at 08:22

## 2022-12-10 RX ADMIN — IPRATROPIUM BROMIDE AND ALBUTEROL SULFATE 3 ML: .5; 3 SOLUTION RESPIRATORY (INHALATION) at 05:20

## 2022-12-10 RX ADMIN — IPRATROPIUM BROMIDE AND ALBUTEROL SULFATE 3 ML: 2.5; .5 SOLUTION RESPIRATORY (INHALATION) at 12:07

## 2022-12-10 RX ADMIN — ASPIRIN 81 MG: 81 TABLET, COATED ORAL at 08:22

## 2022-12-10 RX ADMIN — PREDNISONE 40 MG: 20 TABLET ORAL at 08:21

## 2022-12-10 RX ADMIN — SODIUM CHLORIDE 1000 ML: 9 INJECTION, SOLUTION INTRAVENOUS at 10:58

## 2022-12-10 RX ADMIN — CALCIUM 1000 MG: 500 TABLET ORAL at 08:22

## 2022-12-10 RX ADMIN — IPRATROPIUM BROMIDE AND ALBUTEROL SULFATE 3 ML: 2.5; .5 SOLUTION RESPIRATORY (INHALATION) at 20:41

## 2022-12-10 RX ADMIN — DICLOFENAC SODIUM 4 G: 10 GEL TOPICAL at 08:23

## 2022-12-10 ASSESSMENT — ACTIVITIES OF DAILY LIVING (ADL)
ADLS_ACUITY_SCORE: 53

## 2022-12-10 NOTE — PHARMACY
Delirium Consult    Medications evaluated as requested per Delirium Consult.    Hydromorphone could worsen confusion, will monitor  Pharmacist will continue to follow as new medications are ordered.

## 2022-12-10 NOTE — PROGRESS NOTES
[facility]  Hospitalist Progress Note  Tino Pablo M.D., M.B.A.   12/10/2022    Reason for Stay/active problem list      Acute encephalopathy    Hypotension-likely from hypovolemia    Non- ST segment elevation MI    Dehydration     Acute on chronic back pain    Suspected acute colitis    Large left inguinal hernia, concern for mechanical bowel obstruction    Abnormally marked circumferential wall thickening of the entire esophagus suspicious for esophagitis         Assessment and Plan:        Summary of Stay:     Pacheco Torres is a 77 year old male with a history of htn/hlp, remote hx of CAD s/p DAYAN to the LAD-most recent echo performed in the ER with hyperdynamic EF 75 % and G1dd, hx of prostate cancer from earlier this year treated with XRT through [place], macrocytic anemia with hgb in the 11-12 range (normal B12 and folate), monoclonal gammopathy (no recent documentation of evaluation), PMR, obesity, hx of tob abuse  admitted on 12/6/2022 with hypovolemic shock of unclear etiology     He believes he passed out either the day prior or the day of admission.  Unable to fill in any details. States he hasn't been sleeping well but denies and n/v/d.  States po intake has been well. No f/c/s.  No epistaxis/bloody gums/stools or emesis.      Discussed with  Carlos Enrique Goldberg who states her hasn't been eating well for at least 6 weeks, she thinks his fluid intake is ok.  She reports that he's had some nausea but no vomiting.  No diarrhea in fact she thinks he may be constipated.      In the ER BPs in the 60's/40's with tachycardia and tachypnea, he was hypothermic at 95.4, he was given 2 L of NS without improvement and so started on NE via Right IJ.       EKG with ST elevation throughout septal/anterior leads-cards was contacted and the story just didn't seem to fit- he was without any CP or anginal equivalents. Initial trop 29. He was placed on heparin and stat echo completed showing hyperdynamic  cardiac function.     troponins continue to rise 29->222-493 but EKG has normalized    CMP with AG 2/2 elevated lactic acid with respiratory compensation.    Lactates 5.4->1.4 after IVF/NE  CBC with stable macrocytic anemia, no leukocytosis  procal 0.15     CXR with pulmonary edema  CT Ao survey-diffuse CAD, Left inguinal canal hernia containing loops of the distal colon     He was covered with pip-tazo/vanco for septic shock of unclear etiology        Problem List with Assessment and Plan:    Shock/syncope  Acute colitis on CT imaging  --  Given initial EKG would certainly fit a CV shock picture although echo with hyperdynamic function.  -- Suspected combination of hypovolemia and sepsis.  --Hypotension resolved with IV fluid resuscitation and transient norepinephrine  --CT of chest abdomen pelvis obtained and December 7 showed evidence of colitis and esophagitis.  --Currently he is hypertensive ,afebrile and tachycardic.  Continue to monitor and antibiotic with Zosyn, bb , Follow cultures     Acute toxic metabolic metabolic encephalopathy   -- Likely combination of toxic metabolic and infectious encephalopathy.  -- Patient could have no underlying cognitive deficit.  Chronic insomnia due to back pain issues.  --Patient was agitated on December 8 and required Precedex drip.  -- Currently he  is alert , oriented times 2 , calm appear  to be confused at times.  We will  Continue scheduled  Seroquel, continue Seroquel as needed. zyprexa as needed , remove hendrix     NSTEMI , sinus tachycardia  -- trops 29->222->493  Diffuse CAD noted on CT Ao survey.    --No CP and echo with hyperdynamic function.   -- Patient was treated with heparin and cardiology was consulted.  -- Patient was seen by cardiology and initially recommended coronary angiogram but patient was confused unable to provide consent and lay still for the procedure.  Coronary angiogram plan was consulted and patient was treated with heparin, started on  aspirin.  --Currently he is tachycardic with occasional complaint of chest pain.  He was given nitroglycerin yesterday.  Denies shortness of breath or abdominal pain. Patient was seen by cardiologist and recommendation obtained to continue heparin infusion  And resume home medication including lisinopril and monitor patient on telemetry.  Beta-blocker added -currently metoprolol 25 mg twice daily.  Cardiology following.IVF recommended       CAD  Htn/hlp   --pta on asa 81 mg/lisinopril 10 mg every day/simvastain 40  Mg  -Home medications resumed       Large Left inguinal hernia with bowel contents-initial concern for small bowel obstruction.  -- CT on December 7 showed concern for bowel obstruction.  Patient had left lower quadrant abdominal tenderness but no rigidity or rebound tenderness.  Surgery was consulted and patient was seen and examined by Dr. Ruiz who recommended to continue to monitor for.  --No evidence of bowel obstruction, currently no complaint of abdominal pain or ischemic bowel concern.  Continue to monitor.  Advance diet as tolerate     Macrocytic anemia-MGUS  --Being worked up by PCP   --Currently hemoglobin is 9.9 .Positive stool.  Esophageal thickening noted on CT scan.  GI consulted and recommendation obtained for no intervention at this time.       Incidental pulmonary nodules  --Given heavy smoking history will need repeat CT in 3-6 months    Dehydration  --Patient has evidence of dehydration with very dry tongue and buccal mucosa  Decreased intake PTA   --Advance diet as tolerated, encourage oral fluids, IVF today     Acute on chronic back pain-history of prostate cancer with recent treatment with radiation therapy, follows with [facility]  --Severe acute on chronic back pain requiring narcotic medications.  History of prostate cancer.  -- Patient was seen by pain management team.  Pain medications recommended.  Continue current pain regimen-scheduled Tylenol 1 g every 8 hours, Voltaren  gel 4 g 4 times daily, lidocaine patch, menthol, Lyrica.  As needed Dilaudid 2 -4 mg every 3 hours as needed.  May schedule Dilaudid orally    Patient may need MRI study of his thoracic and lumbar spine for evaluation of compression fractures and pathology and is more stable    Abnormal CT with concern for esophagitis and colitis   -- GI consulted, recommendation noted    Chronic medical problems  Depression/gerd/prostate ca/pmr/monoclonal gammopathy   -- Continue pta sertraline     COVID 19  Negative  S/p 5 shot moderna series 9/2022 (biv)    Acute hypoxic respiratory failure  -- Patient is a smoker, has increased work of breathing and hypoxic requiring supplemental oxygen.  -- He has evidence of emphysema on CT scan . Mild acute exacerbation of COPD suspected  -- Patient was treated with DuoNeb, supplemental oxygen, steroid.  --Currently he is not wheezing, hypoxia is improving, continue to monitor on current regimen    Community-acquired pneumonia-new left lung infiltrate on chest x-ray on December 8  -- Already on Zosyn day #5.  Afebrile, continue to biotic monitor cultures and vitals    Restless leg syndrome  -We will start Requip    Plan for today:  -- Optimize pain control-May need scheduled Dilaudid  -- Start Requip  -- Titrate up beta-blocker, cardiology to see patient      VTE Prophylaxis: Heparin    Code Status: Full Code     Diet: Advance Diet as Tolerated: Full Liquid Diet  Snacks/Supplements Adult: Ensure Max Protein (bariatric); With Meals    Blanchard Catheter: Not present    Family updated today: Yes  significant other and brother Nico.      Disposition: Continue to monitor in ICU.            Interval History (Subjective):        Patient is seen and examined by me today and medical record reviewed.Overnight events noted and care discussed with nursing staff.  Patient states agitated and confused at times but improved this morning.  He is very talkative and restless and still requiring Precedex drip.   "Care plan was discussed with cardiology team, bedside nurse and recommendation noted.  Family at bedside.- Talked about prognosis              Physical Exam:        Last Vital Signs:  BP (!) 130/91   Pulse (!) 126   Temp 98.9  F (37.2  C) (Axillary)   Resp (!) 51   Ht 1.6 m (5' 3\")   Wt 99.4 kg (219 lb 2.2 oz)   SpO2 95%   BMI 38.82 kg/m      I/O last 3 completed shifts:  In: 1384.89 [P.O.:790; I.V.:594.89]  Out: 940 [Urine:940]    Wt Readings from Last 5 Encounters:   12/10/22 99.4 kg (219 lb 2.2 oz)   04/03/12 97.1 kg (214 lb)        Constitutional:  Awake, alert but restless and occasionally confused.     Respiratory:  Increased work of breathing, diffuse rhonchi.  No wheezing.   Cardiovascular: Regular rate and rhythm, normal S1 and S2, and no murmur noted   Abdomen: Normal bowel sounds, soft, non-distended, left lower quadrant abdominal tenderness.  No rebound tenderness, rigidity or guarding.   Skin: No new rashes, no cyanosis, dry to touch   Neuro: Alert with  no new focal weakness   Extremities: No edema   Other(s):        All other systems: egative          Medications:        All current medications were reviewed with changes reflected in problem list.         Data:      All new lab and imaging data was reviewed.      Data reviewed today: I reviewed all new labs and imaging results over the last 24 hours. I personally reviewed       Recent Labs   Lab 12/10/22  0555 12/09/22  0541 12/09/22  0449   WBC 6.2 6.2 6.0   HGB 9.9* 10.2* 10.1*   HCT 31.8* 31.2* 31.3*   * 108* 108*    265 225     No results for input(s): CULT in the last 168 hours.  Recent Labs   Lab 12/10/22  0808 12/10/22  0555 12/10/22  0352 12/08/22  0539 12/08/22  0530 12/07/22  0810 12/07/22  0428 12/06/22  2151 12/06/22  1421   NA  --  142  --   --  139  --  133*  --  132*   POTASSIUM  --  3.5  --   --  3.6  --  4.4  --  3.4   CHLORIDE  --  102  --   --  104  --  99  --  91*   CO2  --  32*  --   --  29  --  24  --  25 "   ANIONGAP  --  8  --   --  6*  --  10  --  16*   * 119* 117*   < > 115*   < > 138*   < > 193*   BUN  --  23.8*  --   --  22.1  --  26.8*  --  17.2   CR  --  0.59*  --   --  0.72  --  0.86  --  0.95   GFRESTIMATED  --  >90  --   --  >90  --  90  --  83   TAVO  --  9.8  --   --  9.8  --  9.5  --  10.5*   MAG  --   --   --   --   --   --   --   --  2.3   PROTTOTAL  --   --   --   --   --   --   --   --  6.7   ALBUMIN  --   --   --   --   --   --   --   --  3.7   BILITOTAL  --   --   --   --   --   --   --   --  0.3   ALKPHOS  --   --   --   --   --   --   --   --  147*   AST  --   --   --   --   --   --   --   --  78*   ALT  --   --   --   --   --   --   --   --  17    < > = values in this interval not displayed.       Recent Labs   Lab 12/10/22  0808 12/10/22  0555 12/10/22  0352 12/10/22  0030 12/09/22  1935   * 119* 117* 128* 122*       Recent Labs   Lab 12/06/22  1421   INR 1.03         No results for input(s): TROPONIN, TROPI, TROPR in the last 168 hours.    Invalid input(s): TROP, TROPONINIES    Recent Results (from the past 48 hour(s))   XR Chest Port 1 View    Narrative    CHEST PORTABLE ONE VIEW December 6, 2022 2:39 PM     HISTORY: STEMI. Altered mental status. Chest pain.    COMPARISON: 11/2/2022.      Impression    IMPRESSION: Hypoinflated lungs and cardiomegaly. Mild bilateral  vascular congestion appears increased. Correlate with any evidence of  developing pulmonary edema.    HAL BOGGS MD         SYSTEM ID:  [id number]   CT Head w/o Contrast    Narrative    CT SCAN OF THE HEAD WITHOUT CONTRAST December 6, 2022 3:07 PM     HISTORY: Altered mental status.    TECHNIQUE: Axial images of the head and coronal reformations without  IV contrast material. Radiation dose for this scan was reduced using  automated exposure control, adjustment of the mA and/or kV according  to patient size, or iterative reconstruction technique.    COMPARISON: None.      Impression    IMPRESSION: Mild motion artifact.  No evidence of hemorrhage. Volume  loss and patchy areas of white matter hypoattenuation which likely  represent chronic small vessel ischemic change. Small area of focal  hypoattenuation within the central medulla, presumably artifactual  (brainstem infarct not excluded, MRI can be performed for basal  ganglia and thalamic lacunar infarcts, presumably chronic. No acute  osseous abnormality. Nonspecific right facial soft tissue swelling,  potentially contusion.    EVELYN VELAZQUEZ MD         SYSTEM ID:  [id number]   CT Aortic Survey w Contrast    Narrative    CT AORTIC SURVEY WITH CONTRAST  12/6/2022 3:07 PM    HISTORY:  Back pain, shock, wide mediastinum on x-ray.    TECHNIQUE: CT scan obtained of the chest, abdomen, and pelvis with IV  contrast. Post contrast scanning performed during the arterial phase.  CT chest also obtained without IV contrast. 80 mL Isovue-370 IV  injected. Sagittal and coronal reformatted images acquired from the  source post contrast data. Radiation dose for this scan was reduced  using automated exposure control, adjustment of the mA and/or kV  according to patient size, or iterative reconstruction technique.    COMPARISON:  None.    FINDINGS:  Thoracic and abdominal aorta: No dissection involving the thoracic or  abdominal aorta. Patency of the main branch vessels from the thoracic  and abdominal aorta. Scattered areas of calcified atherosclerotic  disease noted. Atherosclerotic stenosis at the origins of the celiac  and superior mesenteric arteries but there is distal perfusion. No  periaortic hematoma or aneurysm.    Other vascular: Severe coronary artery calcifications.    Chest: No adenopathy or acute mediastinal abnormality. No acute  mediastinal fluid collection. Patchy atelectasis at the posterior  right lower lobe. No effusions. No pneumothorax. Stable nodule  posterolateral left upper lobe measuring 0.3 cm, series 6 image 60.    Abdomen/pelvis: Liver, gallbladder, adrenals, spleen,  pancreas, and  kidneys do not show any acute abnormalities. Left inguinal hernia  measures 9.1 x 5.8 cm, series 5 image 265. Herniated loops of the  distal descending colon and sigmoid within the hernia. No upstream  obstruction. No bowel inflammatory change. No enlarged lymph nodes. No  acute pelvic abnormality. Diffuse degenerative changes throughout the  spine. A degree of height loss involving multiple thoracic and lumbar  spine levels.      Impression    IMPRESSION:   1. No acute thoracic or abdominal aortic abnormality. No dissection.  Scattered areas of atherosclerotic disease identified. Atherosclerotic  stenosis at the origins of the celiac artery and superior mesenteric  artery.  2. Diffuse coronary artery calcifications.  3. No acute mediastinal abnormality is seen.  4. Stable pulmonary nodule on the left, see below for follow-up  imaging guidelines.   5. Left inguinal canal hernia containing herniated loops of the distal  descending colon and sigmoid. No upstream bowel obstruction. See above  for measurements.    Recommendations for one or multiple incidental lung nodules < 6mm:    Low risk patients: No routine follow-up.    High risk patients: Optional follow-up CT at 12 months; if  unchanged, no further follow-up.    *Low Risk: Minimal or absent history of smoking or other known risk  factors.  *Nonsolid (ground glass) or partly solid nodules may require longer  follow-up to exclude indolent adenocarcinoma.  *Recommendations based on Guidelines for the Management of Incidental  Pulmonary Nodules Detected at CT: From the Fleischner Society 2017,  Radiology 2017.    HAL BOGGS MD         SYSTEM ID:  [id number]   Echocardiogram Limited   Result Value    LVEF  75%    Narrative    [id number]  [id number]  [id number]  [id number]^ANGELIQUE^MINOO^[facility]  Echocardiography Laboratory  [address] [place] [address]     Name: BERTIN LYNCH  MRN: [id number]  : 1945  Study Date:  12/06/2022 02:59 PM  Age: 77 yrs  Gender: Male  Patient Location: [facility]  Reason For Study: Chest Pain  Ordering Physician: MINOO MERINO  Performed By: Mariah Carranza RDCS     BSA: 2.1 m2  Height: 66 in  Weight: 220 lb  HR: 110  BP: 82/65 mmHg  ______________________________________________________________________________  Procedure  Limited Portable Echo Adult. Optison (NDC #6748-3476) given intravenously.  (Emergent exam, abbreviated study performed).  ______________________________________________________________________________  Interpretation Summary     The visual ejection fraction is estimated at 75%.  Hyperdynamic left ventricular function  ER informed by phone that echo has been read. The study was technically  limited. Technically difficult, suboptimal study.  ______________________________________________________________________________  Left Ventricle  Grade I or early diastolic dysfunction. The visual ejection fraction is  estimated at 75%. Hyperdynamic left ventricular function.     Right Ventricle  The right ventricle is normal in size and function.     Atria  Normal left atrial size. Right atrial size is normal.     Mitral Valve  There is trace mitral regurgitation.     Tricuspid Valve  There is trace tricuspid regurgitation. Right ventricular systolic pressure  could not be approximated due to inadequate tricuspid regurgitation.     Aortic Valve  The aortic valve is trileaflet. There is mild trileaflet aortic sclerosis. No  aortic regurgitation is present. No hemodynamically significant valvular  aortic stenosis.     Pulmonic Valve  Normal pulmonic valve velocity.     Vessels  IVC diameter >2.1 cm collapsing <50% with sniff suggests a high RA pressure  estimated at 15 mmHg or greater.     Pericardium  There is no pericardial effusion.     Rhythm  The rhythm was sinus tachycardia.  ______________________________________________________________________________  MMode/2D Measurements & Calculations      LA Volume (BP): 68.8 ml     Doppler Measurements & Calculations  MV E max dwayne: 93.3 cm/sec  MV A max dwayne: 121.6 cm/sec  MV E/A: 0.77  MV dec time: 0.12 sec     ______________________________________________________________________________  Report approved by: Meche English 12/06/2022 03:34 PM         XR Chest Port 1 View    Narrative    XR CHEST PORT 1 VIEW 12/6/2022 3:52 PM    HISTORY: CENTRAL LINE    COMPARISON: CT today      Impression    IMPRESSION: Right IJ central venous catheter tip in the low SVC. No  pneumothorax. Mild interstitial opacities in the lungs, could  represent pulmonary edema. No pleural effusion or pneumothorax.    EMELY MARTINEZ MD         SYSTEM ID:  [id number]   CT Chest/Abdomen/Pelvis w Contrast    Narrative    CT CHEST/ABDOMEN/PELVIS W CONTRAST 12/7/2022 1:00 PM    CLINICAL HISTORY: back pain, abdominal pain    TECHNIQUE: CT scan of the chest, abdomen, and pelvis was performed  following injection of IV contrast. Multiplanar reformats were  obtained. Dose reduction techniques were used.   CONTRAST: 90 mL of Isovue 370    COMPARISON: Chest CT on 11/3/2022    FINDINGS:   LUNGS AND PLEURA: Mild emphysema. Mild bibasilar dependent  atelectasis/infiltrate and trace bilateral pleural effusions.    MEDIASTINUM/AXILLAE: No lymphadenopathy. Right IJ central venous  catheter tip is in the low SVC. No filling defects in the central  pulmonary arteries. No aortic aneurysm or dissection. Severe coronary  artery calcifications. No pericardial effusion.     Diffuse circumferential esophageal wall thickening with surrounding  mediastinal stranding and fluid (series 2, image 34-77 and coronal  series 5, image 74-68).    HEPATOBILIARY: Normal.    PANCREAS: Normal.    SPLEEN: Normal.    ADRENAL GLANDS: Normal.    KIDNEYS/BLADDER: No calculi, hydronephrosis or perinephric stranding.  Urinary bladder is decompressed with a Blanchard catheter.    BOWEL: There are a few mildly dilated loops of small bowel  in the mid  and left abdomen (series 2, image 138) with bowel loops dilated up to  3.5 cm. A transition point is not identified. Distal loops of ileum  are decompressed and the terminal ileum is decompressed with  transition likely in the mid/right lower quadrant.     No colonic obstruction. Marked circumferential wall thickening of the  splenic flexure, descending colon and sigmoid colon. Left lower  quadrant large inguinal hernia containing a loop of the sigmoid colon  with marked wall thickening of the loop within the hernia (series 5,  image 56) and pinching/narrowing of the bowel loop at the hernia  mouth. No pneumatosis or extraluminal air.    Normal appendix.    PELVIC ORGANS: Atrophic prostate gland.    ADDITIONAL FINDINGS: No lymphadenopathy in the abdomen and pelvis. No  abdominal aortic aneurysm. Trace free fluid in the left lower  quadrant. No abscess or free air.    MUSCULOSKELETAL: Stable mild wedge compression deformity T7, T9, T10,  T11, T12 and L1 vertebral. No acute-appearing fractures. No  destructive lesions of the bones.      Impression    IMPRESSION:  1.  Marked circumferential wall thickening of the splenic flexure,  descending colon and sigmoid colon may be due to acute colitis, either  infectious, inflammatory or ischemic.   2.  Large left inguinal hernia containing a loop of the sigmoid colon  with narrowing/pinched appearance of the sigmoid colon at the hernia  mouth and wall thickening of the sigmoid in the inguinal hernia.  Superimposed strangulation of this loop of colon is not excluded.  Consider surgical consultation.  3.  Mechanical small bowel obstruction appears to be a separate  process. Gradual transition to normal caliber small bowel loops in the  right abdomen.  4.  Marked circumferential wall thickening of the entire esophagus,  suspicious for esophagitis.  5.  Bibasilar atelectasis/infiltrate and small bilateral pleural  effusions.    EMELY MARTINEZ MD         SYSTEM ID:   [id number]       COVID Status:  COVID-19 PCR Results    COVID-19 PCR Results 11/2/22 12/6/22   SARS CoV2 PCR Negative Negative      Comments are available for some flowsheets but are not being displayed.         COVID-19 Antibody Results, Testing for Immunity    COVID-19 Antibody Results, Testing for Immunity   No data to display.              Disclaimer: This note consists of symbols derived from keyboarding, dictation and/or voice recognition software. As a result, there may be errors in the script that have gone undetected. Please consider this when interpreting information found in this chart.

## 2022-12-10 NOTE — PROGRESS NOTES
Inpatient Cardiology Consultation Progress Note:    Pacheco Torres MRN#: [id number]   YOB: 1945 Age: 77 year old     Date of Admission: 12/6/2022  Consult indication: elevated troponin, atrial fibrillation, shock         Assessment and Plan:     # Elevated troponin, likely type II MI, however he does note several weeks to months of occasional exertional chest discomfort.  He has known coronary artery disease, history of CAD s/p PCI mid LAD and diagonal 2008, coronary CTA 2019 patent stent and moderate RCA disease.  # Shock, possibly hypovolemic vs septic, resolved and now hypertensive  # Paroxysmal atrial fibrillation, new diagnosis on admission, converted spontaneously to NSR   # AMS    TTE 12/6/2022 LVEF >70%, no significant valvular abnormalities    - Agree with anticoagulation for primary prevention of stroke, transition to DOAC when clinically appropriate  - Continue aspirin  - Agree with metoprolol titrate  - Agree with lisinopril, will increase to 10mg BID  - Agree with statin therapy  - Consider additional IV fluids  - Cardiology will follow  - As outlined by my colleague Dr. Morejon yesterday, the clinical significance of this elevated troponin is not entirely clear.  Possible it may represent NSTEMI, fortunately patient remains chest pain-free, troponins have been downtrending.  Given current clinical status, agree with recommendation for medical management.  Discussed with patient and his significant other who is at his bedside, and they are in agreement.  Risks of cardiac catheterization/coronary angiography at this time likely outweigh benefits.  Recommend revisiting ischemic evaluation in the outpatient setting,  follows with Dr. Dr Flynn at [facility]    Thank you for allowing our team to participate in the care of Pacheco Torres.  Please do not hesitate to page me with any questions or concerns.     Ilan Ro MD, [facility]  Cardiology  December 10,  2022    Voice recognition software utilized.          Interval Events:     - no acute events overnight  - remains in sinus tachycardia  - no chest pain overnight         Medications reviewed:     Current medications:  Current Facility-Administered Medications Ordered in Epic   Medication Dose Route Frequency Last Rate Last Admin     acetaminophen (TYLENOL) tablet 650 mg  650 mg Oral Q6H PRN   650 mg at 12/08/22 1033    Or     acetaminophen (TYLENOL) Suppository 650 mg  650 mg Rectal Q6H PRN         acetaminophen (TYLENOL) tablet 1,000 mg  1,000 mg Oral Q8H   1,000 mg at 12/10/22 0156     alum & mag hydroxide-simethicone (MAALOX) suspension 30 mL  30 mL Oral Q4H PRN         aspirin EC tablet 81 mg  81 mg Oral Daily   81 mg at 12/09/22 1022     atorvastatin (LIPITOR) tablet 40 mg  40 mg Oral QPM   40 mg at 12/09/22 1954     atropine 1 % ophthalmic solution 1 drop  1 drop Right Eye BID   1 drop at 12/09/22 1954     benztropine (COGENTIN) tablet 1 mg  1 mg Oral TID PRN         calcium carbonate 500 mg (elemental) (OSCAL) tablet 1,000 mg  2 tablet Oral Daily   1,000 mg at 12/09/22 1022     cyanocobalamin (VITAMIN B-12) tablet 1,000 mcg  1,000 mcg Oral Daily   1,000 mcg at 12/09/22 1023     cyclobenzaprine (FLEXERIL) tablet 5 mg  5 mg Oral At Bedtime PRN         diclofenac (VOLTAREN) 1 % topical gel 4 g  4 g Topical 4x Daily   4 g at 12/09/22 2207     haloperidol lactate (HALDOL) injection 2-4 mg  2-4 mg Intravenous Q6H PRN   4 mg at 12/09/22 2205     heparin (porcine) 1000 UNIT/ML injection             heparin 25,000 units in 0.45% NaCl 250 mL ANTICOAGULANT infusion  0-5,000 Units/hr Intravenous Continuous 13.5 mL/hr at 12/10/22 0600 1,350 Units/hr at 12/10/22 0600     hydrALAZINE (APRESOLINE) injection 10 mg  10 mg Intravenous Q4H PRN   10 mg at 12/09/22 1541     HYDROmorphone (DILAUDID) tablet 2 mg  2 mg Oral Q3H PRN        Or     HYDROmorphone (DILAUDID) tablet 4 mg  4 mg Oral Q3H PRN   4 mg at 12/09/22 5930      HYDROmorphone (PF) (DILAUDID) injection 0.3 mg  0.3 mg Intravenous Q3H PRN   0.3 mg at 12/09/22 0757     ipratropium - albuterol 0.5 mg/2.5 mg/3 mL (DUONEB) neb solution 3 mL  3 mL Nebulization 4x daily   3 mL at 12/09/22 1924     ipratropium - albuterol 0.5 mg/2.5 mg/3 mL (DUONEB) neb solution 3 mL  3 mL Nebulization Q2H PRN   3 mL at 12/10/22 0520     Lidocaine (LIDOCARE) 4 % Patch 2 patch  2 patch Transdermal Q24h   2 patch at 12/09/22 1954    And     lidocaine patch in PLACE   Transdermal Q8H YOMI         lidocaine (LMX4) cream   Topical Q1H PRN         lidocaine (PF) (XYLOCAINE) 1 % injection             lidocaine 1 % 0.1-1 mL  0.1-1 mL Other Q1H PRN         lisinopril (ZESTRIL) tablet 10 mg  10 mg Oral QPM         magnesium hydroxide (MILK OF MAGNESIA) suspension 30 mL  30 mL Oral Daily PRN         menthol (Topical Analgesic) 2.5% (BENGAY VANISHING SCENT) 2.5 % topical gel   Topical 4x Daily   Given at 12/09/22 2207     methocarbamol (ROBAXIN) half-tab 250-500 mg  250-500 mg Oral 4x Daily   500 mg at 12/09/22 2210     metoprolol tartrate (LOPRESSOR) tablet 25 mg  25 mg Oral BID         miconazole (MICATIN) 2 % powder   Topical BID   Given at 12/09/22 1956     multivitamin w/minerals (THERA-VIT-M) tablet 1 tablet  1 tablet Oral Daily   1 tablet at 12/09/22 1532     naloxone (NARCAN) injection 0.2 mg  0.2 mg Intravenous Q2 Min PRN        Or     naloxone (NARCAN) injection 0.4 mg  0.4 mg Intravenous Q2 Min PRN        Or     naloxone (NARCAN) injection 0.2 mg  0.2 mg Intramuscular Q2 Min PRN        Or     naloxone (NARCAN) injection 0.4 mg  0.4 mg Intramuscular Q2 Min PRN         nitroGLYcerin (NITROSTAT) sublingual tablet 0.4 mg  0.4 mg Sublingual Q5 Min PRN   0.4 mg at 12/09/22 1621     nitroGLYcerin in D5W 100 mcg/mL injection             OLANZapine zydis (zyPREXA) ODT half-tab 2.5 mg  2.5 mg Oral Q6H PRN         ondansetron (ZOFRAN ODT) ODT tab 4 mg  4 mg Oral Q6H PRN        Or     ondansetron (ZOFRAN)  injection 4 mg  4 mg Intravenous Q6H PRN         pantoprazole (PROTONIX) 40 mg vial  40 mg Intravenous BID   40 mg at 22     Patient is already receiving anticoagulation with heparin, enoxaparin (LOVENOX), warfarin (COUMADIN)  or other anticoagulant medication   Does not apply Continuous PRN         Patient is NOT allergic to contrast dye   Does not apply DOES NOT GO TO MAR         piperacillin-tazobactam (ZOSYN) infusion 3.375 g  3.375 g Intravenous Q6H 100 mL/hr at 12/10/22 0415 3.375 g at 12/10/22 0415     potassium chloride ER (KLOR-CON M) CR tablet 20 mEq  20 mEq Oral Once PRN         prednisoLONE acetate (PRED FORTE) 1 % ophthalmic susp 1 drop  1 drop Both Eyes TID   1 drop at 22     predniSONE (DELTASONE) tablet 40 mg  40 mg Oral Daily   40 mg at 22 102     pregabalin (LYRICA) capsule 25 mg  25 mg Oral TID   25 mg at 22     QUEtiapine (SEROquel) tablet 50 mg  50 mg Oral BID         sertraline (ZOLOFT) tablet 100 mg  100 mg Oral Daily   100 mg at 22 1023     sodium chloride (PF) 0.9% PF flush 3 mL  3 mL Intracatheter Q8H   3 mL at 12/10/22 0413     sodium chloride (PF) 0.9% PF flush 3 mL  3 mL Intracatheter Q1H PRN         sodium chloride (PF) 0.9% PF flush 3 mL  3 mL Intracatheter q1 min prn         sodium chloride (PF) 0.9% PF flush 3 mL  3 mL Intracatheter Q8H   3 mL at 12/10/22 0556     sodium chloride (PF) 0.9% PF flush 3 mL  3 mL Intracatheter q1 min prn         tamsulosin (FLOMAX) capsule 0.4 mg  0.4 mg Oral QPM   0.4 mg at 22 1953     Vitamin D3 (CHOLECALCIFEROL) tablet 50 mcg  50 mcg Oral Daily   50 mcg at 22 1022     No current Epic-ordered outpatient medications on file.             Physical Exam:   Vital signs were reviewed:  Temperatures:  Current - Temp: 98  F (36.7  C); Max - Temp  Av.9  F (37.2  C)  Min: 98  F (36.7  C)  Max: 99.3  F (37.4  C)  Respiration range: Resp  Av.8  Min: 16  Max: 46  Pulse range: Pulse  Av.6   Min: 97  Max: 133  Blood pressure range: Systolic (24hrs), Av , Min:107 , Max:185   ; Diastolic (24hrs), Av, Min:58, Max:152    Pulse oximetry range: SpO2  Av.1 %  Min: 78 %  Max: 99 %    Intake/Output Summary (Last 24 hours) at 12/10/2022 0819  Last data filed at 12/10/2022 0600  Gross per 24 hour   Intake 1337.01 ml   Output 880 ml   Net 457.01 ml     219 lbs 2.2 oz  Body mass index is 38.82 kg/m .   Body surface area is 2.1 meters squared.    Constitutional: appears stated age, seated in a chair, wheezing  Head: normocephalic, atraumatic  Neck: supple, trachea midline  Pulmonary: wheezing bilaterally   Cardiovascular: heart sounds difficult to discern but tachycardic, regular rhythm  Gastrointestinal: no guarding, non-rigid   Neurologic: arousable, moves all extremities  Psychiatric: sleepy, answers questions appropriately          Selected laboratory tests:   Laboratory test results personally reviewed:   [facility]  Recent Labs   Lab 12/10/22  0808 12/10/22  0555 12/10/22  0352 12/10/22  0030 22  0539 22  0530 22  0810 22  0428 22  2151 22  1421   NA  --  142  --   --   --  139  --  133*  --  132*   POTASSIUM  --  3.5  --   --   --  3.6  --  4.4  --  3.4   CHLORIDE  --  102  --   --   --  104  --  99  --  91*   CO2  --  32*  --   --   --  29  --  24  --  25   ANIONGAP  --  8  --   --   --  6*  --  10  --  16*   * 119* 117* 128*   < > 115*   < > 138*   < > 193*   BUN  --  23.8*  --   --   --  22.1  --  26.8*  --  17.2   CR  --  0.59*  --   --   --  0.72  --  0.86  --  0.95   GFRESTIMATED  --  >90  --   --   --  >90  --  90  --  83   TAVO  --  9.8  --   --   --  9.8  --  9.5  --  10.5*   MAG  --   --   --   --   --   --   --   --   --  2.3   PROTTOTAL  --   --   --   --   --   --   --   --   --  6.7   ALBUMIN  --   --   --   --   --   --   --   --   --  3.7   BILITOTAL  --   --   --   --   --   --   --   --   --  0.3   ALKPHOS  --   --   --   --   --   --   --   --    --  147*   AST  --   --   --   --   --   --   --   --   --  78*   ALT  --   --   --   --   --   --   --   --   --  17    < > = values in this interval not displayed.     CBC  Recent Labs   Lab 12/10/22  0555 12/09/22  0541 12/09/22  0449 12/08/22  0530   WBC 6.2 6.2 6.0 5.5   RBC 2.85* 2.90* 2.90* 2.98*   HGB 9.9* 10.2* 10.1* 10.5*   HCT 31.8* 31.2* 31.3* 32.3*   * 108* 108* 108*   MCH 34.7* 35.2* 34.8* 35.2*   MCHC 31.1* 32.7 32.3 32.5   RDW 13.9 13.3 13.4 13.8    265 225 232     INR  Recent Labs   Lab 12/06/22  1421   INR 1.03     Lab Results   Component Value Date    TROPI <0.012 09/25/2008    TROPI 0.014 09/24/2008    TROPI 0.018 09/24/2008     No results for input(s): CHOL, HDL, LDL, TRIG, CHOLHDLRATIO in the last 18790 hours.  No results found for: A1C  TSH   Date Value Ref Range Status   11/02/2022 1.36 0.30 - 4.20 uIU/mL Final

## 2022-12-10 NOTE — PROGRESS NOTES
Patient given nebs as ordered. Exp wheezes and diminished in lower lobes. RT to continue to follow while in ICU.    Oxygen: 5 L NC with humidity

## 2022-12-10 NOTE — PROGRESS NOTES
"[facility]   General Surgery Progress Note           Assessment and Plan:   Assessment:   Admission for shock/syncope  Large left inguinal hernia      Plan:   -OK to ADAT from surgical perspective  -continue non-operative management for left inguinal hernia     Liliane Stringer MD        Interval History:   Patient is pleasant, comfortable, sitting up in chair.  Denies abd pain or nausea today. Tolerating full liquid diet, passing flatus.          Physical Exam:   Blood pressure (!) 130/91, pulse (!) 128, temperature 98.9  F (37.2  C), temperature source Axillary, resp. rate 30, height 1.6 m (5' 3\"), weight 99.4 kg (219 lb 2.2 oz), SpO2 96 %.    I/O last 3 completed shifts:  In: 1384.89 [P.O.:790; I.V.:594.89]  Out: 940 [Urine:940]    Abdomen:   Soft, rounded, non-tender and normal bowel sounds. Left inguinal hernia mildly tender. Partially reducible (can hear bowel sounds with attempted reduction)          Data:   Blood culture:  Results for orders placed or performed during the hospital encounter of 12/06/22   Blood Culture Peripheral Blood    Specimen: Peripheral Blood   Result Value Ref Range    Culture No growth after 3 days    Blood Culture Arm, Left    Specimen: Arm, Left; Blood   Result Value Ref Range    Culture No growth after 3 days       Urine culture:  No results found for this or any previous visit.  Recent Labs   Lab 12/10/22  0555 12/09/22  0541 12/09/22  0449   WBC 6.2 6.2 6.0   HGB 9.9* 10.2* 10.1*   HCT 31.8* 31.2* 31.3*   * 108* 108*    265 225       Liliane Stringer MD     Seen and agree,    Nelson Ruiz MD  [facility]    "

## 2022-12-10 NOTE — PROGRESS NOTES
SPIRITUAL [facility]ALTH SERVICES Progress Note  [facility] ICU    Referral Source: Unit staff on call request for family support.  Pt has just transitioned to comfort cares.    Pt Is unresponsive. SO, Alexus is as the bedside and tearful about change in plan of care. Alexus shared she has been in close contact with her  and feels at peace though understandably emotional.  Provided requested prayer and emotional support.     Family is aware of [facility] for support during hospital stay and will request as needed.    Plan: [facility] remains available for additional emotional/spiritual support.    Kavon Deleon MA  Staff   *[phone]   On Site Tu/We/Th    [facility] remains available 24/7 for emergent requests/referrals, either by having the on-call  paged or by entering an ASAP/STAT consult in Epic (this will also page the on-call ). Routine Epic consults receive an initial response within 24 hours.

## 2022-12-10 NOTE — PLAN OF CARE
ICU End of Shift Summary.  For vital signs and complete assessments, please see documentation flowsheets.      Pertinent assessments:   Neuro: Alert to self, and time. Restless/agitated. Pain throughout shift, relieved with PRN dilaudid 4 mg (see MAR). Pt. Moving legs around constantly in bed, even when asleep, presents as possibly restless legs.   Cardiac: ST 115s - 120s. HTN. Max temp 99.  Resp: LS dim/ exp, wheezing. NC 3L. Abd. Muscle use and reported shortness of breath - relief with PRN neb.  GI: Full liquid diet tolerating well. No BM during shift.   : Blanchard in place. Good UOP.  Skin: see flow sheets for details.   Lines: R internal jugular, R PIV, L PIV  Drips: Hep gtt    Major Shift Events:     Pt. Restless and agitated throughout shift, PRN dilaudid given for pain - pt. More calm, but continuing to move legs,arms and pulling @ lines - mitts applied.     PRN halodol given.     Plan (Upcoming Events): Continue ICU cares.   Discharge/Transfer Needs: TBD     Bedside Shift Report Completed : Y  Bedside Safety Check Completed: Y

## 2022-12-11 ENCOUNTER — APPOINTMENT (OUTPATIENT)
Dept: CT IMAGING | Facility: CLINIC | Age: 77
DRG: 871 | End: 2022-12-11
Attending: INTERNAL MEDICINE
Payer: COMMERCIAL

## 2022-12-11 LAB
ANION GAP SERPL CALCULATED.3IONS-SCNC: 7 MMOL/L (ref 7–15)
BACTERIA BLD CULT: NO GROWTH
BACTERIA BLD CULT: NO GROWTH
BUN SERPL-MCNC: 29.7 MG/DL (ref 8–23)
CALCIUM SERPL-MCNC: 9.6 MG/DL (ref 8.8–10.2)
CHLORIDE SERPL-SCNC: 105 MMOL/L (ref 98–107)
CREAT SERPL-MCNC: 0.64 MG/DL (ref 0.67–1.17)
DEPRECATED HCO3 PLAS-SCNC: 32 MMOL/L (ref 22–29)
ERYTHROCYTE [DISTWIDTH] IN BLOOD BY AUTOMATED COUNT: 14.2 % (ref 10–15)
GFR SERPL CREATININE-BSD FRML MDRD: >90 ML/MIN/1.73M2
GLUCOSE BLDC GLUCOMTR-MCNC: 102 MG/DL (ref 70–99)
GLUCOSE BLDC GLUCOMTR-MCNC: 117 MG/DL (ref 70–99)
GLUCOSE BLDC GLUCOMTR-MCNC: 143 MG/DL (ref 70–99)
GLUCOSE BLDC GLUCOMTR-MCNC: 145 MG/DL (ref 70–99)
GLUCOSE SERPL-MCNC: 109 MG/DL (ref 70–99)
HCT VFR BLD AUTO: 32.5 % (ref 40–53)
HGB BLD-MCNC: 10.1 G/DL (ref 13.3–17.7)
MAGNESIUM SERPL-MCNC: 2 MG/DL (ref 1.7–2.3)
MCH RBC QN AUTO: 34.5 PG (ref 26.5–33)
MCHC RBC AUTO-ENTMCNC: 31.1 G/DL (ref 31.5–36.5)
MCV RBC AUTO: 111 FL (ref 78–100)
PLATELET # BLD AUTO: 254 10E3/UL (ref 150–450)
POTASSIUM SERPL-SCNC: 3.4 MMOL/L (ref 3.4–5.3)
RBC # BLD AUTO: 2.93 10E6/UL (ref 4.4–5.9)
SODIUM SERPL-SCNC: 144 MMOL/L (ref 136–145)
UFH PPP CHRO-ACNC: 0.3 IU/ML
WBC # BLD AUTO: 5.2 10E3/UL (ref 4–11)

## 2022-12-11 PROCEDURE — 85027 COMPLETE CBC AUTOMATED: CPT | Performed by: INTERNAL MEDICINE

## 2022-12-11 PROCEDURE — 99233 SBSQ HOSP IP/OBS HIGH 50: CPT | Performed by: INTERNAL MEDICINE

## 2022-12-11 PROCEDURE — 250N000011 HC RX IP 250 OP 636: Performed by: INTERNAL MEDICINE

## 2022-12-11 PROCEDURE — 999N000157 HC STATISTIC RCP TIME EA 10 MIN

## 2022-12-11 PROCEDURE — 250N000013 HC RX MED GY IP 250 OP 250 PS 637: Performed by: CLINICAL NURSE SPECIALIST

## 2022-12-11 PROCEDURE — 250N000011 HC RX IP 250 OP 636: Performed by: PHYSICIAN ASSISTANT

## 2022-12-11 PROCEDURE — 250N000013 HC RX MED GY IP 250 OP 250 PS 637

## 2022-12-11 PROCEDURE — 250N000013 HC RX MED GY IP 250 OP 250 PS 637: Performed by: INTERNAL MEDICINE

## 2022-12-11 PROCEDURE — C9113 INJ PANTOPRAZOLE SODIUM, VIA: HCPCS | Performed by: PHYSICIAN ASSISTANT

## 2022-12-11 PROCEDURE — 74177 CT ABD & PELVIS W/CONTRAST: CPT

## 2022-12-11 PROCEDURE — 94640 AIRWAY INHALATION TREATMENT: CPT

## 2022-12-11 PROCEDURE — 250N000012 HC RX MED GY IP 250 OP 636 PS 637: Performed by: INTERNAL MEDICINE

## 2022-12-11 PROCEDURE — 258N000003 HC RX IP 258 OP 636: Performed by: INTERNAL MEDICINE

## 2022-12-11 PROCEDURE — 200N000001 HC R&B ICU

## 2022-12-11 PROCEDURE — 94640 AIRWAY INHALATION TREATMENT: CPT | Mod: 76

## 2022-12-11 PROCEDURE — 99231 SBSQ HOSP IP/OBS SF/LOW 25: CPT | Performed by: SURGERY

## 2022-12-11 PROCEDURE — 250N000011 HC RX IP 250 OP 636: Performed by: HOSPITALIST

## 2022-12-11 PROCEDURE — 36415 COLL VENOUS BLD VENIPUNCTURE: CPT | Performed by: INTERNAL MEDICINE

## 2022-12-11 PROCEDURE — 250N000013 HC RX MED GY IP 250 OP 250 PS 637: Performed by: NURSE PRACTITIONER

## 2022-12-11 PROCEDURE — 93010 ELECTROCARDIOGRAM REPORT: CPT | Performed by: INTERNAL MEDICINE

## 2022-12-11 PROCEDURE — 250N000009 HC RX 250: Performed by: INTERNAL MEDICINE

## 2022-12-11 PROCEDURE — 85520 HEPARIN ASSAY: CPT | Performed by: INTERNAL MEDICINE

## 2022-12-11 PROCEDURE — 80048 BASIC METABOLIC PNL TOTAL CA: CPT | Performed by: INTERNAL MEDICINE

## 2022-12-11 PROCEDURE — 83735 ASSAY OF MAGNESIUM: CPT | Performed by: INTERNAL MEDICINE

## 2022-12-11 RX ORDER — LEVALBUTEROL INHALATION SOLUTION 0.63 MG/3ML
0.63 SOLUTION RESPIRATORY (INHALATION) EVERY 4 HOURS PRN
Status: DISCONTINUED | OUTPATIENT
Start: 2022-12-11 | End: 2022-12-20 | Stop reason: HOSPADM

## 2022-12-11 RX ORDER — METOPROLOL TARTRATE 25 MG/1
25 TABLET, FILM COATED ORAL ONCE
Status: COMPLETED | OUTPATIENT
Start: 2022-12-11 | End: 2022-12-11

## 2022-12-11 RX ORDER — LEVALBUTEROL INHALATION SOLUTION 0.63 MG/3ML
0.63 SOLUTION RESPIRATORY (INHALATION) EVERY 6 HOURS
Status: DISCONTINUED | OUTPATIENT
Start: 2022-12-11 | End: 2022-12-11

## 2022-12-11 RX ORDER — IOPAMIDOL 755 MG/ML
500 INJECTION, SOLUTION INTRAVASCULAR ONCE
Status: COMPLETED | OUTPATIENT
Start: 2022-12-11 | End: 2022-12-11

## 2022-12-11 RX ORDER — LEVALBUTEROL INHALATION SOLUTION 0.63 MG/3ML
0.63 SOLUTION RESPIRATORY (INHALATION) 4 TIMES DAILY
Status: DISCONTINUED | OUTPATIENT
Start: 2022-12-11 | End: 2022-12-14

## 2022-12-11 RX ORDER — CARVEDILOL 12.5 MG/1
12.5 TABLET ORAL 2 TIMES DAILY WITH MEALS
Status: DISCONTINUED | OUTPATIENT
Start: 2022-12-11 | End: 2022-12-11

## 2022-12-11 RX ORDER — POTASSIUM CHLORIDE 1500 MG/1
40 TABLET, EXTENDED RELEASE ORAL 2 TIMES DAILY
Status: COMPLETED | OUTPATIENT
Start: 2022-12-11 | End: 2022-12-11

## 2022-12-11 RX ORDER — LISINOPRIL 10 MG/1
10 TABLET ORAL ONCE
Status: DISCONTINUED | OUTPATIENT
Start: 2022-12-11 | End: 2022-12-11

## 2022-12-11 RX ORDER — POLYETHYLENE GLYCOL 3350 17 G/17G
17 POWDER, FOR SOLUTION ORAL 2 TIMES DAILY
Status: DISCONTINUED | OUTPATIENT
Start: 2022-12-11 | End: 2022-12-20 | Stop reason: HOSPADM

## 2022-12-11 RX ORDER — POTASSIUM CHLORIDE 1.5 G/1.58G
20 POWDER, FOR SOLUTION ORAL 2 TIMES DAILY
Status: COMPLETED | OUTPATIENT
Start: 2022-12-11 | End: 2022-12-11

## 2022-12-11 RX ORDER — LISINOPRIL 20 MG/1
20 TABLET ORAL 2 TIMES DAILY
Status: DISCONTINUED | OUTPATIENT
Start: 2022-12-11 | End: 2022-12-11

## 2022-12-11 RX ORDER — METOPROLOL TARTRATE 50 MG
50 TABLET ORAL 2 TIMES DAILY
Status: DISCONTINUED | OUTPATIENT
Start: 2022-12-11 | End: 2022-12-11

## 2022-12-11 RX ORDER — LISINOPRIL 10 MG/1
10 TABLET ORAL 2 TIMES DAILY
Status: DISCONTINUED | OUTPATIENT
Start: 2022-12-11 | End: 2022-12-13

## 2022-12-11 RX ADMIN — ROPINIROLE HYDROCHLORIDE 0.25 MG: 0.25 TABLET, FILM COATED ORAL at 16:13

## 2022-12-11 RX ADMIN — ATROPINE SULFATE 1 DROP: 10 SOLUTION/ DROPS OPHTHALMIC at 20:34

## 2022-12-11 RX ADMIN — LIDOCAINE 2 PATCH: 246 PATCH TOPICAL at 20:32

## 2022-12-11 RX ADMIN — PANTOPRAZOLE SODIUM 40 MG: 40 INJECTION, POWDER, FOR SOLUTION INTRAVENOUS at 08:45

## 2022-12-11 RX ADMIN — TAZOBACTAM SODIUM AND PIPERACILLIN SODIUM 3.38 G: 375; 3 INJECTION, SOLUTION INTRAVENOUS at 04:29

## 2022-12-11 RX ADMIN — DICLOFENAC SODIUM 4 G: 10 GEL TOPICAL at 22:10

## 2022-12-11 RX ADMIN — SODIUM CHLORIDE 80 ML: 9 INJECTION, SOLUTION INTRAVENOUS at 17:19

## 2022-12-11 RX ADMIN — Medication 250 MG: at 09:36

## 2022-12-11 RX ADMIN — METOPROLOL TARTRATE 25 MG: 25 TABLET, FILM COATED ORAL at 16:12

## 2022-12-11 RX ADMIN — ACETAMINOPHEN 1000 MG: 500 TABLET, FILM COATED ORAL at 02:16

## 2022-12-11 RX ADMIN — ROPINIROLE HYDROCHLORIDE 0.25 MG: 0.25 TABLET, FILM COATED ORAL at 22:10

## 2022-12-11 RX ADMIN — HYDRALAZINE HYDROCHLORIDE 10 MG: 20 INJECTION INTRAMUSCULAR; INTRAVENOUS at 17:44

## 2022-12-11 RX ADMIN — DICLOFENAC SODIUM 4 G: 10 GEL TOPICAL at 09:12

## 2022-12-11 RX ADMIN — ROPINIROLE HYDROCHLORIDE 0.25 MG: 0.25 TABLET, FILM COATED ORAL at 08:43

## 2022-12-11 RX ADMIN — Medication: at 09:12

## 2022-12-11 RX ADMIN — TAZOBACTAM SODIUM AND PIPERACILLIN SODIUM 3.38 G: 375; 3 INJECTION, SOLUTION INTRAVENOUS at 16:13

## 2022-12-11 RX ADMIN — Medication 500 MG: at 13:31

## 2022-12-11 RX ADMIN — HEPARIN SODIUM 1350 UNITS/HR: 10000 INJECTION, SOLUTION INTRAVENOUS at 13:32

## 2022-12-11 RX ADMIN — LISINOPRIL 10 MG: 10 TABLET ORAL at 20:33

## 2022-12-11 RX ADMIN — SODIUM CHLORIDE 1000 ML: 9 INJECTION, SOLUTION INTRAVENOUS at 10:36

## 2022-12-11 RX ADMIN — POTASSIUM CHLORIDE 20 MEQ: 1.5 POWDER, FOR SOLUTION ORAL at 16:13

## 2022-12-11 RX ADMIN — TAMSULOSIN HYDROCHLORIDE 0.4 MG: 0.4 CAPSULE ORAL at 20:33

## 2022-12-11 RX ADMIN — ACETAMINOPHEN 1000 MG: 500 TABLET, FILM COATED ORAL at 10:38

## 2022-12-11 RX ADMIN — PREDNISOLONE ACETATE 1 DROP: 10 SUSPENSION/ DROPS OPHTHALMIC at 08:44

## 2022-12-11 RX ADMIN — IPRATROPIUM BROMIDE AND ALBUTEROL SULFATE 3 ML: 2.5; .5 SOLUTION RESPIRATORY (INHALATION) at 08:17

## 2022-12-11 RX ADMIN — Medication 250 MG: at 22:10

## 2022-12-11 RX ADMIN — HYDROMORPHONE HYDROCHLORIDE 2 MG: 2 TABLET ORAL at 22:10

## 2022-12-11 RX ADMIN — QUETIAPINE FUMARATE 50 MG: 50 TABLET ORAL at 20:33

## 2022-12-11 RX ADMIN — LEVALBUTEROL HYDROCHLORIDE 0.63 MG: 0.63 SOLUTION RESPIRATORY (INHALATION) at 12:04

## 2022-12-11 RX ADMIN — LISINOPRIL 10 MG: 10 TABLET ORAL at 09:44

## 2022-12-11 RX ADMIN — HYDROMORPHONE HYDROCHLORIDE 2 MG: 2 TABLET ORAL at 10:37

## 2022-12-11 RX ADMIN — TAZOBACTAM SODIUM AND PIPERACILLIN SODIUM 3.38 G: 375; 3 INJECTION, SOLUTION INTRAVENOUS at 22:10

## 2022-12-11 RX ADMIN — HYDROMORPHONE HYDROCHLORIDE 2 MG: 2 TABLET ORAL at 16:12

## 2022-12-11 RX ADMIN — LEVALBUTEROL HYDROCHLORIDE 0.63 MG: 0.63 SOLUTION RESPIRATORY (INHALATION) at 20:12

## 2022-12-11 RX ADMIN — POTASSIUM CHLORIDE 20 MEQ: 1.5 POWDER, FOR SOLUTION ORAL at 13:31

## 2022-12-11 RX ADMIN — PANTOPRAZOLE SODIUM 40 MG: 40 INJECTION, POWDER, FOR SOLUTION INTRAVENOUS at 20:33

## 2022-12-11 RX ADMIN — CYANOCOBALAMIN TAB 1000 MCG 1000 MCG: 1000 TAB at 09:36

## 2022-12-11 RX ADMIN — HYDRALAZINE HYDROCHLORIDE 10 MG: 20 INJECTION INTRAMUSCULAR; INTRAVENOUS at 23:11

## 2022-12-11 RX ADMIN — Medication: at 13:31

## 2022-12-11 RX ADMIN — IOPAMIDOL 80 ML: 755 INJECTION, SOLUTION INTRAVENOUS at 17:19

## 2022-12-11 RX ADMIN — PREGABALIN 25 MG: 25 CAPSULE ORAL at 16:13

## 2022-12-11 RX ADMIN — POLYETHYLENE GLYCOL 3350 17 G: 17 POWDER, FOR SOLUTION ORAL at 20:33

## 2022-12-11 RX ADMIN — TAZOBACTAM SODIUM AND PIPERACILLIN SODIUM 3.38 G: 375; 3 INJECTION, SOLUTION INTRAVENOUS at 10:55

## 2022-12-11 RX ADMIN — QUETIAPINE FUMARATE 50 MG: 50 TABLET ORAL at 09:36

## 2022-12-11 RX ADMIN — Medication: at 22:10

## 2022-12-11 RX ADMIN — PREDNISONE 40 MG: 20 TABLET ORAL at 08:43

## 2022-12-11 RX ADMIN — MICONAZOLE NITRATE: 20 POWDER TOPICAL at 09:43

## 2022-12-11 RX ADMIN — CARVEDILOL 12.5 MG: 12.5 TABLET, FILM COATED ORAL at 08:43

## 2022-12-11 RX ADMIN — HALOPERIDOL LACTATE 2 MG: 5 INJECTION, SOLUTION INTRAMUSCULAR at 15:46

## 2022-12-11 RX ADMIN — HYDROMORPHONE HYDROCHLORIDE 2 MG: 2 TABLET ORAL at 14:39

## 2022-12-11 RX ADMIN — METOPROLOL TARTRATE 75 MG: 50 TABLET, FILM COATED ORAL at 20:33

## 2022-12-11 RX ADMIN — PREDNISOLONE ACETATE 1 DROP: 10 SUSPENSION/ DROPS OPHTHALMIC at 15:49

## 2022-12-11 RX ADMIN — MICONAZOLE NITRATE: 20 POWDER TOPICAL at 20:33

## 2022-12-11 RX ADMIN — ATORVASTATIN CALCIUM 40 MG: 40 TABLET, FILM COATED ORAL at 20:33

## 2022-12-11 RX ADMIN — MAGNESIUM HYDROXIDE 30 ML: 400 SUSPENSION ORAL at 10:38

## 2022-12-11 RX ADMIN — CALCIUM 1000 MG: 500 TABLET ORAL at 09:36

## 2022-12-11 RX ADMIN — PREGABALIN 25 MG: 25 CAPSULE ORAL at 08:43

## 2022-12-11 RX ADMIN — Medication: at 18:12

## 2022-12-11 RX ADMIN — HYDRALAZINE HYDROCHLORIDE 10 MG: 20 INJECTION INTRAMUSCULAR; INTRAVENOUS at 02:07

## 2022-12-11 RX ADMIN — MULTIPLE VITAMINS W/ MINERALS TAB 1 TABLET: TAB at 09:36

## 2022-12-11 RX ADMIN — DICLOFENAC SODIUM 4 G: 10 GEL TOPICAL at 13:31

## 2022-12-11 RX ADMIN — SERTRALINE HYDROCHLORIDE 100 MG: 100 TABLET ORAL at 08:43

## 2022-12-11 RX ADMIN — DICLOFENAC SODIUM 4 G: 10 GEL TOPICAL at 00:10

## 2022-12-11 RX ADMIN — POLYETHYLENE GLYCOL 3350 17 G: 17 POWDER, FOR SOLUTION ORAL at 11:40

## 2022-12-11 RX ADMIN — DICLOFENAC SODIUM 4 G: 10 GEL TOPICAL at 18:12

## 2022-12-11 RX ADMIN — Medication: at 00:10

## 2022-12-11 RX ADMIN — ASPIRIN 81 MG: 81 TABLET, COATED ORAL at 08:43

## 2022-12-11 RX ADMIN — PREDNISOLONE ACETATE 1 DROP: 10 SUSPENSION/ DROPS OPHTHALMIC at 20:33

## 2022-12-11 RX ADMIN — LEVALBUTEROL HYDROCHLORIDE 0.63 MG: 0.63 SOLUTION RESPIRATORY (INHALATION) at 15:58

## 2022-12-11 RX ADMIN — IPRATROPIUM BROMIDE AND ALBUTEROL SULFATE 3 ML: 2.5; .5 SOLUTION RESPIRATORY (INHALATION) at 10:23

## 2022-12-11 RX ADMIN — ATROPINE SULFATE 1 DROP: 10 SOLUTION/ DROPS OPHTHALMIC at 08:44

## 2022-12-11 ASSESSMENT — ACTIVITIES OF DAILY LIVING (ADL)
ADLS_ACUITY_SCORE: 54
ADLS_ACUITY_SCORE: 56
ADLS_ACUITY_SCORE: 52
ADLS_ACUITY_SCORE: 54
ADLS_ACUITY_SCORE: 50
ADLS_ACUITY_SCORE: 54

## 2022-12-11 NOTE — PROGRESS NOTES
Inpatient Cardiology Consultation Progress Note:    Pacheco Torres MRN#: [id number]   YOB: 1945 Age: 77 year old     Date of Admission: 12/6/2022  Consult indication: elevated troponin, atrial fibrillation, shock         Assessment and Plan:     # Elevated troponin, downtrending, likely type II MI, however he does note several weeks to months of occasional exertional chest discomfort.  He has known coronary artery disease, history of CAD s/p PCI mid LAD and diagonal 2008, coronary CTA 2019 patent stent and moderate RCA disease.  # Shock, possibly hypovolemic vs septic, resolved and now hypertensive  # Paroxysmal atrial fibrillation, new diagnosis on admission, converted spontaneously to NSR   # COPD exacerbation  # AMS    TTE 12/6/2022 LVEF >70%, no significant valvular abnormalities    - Agree with anticoagulation for primary prevention of stroke, transition to DOAC when clinically appropriate  - Continue aspirin  - Initially had planned on changing metoprolol to carvedilol for better BP control, however with severe COPD, will increase metoprolol tartrate to 75mg BID  - Continue lisinopril 10mg BID, may need to uptitrate   - Agree with statin therapy  - Etiology of tachycardia not entirely clear, he did briefly go back into atrial fibrillation with RVR yesterday however now is in sinus tachycardia, suspect multifactorial etiology from COPD exacerbation, chronic pain, possible mild dehydration (only net neg 200cc however numerous unmeasured voids), also possible it could be from abdomen/obstruction, there was initially concern about colitis as well, agree with Gen Surgery consultation.   Will check a CT PE study to exclude PE, d-dimer unlikely to be reliable given comorbidities.   - Cardiology will follow  - Cardiac telemetry.  Monitor electrolytes daily, maintain potassium greater than 4, magnesium greater than 2  - As outlined by my colleague Dr. Morejon, the clinical significance of this elevated  troponin is not entirely clear.  Possible it may represent NSTEMI, fortunately patient remains chest pain-free, troponins have been downtrending.  Given current clinical status, agree with recommendation for medical management at this time.  Discussed with patient and his significant other who is at his bedside, and they are in agreement.  Risks of cardiac catheterization/coronary angiography at this time likely outweigh benefits.  Pending clinical status, cardiac catheterization may be considered before discharge, follows with . Dr Flynn at [facility]    Thank you for allowing our team to participate in the care of Pacheco Torres.  Please do not hesitate to page me with any questions or concerns.     Ilan Ro MD, [facility]  Cardiology  December 11, 2022    Voice recognition software utilized.          Interval Events:     - intermittent atrial fibrillation overnight, now back in sinus tachycardia  - BPs elevated   - no chest pain  - still with mild wheezing but improved from yesterday          Medications reviewed:     Current medications:  Current Facility-Administered Medications Ordered in Epic   Medication Dose Route Frequency Last Rate Last Admin     acetaminophen (TYLENOL) tablet 650 mg  650 mg Oral Q6H PRN   650 mg at 12/08/22 1033    Or     acetaminophen (TYLENOL) Suppository 650 mg  650 mg Rectal Q6H PRN         acetaminophen (TYLENOL) tablet 1,000 mg  1,000 mg Oral Q8H   1,000 mg at 12/11/22 0216     alum & mag hydroxide-simethicone (MAALOX) suspension 30 mL  30 mL Oral Q4H PRN         aspirin EC tablet 81 mg  81 mg Oral Daily   81 mg at 12/10/22 0822     atorvastatin (LIPITOR) tablet 40 mg  40 mg Oral QPM   40 mg at 12/09/22 1954     atropine 1 % ophthalmic solution 1 drop  1 drop Right Eye BID   1 drop at 12/10/22 2115     benztropine (COGENTIN) tablet 1 mg  1 mg Oral TID PRN         calcium carbonate 500 mg (elemental) (OSCAL) tablet 1,000 mg  2 tablet Oral Daily   1,000  mg at 12/10/22 0822     carvedilol (COREG) tablet 12.5 mg  12.5 mg Oral BID w/meals         cyanocobalamin (VITAMIN B-12) tablet 1,000 mcg  1,000 mcg Oral Daily   1,000 mcg at 12/10/22 0822     cyclobenzaprine (FLEXERIL) tablet 5 mg  5 mg Oral At Bedtime PRN         diclofenac (VOLTAREN) 1 % topical gel 4 g  4 g Topical 4x Daily   4 g at 12/11/22 0010     haloperidol lactate (HALDOL) injection 2-4 mg  2-4 mg Intravenous Q6H PRN   4 mg at 12/09/22 2205     heparin (porcine) 1000 UNIT/ML injection             heparin 25,000 units in 0.45% NaCl 250 mL ANTICOAGULANT infusion  0-5,000 Units/hr Intravenous Continuous 13.5 mL/hr at 12/11/22 0600 1,350 Units/hr at 12/11/22 0600     hydrALAZINE (APRESOLINE) injection 10 mg  10 mg Intravenous Q4H PRN   10 mg at 12/11/22 0207     HYDROmorphone (DILAUDID) tablet 2 mg  2 mg Oral Q6H   2 mg at 12/10/22 1104     HYDROmorphone (DILAUDID) tablet 2 mg  2 mg Oral Q3H PRN        Or     HYDROmorphone (DILAUDID) tablet 4 mg  4 mg Oral Q3H PRN   4 mg at 12/10/22 0821     HYDROmorphone (PF) (DILAUDID) injection 0.3 mg  0.3 mg Intravenous Q3H PRN   0.3 mg at 12/09/22 0757     ipratropium - albuterol 0.5 mg/2.5 mg/3 mL (DUONEB) neb solution 3 mL  3 mL Nebulization 4x daily   3 mL at 12/11/22 0817     ipratropium - albuterol 0.5 mg/2.5 mg/3 mL (DUONEB) neb solution 3 mL  3 mL Nebulization Q2H PRN   3 mL at 12/10/22 0520     Lidocaine (LIDOCARE) 4 % Patch 2 patch  2 patch Transdermal Q24h   2 patch at 12/10/22 2122    And     lidocaine patch in PLACE   Transdermal Q8H YOMI         lidocaine (LMX4) cream   Topical Q1H PRN         lidocaine (PF) (XYLOCAINE) 1 % injection             lidocaine 1 % 0.1-1 mL  0.1-1 mL Other Q1H PRN         lisinopril (ZESTRIL) tablet 10 mg  10 mg Oral BID         magnesium hydroxide (MILK OF MAGNESIA) suspension 30 mL  30 mL Oral Daily PRN         menthol (Topical Analgesic) 2.5% (BENGAY VANISHING SCENT) 2.5 % topical gel   Topical 4x Daily   Given at 12/11/22 0010      methocarbamol (ROBAXIN) half-tab 250-500 mg  250-500 mg Oral 4x Daily   500 mg at 12/10/22 0822     miconazole (MICATIN) 2 % powder   Topical BID   Given at 12/10/22 2113     multivitamin w/minerals (THERA-VIT-M) tablet 1 tablet  1 tablet Oral Daily   1 tablet at 12/10/22 0822     naloxone (NARCAN) injection 0.2 mg  0.2 mg Intravenous Q2 Min PRN        Or     naloxone (NARCAN) injection 0.4 mg  0.4 mg Intravenous Q2 Min PRN        Or     naloxone (NARCAN) injection 0.2 mg  0.2 mg Intramuscular Q2 Min PRN        Or     naloxone (NARCAN) injection 0.4 mg  0.4 mg Intramuscular Q2 Min PRN         nitroGLYcerin (NITROSTAT) sublingual tablet 0.4 mg  0.4 mg Sublingual Q5 Min PRN   0.4 mg at 12/09/22 1621     nitroGLYcerin in D5W 100 mcg/mL injection             OLANZapine zydis (zyPREXA) ODT half-tab 2.5 mg  2.5 mg Oral Q6H PRN   2.5 mg at 12/10/22 1048     ondansetron (ZOFRAN ODT) ODT tab 4 mg  4 mg Oral Q6H PRN        Or     ondansetron (ZOFRAN) injection 4 mg  4 mg Intravenous Q6H PRN         pantoprazole (PROTONIX) 40 mg vial  40 mg Intravenous BID   40 mg at 12/10/22 2119     Patient is already receiving anticoagulation with heparin, enoxaparin (LOVENOX), warfarin (COUMADIN)  or other anticoagulant medication   Does not apply Continuous PRN         Patient is NOT allergic to contrast dye   Does not apply DOES NOT GO TO MAR         piperacillin-tazobactam (ZOSYN) infusion 3.375 g  3.375 g Intravenous Q6H 100 mL/hr at 12/11/22 0429 3.375 g at 12/11/22 0429     potassium chloride ER (KLOR-CON M) CR tablet 20 mEq  20 mEq Oral Once PRN         potassium chloride ER (KLOR-CON M) CR tablet 40 mEq  40 mEq Oral BID         prednisoLONE acetate (PRED FORTE) 1 % ophthalmic susp 1 drop  1 drop Both Eyes TID   1 drop at 12/10/22 2115     predniSONE (DELTASONE) tablet 40 mg  40 mg Oral Daily   40 mg at 12/10/22 0821     pregabalin (LYRICA) capsule 25 mg  25 mg Oral TID   25 mg at 12/10/22 0822     QUEtiapine (SEROquel) tablet  50 mg  50 mg Oral BID   50 mg at 12/10/22 0915     rOPINIRole (REQUIP) tablet 0.25 mg  0.25 mg Oral TID   0.25 mg at 12/10/22 1104     sertraline (ZOLOFT) tablet 100 mg  100 mg Oral Daily   100 mg at 12/10/22 0822     sodium chloride (PF) 0.9% PF flush 3 mL  3 mL Intracatheter Q8H   3 mL at 22 0430     sodium chloride (PF) 0.9% PF flush 3 mL  3 mL Intracatheter Q1H PRN         sodium chloride (PF) 0.9% PF flush 3 mL  3 mL Intracatheter q1 min prn         sodium chloride (PF) 0.9% PF flush 3 mL  3 mL Intracatheter Q8H   3 mL at 12/10/22 0556     sodium chloride (PF) 0.9% PF flush 3 mL  3 mL Intracatheter q1 min prn         tamsulosin (FLOMAX) capsule 0.4 mg  0.4 mg Oral QPM   0.4 mg at 22 1953     Vitamin D3 (CHOLECALCIFEROL) tablet 50 mcg  50 mcg Oral Daily   50 mcg at 12/10/22 0822     No current [facility]-ordered outpatient medications on file.             Physical Exam:   Vital signs were reviewed:  Temperatures:  Current - Temp: 98  F (36.7  C); Max - Temp  Av.9  F (37.2  C)  Min: 98  F (36.7  C)  Max: 99.3  F (37.4  C)  Respiration range: Resp  Av.8  Min: 16  Max: 46  Pulse range: Pulse  Av.6  Min: 97  Max: 133  Blood pressure range: Systolic (24hrs), Av , Min:107 , Max:185   ; Diastolic (24hrs), Av, Min:58, Max:152    Pulse oximetry range: SpO2  Av.1 %  Min: 78 %  Max: 99 %    Intake/Output Summary (Last 24 hours) at 12/10/2022 0819  Last data filed at 12/10/2022 0600  Gross per 24 hour   Intake 1337.01 ml   Output 880 ml   Net 457.01 ml     219 lbs 2.2 oz  Body mass index is 38.82 kg/m .   Body surface area is 2.1 meters squared.    Constitutional: appears stated age, lying in bed, pursed lip breathing, obese  Head: normocephalic, atraumatic  Neck: supple, trachea midline  Pulmonary: mild wheezing bilaterally   Cardiovascular: heart sounds difficult to discern but tachycardic, regular rhythm  Gastrointestinal: no guarding, non-rigid   Neurologic: arousable, moves all  extremities  Psychiatric: sleepy, answers questions appropriately          Selected laboratory tests:   Laboratory test results personally reviewed:   [facility]  Recent Labs   Lab 12/11/22  0510 12/10/22  2338 12/10/22  2014 12/10/22  1705 12/10/22  0808 12/10/22  0555 12/08/22  0539 12/08/22  0530 12/07/22  0810 12/07/22  0428 12/06/22  2151 12/06/22  1421     --   --   --   --  142  --  139  --  133*  --  132*   POTASSIUM 3.4  --   --   --   --  3.5  --  3.6  --  4.4  --  3.4   CHLORIDE 105  --   --   --   --  102  --  104  --  99  --  91*   CO2 32*  --   --   --   --  32*  --  29  --  24  --  25   ANIONGAP 7  --   --   --   --  8  --  6*  --  10  --  16*   * 104* 104* 117*   < > 119*   < > 115*   < > 138*   < > 193*   BUN 29.7*  --   --   --   --  23.8*  --  22.1  --  26.8*  --  17.2   CR 0.64*  --   --   --   --  0.59*  --  0.72  --  0.86  --  0.95   GFRESTIMATED >90  --   --   --   --  >90  --  >90  --  90  --  83   TAVO 9.6  --   --   --   --  9.8  --  9.8  --  9.5  --  10.5*   MAG  --   --   --   --   --   --   --   --   --   --   --  2.3   PROTTOTAL  --   --   --   --   --   --   --   --   --   --   --  6.7   ALBUMIN  --   --   --   --   --   --   --   --   --   --   --  3.7   BILITOTAL  --   --   --   --   --   --   --   --   --   --   --  0.3   ALKPHOS  --   --   --   --   --   --   --   --   --   --   --  147*   AST  --   --   --   --   --   --   --   --   --   --   --  78*   ALT  --   --   --   --   --   --   --   --   --   --   --  17    < > = values in this interval not displayed.     CBC  Recent Labs   Lab 12/11/22  0510 12/10/22  0555 12/09/22  0541 12/09/22  0449   WBC 5.2 6.2 6.2 6.0   RBC 2.93* 2.85* 2.90* 2.90*   HGB 10.1* 9.9* 10.2* 10.1*   HCT 32.5* 31.8* 31.2* 31.3*   * 112* 108* 108*   MCH 34.5* 34.7* 35.2* 34.8*   MCHC 31.1* 31.1* 32.7 32.3   RDW 14.2 13.9 13.3 13.4    249 265 225     INR  Recent Labs   Lab 12/06/22  1421   INR 1.03     TSH   Date Value Ref Range Status    11/02/2022 1.36 0.30 - 4.20 uIU/mL Final

## 2022-12-11 NOTE — PROGRESS NOTES
Patient given nebs as ordered. Exp wheezes and diminished in lower lobes. RT to continue to follow while in ICU.    Oxygen: 3 L NC with humidity

## 2022-12-11 NOTE — PROGRESS NOTES
[facility]  Hospitalist Progress Note  Tino Pablo M.D., M.B.A.   12/11/2022    Reason for Stay/active problem list      Acute encephalopathy    Hypotension-likely from hypovolemia    Non- ST segment elevation MI    Dehydration     Acute on chronic back pain    Suspected acute colitis    Large left inguinal hernia, concern for mechanical bowel obstruction    Abnormally marked circumferential wall thickening of the entire esophagus suspicious for esophagitis         Assessment and Plan:        Summary of Stay:     Pacheco Torres is a 77 year old male with a history of htn/hlp, remote hx of CAD s/p DAYAN to the LAD-most recent echo performed in the ER with hyperdynamic EF 75 % and G1dd, hx of prostate cancer from earlier this year treated with XRT through [facility], macrocytic anemia with hgb in the 11-12 range (normal B12 and folate), monoclonal gammopathy (no recent documentation of evaluation), PMR, obesity, hx of tob abuse  admitted on 12/6/2022 with hypovolemic shock of unclear etiology     He believes he passed out either the day prior or the day of admission.  Unable to fill in any details. States he hasn't been sleeping well but denies and n/v/d.  States po intake has been well. No f/c/s.  No epistaxis/bloody gums/stools or emesis.      Discussed with  Carlos Enrique Goldberg who states her hasn't been eating well for at least 6 weeks, she thinks his fluid intake is ok.  She reports that he's had some nausea but no vomiting.  No diarrhea in fact she thinks he may be constipated.      In the ER BPs in the 60's/40's with tachycardia and tachypnea, he was hypothermic at 95.4, he was given 2 L of NS without improvement and so started on NE via Right IJ.       EKG with ST elevation throughout septal/anterior leads-cards was contacted and the story just didn't seem to fit- he was without any CP or anginal equivalents. Initial trop 29. He was placed on heparin and stat echo completed showing hyperdynamic  cardiac function.     troponins continue to rise 29->222-493 but EKG has normalized    CMP with AG 2/2 elevated lactic acid with respiratory compensation.    Lactates 5.4->1.4 after IVF/NE  CBC with stable macrocytic anemia, no leukocytosis  procal 0.15     CXR with pulmonary edema  CT Ao survey-diffuse CAD, Left inguinal canal hernia containing loops of the distal colon     He was covered with pip-tazo/vanco for septic shock of unclear etiology        Problem List with Assessment and Plan:    Shock/syncope  Acute colitis on CT imaging  --  Given initial EKG would certainly fit a CV shock picture although echo with hyperdynamic function.  -- Suspected combination of hypovolemia and sepsis.  --Hypotension resolved with IV fluid resuscitation and transient norepinephrine  --CT of chest abdomen pelvis obtained and December 7 showed evidence of colitis and esophagitis.  --Currently he is hypertensive ,afebrile but continues to be tachycardic despite beta-blockade.  Discussed with cardiology team.  We will increase his beta-blocker, repeat imaging study including CT of the chest abdomen pelvis with and without contrast.osyn, bb , Follow cultures     Acute toxic metabolic metabolic encephalopathy   -- Likely combination of toxic metabolic and infectious encephalopathy.  -- Patient could have no underlying cognitive deficit.  Chronic insomnia due to back pain issues.  --Patient was agitated on December 8 and required Precedex drip.  -- Currently he  is alert , oriented times 2 , calm appear  to be confused at times.  We will  Continue scheduled  Seroquel, continue Seroquel as needed. zyprexa as needed , remove hendrix     NSTEMI , sinus tachycardia  -- trops 29->222->493  Diffuse CAD noted on CT Ao survey.    --No CP and echo with hyperdynamic function.   -- Patient was treated with heparin and cardiology was consulted.  -- Patient was seen by cardiology and initially recommended coronary angiogram but patient was confused  unable to provide consent and lay still for the procedure.  Coronary angiogram plan was consulted and patient was treated with heparin, started on aspirin.  -- Has been tachycardic.  Discussed with cardiology team and beta-blocker was titrated.  Further imaging studies ordered as above    CAD  Htn/hlp   --pta on asa 81 mg/lisinopril 10 mg every day/simvastain 40  Mg  -Home medications resumed       Large Left inguinal hernia with bowel contents-initial concern for small bowel obstruction.  -- CT on December 7 showed concern for bowel obstruction.  Patient had left lower quadrant abdominal tenderness but no rigidity or rebound tenderness.  Surgery was consulted and patient was seen and examined by Dr. Ruiz who recommended to continue to monitor for.  --No evidence of bowel obstruction, currently no complaint of abdominal pain or ischemic bowel concern.  Continue to monitor.  Advance diet as tolerate     Macrocytic anemia-MGUS  --Being worked up by PCP   --Currently hemoglobin is 10.1.Positive stool.  Esophageal thickening noted on CT scan.  GI consulted and recommendation obtained for no intervention at this time.       Incidental pulmonary nodules  --Given heavy smoking history will need repeat CT in 3-6 months    Dehydration  --Patient has evidence of dehydration with very dry tongue and buccal mucosa  Decreased intake PTA   --Advance diet as tolerated, encourage oral fluids, IVF today     Acute on chronic back pain-history of prostate cancer with recent treatment with radiation therapy, follows with [facility]  --Severe acute on chronic back pain requiring narcotic medications.  History of prostate cancer.  -- Patient was seen by pain management team.  Pain medications recommended.  Continue current pain regimen-scheduled Tylenol 1 g every 8 hours, Voltaren gel 4 g 4 times daily, lidocaine patch, menthol, Lyrica.  As needed Dilaudid 2 -4 mg every 3 hours as needed.  Improved with scheduled Dilaudid   Patient  may need MRI study of his thoracic and lumbar spine for evaluation of compression fractures and pathology and is more stable    Abnormal CT with concern for esophagitis and colitis   -- GI consulted, recommendation noted    Chronic medical problems  Depression/gerd/prostate ca/pmr/monoclonal gammopathy   -- Continue pta sertraline     COVID 19  Negative  S/p 5 shot moderna series 9/2022 (biv)    Acute hypoxic respiratory failure  -- Patient is a smoker, has increased work of breathing and hypoxic requiring supplemental oxygen.  -- He has evidence of emphysema on CT scan . Mild acute exacerbation of COPD suspected  -- Patient was treated with DuoNeb, supplemental oxygen, steroid.  --Currently he is not wheezing, hypoxia is improving, continue to monitor on current regimen including steroid    Community-acquired pneumonia-new left lung infiltrate on chest x-ray on December 8  -- Already on Zosyn day #6.  Afebrile, continue to antibiotic monitor cultures and vitals    Restless leg syndrome  --Requip started this admission    Plan for today:  -- Patient's agitation improved but he continues to be tachycardic.  Concern for PE and abdominal pathology.  Discussed with cardiology team and plan is to get CT of the abdomen pelvis and chest for further evaluation.  Continue beta-blocker, telemetry monitoring.  Discussed with patient's significant other regarding care plan.    VTE Prophylaxis: Heparin    Code Status: No CPR- Do NOT Intubate     Diet: Advance Diet as Tolerated: Full Liquid Diet  Snacks/Supplements Adult: Ensure Max Protein (bariatric); With Meals    Blanchard Catheter: Not present    Family updated today: Yes  significant other and brother Nico.      Disposition: May downgrade to telemetry bed          Interval History (Subjective):        Patient is seen and examined by me today and medical record reviewed.Overnight events noted and care discussed with nursing staff.Agitation is much better.  He is more  "cooperative and conversant today.  However, his heart rate has been uncontrolled despite beta-blocker.  No fever documented.  Denies any chest pain but he has shortness of breath and respiratory distress evident at bedside.          Physical Exam:        Last Vital Signs:  BP (!) 159/111   Pulse (!) 121   Temp 99.1  F (37.3  C) (Axillary)   Resp 15   Ht 1.6 m (5' 3\")   Wt 99.4 kg (219 lb 2.2 oz)   SpO2 90%   BMI 38.82 kg/m      I/O last 3 completed shifts:  In: 1044.01 [P.O.:420; I.V.:424.01; IV Piggyback:200]  Out: -     Wt Readings from Last 5 Encounters:   12/10/22 99.4 kg (219 lb 2.2 oz)   04/03/12 97.1 kg (214 lb)        Constitutional:  Awake, alert but restless and occasionally confused.     Respiratory:  Increased work of breathing, diffuse rhonchi.  No wheezing.   Cardiovascular: Regular rate and rhythm, normal S1 and S2, and no murmur noted   Abdomen: Normal bowel sounds, soft, non-distended, left lower quadrant abdominal tenderness.  No rebound tenderness, rigidity or guarding.   Skin: No new rashes, no cyanosis, dry to touch   Neuro: Alert with  no new focal weakness   Extremities: No edema   Other(s):        All other systems: egative          Medications:        All current medications were reviewed with changes reflected in problem list.         Data:      All new lab and imaging data was reviewed.      Data reviewed today: I reviewed all new labs and imaging results over the last 24 hours. I personally reviewed       Recent Labs   Lab 12/11/22  0510 12/10/22  0555 12/09/22  0541   WBC 5.2 6.2 6.2   HGB 10.1* 9.9* 10.2*   HCT 32.5* 31.8* 31.2*   * 112* 108*    249 265     No results for input(s): CULT in the last 168 hours.  Recent Labs   Lab 12/11/22  1159 12/11/22  0910 12/11/22  0510 12/10/22  0808 12/10/22  0555 12/08/22  0539 12/08/22  0530 12/06/22  2151 12/06/22  1421   NA  --   --  144  --  142  --  139   < > 132*   POTASSIUM  --   --  3.4  --  3.5  --  3.6   < > 3.4 "   CHLORIDE  --   --  105  --  102  --  104   < > 91*   CO2  --   --  32*  --  32*  --  29   < > 25   ANIONGAP  --   --  7  --  8  --  6*   < > 16*   * 102* 109*   < > 119*   < > 115*   < > 193*   BUN  --   --  29.7*  --  23.8*  --  22.1   < > 17.2   CR  --   --  0.64*  --  0.59*  --  0.72   < > 0.95   GFRESTIMATED  --   --  >90  --  >90  --  >90   < > 83   TAVO  --   --  9.6  --  9.8  --  9.8   < > 10.5*   MAG  --   --  2.0  --   --   --   --   --  2.3   PROTTOTAL  --   --   --   --   --   --   --   --  6.7   ALBUMIN  --   --   --   --   --   --   --   --  3.7   BILITOTAL  --   --   --   --   --   --   --   --  0.3   ALKPHOS  --   --   --   --   --   --   --   --  147*   AST  --   --   --   --   --   --   --   --  78*   ALT  --   --   --   --   --   --   --   --  17    < > = values in this interval not displayed.       Recent Labs   Lab 12/11/22  1159 12/11/22  0910 12/11/22  0510 12/10/22  2338 12/10/22  2014   * 102* 109* 104* 104*       Recent Labs   Lab 12/06/22  1421   INR 1.03         No results for input(s): TROPONIN, TROPI, TROPR in the last 168 hours.    Invalid input(s): TROP, TROPONINIES    Recent Results (from the past 48 hour(s))   XR Chest Port 1 View    Narrative    CHEST PORTABLE ONE VIEW December 6, 2022 2:39 PM     HISTORY: STEMI. Altered mental status. Chest pain.    COMPARISON: 11/2/2022.      Impression    IMPRESSION: Hypoinflated lungs and cardiomegaly. Mild bilateral  vascular congestion appears increased. Correlate with any evidence of  developing pulmonary edema.    HAL BOGGS MD         SYSTEM ID:  [id number]   CT Head w/o Contrast    Narrative    CT SCAN OF THE HEAD WITHOUT CONTRAST December 6, 2022 3:07 PM     HISTORY: Altered mental status.    TECHNIQUE: Axial images of the head and coronal reformations without  IV contrast material. Radiation dose for this scan was reduced using  automated exposure control, adjustment of the mA and/or kV according  to patient size, or  iterative reconstruction technique.    COMPARISON: None.      Impression    IMPRESSION: Mild motion artifact. No evidence of hemorrhage. Volume  loss and patchy areas of white matter hypoattenuation which likely  represent chronic small vessel ischemic change. Small area of focal  hypoattenuation within the central medulla, presumably artifactual  (brainstem infarct not excluded, MRI can be performed for basal  ganglia and thalamic lacunar infarcts, presumably chronic. No acute  osseous abnormality. Nonspecific right facial soft tissue swelling,  potentially contusion.    EVELNY VELAZQUEZ MD         SYSTEM ID:  [id number]   CT Aortic Survey w Contrast    Narrative    CT AORTIC SURVEY WITH CONTRAST  12/6/2022 3:07 PM    HISTORY:  Back pain, shock, wide mediastinum on x-ray.    TECHNIQUE: CT scan obtained of the chest, abdomen, and pelvis with IV  contrast. Post contrast scanning performed during the arterial phase.  CT chest also obtained without IV contrast. 80 mL Isovue-370 IV  injected. Sagittal and coronal reformatted images acquired from the  source post contrast data. Radiation dose for this scan was reduced  using automated exposure control, adjustment of the mA and/or kV  according to patient size, or iterative reconstruction technique.    COMPARISON:  None.    FINDINGS:  Thoracic and abdominal aorta: No dissection involving the thoracic or  abdominal aorta. Patency of the main branch vessels from the thoracic  and abdominal aorta. Scattered areas of calcified atherosclerotic  disease noted. Atherosclerotic stenosis at the origins of the celiac  and superior mesenteric arteries but there is distal perfusion. No  periaortic hematoma or aneurysm.    Other vascular: Severe coronary artery calcifications.    Chest: No adenopathy or acute mediastinal abnormality. No acute  mediastinal fluid collection. Patchy atelectasis at the posterior  right lower lobe. No effusions. No pneumothorax. Stable nodule  posterolateral  left upper lobe measuring 0.3 cm, series 6 image 60.    Abdomen/pelvis: Liver, gallbladder, adrenals, spleen, pancreas, and  kidneys do not show any acute abnormalities. Left inguinal hernia  measures 9.1 x 5.8 cm, series 5 image 265. Herniated loops of the  distal descending colon and sigmoid within the hernia. No upstream  obstruction. No bowel inflammatory change. No enlarged lymph nodes. No  acute pelvic abnormality. Diffuse degenerative changes throughout the  spine. A degree of height loss involving multiple thoracic and lumbar  spine levels.      Impression    IMPRESSION:   1. No acute thoracic or abdominal aortic abnormality. No dissection.  Scattered areas of atherosclerotic disease identified. Atherosclerotic  stenosis at the origins of the celiac artery and superior mesenteric  artery.  2. Diffuse coronary artery calcifications.  3. No acute mediastinal abnormality is seen.  4. Stable pulmonary nodule on the left, see below for follow-up  imaging guidelines.   5. Left inguinal canal hernia containing herniated loops of the distal  descending colon and sigmoid. No upstream bowel obstruction. See above  for measurements.    Recommendations for one or multiple incidental lung nodules < 6mm:    Low risk patients: No routine follow-up.    High risk patients: Optional follow-up CT at 12 months; if  unchanged, no further follow-up.    *Low Risk: Minimal or absent history of smoking or other known risk  factors.  *Nonsolid (ground glass) or partly solid nodules may require longer  follow-up to exclude indolent adenocarcinoma.  *Recommendations based on Guidelines for the Management of Incidental  Pulmonary Nodules Detected at CT: From the Fleischner Society 2017,  Radiology 2017.    AHL BOGGS MD         SYSTEM ID:  [id number]   Echocardiogram Limited   Result Value    LVEF  75%    Narrative    [id number]  [id number]  [id number]  [id number]^ANGELIQUE^MINOO^[facility]  Echocardiography Laboratory  [address]  [place] [address]     Name: BERTIN LYNCH  MRN: [id number]  : 1945  Study Date: 2022 02:59 PM  Age: 77 yrs  Gender: Male  Patient Location: [facility]  Reason For Study: Chest Pain  Ordering Physician: MINOO MERINO  Performed By: Mariah Carranza RDCS     BSA: 2.1 m2  Height: 66 in  Weight: 220 lb  HR: 110  BP: 82/65 mmHg  ______________________________________________________________________________  Procedure  Limited Portable Echo Adult. Optison (NDC #4621-6325) given intravenously.  (Emergent exam, abbreviated study performed).  ______________________________________________________________________________  Interpretation Summary     The visual ejection fraction is estimated at 75%.  Hyperdynamic left ventricular function  ER informed by phone that echo has been read. The study was technically  limited. Technically difficult, suboptimal study.  ______________________________________________________________________________  Left Ventricle  Grade I or early diastolic dysfunction. The visual ejection fraction is  estimated at 75%. Hyperdynamic left ventricular function.     Right Ventricle  The right ventricle is normal in size and function.     Atria  Normal left atrial size. Right atrial size is normal.     Mitral Valve  There is trace mitral regurgitation.     Tricuspid Valve  There is trace tricuspid regurgitation. Right ventricular systolic pressure  could not be approximated due to inadequate tricuspid regurgitation.     Aortic Valve  The aortic valve is trileaflet. There is mild trileaflet aortic sclerosis. No  aortic regurgitation is present. No hemodynamically significant valvular  aortic stenosis.     Pulmonic Valve  Normal pulmonic valve velocity.     Vessels  IVC diameter >2.1 cm collapsing <50% with sniff suggests a high RA pressure  estimated at 15 mmHg or greater.     Pericardium  There is no pericardial effusion.     Rhythm  The rhythm was sinus  tachycardia.  ______________________________________________________________________________  MMode/2D Measurements & Calculations     LA Volume (BP): 68.8 ml     Doppler Measurements & Calculations  MV E max dwayne: 93.3 cm/sec  MV A max dwayne: 121.6 cm/sec  MV E/A: 0.77  MV dec time: 0.12 sec     ______________________________________________________________________________  Report approved by: Meche English 12/06/2022 03:34 PM         XR Chest Port 1 View    Narrative    XR CHEST PORT 1 VIEW 12/6/2022 3:52 PM    HISTORY: CENTRAL LINE    COMPARISON: CT today      Impression    IMPRESSION: Right IJ central venous catheter tip in the low SVC. No  pneumothorax. Mild interstitial opacities in the lungs, could  represent pulmonary edema. No pleural effusion or pneumothorax.    EMELY MARTINEZ MD         SYSTEM ID:  [id number]   CT Chest/Abdomen/Pelvis w Contrast    Narrative    CT CHEST/ABDOMEN/PELVIS W CONTRAST 12/7/2022 1:00 PM    CLINICAL HISTORY: back pain, abdominal pain    TECHNIQUE: CT scan of the chest, abdomen, and pelvis was performed  following injection of IV contrast. Multiplanar reformats were  obtained. Dose reduction techniques were used.   CONTRAST: 90 mL of Isovue 370    COMPARISON: Chest CT on 11/3/2022    FINDINGS:   LUNGS AND PLEURA: Mild emphysema. Mild bibasilar dependent  atelectasis/infiltrate and trace bilateral pleural effusions.    MEDIASTINUM/AXILLAE: No lymphadenopathy. Right IJ central venous  catheter tip is in the low SVC. No filling defects in the central  pulmonary arteries. No aortic aneurysm or dissection. Severe coronary  artery calcifications. No pericardial effusion.     Diffuse circumferential esophageal wall thickening with surrounding  mediastinal stranding and fluid (series 2, image 34-77 and coronal  series 5, image 74-68).    HEPATOBILIARY: Normal.    PANCREAS: Normal.    SPLEEN: Normal.    ADRENAL GLANDS: Normal.    KIDNEYS/BLADDER: No calculi, hydronephrosis or  perinephric stranding.  Urinary bladder is decompressed with a Blanchard catheter.    BOWEL: There are a few mildly dilated loops of small bowel in the mid  and left abdomen (series 2, image 138) with bowel loops dilated up to  3.5 cm. A transition point is not identified. Distal loops of ileum  are decompressed and the terminal ileum is decompressed with  transition likely in the mid/right lower quadrant.     No colonic obstruction. Marked circumferential wall thickening of the  splenic flexure, descending colon and sigmoid colon. Left lower  quadrant large inguinal hernia containing a loop of the sigmoid colon  with marked wall thickening of the loop within the hernia (series 5,  image 56) and pinching/narrowing of the bowel loop at the hernia  mouth. No pneumatosis or extraluminal air.    Normal appendix.    PELVIC ORGANS: Atrophic prostate gland.    ADDITIONAL FINDINGS: No lymphadenopathy in the abdomen and pelvis. No  abdominal aortic aneurysm. Trace free fluid in the left lower  quadrant. No abscess or free air.    MUSCULOSKELETAL: Stable mild wedge compression deformity T7, T9, T10,  T11, T12 and L1 vertebral. No acute-appearing fractures. No  destructive lesions of the bones.      Impression    IMPRESSION:  1.  Marked circumferential wall thickening of the splenic flexure,  descending colon and sigmoid colon may be due to acute colitis, either  infectious, inflammatory or ischemic.   2.  Large left inguinal hernia containing a loop of the sigmoid colon  with narrowing/pinched appearance of the sigmoid colon at the hernia  mouth and wall thickening of the sigmoid in the inguinal hernia.  Superimposed strangulation of this loop of colon is not excluded.  Consider surgical consultation.  3.  Mechanical small bowel obstruction appears to be a separate  process. Gradual transition to normal caliber small bowel loops in the  right abdomen.  4.  Marked circumferential wall thickening of the entire  esophagus,  suspicious for esophagitis.  5.  Bibasilar atelectasis/infiltrate and small bilateral pleural  effusions.    EMELY MARTINEZ MD         SYSTEM ID:  [id number]       COVID Status:  COVID-19 PCR Results    COVID-19 PCR Results 11/2/22 12/6/22   SARS CoV2 PCR Negative Negative      Comments are available for some flowsheets but are not being displayed.         COVID-19 Antibody Results, Testing for Immunity    COVID-19 Antibody Results, Testing for Immunity   No data to display.              Disclaimer: This note consists of symbols derived from keyboarding, dictation and/or voice recognition software. As a result, there may be errors in the script that have gone undetected. Please consider this when interpreting information found in this chart.

## 2022-12-11 NOTE — PLAN OF CARE
Goal Outcome Evaluation:      Plan of Care Reviewed With: patient, family    Overall Patient Progress: declining Overall Patient Progress: declining    ICU End of Shift Summary.  For vital signs and complete assessments, please see documentation flowsheets.      Pertinent assessments: Pt more confused today and only oriented to self. Less restless and more lethargic today. Pt became obtunded and minimally responsive to pain around 1620 when pt returned from x-rays. MD notified and code status was re-discussed with family. Family decided to make pt DNR/DNI at this time. Continuing supportive cares.  PO medication held while pt is lethargic/obtunded, see MAR for details. HR sinus tach with frequent PACs and PVCs vs a-fib this shift. BP has been labile, no PRN interventions needed. Remains on heparin gtt. Was given 1 L NS over 5 hrs per cards.  Continues pursed lip breathing, on NC 2-5 L to maintain sats in the 90s. No BP, remains on full liquid diet, only ate a few bites of cream of wheat for breakfast. Hendrix and R internal jugular removed. No UO since hendrix removed, bladder scanned for 30mL.     Major Shift Events: Hendrix and CVC removed. Rhythm changed. Code status changed to DNR/DNI. Pt declining, family aware and supportive. Nohemi and staff provided emotional support to family.     Plan (Upcoming Events): Continue pain control measures and monitoring  Discharge/Transfer Needs: TBD

## 2022-12-11 NOTE — PLAN OF CARE
ICU End of Shift Summary.  For vital signs and complete assessments, please see documentation flowsheets.      Pertinent assessments:   Neuro: confused and lethargic @ beginning of shift. Pt. Becoming more alert and disoriented only to situation towards end of shift. Pt. States pain with turning left and right - scheduled dilaudid given.  Cardiac: ST 110s- 120s. HTN. Afebrile.  Resp: LS dim/exp. Wheezes. NC 3L. O2 sat maintained above 92%. Infrequent nonproductive cough.   GI: No BM during shift. Tolerating full liquid diet while alert and awake.   : incont. X4.   Skin: see flowsheets for details.  Lines: PIV x2  Drips: hep gtt    Major Shift Events:   PRN hydralazine given.  Due to being lethargic, pt. Unable to take evening medications.   Pt. More alert - drank water, ice cream ate 25% and brushed teeth.     Plan (Upcoming Events): Pain control, and monitoring mentation.  Discharge/Transfer Needs: TBD     Bedside Shift Report Completed : Y  Bedside Safety Check Completed: Y

## 2022-12-11 NOTE — PROGRESS NOTES
"[facility]   General Surgery Progress Note           Assessment and Plan:   Assessment:   Admission for shock/syncope  Large left inguinal hernia      Plan:   -continue non-operative management for left inguinal hernia     Liliane Stringer MD        Interval History:   Patient is pleasant, comfortable, confused. Laying flat in bed.  Denies abd pain, groin pain, or nausea today. Tolerating full liquid diet, passing flatus, no BM         Physical Exam:   Blood pressure (!) 158/92, pulse 116, temperature 99.6  F (37.6  C), temperature source Axillary, resp. rate 21, height 1.6 m (5' 3\"), weight 99.4 kg (219 lb 2.2 oz), SpO2 95 %.    I/O last 3 completed shifts:  In: 1544.01 [P.O.:120; I.V.:424.01; IV Piggyback:1000]  Out: 80 [Urine:80]    Abdomen:   Soft, rounded, non-tender. Left inguinal hernia mildly tender with deep palpation. Partially reducible (can hear bowel sounds with attempted reduction).          Data:   Blood culture:  Results for orders placed or performed during the hospital encounter of 12/06/22   Blood Culture Peripheral Blood    Specimen: Peripheral Blood   Result Value Ref Range    Culture No growth after 4 days    Blood Culture Arm, Left    Specimen: Arm, Left; Blood   Result Value Ref Range    Culture No growth after 4 days       Urine culture:  No results found for this or any previous visit.  Recent Labs   Lab 12/11/22  0510 12/10/22  0555 12/09/22  0541   WBC 5.2 6.2 6.2   HGB 10.1* 9.9* 10.2*   HCT 32.5* 31.8* 31.2*   * 112* 108*    249 265       Liliane Stringer MD     Seen and agree,    Nelson Ruiz MD  [facility]    "

## 2022-12-12 LAB
ANION GAP SERPL CALCULATED.3IONS-SCNC: 6 MMOL/L (ref 7–15)
BASE EXCESS BLDV CALC-SCNC: 11 MMOL/L (ref -7.7–1.9)
BUN SERPL-MCNC: 28.1 MG/DL (ref 8–23)
CALCIUM SERPL-MCNC: 9.5 MG/DL (ref 8.8–10.2)
CHLORIDE SERPL-SCNC: 104 MMOL/L (ref 98–107)
CREAT SERPL-MCNC: 0.58 MG/DL (ref 0.67–1.17)
DEPRECATED HCO3 PLAS-SCNC: 35 MMOL/L (ref 22–29)
ERYTHROCYTE [DISTWIDTH] IN BLOOD BY AUTOMATED COUNT: 14 % (ref 10–15)
GFR SERPL CREATININE-BSD FRML MDRD: >90 ML/MIN/1.73M2
GLUCOSE BLDC GLUCOMTR-MCNC: 100 MG/DL (ref 70–99)
GLUCOSE SERPL-MCNC: 114 MG/DL (ref 70–99)
HCO3 BLDV-SCNC: 38 MMOL/L (ref 21–28)
HCT VFR BLD AUTO: 35.1 % (ref 40–53)
HGB BLD-MCNC: 11 G/DL (ref 13.3–17.7)
MCH RBC QN AUTO: 34.7 PG (ref 26.5–33)
MCHC RBC AUTO-ENTMCNC: 31.3 G/DL (ref 31.5–36.5)
MCV RBC AUTO: 111 FL (ref 78–100)
O2/TOTAL GAS SETTING VFR VENT: 4 %
PCO2 BLDV: 63 MM HG (ref 40–50)
PH BLDV: 7.39 [PH] (ref 7.32–7.43)
PLATELET # BLD AUTO: 270 10E3/UL (ref 150–450)
PO2 BLDV: 39 MM HG (ref 25–47)
POTASSIUM SERPL-SCNC: 3.8 MMOL/L (ref 3.4–5.3)
RBC # BLD AUTO: 3.17 10E6/UL (ref 4.4–5.9)
SODIUM SERPL-SCNC: 145 MMOL/L (ref 136–145)
UFH PPP CHRO-ACNC: 0.39 IU/ML
WBC # BLD AUTO: 6.8 10E3/UL (ref 4–11)

## 2022-12-12 PROCEDURE — 250N000012 HC RX MED GY IP 250 OP 636 PS 637: Performed by: INTERNAL MEDICINE

## 2022-12-12 PROCEDURE — 99232 SBSQ HOSP IP/OBS MODERATE 35: CPT | Performed by: INTERNAL MEDICINE

## 2022-12-12 PROCEDURE — 80048 BASIC METABOLIC PNL TOTAL CA: CPT | Performed by: INTERNAL MEDICINE

## 2022-12-12 PROCEDURE — 250N000013 HC RX MED GY IP 250 OP 250 PS 637: Performed by: INTERNAL MEDICINE

## 2022-12-12 PROCEDURE — 36415 COLL VENOUS BLD VENIPUNCTURE: CPT | Performed by: INTERNAL MEDICINE

## 2022-12-12 PROCEDURE — C9113 INJ PANTOPRAZOLE SODIUM, VIA: HCPCS | Performed by: INTERNAL MEDICINE

## 2022-12-12 PROCEDURE — 999N000157 HC STATISTIC RCP TIME EA 10 MIN

## 2022-12-12 PROCEDURE — 94640 AIRWAY INHALATION TREATMENT: CPT

## 2022-12-12 PROCEDURE — 250N000013 HC RX MED GY IP 250 OP 250 PS 637: Performed by: CLINICAL NURSE SPECIALIST

## 2022-12-12 PROCEDURE — 250N000013 HC RX MED GY IP 250 OP 250 PS 637: Performed by: NURSE PRACTITIONER

## 2022-12-12 PROCEDURE — 99231 SBSQ HOSP IP/OBS SF/LOW 25: CPT | Performed by: INTERNAL MEDICINE

## 2022-12-12 PROCEDURE — 250N000011 HC RX IP 250 OP 636: Performed by: INTERNAL MEDICINE

## 2022-12-12 PROCEDURE — 200N000001 HC R&B ICU

## 2022-12-12 PROCEDURE — 82803 BLOOD GASES ANY COMBINATION: CPT | Performed by: INTERNAL MEDICINE

## 2022-12-12 PROCEDURE — 85027 COMPLETE CBC AUTOMATED: CPT | Performed by: INTERNAL MEDICINE

## 2022-12-12 PROCEDURE — 250N000009 HC RX 250: Performed by: INTERNAL MEDICINE

## 2022-12-12 PROCEDURE — 94640 AIRWAY INHALATION TREATMENT: CPT | Mod: 76

## 2022-12-12 PROCEDURE — 85520 HEPARIN ASSAY: CPT | Performed by: INTERNAL MEDICINE

## 2022-12-12 PROCEDURE — 99231 SBSQ HOSP IP/OBS SF/LOW 25: CPT | Performed by: SURGERY

## 2022-12-12 PROCEDURE — 99233 SBSQ HOSP IP/OBS HIGH 50: CPT | Performed by: INTERNAL MEDICINE

## 2022-12-12 RX ORDER — METOPROLOL TARTRATE 100 MG
100 TABLET ORAL 2 TIMES DAILY
Status: DISCONTINUED | OUTPATIENT
Start: 2022-12-12 | End: 2022-12-14

## 2022-12-12 RX ADMIN — METOPROLOL TARTRATE 75 MG: 50 TABLET, FILM COATED ORAL at 09:29

## 2022-12-12 RX ADMIN — LEVALBUTEROL HYDROCHLORIDE 0.63 MG: 0.63 SOLUTION RESPIRATORY (INHALATION) at 20:38

## 2022-12-12 RX ADMIN — HEPARIN SODIUM 1350 UNITS/HR: 10000 INJECTION, SOLUTION INTRAVENOUS at 09:55

## 2022-12-12 RX ADMIN — ATORVASTATIN CALCIUM 40 MG: 40 TABLET, FILM COATED ORAL at 21:55

## 2022-12-12 RX ADMIN — QUETIAPINE FUMARATE 50 MG: 50 TABLET ORAL at 09:29

## 2022-12-12 RX ADMIN — Medication 50 MCG: at 09:30

## 2022-12-12 RX ADMIN — LISINOPRIL 10 MG: 10 TABLET ORAL at 21:49

## 2022-12-12 RX ADMIN — Medication: at 22:00

## 2022-12-12 RX ADMIN — LEVALBUTEROL HYDROCHLORIDE 0.63 MG: 0.63 SOLUTION RESPIRATORY (INHALATION) at 15:37

## 2022-12-12 RX ADMIN — PREDNISOLONE ACETATE 1 DROP: 10 SUSPENSION/ DROPS OPHTHALMIC at 18:33

## 2022-12-12 RX ADMIN — DICLOFENAC SODIUM 4 G: 10 GEL TOPICAL at 13:10

## 2022-12-12 RX ADMIN — ROPINIROLE HYDROCHLORIDE 0.25 MG: 0.25 TABLET, FILM COATED ORAL at 22:11

## 2022-12-12 RX ADMIN — TAZOBACTAM SODIUM AND PIPERACILLIN SODIUM 3.38 G: 375; 3 INJECTION, SOLUTION INTRAVENOUS at 09:54

## 2022-12-12 RX ADMIN — DICLOFENAC SODIUM 4 G: 10 GEL TOPICAL at 22:00

## 2022-12-12 RX ADMIN — HYDROMORPHONE HYDROCHLORIDE 2 MG: 2 TABLET ORAL at 21:50

## 2022-12-12 RX ADMIN — CALCIUM 1000 MG: 500 TABLET ORAL at 09:29

## 2022-12-12 RX ADMIN — MICONAZOLE NITRATE: 20 POWDER TOPICAL at 21:58

## 2022-12-12 RX ADMIN — Medication 250 MG: at 21:54

## 2022-12-12 RX ADMIN — PANTOPRAZOLE SODIUM 40 MG: 40 INJECTION, POWDER, FOR SOLUTION INTRAVENOUS at 21:46

## 2022-12-12 RX ADMIN — ROPINIROLE HYDROCHLORIDE 0.25 MG: 0.25 TABLET, FILM COATED ORAL at 18:31

## 2022-12-12 RX ADMIN — ROPINIROLE HYDROCHLORIDE 0.25 MG: 0.25 TABLET, FILM COATED ORAL at 09:29

## 2022-12-12 RX ADMIN — LEVALBUTEROL HYDROCHLORIDE 0.63 MG: 0.63 SOLUTION RESPIRATORY (INHALATION) at 11:28

## 2022-12-12 RX ADMIN — PREDNISONE 40 MG: 20 TABLET ORAL at 09:30

## 2022-12-12 RX ADMIN — QUETIAPINE FUMARATE 50 MG: 50 TABLET ORAL at 21:52

## 2022-12-12 RX ADMIN — MICONAZOLE NITRATE: 20 POWDER TOPICAL at 11:47

## 2022-12-12 RX ADMIN — ATROPINE SULFATE 1 DROP: 10 SOLUTION/ DROPS OPHTHALMIC at 21:57

## 2022-12-12 RX ADMIN — HYDROMORPHONE HYDROCHLORIDE 2 MG: 2 TABLET ORAL at 11:04

## 2022-12-12 RX ADMIN — LIDOCAINE 2 PATCH: 246 PATCH TOPICAL at 21:59

## 2022-12-12 RX ADMIN — POLYETHYLENE GLYCOL 3350 17 G: 17 POWDER, FOR SOLUTION ORAL at 09:40

## 2022-12-12 RX ADMIN — LEVALBUTEROL HYDROCHLORIDE 0.63 MG: 0.63 SOLUTION RESPIRATORY (INHALATION) at 08:31

## 2022-12-12 RX ADMIN — METOPROLOL TARTRATE 100 MG: 100 TABLET, FILM COATED ORAL at 21:52

## 2022-12-12 RX ADMIN — PREDNISOLONE ACETATE 1 DROP: 10 SUSPENSION/ DROPS OPHTHALMIC at 21:48

## 2022-12-12 RX ADMIN — Medication 250 MG: at 09:29

## 2022-12-12 RX ADMIN — LEVALBUTEROL HYDROCHLORIDE 0.63 MG: 0.63 SOLUTION RESPIRATORY (INHALATION) at 08:30

## 2022-12-12 RX ADMIN — TAMSULOSIN HYDROCHLORIDE 0.4 MG: 0.4 CAPSULE ORAL at 22:12

## 2022-12-12 RX ADMIN — MULTIPLE VITAMINS W/ MINERALS TAB 1 TABLET: TAB at 09:29

## 2022-12-12 RX ADMIN — ACETAMINOPHEN 1000 MG: 500 TABLET, FILM COATED ORAL at 09:29

## 2022-12-12 RX ADMIN — ATROPINE SULFATE 1 DROP: 10 SOLUTION/ DROPS OPHTHALMIC at 09:49

## 2022-12-12 RX ADMIN — ASPIRIN 81 MG: 81 TABLET, COATED ORAL at 09:29

## 2022-12-12 RX ADMIN — Medication: at 13:10

## 2022-12-12 RX ADMIN — LISINOPRIL 10 MG: 10 TABLET ORAL at 09:30

## 2022-12-12 RX ADMIN — CYANOCOBALAMIN TAB 1000 MCG 1000 MCG: 1000 TAB at 09:30

## 2022-12-12 RX ADMIN — PREGABALIN 25 MG: 25 CAPSULE ORAL at 21:51

## 2022-12-12 RX ADMIN — PREDNISOLONE ACETATE 1 DROP: 10 SUSPENSION/ DROPS OPHTHALMIC at 09:51

## 2022-12-12 RX ADMIN — POLYETHYLENE GLYCOL 3350 17 G: 17 POWDER, FOR SOLUTION ORAL at 22:03

## 2022-12-12 RX ADMIN — HYDROMORPHONE HYDROCHLORIDE 2 MG: 2 TABLET ORAL at 03:44

## 2022-12-12 RX ADMIN — PREGABALIN 25 MG: 25 CAPSULE ORAL at 09:29

## 2022-12-12 RX ADMIN — SERTRALINE HYDROCHLORIDE 100 MG: 100 TABLET ORAL at 09:30

## 2022-12-12 RX ADMIN — TAZOBACTAM SODIUM AND PIPERACILLIN SODIUM 3.38 G: 375; 3 INJECTION, SOLUTION INTRAVENOUS at 03:44

## 2022-12-12 ASSESSMENT — ACTIVITIES OF DAILY LIVING (ADL)
ADLS_ACUITY_SCORE: 50
ADLS_ACUITY_SCORE: 47
ADLS_ACUITY_SCORE: 50
ADLS_ACUITY_SCORE: 47
ADLS_ACUITY_SCORE: 50
ADLS_ACUITY_SCORE: 47
ADLS_ACUITY_SCORE: 50
ADLS_ACUITY_SCORE: 47
ADLS_ACUITY_SCORE: 47

## 2022-12-12 NOTE — PROGRESS NOTES
ICU    78 yo male long time smoker admitted with encephalopathy, hypotension, NTEMI and non mechanical bowel obstruction.  Has been on antibiotics for 7 for possible pneumonia but no fever in many days and normal WBC.  CT of lungs show more edema than focal infiltrate.  On nasal cannula with some wheezes.  On prednisone 40 mg daily.  Taking just water but some stool output but has abdominal distension with tympany but not tender.   Eventually will need angiogram but patient not consentable so will have to get consent from brother Nico.  Intermittent a fib on iv heparin    Has bruise along right face but globe is normal and no drainage.      P:  Stop antibiotics  Continue prednisone 40 mg  Nebs  Heart cath when Nico is agreeable.

## 2022-12-12 NOTE — PLAN OF CARE
Goal Outcome Evaluation:      Plan of Care Reviewed With: patient, family    Overall Patient Progress: improving Overall Patient Progress: improving    ICU End of Shift Summary.  For vital signs and complete assessments, please see documentation flowsheets.      Pertinent assessments: Pt alert and oriented and remains confused. Inconsistently oriented to place and time. Increased confusion and agitation as shift progressed, given haldol x1 with slight improvement. Remains sinus tach, EKG done to R/O a-fib. BP elevated this afternoon, hydralazine given x1. Remains on 3 l NC. Increased SOB and wheezes this afternoon. Adequate unmeasured UO. Remains on full liquid diet. Upper ABD distended and painful to palpation. Given miralax and milk of magnesia, 1 small BM this evening and lots of gas. Declined food all day. Heparin gtt infusing at 1350 Xa in the AM. Given 1 L NS bolus per cards.     Major Shift Events: Bowel meds given, BM x1. CT Chest/ABD/Pelvis to RO PE. Increased SOB.      Plan (Upcoming Events): Pain control, monitor HR and BP.  Discharge/Transfer Needs: TBD

## 2022-12-12 NOTE — PROGRESS NOTES
"[facility]   General Surgery Progress Note           Assessment and Plan:   Assessment:   Admission for shock/syncope  Large left inguinal hernia      Plan:   -continue full liquid diet until bloating improved and + BM.  -continue non-operative management for left inguinal hernia         Interval History:   Patient is pleasant, comfortable, confused. Laying flat in bed.  Denies abd pain, groin pain, or nausea today. Poor appetite. Tolerating full liquid diet, passing flatus, no BM         Physical Exam:   Blood pressure (!) 142/82, pulse 107, temperature 98.8  F (37.1  C), temperature source Oral, resp. rate 22, height 1.6 m (5' 3\"), weight 102.6 kg (226 lb 3.1 oz), SpO2 97 %.    I/O last 3 completed shifts:  In: 1124 [P.O.:600; I.V.:524]  Out: -     Abdomen:   Soft, rounded, non-tender. Normal bowel sounds. Left inguinal hernia mildly tender with deep palpation. Partially reducible.          Data:   Blood culture:  Results for orders placed or performed during the hospital encounter of 12/06/22   Blood Culture Peripheral Blood    Specimen: Peripheral Blood   Result Value Ref Range    Culture No Growth    Blood Culture Arm, Left    Specimen: Arm, Left; Blood   Result Value Ref Range    Culture No Growth       Urine culture:  No results found for this or any previous visit.  Recent Labs   Lab 12/12/22  0518 12/11/22  0510 12/10/22  0555   WBC 6.8 5.2 6.2   HGB 11.0* 10.1* 9.9*   HCT 35.1* 32.5* 31.8*   * 111* 112*    254 249       Nehal Valles PA-C     Seen and agree,    Nelson Ruiz MD  [facility]      "

## 2022-12-12 NOTE — PLAN OF CARE
Pt alert, oriented to self and place.  BP elevated, despite HS BP meds, PRN hydralazine given with improvement.  Tachycardia.  On 2 LPM, per NC, abdominal breathing, VILLAR, LS Cl/Dim.  Abdomen distended and firm.  Had watery BM tonight.  Incontinent of urine and stool.  Scattered bruises. Heparin infusing.

## 2022-12-12 NOTE — PROGRESS NOTES
[facility]  Hospitalist Progress Note  Tino Pablo M.D., M.B.A.   12/12/2022    Reason for Stay/active problem list      Acute encephalopathy    Hypotension-likely from hypovolemia    Non- ST segment elevation MI    Dehydration     Acute on chronic back pain    Suspected acute colitis    Large left inguinal hernia, concern for mechanical bowel obstruction    Abnormally marked circumferential wall thickening of the entire esophagus suspicious for esophagitis         Assessment and Plan:        Summary of Stay:     Pacheco Torres is a 77 year old male with a history of htn/hlp, remote hx of CAD s/p DAYAN to the LAD-most recent echo performed in the ER with hyperdynamic EF 75 % and G1dd, hx of prostate cancer from earlier this year treated with XRT through [place], macrocytic anemia with hgb in the 11-12 range (normal B12 and folate), monoclonal gammopathy (no recent documentation of evaluation), PMR, obesity, hx of tob abuse  admitted on 12/6/2022 with hypovolemic shock of unclear etiology     He believes he passed out either the day prior or the day of admission.  Unable to fill in any details. States he hasn't been sleeping well but denies and n/v/d.  States po intake has been well. No f/c/s.  No epistaxis/bloody gums/stools or emesis.      Discussed with  Carlos Enrique Goldberg who states her hasn't been eating well for at least 6 weeks, she thinks his fluid intake is ok.  She reports that he's had some nausea but no vomiting.  No diarrhea in fact she thinks he may be constipated.      In the ER BPs in the 60's/40's with tachycardia and tachypnea, he was hypothermic at 95.4, he was given 2 L of NS without improvement and so started on NE via Right IJ.       EKG with ST elevation throughout septal/anterior leads-cards was contacted and the story just didn't seem to fit- he was without any CP or anginal equivalents. Initial trop 29. He was placed on heparin and stat echo completed showing hyperdynamic  cardiac function.     troponins continue to rise 29->222-493 but EKG has normalized    CMP with AG 2/2 elevated lactic acid with respiratory compensation.    Lactates 5.4->1.4 after IVF/NE  CBC with stable macrocytic anemia, no leukocytosis  procal 0.15     CXR with pulmonary edema  CT Ao survey-diffuse CAD, Left inguinal canal hernia containing loops of the distal colon     He was covered with pip-tazo/vanco for septic shock of unclear etiology        Problem List with Assessment and Plan:    Shock/syncope  Acute colitis on CT imaging  --  Given initial EKG would certainly fit a CV shock picture although echo with hyperdynamic function.  -- Suspected combination of hypovolemia and sepsis.  --Hypotension resolved with IV fluid resuscitation and transient norepinephrine  --CT of chest abdomen pelvis obtained and December 7 showed evidence of colitis and esophagitis.  --He is afebrile.  Tachycardia much better with beta-blocker.  CT of the chest abdomen pelvis showed no acute PE, unchanged small bilateral pleural effusions and bilateral groundglass opacities.  Diffuse small and large bowel dilatation increased from December 7.  No specific evidence of small bowel obstruction.  Patient is having bowel movements.  Surgery is following.  Continue to monitor.  Diet per surgery team    Acute toxic metabolic metabolic encephalopathy   -- Likely combination of toxic metabolic and infectious encephalopathy.  -- Patient could have no underlying cognitive deficit.  Chronic insomnia due to back pain issues.  --Patient was agitated on December 8 and required Precedex drip.  -- Currently he  is alert , oriented times 2-3 , calm appear  to be confused at times.  We will  Continue scheduled  Seroquel, continue Seroquel as needed. zyprexa as needed , removed hendrix     NSTEMI , sinus tachycardia  -- trops 29->222->493  Diffuse CAD noted on CT Ao survey.    --No CP and echo with hyperdynamic function.   -- Patient was treated with  heparin and cardiology was consulted.  -- Patient was seen by cardiology and initially recommended coronary angiogram but patient was confused unable to provide consent and lay still for the procedure.  Coronary angiogram plan was consulted and patient was treated with heparin, started on aspirin.  -- Has been tachycardic.  Discussed with cardiology team and beta-blocker was titrated.  Further imaging studies ordered as above.  Heart rate is improving.  Cardiology planning for coronary angiogram noted.  Will discuss with cardiology if angiogram can be postponed.    CAD  Htn/hlp   --pta on asa 81 mg/lisinopril 10 mg every day/simvastain 40  Mg  -Home medications resumed       Large Left inguinal hernia with bowel contents-initial concern for small bowel obstruction.  -- CT on December 7 showed concern for bowel obstruction.  Patient had left lower quadrant abdominal tenderness but no rigidity or rebound tenderness.  Surgery was consulted and patient was seen and examined by Dr. Ruiz who recommended to continue to monitor for.  --No evidence of bowel obstruction, currently no complaint of abdominal pain or ischemic bowel concern.  Continue to monitor.  Advance diet as tolerate     Macrocytic anemia-MGUS  --Being worked up by PCP   --Currently hemoglobin is 11.0.Positive stool.  Esophageal thickening noted on CT scan.  GI consulted and recommendation obtained for no intervention at this time.       Incidental pulmonary nodules  --Given heavy smoking history will need repeat CT in 3-6 months    Dehydration  --Patient has evidence of dehydration with very dry tongue and buccal mucosa  Decreased intake PTA   --Advance diet as tolerated, encourage oral fluids, IVF today     Acute on chronic back pain-history of prostate cancer with recent treatment with radiation therapy, follows with [facility]  --Severe acute on chronic back pain requiring narcotic medications.  History of prostate cancer.  -- Patient was seen by pain  management team.  Pain medications recommended.  Continue current pain regimen-scheduled Tylenol 1 g every 8 hours, Voltaren gel 4 g 4 times daily, lidocaine patch, menthol, Lyrica.  As needed Dilaudid 2 -4 mg every 3 hours as needed.  Improved with scheduled Dilaudid   Patient may need MRI study of his thoracic and lumbar spine for evaluation of compression fractures and pathology and is more stable    Abnormal CT with concern for esophagitis and colitis   -- GI consulted, recommendation noted    Chronic medical problems  Depression/gerd/prostate ca/pmr/monoclonal gammopathy   -- Continue pta sertraline     COVID 19  Negative  S/p 5 shot moderna series 9/2022 (biv)    Acute hypoxic respiratory failure  -- Patient is a smoker, has increased work of breathing and hypoxic requiring supplemental oxygen.  -- He has evidence of emphysema on CT scan . Mild acute exacerbation of COPD suspected  -- Patient was treated with DuoNeb, supplemental oxygen, steroid.  -- Currently he is wheezing, hypoxia is improving, continue to monitor on current regimen including steroid    Community-acquired pneumonia-new left lung infiltrate on chest x-ray on December 8  -- Already on Zosyn day #7 .  Afebrile, may stop antibiotic    Restless leg syndrome  --Requip started this admission    Plan for today:  --Continue to monitor.  May transfer to the floor.  -- We will discuss with cardiology if coronary angiogram can be postponed.  -- Care plan discussed with Dr. Martin      VTE Prophylaxis: Heparin    Code Status: No CPR- Do NOT Intubate     Diet: Snacks/Supplements Adult: Ensure Max Protein (bariatric); With Meals  Full Liquid Diet    Blanchard Catheter: Not present    Family updated today: Yes  significant other and brother Nico.      Disposition: May transfer to the floor, telemetry        Interval History (Subjective):        Patient is seen and examined by me today and medical record reviewed.Overnight events noted and care discussed with  "nursing staff.he is feeling better today.  More comfortable but still having respiratory distress.  He is wheezing today.  Denies any chest pain.  No fevers or chills.  Had bowel movement       Physical Exam:        Last Vital Signs:  BP (!) 142/82   Pulse 107   Temp 98.8  F (37.1  C) (Oral)   Resp 22   Ht 1.6 m (5' 3\")   Wt 102.6 kg (226 lb 3.1 oz)   SpO2 97%   BMI 40.07 kg/m      I/O last 3 completed shifts:  In: 1124 [P.O.:600; I.V.:524]  Out: -     Wt Readings from Last 5 Encounters:   12/12/22 102.6 kg (226 lb 3.1 oz)   04/03/12 97.1 kg (214 lb)        Constitutional:  Awake, alert but restless and occasionally confused.     Respiratory:  Increased work of breathing, diffuse rhonchi.  No wheezing.   Cardiovascular: Regular rate and rhythm, normal S1 and S2, and no murmur noted   Abdomen: Normal bowel sounds, soft, non-distended, left lower quadrant abdominal tenderness.  No rebound tenderness, rigidity or guarding.   Skin: No new rashes, no cyanosis, dry to touch   Neuro: Alert with  no new focal weakness   Extremities: No edema   Other(s):        All other systems: egative          Medications:        All current medications were reviewed with changes reflected in problem list.         Data:      All new lab and imaging data was reviewed.      Data reviewed today: I reviewed all new labs and imaging results over the last 24 hours. I personally reviewed       Recent Labs   Lab 12/12/22 0518 12/11/22  0510 12/10/22  0555   WBC 6.8 5.2 6.2   HGB 11.0* 10.1* 9.9*   HCT 35.1* 32.5* 31.8*   * 111* 112*    254 249     No results for input(s): CULT in the last 168 hours.  Recent Labs   Lab 12/12/22 0518 12/11/22  2013 12/11/22  1612 12/11/22  0910 12/11/22  0510 12/10/22  0808 12/10/22  0555 12/06/22  2151 12/06/22  1421     --   --   --  144  --  142   < > 132*   POTASSIUM 3.8  --   --   --  3.4  --  3.5   < > 3.4   CHLORIDE 104  --   --   --  105  --  102   < > 91*   CO2 35*  --   --   " --  32*  --  32*   < > 25   ANIONGAP 6*  --   --   --  7  --  8   < > 16*   * 117* 143*   < > 109*   < > 119*   < > 193*   BUN 28.1*  --   --   --  29.7*  --  23.8*   < > 17.2   CR 0.58*  --   --   --  0.64*  --  0.59*   < > 0.95   GFRESTIMATED >90  --   --   --  >90  --  >90   < > 83   TAVO 9.5  --   --   --  9.6  --  9.8   < > 10.5*   MAG  --   --   --   --  2.0  --   --   --  2.3   PROTTOTAL  --   --   --   --   --   --   --   --  6.7   ALBUMIN  --   --   --   --   --   --   --   --  3.7   BILITOTAL  --   --   --   --   --   --   --   --  0.3   ALKPHOS  --   --   --   --   --   --   --   --  147*   AST  --   --   --   --   --   --   --   --  78*   ALT  --   --   --   --   --   --   --   --  17    < > = values in this interval not displayed.       Recent Labs   Lab 12/12/22  0518 12/11/22 2013 12/11/22  1612 12/11/22  1159 12/11/22  0910   * 117* 143* 145* 102*       Recent Labs   Lab 12/06/22  1421   INR 1.03         No results for input(s): TROPONIN, TROPI, TROPR in the last 168 hours.    Invalid input(s): TROP, TROPONINIES    Recent Results (from the past 48 hour(s))   XR Chest Port 1 View    Narrative    CHEST PORTABLE ONE VIEW December 6, 2022 2:39 PM     HISTORY: STEMI. Altered mental status. Chest pain.    COMPARISON: 11/2/2022.      Impression    IMPRESSION: Hypoinflated lungs and cardiomegaly. Mild bilateral  vascular congestion appears increased. Correlate with any evidence of  developing pulmonary edema.    HAL BOGGS MD         SYSTEM ID:  [id number]   CT Head w/o Contrast    Narrative    CT SCAN OF THE HEAD WITHOUT CONTRAST December 6, 2022 3:07 PM     HISTORY: Altered mental status.    TECHNIQUE: Axial images of the head and coronal reformations without  IV contrast material. Radiation dose for this scan was reduced using  automated exposure control, adjustment of the mA and/or kV according  to patient size, or iterative reconstruction technique.    COMPARISON: None.      Impression     IMPRESSION: Mild motion artifact. No evidence of hemorrhage. Volume  loss and patchy areas of white matter hypoattenuation which likely  represent chronic small vessel ischemic change. Small area of focal  hypoattenuation within the central medulla, presumably artifactual  (brainstem infarct not excluded, MRI can be performed for basal  ganglia and thalamic lacunar infarcts, presumably chronic. No acute  osseous abnormality. Nonspecific right facial soft tissue swelling,  potentially contusion.    EVELYN VELAZQUEZ MD         SYSTEM ID:  [id number]   CT Aortic Survey w Contrast    Narrative    CT AORTIC SURVEY WITH CONTRAST  12/6/2022 3:07 PM    HISTORY:  Back pain, shock, wide mediastinum on x-ray.    TECHNIQUE: CT scan obtained of the chest, abdomen, and pelvis with IV  contrast. Post contrast scanning performed during the arterial phase.  CT chest also obtained without IV contrast. 80 mL Isovue-370 IV  injected. Sagittal and coronal reformatted images acquired from the  source post contrast data. Radiation dose for this scan was reduced  using automated exposure control, adjustment of the mA and/or kV  according to patient size, or iterative reconstruction technique.    COMPARISON:  None.    FINDINGS:  Thoracic and abdominal aorta: No dissection involving the thoracic or  abdominal aorta. Patency of the main branch vessels from the thoracic  and abdominal aorta. Scattered areas of calcified atherosclerotic  disease noted. Atherosclerotic stenosis at the origins of the celiac  and superior mesenteric arteries but there is distal perfusion. No  periaortic hematoma or aneurysm.    Other vascular: Severe coronary artery calcifications.    Chest: No adenopathy or acute mediastinal abnormality. No acute  mediastinal fluid collection. Patchy atelectasis at the posterior  right lower lobe. No effusions. No pneumothorax. Stable nodule  posterolateral left upper lobe measuring 0.3 cm, series 6 image 60.    Abdomen/pelvis:  Liver, gallbladder, adrenals, spleen, pancreas, and  kidneys do not show any acute abnormalities. Left inguinal hernia  measures 9.1 x 5.8 cm, series 5 image 265. Herniated loops of the  distal descending colon and sigmoid within the hernia. No upstream  obstruction. No bowel inflammatory change. No enlarged lymph nodes. No  acute pelvic abnormality. Diffuse degenerative changes throughout the  spine. A degree of height loss involving multiple thoracic and lumbar  spine levels.      Impression    IMPRESSION:   1. No acute thoracic or abdominal aortic abnormality. No dissection.  Scattered areas of atherosclerotic disease identified. Atherosclerotic  stenosis at the origins of the celiac artery and superior mesenteric  artery.  2. Diffuse coronary artery calcifications.  3. No acute mediastinal abnormality is seen.  4. Stable pulmonary nodule on the left, see below for follow-up  imaging guidelines.   5. Left inguinal canal hernia containing herniated loops of the distal  descending colon and sigmoid. No upstream bowel obstruction. See above  for measurements.    Recommendations for one or multiple incidental lung nodules < 6mm:    Low risk patients: No routine follow-up.    High risk patients: Optional follow-up CT at 12 months; if  unchanged, no further follow-up.    *Low Risk: Minimal or absent history of smoking or other known risk  factors.  *Nonsolid (ground glass) or partly solid nodules may require longer  follow-up to exclude indolent adenocarcinoma.  *Recommendations based on Guidelines for the Management of Incidental  Pulmonary Nodules Detected at CT: From the Fleischner Society 2017,  Radiology 2017.    HAL BOGGS MD         SYSTEM ID:  [id number]   Echocardiogram Limited   Result Value    LVEF  75%    Narrative    [id number]  [id number]  [id number]  [id number]^ANGELIQUE^MINOO^[facility]  Echocardiography Laboratory  [address] [place] [address]     Name: BERTIN LYNCH  MRN:  [id number]  : 1945  Study Date: 2022 02:59 PM  Age: 77 yrs  Gender: Male  Patient Location: [facility]  Reason For Study: Chest Pain  Ordering Physician: MINOO MERINO  Performed By: Mariah Carranza RDCS     BSA: 2.1 m2  Height: 66 in  Weight: 220 lb  HR: 110  BP: 82/65 mmHg  ______________________________________________________________________________  Procedure  Limited Portable Echo Adult. Optison (NDC #[id number]) given intravenously.  (Emergent exam, abbreviated study performed).  ______________________________________________________________________________  Interpretation Summary     The visual ejection fraction is estimated at 75%.  Hyperdynamic left ventricular function  ER informed by phone that echo has been read. The study was technically  limited. Technically difficult, suboptimal study.  ______________________________________________________________________________  Left Ventricle  Grade I or early diastolic dysfunction. The visual ejection fraction is  estimated at 75%. Hyperdynamic left ventricular function.     Right Ventricle  The right ventricle is normal in size and function.     Atria  Normal left atrial size. Right atrial size is normal.     Mitral Valve  There is trace mitral regurgitation.     Tricuspid Valve  There is trace tricuspid regurgitation. Right ventricular systolic pressure  could not be approximated due to inadequate tricuspid regurgitation.     Aortic Valve  The aortic valve is trileaflet. There is mild trileaflet aortic sclerosis. No  aortic regurgitation is present. No hemodynamically significant valvular  aortic stenosis.     Pulmonic Valve  Normal pulmonic valve velocity.     Vessels  IVC diameter >2.1 cm collapsing <50% with sniff suggests a high RA pressure  estimated at 15 mmHg or greater.     Pericardium  There is no pericardial effusion.     Rhythm  The rhythm was sinus  tachycardia.  ______________________________________________________________________________  MMode/2D Measurements & Calculations     LA Volume (BP): 68.8 ml     Doppler Measurements & Calculations  MV E max dwayne: 93.3 cm/sec  MV A max dwayne: 121.6 cm/sec  MV E/A: 0.77  MV dec time: 0.12 sec     ______________________________________________________________________________  Report approved by: Meche English 12/06/2022 03:34 PM         XR Chest Port 1 View    Narrative    XR CHEST PORT 1 VIEW 12/6/2022 3:52 PM    HISTORY: CENTRAL LINE    COMPARISON: CT today      Impression    IMPRESSION: Right IJ central venous catheter tip in the low SVC. No  pneumothorax. Mild interstitial opacities in the lungs, could  represent pulmonary edema. No pleural effusion or pneumothorax.    EMELY MARTINEZ MD         SYSTEM ID:  [id number]   CT Chest/Abdomen/Pelvis w Contrast    Narrative    CT CHEST/ABDOMEN/PELVIS W CONTRAST 12/7/2022 1:00 PM    CLINICAL HISTORY: back pain, abdominal pain    TECHNIQUE: CT scan of the chest, abdomen, and pelvis was performed  following injection of IV contrast. Multiplanar reformats were  obtained. Dose reduction techniques were used.   CONTRAST: 90 mL of Isovue 370    COMPARISON: Chest CT on 11/3/2022    FINDINGS:   LUNGS AND PLEURA: Mild emphysema. Mild bibasilar dependent  atelectasis/infiltrate and trace bilateral pleural effusions.    MEDIASTINUM/AXILLAE: No lymphadenopathy. Right IJ central venous  catheter tip is in the low SVC. No filling defects in the central  pulmonary arteries. No aortic aneurysm or dissection. Severe coronary  artery calcifications. No pericardial effusion.     Diffuse circumferential esophageal wall thickening with surrounding  mediastinal stranding and fluid (series 2, image 34-77 and coronal  series 5, image 74-68).    HEPATOBILIARY: Normal.    PANCREAS: Normal.    SPLEEN: Normal.    ADRENAL GLANDS: Normal.    KIDNEYS/BLADDER: No calculi, hydronephrosis or  perinephric stranding.  Urinary bladder is decompressed with a Blanchard catheter.    BOWEL: There are a few mildly dilated loops of small bowel in the mid  and left abdomen (series 2, image 138) with bowel loops dilated up to  3.5 cm. A transition point is not identified. Distal loops of ileum  are decompressed and the terminal ileum is decompressed with  transition likely in the mid/right lower quadrant.     No colonic obstruction. Marked circumferential wall thickening of the  splenic flexure, descending colon and sigmoid colon. Left lower  quadrant large inguinal hernia containing a loop of the sigmoid colon  with marked wall thickening of the loop within the hernia (series 5,  image 56) and pinching/narrowing of the bowel loop at the hernia  mouth. No pneumatosis or extraluminal air.    Normal appendix.    PELVIC ORGANS: Atrophic prostate gland.    ADDITIONAL FINDINGS: No lymphadenopathy in the abdomen and pelvis. No  abdominal aortic aneurysm. Trace free fluid in the left lower  quadrant. No abscess or free air.    MUSCULOSKELETAL: Stable mild wedge compression deformity T7, T9, T10,  T11, T12 and L1 vertebral. No acute-appearing fractures. No  destructive lesions of the bones.      Impression    IMPRESSION:  1.  Marked circumferential wall thickening of the splenic flexure,  descending colon and sigmoid colon may be due to acute colitis, either  infectious, inflammatory or ischemic.   2.  Large left inguinal hernia containing a loop of the sigmoid colon  with narrowing/pinched appearance of the sigmoid colon at the hernia  mouth and wall thickening of the sigmoid in the inguinal hernia.  Superimposed strangulation of this loop of colon is not excluded.  Consider surgical consultation.  3.  Mechanical small bowel obstruction appears to be a separate  process. Gradual transition to normal caliber small bowel loops in the  right abdomen.  4.  Marked circumferential wall thickening of the entire  esophagus,  suspicious for esophagitis.  5.  Bibasilar atelectasis/infiltrate and small bilateral pleural  effusions.    EMELY MARTINEZ MD         SYSTEM ID:  [id number]       COVID Status:  COVID-19 PCR Results    COVID-19 PCR Results 11/2/22 12/6/22   SARS CoV2 PCR Negative Negative      Comments are available for some flowsheets but are not being displayed.         COVID-19 Antibody Results, Testing for Immunity    COVID-19 Antibody Results, Testing for Immunity   No data to display.              Disclaimer: This note consists of symbols derived from keyboarding, dictation and/or voice recognition software. As a result, there may be errors in the script that have gone undetected. Please consider this when interpreting information found in this chart.

## 2022-12-12 NOTE — PROGRESS NOTES
Cardiology Progress Note          Assessment and Plan:     78 yo male with  Acute encephalopathy, colitis, hypotension, lactic acidosis, type II NSTEMI, CAD, ongoing tobacco abuse, macrocytic anemia and rifht facial hematoma/cellulitis  1.Type II MI- ST elevation upon presentation with hypotension, known CAD, trop peak 1,047. No RWMA on echo EF 75%, recommend coronary angiography.  Recommend dual antiplatelet Rx for one year. Patient is still cognitively impaired with no clear POA. Need to establish medical POA to discuss informed consent  2. Right facial hematoma- indurated, warm, tender  Recommend demarcating to monitor for progression. Consider ophthhamology consult  3. Hypotension-resolved, now hypertensive- increase BB  4. hyperlipid- moderate intensity statin, continue  5. COPD- requiring supplemental O2  6. Tobacco abuse- counseled to quit  35Time spent in review of the chart, history and exam, education, clinical coordination of care and counselling.        Interval History:   Memory impairment, slurred speech, no acute events overnite                Medications:   I have reviewed this patient's current medications         Physical Exam:         Vital Sign Ranges  Temperature Temp  Av  F (37.2  C)  Min: 98.8  F (37.1  C)  Max: 99.1  F (37.3  C)   Blood pressure Systolic (24hrs), Av , Min:113 , Max:184        Diastolic (24hrs), Av, Min:70, Max:133      Pulse Pulse  Av.7  Min: 98  Max: 121   Respirations Resp  Av.8  Min: 15  Max: 49   Pulse oximetry SpO2  Av.6 %  Min: 88 %  Max: 98 %         Intake/Output Summary (Last 24 hours) at 2022 0949  Last data filed at 2022 0600  Gross per 24 hour   Intake 1097 ml   Output --   Net 1097 ml       Constitutional:   Audible prolonged expirium and wheezes     Skin:   Erythema induration and ecchymosis of right face near eye     Neck:   supple, symmetrical, trachea midline     Chest:   Normal Symmetry and no tenderness     Lungs:    Diminished BS and scattered wheezes     Cardiovascular:   normal S1 and S2     Extremities and Back:   No edema     Neurological:   No gross or focal neurologic abnormalities              Data:     Results for orders placed or performed during the hospital encounter of 12/06/22 (from the past 24 hour(s))   Glucose by meter   Result Value Ref Range    GLUCOSE BY METER POCT 145 (H) 70 - 99 mg/dL   EKG 12-lead, tracing only   Result Value Ref Range    Systolic Blood Pressure  mmHg    Diastolic Blood Pressure  mmHg    Ventricular Rate 114 BPM    Atrial Rate 114 BPM    MS Interval 136 ms    QRS Duration 70 ms     ms    QTc 394 ms    P Axis 20 degrees    R AXIS -11 degrees    T Axis 22 degrees    Interpretation ECG       Sinus tachycardia  Inferior infarct , age undetermined  Abnormal ECG  When compared with ECG of 10-DEC-2022 16:27, (unconfirmed)  Sinus rhythm has replaced Atrial fibrillation  Nonspecific T wave abnormality no longer evident in Anterolateral leads     Glucose by meter   Result Value Ref Range    GLUCOSE BY METER POCT 143 (H) 70 - 99 mg/dL   CT Chest/Abdomen/Pelvis w Contrast    Narrative    EXAM: CT CHEST/ABDOMEN/PELVIS W CONTRAST  LOCATION: [facility]  DATE/TIME: 12/11/2022 5:31 PM    INDICATION: sepsis ,r o PE  COMPARISON: 12/07/2022  TECHNIQUE: CT scan of the chest, abdomen, and pelvis was performed following injection of IV contrast. Multiplanar reformats were obtained. Dose reduction techniques were used.   CONTRAST: 80mL Isovue 370    FINDINGS:   LUNGS AND PLEURA: Bilateral groundglass opacities with more confluent airspace opacities at the lung bases, similar to prior. Small bilateral pleural effusions are unchanged.    MEDIASTINUM/AXILLAE: Suboptimal opacification of the segmental and subsegmental pulmonary artery branches, but there are no central filling defects. The pulmonary arteries are enlarged suggesting pulmonary artery hypertension. No enlarged thoracic lymph    nodes. No thoracic aortic aneurysm.    CORONARY ARTERY CALCIFICATION: Moderate.    HEPATOBILIARY: Normal.    PANCREAS: Normal.    SPLEEN: Normal.    ADRENAL GLANDS: Normal.    KIDNEYS/BLADDER: No significant mass, stone, or hydronephrosis. Gas within the bladder, presumably iatrogenic    BOWEL: Diffuse small bowel dilation, which has increased compared to 12/07/2022 2. No single abrupt transition point. The colon is also mildly distended, particularly the cecum and ascending colon. Gas and stool throughout the colon. No pneumoperitoneum.    LYMPH NODES: Normal.    VASCULATURE: No abdominal aortic aneurysm. Mild atherosclerotic calcification.    PELVIC ORGANS: Normal.    MUSCULOSKELETAL: Left inguinal hernia containing a short segment of colon, which is mildly thickened and with mild fat stranding, similar to prior. No aggressive or destructive osseous lesions. Degenerative changes in the spine with multilevel   compression deformities that are similar to prior.      Impression    IMPRESSION:  1.  No acute pulmonary embolism, although suboptimal opacification of the segmental and subsegmental branches. Enlarged pulmonary arteries suggestive of chronic pulmonary hypertension.    2.  Unchanged small bilateral pleural effusions and bilateral groundglass opacities, likely infectious or inflammatory.    3.  Diffuse small and large bowel dilation has increased compared to 12/07/2022, but no discrete transition point, suggesting systemic hypomotility. Unchanged left inguinal hernia containing a short segment of colon, which is mildly thickened and with   mild fat stranding.   Glucose by meter   Result Value Ref Range    GLUCOSE BY METER POCT 117 (H) 70 - 99 mg/dL   CBC with platelets   Result Value Ref Range    WBC Count 6.8 4.0 - 11.0 10e3/uL    RBC Count 3.17 (L) 4.40 - 5.90 10e6/uL    Hemoglobin 11.0 (L) 13.3 - 17.7 g/dL    Hematocrit 35.1 (L) 40.0 - 53.0 %     (H) 78 - 100 fL    MCH 34.7 (H) 26.5 - 33.0 pg     MCHC 31.3 (L) 31.5 - 36.5 g/dL    RDW 14.0 10.0 - 15.0 %    Platelet Count 270 150 - 450 10e3/uL   Heparin Unfractionated Anti Xa Level   Result Value Ref Range    Anti Xa Unfractionated Heparin 0.39 For Reference Range, See Comment IU/mL    Narrative    Therapeutic Range: UFH: 0.25-0.50 IU/mL for low intensity dosing,  0.30-0.70 IU/mL for high intensity dosing DVT and PE.  This test is not validated for other direct factor X inhibitors (e.g. rivaroxaban, apixaban, edoxaban, betrixaban, fondaparinux) and should not be used for monitoring of other medications.   Basic metabolic panel   Result Value Ref Range    Sodium 145 136 - 145 mmol/L    Potassium 3.8 3.4 - 5.3 mmol/L    Chloride 104 98 - 107 mmol/L    Carbon Dioxide (CO2) 35 (H) 22 - 29 mmol/L    Anion Gap 6 (L) 7 - 15 mmol/L    Urea Nitrogen 28.1 (H) 8.0 - 23.0 mg/dL    Creatinine 0.58 (L) 0.67 - 1.17 mg/dL    Calcium 9.5 8.8 - 10.2 mg/dL    Glucose 114 (H) 70 - 99 mg/dL    GFR Estimate >90 >60 mL/min/1.73m2

## 2022-12-13 ENCOUNTER — APPOINTMENT (OUTPATIENT)
Dept: MRI IMAGING | Facility: CLINIC | Age: 77
DRG: 871 | End: 2022-12-13
Attending: INTERNAL MEDICINE
Payer: COMMERCIAL

## 2022-12-13 LAB
ATRIAL RATE - MUSE: 114 BPM
ATRIAL RATE - MUSE: 117 BPM
ATRIAL RATE - MUSE: 130 BPM
DIASTOLIC BLOOD PRESSURE - MUSE: NORMAL MMHG
GLUCOSE BLDC GLUCOMTR-MCNC: 111 MG/DL (ref 70–99)
GLUCOSE BLDC GLUCOMTR-MCNC: 115 MG/DL (ref 70–99)
GLUCOSE BLDC GLUCOMTR-MCNC: 86 MG/DL (ref 70–99)
GLUCOSE BLDC GLUCOMTR-MCNC: 87 MG/DL (ref 70–99)
GLUCOSE BLDC GLUCOMTR-MCNC: 94 MG/DL (ref 70–99)
INTERPRETATION ECG - MUSE: NORMAL
P AXIS - MUSE: 20 DEGREES
P AXIS - MUSE: 50 DEGREES
P AXIS - MUSE: NORMAL DEGREES
PR INTERVAL - MUSE: 136 MS
PR INTERVAL - MUSE: 152 MS
PR INTERVAL - MUSE: NORMAL MS
QRS DURATION - MUSE: 70 MS
QT - MUSE: 270 MS
QT - MUSE: 286 MS
QT - MUSE: 302 MS
QTC - MUSE: 394 MS
QTC - MUSE: 406 MS
QTC - MUSE: 444 MS
R AXIS - MUSE: -11 DEGREES
R AXIS - MUSE: -11 DEGREES
R AXIS - MUSE: -18 DEGREES
SYSTOLIC BLOOD PRESSURE - MUSE: NORMAL MMHG
T AXIS - MUSE: 22 DEGREES
T AXIS - MUSE: 27 DEGREES
T AXIS - MUSE: 7 DEGREES
UFH PPP CHRO-ACNC: 0.42 IU/ML
VENTRICULAR RATE- MUSE: 114 BPM
VENTRICULAR RATE- MUSE: 130 BPM
VENTRICULAR RATE- MUSE: 136 BPM

## 2022-12-13 PROCEDURE — 99231 SBSQ HOSP IP/OBS SF/LOW 25: CPT | Performed by: SURGERY

## 2022-12-13 PROCEDURE — C9113 INJ PANTOPRAZOLE SODIUM, VIA: HCPCS | Performed by: INTERNAL MEDICINE

## 2022-12-13 PROCEDURE — 85520 HEPARIN ASSAY: CPT | Performed by: INTERNAL MEDICINE

## 2022-12-13 PROCEDURE — 250N000011 HC RX IP 250 OP 636: Performed by: INTERNAL MEDICINE

## 2022-12-13 PROCEDURE — 250N000013 HC RX MED GY IP 250 OP 250 PS 637: Performed by: INTERNAL MEDICINE

## 2022-12-13 PROCEDURE — 250N000009 HC RX 250: Performed by: INTERNAL MEDICINE

## 2022-12-13 PROCEDURE — 36415 COLL VENOUS BLD VENIPUNCTURE: CPT | Performed by: INTERNAL MEDICINE

## 2022-12-13 PROCEDURE — 250N000013 HC RX MED GY IP 250 OP 250 PS 637: Performed by: NURSE PRACTITIONER

## 2022-12-13 PROCEDURE — A9585 GADOBUTROL INJECTION: HCPCS | Performed by: INTERNAL MEDICINE

## 2022-12-13 PROCEDURE — 99232 SBSQ HOSP IP/OBS MODERATE 35: CPT | Performed by: INTERNAL MEDICINE

## 2022-12-13 PROCEDURE — 250N000013 HC RX MED GY IP 250 OP 250 PS 637: Performed by: CLINICAL NURSE SPECIALIST

## 2022-12-13 PROCEDURE — 250N000011 HC RX IP 250 OP 636: Performed by: HOSPITALIST

## 2022-12-13 PROCEDURE — 94640 AIRWAY INHALATION TREATMENT: CPT | Mod: 76

## 2022-12-13 PROCEDURE — 250N000012 HC RX MED GY IP 250 OP 636 PS 637: Performed by: INTERNAL MEDICINE

## 2022-12-13 PROCEDURE — 94640 AIRWAY INHALATION TREATMENT: CPT

## 2022-12-13 PROCEDURE — 70553 MRI BRAIN STEM W/O & W/DYE: CPT

## 2022-12-13 PROCEDURE — 255N000002 HC RX 255 OP 636: Performed by: INTERNAL MEDICINE

## 2022-12-13 PROCEDURE — 200N000001 HC R&B ICU

## 2022-12-13 PROCEDURE — 999N000157 HC STATISTIC RCP TIME EA 10 MIN

## 2022-12-13 RX ORDER — ACETAMINOPHEN 325 MG/1
650 TABLET ORAL EVERY 4 HOURS PRN
Status: DISCONTINUED | OUTPATIENT
Start: 2022-12-13 | End: 2022-12-13 | Stop reason: CLARIF

## 2022-12-13 RX ORDER — GADOBUTROL 604.72 MG/ML
10 INJECTION INTRAVENOUS ONCE
Status: COMPLETED | OUTPATIENT
Start: 2022-12-13 | End: 2022-12-13

## 2022-12-13 RX ORDER — LORAZEPAM 2 MG/ML
0.25 INJECTION INTRAMUSCULAR ONCE
Status: COMPLETED | OUTPATIENT
Start: 2022-12-13 | End: 2022-12-13

## 2022-12-13 RX ORDER — LISINOPRIL 20 MG/1
20 TABLET ORAL 2 TIMES DAILY
Status: DISCONTINUED | OUTPATIENT
Start: 2022-12-13 | End: 2022-12-20 | Stop reason: HOSPADM

## 2022-12-13 RX ORDER — ACETAMINOPHEN 650 MG/1
650 SUPPOSITORY RECTAL EVERY 4 HOURS PRN
Status: DISCONTINUED | OUTPATIENT
Start: 2022-12-13 | End: 2022-12-13 | Stop reason: CLARIF

## 2022-12-13 RX ADMIN — POLYETHYLENE GLYCOL 3350 17 G: 17 POWDER, FOR SOLUTION ORAL at 09:18

## 2022-12-13 RX ADMIN — METOPROLOL TARTRATE 100 MG: 100 TABLET, FILM COATED ORAL at 09:18

## 2022-12-13 RX ADMIN — MULTIPLE VITAMINS W/ MINERALS TAB 1 TABLET: TAB at 09:18

## 2022-12-13 RX ADMIN — ASPIRIN 81 MG: 81 TABLET, COATED ORAL at 09:18

## 2022-12-13 RX ADMIN — LISINOPRIL 10 MG: 10 TABLET ORAL at 09:00

## 2022-12-13 RX ADMIN — Medication 500 MG: at 09:18

## 2022-12-13 RX ADMIN — METOPROLOL TARTRATE 100 MG: 100 TABLET, FILM COATED ORAL at 22:22

## 2022-12-13 RX ADMIN — PREDNISOLONE ACETATE 1 DROP: 10 SUSPENSION/ DROPS OPHTHALMIC at 21:58

## 2022-12-13 RX ADMIN — Medication 250 MG: at 17:49

## 2022-12-13 RX ADMIN — QUETIAPINE FUMARATE 50 MG: 50 TABLET ORAL at 09:18

## 2022-12-13 RX ADMIN — ACETAMINOPHEN 1000 MG: 500 TABLET, FILM COATED ORAL at 09:18

## 2022-12-13 RX ADMIN — CYANOCOBALAMIN TAB 1000 MCG 1000 MCG: 1000 TAB at 09:18

## 2022-12-13 RX ADMIN — LEVALBUTEROL HYDROCHLORIDE 0.63 MG: 0.63 SOLUTION RESPIRATORY (INHALATION) at 20:04

## 2022-12-13 RX ADMIN — CALCIUM 1000 MG: 500 TABLET ORAL at 09:18

## 2022-12-13 RX ADMIN — HYDROMORPHONE HYDROCHLORIDE 0.3 MG: 1 INJECTION, SOLUTION INTRAMUSCULAR; INTRAVENOUS; SUBCUTANEOUS at 16:14

## 2022-12-13 RX ADMIN — ACETAMINOPHEN 1000 MG: 500 TABLET, FILM COATED ORAL at 17:49

## 2022-12-13 RX ADMIN — ATROPINE SULFATE 1 DROP: 10 SOLUTION/ DROPS OPHTHALMIC at 21:58

## 2022-12-13 RX ADMIN — LEVALBUTEROL HYDROCHLORIDE 0.63 MG: 0.63 SOLUTION RESPIRATORY (INHALATION) at 08:36

## 2022-12-13 RX ADMIN — LIDOCAINE 2 PATCH: 246 PATCH TOPICAL at 21:13

## 2022-12-13 RX ADMIN — Medication 50 MCG: at 09:18

## 2022-12-13 RX ADMIN — LISINOPRIL 20 MG: 20 TABLET ORAL at 22:04

## 2022-12-13 RX ADMIN — HYDROMORPHONE HYDROCHLORIDE 2 MG: 2 TABLET ORAL at 04:53

## 2022-12-13 RX ADMIN — ROPINIROLE HYDROCHLORIDE 0.25 MG: 0.25 TABLET, FILM COATED ORAL at 21:56

## 2022-12-13 RX ADMIN — Medication: at 17:50

## 2022-12-13 RX ADMIN — ACETAMINOPHEN 1000 MG: 500 TABLET, FILM COATED ORAL at 01:42

## 2022-12-13 RX ADMIN — SERTRALINE HYDROCHLORIDE 100 MG: 100 TABLET ORAL at 09:18

## 2022-12-13 RX ADMIN — PREDNISOLONE ACETATE 1 DROP: 10 SUSPENSION/ DROPS OPHTHALMIC at 09:25

## 2022-12-13 RX ADMIN — Medication: at 21:12

## 2022-12-13 RX ADMIN — ATROPINE SULFATE 1 DROP: 10 SOLUTION/ DROPS OPHTHALMIC at 09:25

## 2022-12-13 RX ADMIN — LORAZEPAM 0.25 MG: 2 INJECTION INTRAMUSCULAR; INTRAVENOUS at 16:13

## 2022-12-13 RX ADMIN — DICLOFENAC SODIUM 4 G: 10 GEL TOPICAL at 21:12

## 2022-12-13 RX ADMIN — PREGABALIN 25 MG: 25 CAPSULE ORAL at 09:18

## 2022-12-13 RX ADMIN — PREGABALIN 25 MG: 25 CAPSULE ORAL at 16:14

## 2022-12-13 RX ADMIN — PREGABALIN 25 MG: 25 CAPSULE ORAL at 21:56

## 2022-12-13 RX ADMIN — Medication 250 MG: at 21:56

## 2022-12-13 RX ADMIN — PANTOPRAZOLE SODIUM 40 MG: 40 INJECTION, POWDER, FOR SOLUTION INTRAVENOUS at 09:19

## 2022-12-13 RX ADMIN — Medication 500 MG: at 13:09

## 2022-12-13 RX ADMIN — MICONAZOLE NITRATE: 20 POWDER TOPICAL at 22:02

## 2022-12-13 RX ADMIN — DICLOFENAC SODIUM 4 G: 10 GEL TOPICAL at 12:24

## 2022-12-13 RX ADMIN — LEVALBUTEROL HYDROCHLORIDE 0.63 MG: 0.63 SOLUTION RESPIRATORY (INHALATION) at 11:37

## 2022-12-13 RX ADMIN — Medication: at 09:19

## 2022-12-13 RX ADMIN — PREDNISOLONE ACETATE 1 DROP: 10 SUSPENSION/ DROPS OPHTHALMIC at 16:31

## 2022-12-13 RX ADMIN — ATORVASTATIN CALCIUM 40 MG: 40 TABLET, FILM COATED ORAL at 21:55

## 2022-12-13 RX ADMIN — PREDNISONE 40 MG: 20 TABLET ORAL at 09:18

## 2022-12-13 RX ADMIN — TAMSULOSIN HYDROCHLORIDE 0.4 MG: 0.4 CAPSULE ORAL at 21:56

## 2022-12-13 RX ADMIN — DICLOFENAC SODIUM 4 G: 10 GEL TOPICAL at 09:18

## 2022-12-13 RX ADMIN — ROPINIROLE HYDROCHLORIDE 0.25 MG: 0.25 TABLET, FILM COATED ORAL at 09:18

## 2022-12-13 RX ADMIN — QUETIAPINE FUMARATE 50 MG: 50 TABLET ORAL at 22:05

## 2022-12-13 RX ADMIN — MICONAZOLE NITRATE: 20 POWDER TOPICAL at 09:26

## 2022-12-13 RX ADMIN — HEPARIN SODIUM 1350 UNITS/HR: 10000 INJECTION, SOLUTION INTRAVENOUS at 04:32

## 2022-12-13 RX ADMIN — HYDROMORPHONE HYDROCHLORIDE 2 MG: 2 TABLET ORAL at 16:12

## 2022-12-13 RX ADMIN — HYDROMORPHONE HYDROCHLORIDE 0.3 MG: 1 INJECTION, SOLUTION INTRAMUSCULAR; INTRAVENOUS; SUBCUTANEOUS at 01:41

## 2022-12-13 RX ADMIN — HYDROMORPHONE HYDROCHLORIDE 2 MG: 2 TABLET ORAL at 22:22

## 2022-12-13 RX ADMIN — GADOBUTROL 10 ML: 604.72 INJECTION INTRAVENOUS at 17:16

## 2022-12-13 RX ADMIN — ROPINIROLE HYDROCHLORIDE 0.25 MG: 0.25 TABLET, FILM COATED ORAL at 16:13

## 2022-12-13 RX ADMIN — DICLOFENAC SODIUM 4 G: 10 GEL TOPICAL at 17:50

## 2022-12-13 RX ADMIN — PANTOPRAZOLE SODIUM 40 MG: 40 INJECTION, POWDER, FOR SOLUTION INTRAVENOUS at 21:10

## 2022-12-13 ASSESSMENT — ACTIVITIES OF DAILY LIVING (ADL)
ADLS_ACUITY_SCORE: 56
ADLS_ACUITY_SCORE: 47
ADLS_ACUITY_SCORE: 56

## 2022-12-13 NOTE — PROGRESS NOTES
"[facility]   General Surgery Progress Note           Assessment and Plan:   Assessment:   Admission for shock/syncope  Large left inguinal hernia      Plan:   -ok to advance to regular diet  -continue non-operative management for left inguinal hernia         Interval History:   Patient is pleasant, comfortable, confused. Laying in bed.  Denies abd pain, groin pain, or nausea today. Poor appetite. Passing flatus and had BM yesterday         Physical Exam:   Blood pressure (!) 160/92, pulse 102, temperature 97.9  F (36.6  C), temperature source Temporal, resp. rate 22, height 1.6 m (5' 3\"), weight 102.6 kg (226 lb 3.1 oz), SpO2 94 %.    I/O last 3 completed shifts:  In: 270 [I.V.:270]  Out: 175 [Urine:175]    Abdomen:   Soft, rounded, non-tender.           Data:     Recent Labs   Lab 12/12/22  0518 12/11/22  0510 12/10/22  0555   WBC 6.8 5.2 6.2   HGB 11.0* 10.1* 9.9*   HCT 35.1* 32.5* 31.8*   * 111* 112*    254 249       Roberta Maguire PA-C     Just had large loose BM.    Surgery will sign off.  Call if we can help.    Nelson Ruiz MD  [facility]      "

## 2022-12-13 NOTE — PLAN OF CARE
"Orientation: A/O x4- says off handed things/ garbled speech and rambling at times. Mostly drowsy today  VS: /75   Pulse 85   Temp 97.3  F (36.3  C) (Temporal)   Resp 17   Ht 1.6 m (5' 3\")   Wt 102.6 kg (226 lb 3.1 oz)   SpO2 95%   BMI 40.07 kg/m    LS: Coarse, wheezes, VILLAR, on NC  GI:Full liquid, pt did not eat today due to lethargy and inability to stay awake to safely swallow at times  : dribbles into brief- voiding into urinal x1 this shift.very little PO intake  Skin: scattered bruised, red buttock  Activity: turns in bed  Pain: pt reported minimal pain- most pain meds held for sedation throughout day. Pt aware he can ask for pain meds if it breaks through  Lines: PIV x2  Plan: Cards following, hep gtt infusing and theraputic  Marilin Mcnamara RN on 12/12/2022 at 8:12 PM      "

## 2022-12-13 NOTE — PROGRESS NOTES
[facility]  Hospitalist Progress Note  Tino Pablo M.D., M.B.A.   12/13/2022    Reason for Stay/active problem list      Acute encephalopathy    Hypotension-likely from hypovolemia    Non- ST segment elevation MI    Dehydration     Acute on chronic back pain    Suspected acute colitis    Large left inguinal hernia, concern for mechanical bowel obstruction    Abnormally marked circumferential wall thickening of the entire esophagus suspicious for esophagitis         Assessment and Plan:        Summary of Stay:     Pacheco Torres is a 77 year old male with a history of htn/hlp, remote hx of CAD s/p DAYAN to the LAD-most recent echo performed in the ER with hyperdynamic EF 75 % and G1dd, hx of prostate cancer from earlier this year treated with XRT through [facility], macrocytic anemia with hgb in the 11-12 range (normal B12 and folate), monoclonal gammopathy (no recent documentation of evaluation), PMR, obesity, hx of tob abuse  admitted on 12/6/2022 with hypovolemic shock of unclear etiology     He believes he passed out either the day prior or the day of admission.  Unable to fill in any details. States he hasn't been sleeping well but denies and n/v/d.  States po intake has been well. No f/c/s.  No epistaxis/bloody gums/stools or emesis.      Discussed with  Carlos Enrique Goldberg who states her hasn't been eating well for at least 6 weeks, she thinks his fluid intake is ok.  She reports that he's had some nausea but no vomiting.  No diarrhea in fact she thinks he may be constipated.      In the ER BPs in the 60's/40's with tachycardia and tachypnea, he was hypothermic at 95.4, he was given 2 L of NS without improvement and so started on NE via Right IJ.       EKG with ST elevation throughout septal/anterior leads-cards was contacted and the story just didn't seem to fit- he was without any CP or anginal equivalents. Initial trop 29. He was placed on heparin and stat echo completed showing hyperdynamic  cardiac function.     troponins continue to rise 29->222-493 but EKG has normalized    CMP with AG 2/2 elevated lactic acid with respiratory compensation.    Lactates 5.4->1.4 after IVF/NE  CBC with stable macrocytic anemia, no leukocytosis  procal 0.15     CXR with pulmonary edema  CT Ao survey-diffuse CAD, Left inguinal canal hernia containing loops of the distal colon     He was covered with pip-tazo/vanco for septic shock of unclear etiology        Problem List with Assessment and Plan:    Shock/syncope  Acute colitis on CT imaging  --  Given initial EKG would certainly fit a CV shock picture although echo with hyperdynamic function.  -- Suspected combination of hypovolemia and sepsis.  --Hypotension resolved with IV fluid resuscitation and transient norepinephrine  --CT of chest abdomen pelvis obtained and December 7 showed evidence of colitis and esophagitis.  --He is afebrile.  Tachycardia much better with beta-blocker.  CT of the chest abdomen pelvis showed no acute PE, unchanged small bilateral pleural effusions and bilateral groundglass opacities.  Diffuse small and large bowel dilatation increased from December 7.  No specific evidence of small bowel obstruction.  Patient is having bowel movements.  Surgery is following and no anticipated surgical intervention.  Advance diet as tolerated.     Acute toxic metabolic metabolic encephalopathy   -- Likely combination of toxic metabolic and infectious encephalopathy.  -- Patient could have no underlying cognitive deficit.  Chronic insomnia due to back pain issues.  --Patient was agitated on December 8 and required Precedex drip.  -- Patient's encephalopathy persisted and is occasionally confused with periods of full orientation to time place and person.  He is more talkative and hard to understand at times.  His encephalopathy is most likely ICU delirium, toxic metabolic and infectious component.  CT of the head on admission showed no acute bleed or pathology  but it had several artifacts.  We will get MRI study for further evaluation of his altered mental status.  Consider neurology consultation if MRI study is significantly abnormal.  Currently on scheduled Seroquel, continue Seroquel, as needed Zyprexa.  Of note patient is on scheduled Dilaudid due to concern for acute on chronic pain.  He was initially on Precedex drip but he got off Precedex and Dilaudid helped with agitation.  Pain management team is involved    NSTEMI , sinus tachycardia  -- trops 29->222->493  Diffuse CAD noted on CT Ao survey.    --No CP and echo with hyperdynamic function.   -- Patient was treated with heparin and cardiology was consulted.  -- Patient was seen by cardiology and initially recommended coronary angiogram but patient was confused unable to provide consent and lay still for the procedure.  Coronary angiogram plan was consulted and patient was treated with heparin, started on aspirin.  -- Has been tachycardic.  Discussed with cardiology team and beta-blocker was titrated.  Coronary angiogram was recommended initially but patient was confused and agitated unable to sign consent and this was consulted.  He was continued with heparin for several days anticipating coronary angiogram but patient is not stable enough for coronary angiogram at this time.  Discussed with cardiologist and ischemic work-up to be postponed as outpatient when he is more stable    CAD  Htn/hlp   --pta on asa 81 mg/lisinopril 10 mg every day/simvastain 40  Mg  -Home medications resumed       Large Left inguinal hernia with bowel contents-initial concern for small bowel obstruction.  -- CT on December 7 showed concern for bowel obstruction.  Patient had left lower quadrant abdominal tenderness but no rigidity or rebound tenderness.  Surgery was consulted and patient was seen and examined by Dr. Ruiz who recommended to continue to monitor for.  --No evidence of bowel obstruction, currently no complaint of abdominal  pain or ischemic bowel concern.  Continue to monitor.  Advance diet as tolerate     Macrocytic anemia-MGUS  --Being worked up by PCP   --Currently hemoglobin is 11.0.Positive stool.  Esophageal thickening noted on CT scan.  GI consulted and recommendation obtained for no intervention at this time.       Incidental pulmonary nodules  --Given heavy smoking history will need repeat CT in 3-6 months    Dehydration  --Patient has evidence of dehydration with very dry tongue and buccal mucosa  Decreased intake PTA   --Advance diet as tolerated, encourage oral fluids, IVF today     Acute on chronic back pain-history of prostate cancer with recent treatment with radiation therapy, follows with [facility]  --Severe acute on chronic back pain requiring narcotic medications.  History of prostate cancer.  -- Patient was seen by pain management team.  Pain medications recommended.  Continue current pain regimen-scheduled Tylenol 1 g every 8 hours, Voltaren gel 4 g 4 times daily, lidocaine patch, menthol, Lyrica.  As needed Dilaudid 2 -4 mg every 3 hours as needed.  Improved with scheduled Dilaudid   Patient may need MRI study of his thoracic and lumbar spine for evaluation of compression fractures and pathology and is more stable    Abnormal CT with concern for esophagitis and colitis   -- GI consulted, recommendation noted    Chronic medical problems  Depression/gerd/prostate ca/pmr/monoclonal gammopathy   -- Continue pta sertraline     COVID 19  Negative  S/p 5 shot moderna series 9/2022 (biv)    Acute hypoxic respiratory failure  -- Patient is a smoker, has increased work of breathing and hypoxic requiring supplemental oxygen.  -- He has evidence of emphysema on CT scan . Mild acute exacerbation of COPD suspected  -- Patient was treated with DuoNeb, supplemental oxygen, steroid.  -- Currently he is wheezing, hypoxia is improving, continue to monitor on current regimen including steroid    Community-acquired pneumonia-new  "left lung infiltrate on chest x-ray on December 8  -- Treated with  Zosyn for 7 days..  Afebrile    Restless leg syndrome  --Requip started this admission    Plan for today:    -- Patient's mental status waxes and wanes.  He is occasionally confused and agitated.  We will get MRI study for further evaluation and consideration of neurology if he continues to be encephalopathic.  -- May transfer to floor    VTE Prophylaxis: Heparin    Code Status: No CPR- Do NOT Intubate     Diet: Snacks/Supplements Adult: Ensure Max Protein (bariatric); With Meals  Full Liquid Diet      Blanchard Catheter: Not present    Family updated today: Yes  significant other and brother Nico.      Disposition: May transfer to the floor, telemetry        Interval History (Subjective):        Patient is seen and examined by me today and medical record reviewed.Overnight events noted and care discussed with nursing staff.he is feeling better today.  He feels better. Appears to be oriented times three but his speech is fast and difficult to understand at time. His family at bedside. Discussed care plan with Dr Blankenship of cardiology.        Physical Exam:        Last Vital Signs:  /65   Pulse 67   Temp 97.9  F (36.6  C) (Temporal)   Resp 20   Ht 1.6 m (5' 3\")   Wt 102.6 kg (226 lb 3.1 oz)   SpO2 95%   BMI 40.07 kg/m      I/O last 3 completed shifts:  In: 270 [I.V.:270]  Out: 175 [Urine:175]    Wt Readings from Last 5 Encounters:   12/12/22 102.6 kg (226 lb 3.1 oz)   04/03/12 97.1 kg (214 lb)        Constitutional:  Awake, alert but restless and occasionally confused.     Respiratory:  Increased work of breathing, diffuse rhonchi.  No wheezing.   Cardiovascular: Regular rate and rhythm, normal S1 and S2, and no murmur noted   Abdomen: Normal bowel sounds, soft, non-distended, left lower quadrant abdominal tenderness.  No rebound tenderness, rigidity or guarding.   Skin: No new rashes, no cyanosis, dry to touch   Neuro: Alert with  no new " focal weakness   Extremities: No edema   Other(s):        All other systems: egative          Medications:        All current medications were reviewed with changes reflected in problem list.         Data:      All new lab and imaging data was reviewed.      Data reviewed today: I reviewed all new labs and imaging results over the last 24 hours. I personally reviewed       Recent Labs   Lab 12/12/22  0518 12/11/22  0510 12/10/22  0555   WBC 6.8 5.2 6.2   HGB 11.0* 10.1* 9.9*   HCT 35.1* 32.5* 31.8*   * 111* 112*    254 249     No results for input(s): CULT in the last 168 hours.  Recent Labs   Lab 12/13/22  1215 12/13/22  0809 12/13/22  0438 12/12/22  1936 12/12/22  0518 12/11/22  0910 12/11/22  0510 12/10/22  0808 12/10/22  0555 12/06/22  2151 12/06/22  1421   NA  --   --   --   --  145  --  144  --  142   < > 132*   POTASSIUM  --   --   --   --  3.8  --  3.4  --  3.5   < > 3.4   CHLORIDE  --   --   --   --  104  --  105  --  102   < > 91*   CO2  --   --   --   --  35*  --  32*  --  32*   < > 25   ANIONGAP  --   --   --   --  6*  --  7  --  8   < > 16*   * 87 86   < > 114*   < > 109*   < > 119*   < > 193*   BUN  --   --   --   --  28.1*  --  29.7*  --  23.8*   < > 17.2   CR  --   --   --   --  0.58*  --  0.64*  --  0.59*   < > 0.95   GFRESTIMATED  --   --   --   --  >90  --  >90  --  >90   < > 83   TAVO  --   --   --   --  9.5  --  9.6  --  9.8   < > 10.5*   MAG  --   --   --   --   --   --  2.0  --   --   --  2.3   PROTTOTAL  --   --   --   --   --   --   --   --   --   --  6.7   ALBUMIN  --   --   --   --   --   --   --   --   --   --  3.7   BILITOTAL  --   --   --   --   --   --   --   --   --   --  0.3   ALKPHOS  --   --   --   --   --   --   --   --   --   --  147*   AST  --   --   --   --   --   --   --   --   --   --  78*   ALT  --   --   --   --   --   --   --   --   --   --  17    < > = values in this interval not displayed.       Recent Labs   Lab 12/13/22  1215 12/13/22  0809  12/13/22  0438 12/12/22  2359 12/12/22  1936   * 87 86 94 100*       Recent Labs   Lab 12/06/22  1421   INR 1.03         No results for input(s): TROPONIN, TROPI, TROPR in the last 168 hours.    Invalid input(s): TROP, TROPONINIES    Recent Results (from the past 48 hour(s))   XR Chest Port 1 View    Narrative    CHEST PORTABLE ONE VIEW December 6, 2022 2:39 PM     HISTORY: STEMI. Altered mental status. Chest pain.    COMPARISON: 11/2/2022.      Impression    IMPRESSION: Hypoinflated lungs and cardiomegaly. Mild bilateral  vascular congestion appears increased. Correlate with any evidence of  developing pulmonary edema.    HAL BOGGS MD         SYSTEM ID:  [id number]   CT Head w/o Contrast    Narrative    CT SCAN OF THE HEAD WITHOUT CONTRAST December 6, 2022 3:07 PM     HISTORY: Altered mental status.    TECHNIQUE: Axial images of the head and coronal reformations without  IV contrast material. Radiation dose for this scan was reduced using  automated exposure control, adjustment of the mA and/or kV according  to patient size, or iterative reconstruction technique.    COMPARISON: None.      Impression    IMPRESSION: Mild motion artifact. No evidence of hemorrhage. Volume  loss and patchy areas of white matter hypoattenuation which likely  represent chronic small vessel ischemic change. Small area of focal  hypoattenuation within the central medulla, presumably artifactual  (brainstem infarct not excluded, MRI can be performed for basal  ganglia and thalamic lacunar infarcts, presumably chronic. No acute  osseous abnormality. Nonspecific right facial soft tissue swelling,  potentially contusion.    EVELYN VELAZQUEZ MD         SYSTEM ID:  [id number]   CT Aortic Survey w Contrast    Narrative    CT AORTIC SURVEY WITH CONTRAST  12/6/2022 3:07 PM    HISTORY:  Back pain, shock, wide mediastinum on x-ray.    TECHNIQUE: CT scan obtained of the chest, abdomen, and pelvis with IV  contrast. Post contrast scanning  performed during the arterial phase.  CT chest also obtained without IV contrast. 80 mL Isovue-370 IV  injected. Sagittal and coronal reformatted images acquired from the  source post contrast data. Radiation dose for this scan was reduced  using automated exposure control, adjustment of the mA and/or kV  according to patient size, or iterative reconstruction technique.    COMPARISON:  None.    FINDINGS:  Thoracic and abdominal aorta: No dissection involving the thoracic or  abdominal aorta. Patency of the main branch vessels from the thoracic  and abdominal aorta. Scattered areas of calcified atherosclerotic  disease noted. Atherosclerotic stenosis at the origins of the celiac  and superior mesenteric arteries but there is distal perfusion. No  periaortic hematoma or aneurysm.    Other vascular: Severe coronary artery calcifications.    Chest: No adenopathy or acute mediastinal abnormality. No acute  mediastinal fluid collection. Patchy atelectasis at the posterior  right lower lobe. No effusions. No pneumothorax. Stable nodule  posterolateral left upper lobe measuring 0.3 cm, series 6 image 60.    Abdomen/pelvis: Liver, gallbladder, adrenals, spleen, pancreas, and  kidneys do not show any acute abnormalities. Left inguinal hernia  measures 9.1 x 5.8 cm, series 5 image 265. Herniated loops of the  distal descending colon and sigmoid within the hernia. No upstream  obstruction. No bowel inflammatory change. No enlarged lymph nodes. No  acute pelvic abnormality. Diffuse degenerative changes throughout the  spine. A degree of height loss involving multiple thoracic and lumbar  spine levels.      Impression    IMPRESSION:   1. No acute thoracic or abdominal aortic abnormality. No dissection.  Scattered areas of atherosclerotic disease identified. Atherosclerotic  stenosis at the origins of the celiac artery and superior mesenteric  artery.  2. Diffuse coronary artery calcifications.  3. No acute mediastinal abnormality  is seen.  4. Stable pulmonary nodule on the left, see below for follow-up  imaging guidelines.   5. Left inguinal canal hernia containing herniated loops of the distal  descending colon and sigmoid. No upstream bowel obstruction. See above  for measurements.    Recommendations for one or multiple incidental lung nodules < 6mm:    Low risk patients: No routine follow-up.    High risk patients: Optional follow-up CT at 12 months; if  unchanged, no further follow-up.    *Low Risk: Minimal or absent history of smoking or other known risk  factors.  *Nonsolid (ground glass) or partly solid nodules may require longer  follow-up to exclude indolent adenocarcinoma.  *Recommendations based on Guidelines for the Management of Incidental  Pulmonary Nodules Detected at CT: From the Fleischner Society 2017,  Radiology 2017.    HAL BOGGS MD         SYSTEM ID:  [id number]   Echocardiogram Limited   Result Value    LVEF  75%    Narrative    [id number]  [id number]  [id number]  [id number]^ANGELIQUE^MINOO^NERI     [facility]  Echocardiography Laboratory  [address] [place] [address]     Name: BERTIN LYNCH  MRN: [id number]  : 1945  Study Date: 2022 02:59 PM  Age: 77 yrs  Gender: Male  Patient Location: [facility]  Reason For Study: Chest Pain  Ordering Physician: MINOO MERINO  Performed By: Mariah Carranza RDCS     BSA: 2.1 m2  Height: 66 in  Weight: 220 lb  HR: 110  BP: 82/65 mmHg  ______________________________________________________________________________  Procedure  Limited Portable Echo Adult. Optison (NDC #8320-5577) given intravenously.  (Emergent exam, abbreviated study performed).  ______________________________________________________________________________  Interpretation Summary     The visual ejection fraction is estimated at 75%.  Hyperdynamic left ventricular function  ER informed by phone that echo has been read. The study was technically  limited. Technically difficult, suboptimal  study.  ______________________________________________________________________________  Left Ventricle  Grade I or early diastolic dysfunction. The visual ejection fraction is  estimated at 75%. Hyperdynamic left ventricular function.     Right Ventricle  The right ventricle is normal in size and function.     Atria  Normal left atrial size. Right atrial size is normal.     Mitral Valve  There is trace mitral regurgitation.     Tricuspid Valve  There is trace tricuspid regurgitation. Right ventricular systolic pressure  could not be approximated due to inadequate tricuspid regurgitation.     Aortic Valve  The aortic valve is trileaflet. There is mild trileaflet aortic sclerosis. No  aortic regurgitation is present. No hemodynamically significant valvular  aortic stenosis.     Pulmonic Valve  Normal pulmonic valve velocity.     Vessels  IVC diameter >2.1 cm collapsing <50% with sniff suggests a high RA pressure  estimated at 15 mmHg or greater.     Pericardium  There is no pericardial effusion.     Rhythm  The rhythm was sinus tachycardia.  ______________________________________________________________________________  MMode/2D Measurements & Calculations     LA Volume (BP): 68.8 ml     Doppler Measurements & Calculations  MV E max dwayne: 93.3 cm/sec  MV A max dwayne: 121.6 cm/sec  MV E/A: 0.77  MV dec time: 0.12 sec     ______________________________________________________________________________  Report approved by: Meche English 12/06/2022 03:34 PM         XR Chest Port 1 View    Narrative    XR CHEST PORT 1 VIEW 12/6/2022 3:52 PM    HISTORY: CENTRAL LINE    COMPARISON: CT today      Impression    IMPRESSION: Right IJ central venous catheter tip in the low SVC. No  pneumothorax. Mild interstitial opacities in the lungs, could  represent pulmonary edema. No pleural effusion or pneumothorax.    EMELY MARTINEZ MD         SYSTEM ID:  [id number]   CT Chest/Abdomen/Pelvis w Contrast    Narrative    CT  CHEST/ABDOMEN/PELVIS W CONTRAST 12/7/2022 1:00 PM    CLINICAL HISTORY: back pain, abdominal pain    TECHNIQUE: CT scan of the chest, abdomen, and pelvis was performed  following injection of IV contrast. Multiplanar reformats were  obtained. Dose reduction techniques were used.   CONTRAST: 90 mL of Isovue 370    COMPARISON: Chest CT on 11/3/2022    FINDINGS:   LUNGS AND PLEURA: Mild emphysema. Mild bibasilar dependent  atelectasis/infiltrate and trace bilateral pleural effusions.    MEDIASTINUM/AXILLAE: No lymphadenopathy. Right IJ central venous  catheter tip is in the low SVC. No filling defects in the central  pulmonary arteries. No aortic aneurysm or dissection. Severe coronary  artery calcifications. No pericardial effusion.     Diffuse circumferential esophageal wall thickening with surrounding  mediastinal stranding and fluid (series 2, image 34-77 and coronal  series 5, image 74-68).    HEPATOBILIARY: Normal.    PANCREAS: Normal.    SPLEEN: Normal.    ADRENAL GLANDS: Normal.    KIDNEYS/BLADDER: No calculi, hydronephrosis or perinephric stranding.  Urinary bladder is decompressed with a Blanchard catheter.    BOWEL: There are a few mildly dilated loops of small bowel in the mid  and left abdomen (series 2, image 138) with bowel loops dilated up to  3.5 cm. A transition point is not identified. Distal loops of ileum  are decompressed and the terminal ileum is decompressed with  transition likely in the mid/right lower quadrant.     No colonic obstruction. Marked circumferential wall thickening of the  splenic flexure, descending colon and sigmoid colon. Left lower  quadrant large inguinal hernia containing a loop of the sigmoid colon  with marked wall thickening of the loop within the hernia (series 5,  image 56) and pinching/narrowing of the bowel loop at the hernia  mouth. No pneumatosis or extraluminal air.    Normal appendix.    PELVIC ORGANS: Atrophic prostate gland.    ADDITIONAL FINDINGS: No lymphadenopathy  in the abdomen and pelvis. No  abdominal aortic aneurysm. Trace free fluid in the left lower  quadrant. No abscess or free air.    MUSCULOSKELETAL: Stable mild wedge compression deformity T7, T9, T10,  T11, T12 and L1 vertebral. No acute-appearing fractures. No  destructive lesions of the bones.      Impression    IMPRESSION:  1.  Marked circumferential wall thickening of the splenic flexure,  descending colon and sigmoid colon may be due to acute colitis, either  infectious, inflammatory or ischemic.   2.  Large left inguinal hernia containing a loop of the sigmoid colon  with narrowing/pinched appearance of the sigmoid colon at the hernia  mouth and wall thickening of the sigmoid in the inguinal hernia.  Superimposed strangulation of this loop of colon is not excluded.  Consider surgical consultation.  3.  Mechanical small bowel obstruction appears to be a separate  process. Gradual transition to normal caliber small bowel loops in the  right abdomen.  4.  Marked circumferential wall thickening of the entire esophagus,  suspicious for esophagitis.  5.  Bibasilar atelectasis/infiltrate and small bilateral pleural  effusions.    EMELY MARTINEZ MD         SYSTEM ID:  [id number]       COVID Status:  COVID-19 PCR Results    COVID-19 PCR Results 11/2/22 12/6/22   SARS CoV2 PCR Negative Negative      Comments are available for some flowsheets but are not being displayed.         COVID-19 Antibody Results, Testing for Immunity    COVID-19 Antibody Results, Testing for Immunity   No data to display.              Disclaimer: This note consists of symbols derived from keyboarding, dictation and/or voice recognition software. As a result, there may be errors in the script that have gone undetected. Please consider this when interpreting information found in this chart.

## 2022-12-13 NOTE — PROGRESS NOTES
Patient given nebs as ordered. Exp wheezes and diminished in lower lobes. RT to continue to follow while in ICU.    Oxygen: 2 L NC with humidity

## 2022-12-13 NOTE — PLAN OF CARE
Goal Outcome Evaluation:  Pt Alert, disoriented to time. Forgetful, scattered thoughts. Follows command. LS wheezy and diminished, SOB   On exertion especially with repositioning and pericare. SR, occasionally hypertensive. BM smearx1, Incontinent of bladder. Ric during the night, Dilaudid PRNx1 given for back pain. Heparin drips continued.

## 2022-12-13 NOTE — PROGRESS NOTES
Cardiology Progress Note          Assessment and Plan:       78 yo male with  Acute encephalopathy, colitis, hypotension, lactic acidosis, type II NSTEMI, CAD, ongoing tobacco abuse, macrocytic anemia and rifht facial hematoma/cellulitis  1.Type II MI- ST elevation upon presentation with hypotension, known CAD, trop peak 1,047. No RWMA on echo EF 75%, recommend coronary angiography.  Recommend dual antiplatelet Rx for one year.  Discussed with hospitalist,  Patient is undergoing evaluation with MRI brain.  Once medically stable can will reapproach possible coronary angiography  2. Right facial hematoma- \rx per intensivist   3. Hypotension-resolved, now hypertensive- increased BB yesterday, will increase lisinopril for better BP control  4. hyperlipid- moderate intensity statin, continue  5. COPD- requiring supplemental O2  6. Tobacco abuse- counseled to quit  30Time spent in review of the chart, history and exam, education, clinical coordination of care and counselling.           Interval History:   no new complaints                Medications:   I have reviewed this patient's current medications         Physical Exam:         Vital Sign Ranges  Temperature Temp  Av.3  F (36.8  C)  Min: 97.3  F (36.3  C)  Max: 99.1  F (37.3  C)   Blood pressure Systolic (24hrs), Av , Min:95 , Max:191        Diastolic (24hrs), Av, Min:56, Max:143      Pulse Pulse  Av.8  Min: 67  Max: 104   Respirations Resp  Av.8  Min: 14  Max: 42   Pulse oximetry SpO2  Av.8 %  Min: 80 %  Max: 98 %         Intake/Output Summary (Last 24 hours) at 2022 1108  Last data filed at 2022 0200  Gross per 24 hour   Intake 270 ml   Output --   Net 270 ml       Constitutional:   in no apparent distress     Skin:   normal     Neck:   supple, symmetrical, trachea midline     Chest:   Normal Symmetry and no tenderness     Lungs:   scattered wheezes     Cardiovascular:   normal S1 and S2     Extremities and Back:   no  cyanosis or clubbing     Neurological:   Moving all extremities              Data:     Results for orders placed or performed during the hospital encounter of 12/06/22 (from the past 24 hour(s))   Glucose by meter   Result Value Ref Range    GLUCOSE BY METER POCT 100 (H) 70 - 99 mg/dL   Glucose by meter   Result Value Ref Range    GLUCOSE BY METER POCT 94 70 - 99 mg/dL   Glucose by meter   Result Value Ref Range    GLUCOSE BY METER POCT 86 70 - 99 mg/dL   Heparin Unfractionated Anti Xa Level   Result Value Ref Range    Anti Xa Unfractionated Heparin 0.42 For Reference Range, See Comment IU/mL    Narrative    Therapeutic Range: UFH: 0.25-0.50 IU/mL for low intensity dosing,  0.30-0.70 IU/mL for high intensity dosing DVT and PE.  This test is not validated for other direct factor X inhibitors (e.g. rivaroxaban, apixaban, edoxaban, betrixaban, fondaparinux) and should not be used for monitoring of other medications.   Glucose by meter   Result Value Ref Range    GLUCOSE BY METER POCT 87 70 - 99 mg/dL

## 2022-12-14 LAB
ANION GAP SERPL CALCULATED.3IONS-SCNC: 8 MMOL/L (ref 7–15)
BUN SERPL-MCNC: 24.2 MG/DL (ref 8–23)
CALCIUM SERPL-MCNC: 8.6 MG/DL (ref 8.8–10.2)
CHLORIDE SERPL-SCNC: 104 MMOL/L (ref 98–107)
CREAT SERPL-MCNC: 0.56 MG/DL (ref 0.67–1.17)
DEPRECATED HCO3 PLAS-SCNC: 30 MMOL/L (ref 22–29)
ERYTHROCYTE [DISTWIDTH] IN BLOOD BY AUTOMATED COUNT: 13.9 % (ref 10–15)
GFR SERPL CREATININE-BSD FRML MDRD: >90 ML/MIN/1.73M2
GLUCOSE BLDC GLUCOMTR-MCNC: 96 MG/DL (ref 70–99)
GLUCOSE SERPL-MCNC: 87 MG/DL (ref 70–99)
HCT VFR BLD AUTO: 30.8 % (ref 40–53)
HGB BLD-MCNC: 9.2 G/DL (ref 13.3–17.7)
MCH RBC QN AUTO: 34.6 PG (ref 26.5–33)
MCHC RBC AUTO-ENTMCNC: 29.9 G/DL (ref 31.5–36.5)
MCV RBC AUTO: 116 FL (ref 78–100)
PLATELET # BLD AUTO: 185 10E3/UL (ref 150–450)
POTASSIUM SERPL-SCNC: 4.1 MMOL/L (ref 3.4–5.3)
RBC # BLD AUTO: 2.66 10E6/UL (ref 4.4–5.9)
SODIUM SERPL-SCNC: 142 MMOL/L (ref 136–145)
UFH PPP CHRO-ACNC: <0.1 IU/ML
WBC # BLD AUTO: 7.1 10E3/UL (ref 4–11)

## 2022-12-14 PROCEDURE — 250N000013 HC RX MED GY IP 250 OP 250 PS 637: Performed by: INTERNAL MEDICINE

## 2022-12-14 PROCEDURE — 99233 SBSQ HOSP IP/OBS HIGH 50: CPT | Performed by: INTERNAL MEDICINE

## 2022-12-14 PROCEDURE — 250N000012 HC RX MED GY IP 250 OP 636 PS 637: Performed by: INTERNAL MEDICINE

## 2022-12-14 PROCEDURE — 200N000001 HC R&B ICU

## 2022-12-14 PROCEDURE — C9113 INJ PANTOPRAZOLE SODIUM, VIA: HCPCS | Performed by: INTERNAL MEDICINE

## 2022-12-14 PROCEDURE — 36415 COLL VENOUS BLD VENIPUNCTURE: CPT | Performed by: INTERNAL MEDICINE

## 2022-12-14 PROCEDURE — 94640 AIRWAY INHALATION TREATMENT: CPT

## 2022-12-14 PROCEDURE — 94640 AIRWAY INHALATION TREATMENT: CPT | Mod: 76

## 2022-12-14 PROCEDURE — 82310 ASSAY OF CALCIUM: CPT | Performed by: INTERNAL MEDICINE

## 2022-12-14 PROCEDURE — 250N000009 HC RX 250: Performed by: INTERNAL MEDICINE

## 2022-12-14 PROCEDURE — 250N000013 HC RX MED GY IP 250 OP 250 PS 637: Performed by: NURSE PRACTITIONER

## 2022-12-14 PROCEDURE — 250N000013 HC RX MED GY IP 250 OP 250 PS 637: Performed by: CLINICAL NURSE SPECIALIST

## 2022-12-14 PROCEDURE — 85520 HEPARIN ASSAY: CPT | Performed by: INTERNAL MEDICINE

## 2022-12-14 PROCEDURE — 250N000011 HC RX IP 250 OP 636: Performed by: INTERNAL MEDICINE

## 2022-12-14 PROCEDURE — 99232 SBSQ HOSP IP/OBS MODERATE 35: CPT | Performed by: INTERNAL MEDICINE

## 2022-12-14 PROCEDURE — 85027 COMPLETE CBC AUTOMATED: CPT | Performed by: INTERNAL MEDICINE

## 2022-12-14 PROCEDURE — 999N000157 HC STATISTIC RCP TIME EA 10 MIN

## 2022-12-14 RX ORDER — IPRATROPIUM BROMIDE AND ALBUTEROL SULFATE 2.5; .5 MG/3ML; MG/3ML
3 SOLUTION RESPIRATORY (INHALATION)
Status: DISCONTINUED | OUTPATIENT
Start: 2022-12-14 | End: 2022-12-16

## 2022-12-14 RX ORDER — CARVEDILOL 25 MG/1
25 TABLET ORAL 2 TIMES DAILY WITH MEALS
Status: DISCONTINUED | OUTPATIENT
Start: 2022-12-14 | End: 2022-12-20 | Stop reason: HOSPADM

## 2022-12-14 RX ORDER — NYSTATIN 100000/ML
1000000 SUSPENSION, ORAL (FINAL DOSE FORM) ORAL 4 TIMES DAILY
Status: DISCONTINUED | OUTPATIENT
Start: 2022-12-14 | End: 2022-12-14

## 2022-12-14 RX ORDER — ALBUTEROL SULFATE 0.83 MG/ML
2.5 SOLUTION RESPIRATORY (INHALATION)
Status: DISCONTINUED | OUTPATIENT
Start: 2022-12-14 | End: 2022-12-20 | Stop reason: HOSPADM

## 2022-12-14 RX ORDER — FLUCONAZOLE 200 MG/1
400 TABLET ORAL ONCE
Status: COMPLETED | OUTPATIENT
Start: 2022-12-14 | End: 2022-12-14

## 2022-12-14 RX ORDER — FLUCONAZOLE 50 MG/1
200 TABLET ORAL DAILY
Status: DISCONTINUED | OUTPATIENT
Start: 2022-12-15 | End: 2022-12-20 | Stop reason: HOSPADM

## 2022-12-14 RX ORDER — GABAPENTIN 100 MG/1
100 CAPSULE ORAL 3 TIMES DAILY
Status: DISCONTINUED | OUTPATIENT
Start: 2022-12-14 | End: 2022-12-14

## 2022-12-14 RX ADMIN — ATROPINE SULFATE 1 DROP: 10 SOLUTION/ DROPS OPHTHALMIC at 08:44

## 2022-12-14 RX ADMIN — CYANOCOBALAMIN TAB 1000 MCG 1000 MCG: 1000 TAB at 08:43

## 2022-12-14 RX ADMIN — ATROPINE SULFATE 1 DROP: 10 SOLUTION/ DROPS OPHTHALMIC at 22:02

## 2022-12-14 RX ADMIN — Medication: at 22:02

## 2022-12-14 RX ADMIN — ACETAMINOPHEN 1000 MG: 500 TABLET, FILM COATED ORAL at 17:22

## 2022-12-14 RX ADMIN — IPRATROPIUM BROMIDE AND ALBUTEROL SULFATE 3 ML: 2.5; .5 SOLUTION RESPIRATORY (INHALATION) at 19:47

## 2022-12-14 RX ADMIN — PREGABALIN 25 MG: 25 CAPSULE ORAL at 17:16

## 2022-12-14 RX ADMIN — FLUCONAZOLE 400 MG: 200 TABLET ORAL at 17:27

## 2022-12-14 RX ADMIN — ACETAMINOPHEN 1000 MG: 500 TABLET, FILM COATED ORAL at 10:47

## 2022-12-14 RX ADMIN — QUETIAPINE FUMARATE 50 MG: 50 TABLET ORAL at 08:43

## 2022-12-14 RX ADMIN — CALCIUM 1000 MG: 500 TABLET ORAL at 08:43

## 2022-12-14 RX ADMIN — PREGABALIN 25 MG: 25 CAPSULE ORAL at 08:44

## 2022-12-14 RX ADMIN — LEVALBUTEROL HYDROCHLORIDE 0.63 MG: 0.63 SOLUTION RESPIRATORY (INHALATION) at 12:33

## 2022-12-14 RX ADMIN — ACETAMINOPHEN 1000 MG: 500 TABLET, FILM COATED ORAL at 03:52

## 2022-12-14 RX ADMIN — DICLOFENAC SODIUM 4 G: 10 GEL TOPICAL at 22:02

## 2022-12-14 RX ADMIN — ROPINIROLE HYDROCHLORIDE 0.25 MG: 0.25 TABLET, FILM COATED ORAL at 08:43

## 2022-12-14 RX ADMIN — Medication 50 MCG: at 08:44

## 2022-12-14 RX ADMIN — HYDROMORPHONE HYDROCHLORIDE 2 MG: 2 TABLET ORAL at 17:16

## 2022-12-14 RX ADMIN — PREDNISONE 40 MG: 20 TABLET ORAL at 08:44

## 2022-12-14 RX ADMIN — MULTIPLE VITAMINS W/ MINERALS TAB 1 TABLET: TAB at 08:43

## 2022-12-14 RX ADMIN — DICLOFENAC SODIUM 4 G: 10 GEL TOPICAL at 08:44

## 2022-12-14 RX ADMIN — PREGABALIN 25 MG: 25 CAPSULE ORAL at 21:31

## 2022-12-14 RX ADMIN — HYDROMORPHONE HYDROCHLORIDE 2 MG: 2 TABLET ORAL at 03:52

## 2022-12-14 RX ADMIN — LISINOPRIL 20 MG: 20 TABLET ORAL at 21:31

## 2022-12-14 RX ADMIN — METOPROLOL TARTRATE 100 MG: 100 TABLET, FILM COATED ORAL at 08:47

## 2022-12-14 RX ADMIN — Medication: at 17:18

## 2022-12-14 RX ADMIN — QUETIAPINE FUMARATE 50 MG: 50 TABLET ORAL at 21:31

## 2022-12-14 RX ADMIN — IPRATROPIUM BROMIDE AND ALBUTEROL SULFATE 3 ML: 2.5; .5 SOLUTION RESPIRATORY (INHALATION) at 16:12

## 2022-12-14 RX ADMIN — ASPIRIN 81 MG: 81 TABLET, COATED ORAL at 08:44

## 2022-12-14 RX ADMIN — PREDNISOLONE ACETATE 1 DROP: 10 SUSPENSION/ DROPS OPHTHALMIC at 17:17

## 2022-12-14 RX ADMIN — Medication 250 MG: at 08:43

## 2022-12-14 RX ADMIN — SERTRALINE HYDROCHLORIDE 100 MG: 100 TABLET ORAL at 08:44

## 2022-12-14 RX ADMIN — PREDNISOLONE ACETATE 1 DROP: 10 SUSPENSION/ DROPS OPHTHALMIC at 08:48

## 2022-12-14 RX ADMIN — Medication 250 MG: at 18:30

## 2022-12-14 RX ADMIN — DICLOFENAC SODIUM 4 G: 10 GEL TOPICAL at 13:46

## 2022-12-14 RX ADMIN — PANTOPRAZOLE SODIUM 40 MG: 40 INJECTION, POWDER, FOR SOLUTION INTRAVENOUS at 08:46

## 2022-12-14 RX ADMIN — Medication: at 08:47

## 2022-12-14 RX ADMIN — MICONAZOLE NITRATE: 20 POWDER TOPICAL at 08:45

## 2022-12-14 RX ADMIN — CARVEDILOL 25 MG: 25 TABLET, FILM COATED ORAL at 17:17

## 2022-12-14 RX ADMIN — IPRATROPIUM BROMIDE 0.5 MG: 0.5 SOLUTION RESPIRATORY (INHALATION) at 12:34

## 2022-12-14 RX ADMIN — HYDROMORPHONE HYDROCHLORIDE 2 MG: 2 TABLET ORAL at 10:47

## 2022-12-14 RX ADMIN — ROPINIROLE HYDROCHLORIDE 0.25 MG: 0.25 TABLET, FILM COATED ORAL at 22:00

## 2022-12-14 RX ADMIN — LISINOPRIL 20 MG: 20 TABLET ORAL at 08:44

## 2022-12-14 RX ADMIN — LIDOCAINE 2 PATCH: 246 PATCH TOPICAL at 21:22

## 2022-12-14 RX ADMIN — Medication 250 MG: at 13:45

## 2022-12-14 RX ADMIN — Medication 250 MG: at 22:00

## 2022-12-14 RX ADMIN — TAMSULOSIN HYDROCHLORIDE 0.4 MG: 0.4 CAPSULE ORAL at 21:31

## 2022-12-14 RX ADMIN — ATORVASTATIN CALCIUM 40 MG: 40 TABLET, FILM COATED ORAL at 22:04

## 2022-12-14 RX ADMIN — HYDROMORPHONE HYDROCHLORIDE 2 MG: 2 TABLET ORAL at 21:31

## 2022-12-14 RX ADMIN — LEVALBUTEROL HYDROCHLORIDE 0.63 MG: 0.63 SOLUTION RESPIRATORY (INHALATION) at 08:18

## 2022-12-14 RX ADMIN — PREDNISOLONE ACETATE 1 DROP: 10 SUSPENSION/ DROPS OPHTHALMIC at 22:06

## 2022-12-14 RX ADMIN — ROPINIROLE HYDROCHLORIDE 0.25 MG: 0.25 TABLET, FILM COATED ORAL at 17:17

## 2022-12-14 RX ADMIN — MICONAZOLE NITRATE: 20 POWDER TOPICAL at 21:26

## 2022-12-14 RX ADMIN — PANTOPRAZOLE SODIUM 40 MG: 40 INJECTION, POWDER, FOR SOLUTION INTRAVENOUS at 21:33

## 2022-12-14 RX ADMIN — Medication: at 13:46

## 2022-12-14 RX ADMIN — DICLOFENAC SODIUM 4 G: 10 GEL TOPICAL at 17:18

## 2022-12-14 ASSESSMENT — ACTIVITIES OF DAILY LIVING (ADL)
ADLS_ACUITY_SCORE: 61
ADLS_ACUITY_SCORE: 56
ADLS_ACUITY_SCORE: 61
ADLS_ACUITY_SCORE: 56
ADLS_ACUITY_SCORE: 61
ADLS_ACUITY_SCORE: 52
ADLS_ACUITY_SCORE: 61
ADLS_ACUITY_SCORE: 56
ADLS_ACUITY_SCORE: 61

## 2022-12-14 NOTE — PLAN OF CARE
Goal Outcome Evaluation:    Pt disoriented to time, confuse at times, follows command but sometimes gets lost in the middle of a conversation  or forget what was being asked of him. ST and hypertensive part of the night, LS wheezy with infrequent congested cough and SOB on VILLAR. Incontinent of bowel and bladder but will occasionally use the urinal. Several episodes of incontinent stools throughout the night. Back pain complain, managed with lidocaine patch, scheduled PO Dilaudid.

## 2022-12-14 NOTE — PROGRESS NOTES
Cardiology Progress Note          Assessment and Plan:     76 yo male with  Acute encephalopathy, colitis, hypotension, lactic acidosis, type II NSTEMI, CAD, ongoing tobacco abuse, macrocytic anemia and rifht facial hematoma/cellulitis  1. Type II MI- known CAD, stable and appears asymptomatic now, if his encephalopathy clears, can consider angiography, however, prolong confusion and encephalopathy without clear apparent reversible etiology. Would favor medical mangement if he doesn't improve.  2.HTN- still with elevated BP, noted significant wheezing on exam, will switch metoprolol to carvedilol to reduce risk of reactive airway disease.  3. COPD- requiring supplemental O2 with active wheezing, per hospitalist  4. Tobacco abuse- counseled to quit        Interval History:   Still confused with nonsensical speech                Medications:   I have reviewed this patient's current medications         Physical Exam:         Vital Sign Ranges  Temperature Temp  Av.8  F (36.6  C)  Min: 97.4  F (36.3  C)  Max: 98.2  F (36.8  C)   Blood pressure Systolic (24hrs), Av , Min:108 , Max:162        Diastolic (24hrs), Av, Min:65, Max:132      Pulse Pulse  Av.5  Min: 67  Max: 101   Respirations Resp  Av.1  Min: 12  Max: 37   Pulse oximetry SpO2  Av.6 %  Min: 88 %  Max: 98 %         Intake/Output Summary (Last 24 hours) at 2022 1032  Last data filed at 2022 0500  Gross per 24 hour   Intake --   Output 750 ml   Net -750 ml       Constitutional:   obese     Skin:   normal     Neck:   supple, symmetrical, trachea midline     Chest:   Normal Symmetry and no tenderness     Lungs:   Significant wheezes today with prolonged experium     Cardiovascular:   normal S1 and S2     Extremities and Back:   No edema     Neurological:   Moving all extremities              Data:     Results for orders placed or performed during the hospital encounter of 22 (from the past 24 hour(s))   Glucose by meter    Result Value Ref Range    GLUCOSE BY METER POCT 111 (H) 70 - 99 mg/dL   Glucose by meter   Result Value Ref Range    GLUCOSE BY METER POCT 115 (H) 70 - 99 mg/dL   MR Brain w/o & w Contrast    Narrative    EXAM: MR BRAIN W/O and W CONTRAST  LOCATION: [facility]  DATE/TIME: 12/13/2022 5:21 PM    INDICATION: Altered mental status.  COMPARISON: Head CT 12/06/2022.  CONTRAST: 10 mL Gadavist  TECHNIQUE: Routine multiplanar multisequence head MRI without and with intravenous contrast.    FINDINGS:  INTRACRANIAL CONTENTS: No acute or subacute infarct. No mass, acute hemorrhage, or extra-axial fluid collections. Scattered nonspecific T2/FLAIR hyperintensities within the cerebral white matter most consistent with mild chronic microvascular ischemic   change. Mild generalized cerebral atrophy. No hydrocephalus. Normal position of the cerebellar tonsils. No pathologic contrast enhancement.    SELLA: No abnormality accounting for technique.    OSSEOUS STRUCTURES/SOFT TISSUES: Normal marrow signal. The major intracranial vascular flow voids are maintained.     ORBITS: No abnormality accounting for technique.     SINUSES/MASTOIDS: No paranasal sinus mucosal disease. No middle ear or mastoid effusion.       Impression    IMPRESSION:  1.  No acute intracranial process.  2.  Generalized brain atrophy and presumed microvascular ischemic changes as detailed above.   Heparin Unfractionated Anti Xa Level   Result Value Ref Range    Anti Xa Unfractionated Heparin <0.10 For Reference Range, See Comment IU/mL    Narrative    Therapeutic Range: UFH: 0.25-0.50 IU/mL for low intensity dosing,  0.30-0.70 IU/mL for high intensity dosing DVT and PE.  This test is not validated for other direct factor X inhibitors (e.g. rivaroxaban, apixaban, edoxaban, betrixaban, fondaparinux) and should not be used for monitoring of other medications.   CBC with platelets   Result Value Ref Range    WBC Count 7.1 4.0 - 11.0 10e3/uL     RBC Count 2.66 (L) 4.40 - 5.90 10e6/uL    Hemoglobin 9.2 (L) 13.3 - 17.7 g/dL    Hematocrit 30.8 (L) 40.0 - 53.0 %     (H) 78 - 100 fL    MCH 34.6 (H) 26.5 - 33.0 pg    MCHC 29.9 (L) 31.5 - 36.5 g/dL    RDW 13.9 10.0 - 15.0 %    Platelet Count 185 150 - 450 10e3/uL   Basic metabolic panel   Result Value Ref Range    Sodium 142 136 - 145 mmol/L    Potassium 4.1 3.4 - 5.3 mmol/L    Chloride 104 98 - 107 mmol/L    Carbon Dioxide (CO2) 30 (H) 22 - 29 mmol/L    Anion Gap 8 7 - 15 mmol/L    Urea Nitrogen 24.2 (H) 8.0 - 23.0 mg/dL    Creatinine 0.56 (L) 0.67 - 1.17 mg/dL    Calcium 8.6 (L) 8.8 - 10.2 mg/dL    Glucose 87 70 - 99 mg/dL    GFR Estimate >90 >60 mL/min/1.73m2   Glucose by meter   Result Value Ref Range    GLUCOSE BY METER POCT 96 70 - 99 mg/dL

## 2022-12-14 NOTE — PLAN OF CARE
ICU End of Shift Summary.  For vital signs and complete assessments, please see documentation flowsheets.      Pertinent assessments:   Neuro: Alert. Mentations waxes and wanes. Confused about situation and location at times.   Cardiac: SR, BP stable  Resp: LS dim, continues on supplemental oxygen via NC  GI: BS active. Multiple loose stools today.   : WDL  Lines: PIV  Drips: none.     Major Shift Events:   Heparin stopped.   Multiple loose stools.   MRI of brain completed.   Heparin discontinued  Lisinopril discontinued    Plan (Upcoming Events): TBD. Cardiology, Hospitalist following. Medical overflow - transfer to floor when bed available.   Discharge/Transfer Needs: TBD     Bedside Shift Report Completed : yes  Bedside Safety Check Completed: yes

## 2022-12-14 NOTE — PLAN OF CARE
Shift Events: No change    Neuro: Intermittently confused. Speech Illogical at times. Restless but redirectable.  CV: SR, normotensive. Cardiology changed Metoprolol to Coreg.  ID: Oral Diflucan  Pulm: Coarse with wheezing. Desats with activity to mid 80's while in bed. VILLAR. Recovers with rest. Poor tolerance to activity.  GI: Incontinent stool x multiple. Poor appetite.  : Urinal/incontinence  Lines/gtts: PIV x2    Plan: Continue with current cares. Medical overflow.

## 2022-12-15 ENCOUNTER — APPOINTMENT (OUTPATIENT)
Dept: OCCUPATIONAL THERAPY | Facility: CLINIC | Age: 77
DRG: 871 | End: 2022-12-15
Attending: INTERNAL MEDICINE
Payer: COMMERCIAL

## 2022-12-15 ENCOUNTER — APPOINTMENT (OUTPATIENT)
Dept: PHYSICAL THERAPY | Facility: CLINIC | Age: 77
DRG: 871 | End: 2022-12-15
Attending: INTERNAL MEDICINE
Payer: COMMERCIAL

## 2022-12-15 LAB
ANION GAP SERPL CALCULATED.3IONS-SCNC: 7 MMOL/L (ref 7–15)
BASOPHILS # BLD AUTO: 0 10E3/UL (ref 0–0.2)
BASOPHILS NFR BLD AUTO: 0 %
BUN SERPL-MCNC: 21.9 MG/DL (ref 8–23)
CALCIUM SERPL-MCNC: 8.8 MG/DL (ref 8.8–10.2)
CHLORIDE SERPL-SCNC: 102 MMOL/L (ref 98–107)
CREAT SERPL-MCNC: 0.65 MG/DL (ref 0.67–1.17)
DEPRECATED HCO3 PLAS-SCNC: 33 MMOL/L (ref 22–29)
EOSINOPHIL # BLD AUTO: 0 10E3/UL (ref 0–0.7)
EOSINOPHIL NFR BLD AUTO: 0 %
ERYTHROCYTE [DISTWIDTH] IN BLOOD BY AUTOMATED COUNT: 13.7 % (ref 10–15)
GFR SERPL CREATININE-BSD FRML MDRD: >90 ML/MIN/1.73M2
GLUCOSE SERPL-MCNC: 96 MG/DL (ref 70–99)
HCT VFR BLD AUTO: 32.1 % (ref 40–53)
HGB BLD-MCNC: 9.8 G/DL (ref 13.3–17.7)
IMM GRANULOCYTES # BLD: 0 10E3/UL
IMM GRANULOCYTES NFR BLD: 1 %
LYMPHOCYTES # BLD AUTO: 0.8 10E3/UL (ref 0.8–5.3)
LYMPHOCYTES NFR BLD AUTO: 14 %
MCH RBC QN AUTO: 34.4 PG (ref 26.5–33)
MCHC RBC AUTO-ENTMCNC: 30.5 G/DL (ref 31.5–36.5)
MCV RBC AUTO: 113 FL (ref 78–100)
MONOCYTES # BLD AUTO: 0.5 10E3/UL (ref 0–1.3)
MONOCYTES NFR BLD AUTO: 8 %
NEUTROPHILS # BLD AUTO: 4.4 10E3/UL (ref 1.6–8.3)
NEUTROPHILS NFR BLD AUTO: 77 %
NRBC # BLD AUTO: 0 10E3/UL
NRBC BLD AUTO-RTO: 0 /100
PLATELET # BLD AUTO: 312 10E3/UL (ref 150–450)
POTASSIUM SERPL-SCNC: 3.9 MMOL/L (ref 3.4–5.3)
RBC # BLD AUTO: 2.85 10E6/UL (ref 4.4–5.9)
SODIUM SERPL-SCNC: 142 MMOL/L (ref 136–145)
WBC # BLD AUTO: 5.8 10E3/UL (ref 4–11)

## 2022-12-15 PROCEDURE — 94640 AIRWAY INHALATION TREATMENT: CPT

## 2022-12-15 PROCEDURE — 94640 AIRWAY INHALATION TREATMENT: CPT | Mod: 76

## 2022-12-15 PROCEDURE — 250N000013 HC RX MED GY IP 250 OP 250 PS 637: Performed by: INTERNAL MEDICINE

## 2022-12-15 PROCEDURE — 250N000009 HC RX 250: Performed by: INTERNAL MEDICINE

## 2022-12-15 PROCEDURE — 250N000013 HC RX MED GY IP 250 OP 250 PS 637: Performed by: NURSE PRACTITIONER

## 2022-12-15 PROCEDURE — 250N000011 HC RX IP 250 OP 636: Performed by: INTERNAL MEDICINE

## 2022-12-15 PROCEDURE — 97166 OT EVAL MOD COMPLEX 45 MIN: CPT | Mod: GO

## 2022-12-15 PROCEDURE — 250N000011 HC RX IP 250 OP 636: Performed by: HOSPITALIST

## 2022-12-15 PROCEDURE — C9113 INJ PANTOPRAZOLE SODIUM, VIA: HCPCS | Performed by: INTERNAL MEDICINE

## 2022-12-15 PROCEDURE — 250N000013 HC RX MED GY IP 250 OP 250 PS 637: Performed by: CLINICAL NURSE SPECIALIST

## 2022-12-15 PROCEDURE — 250N000012 HC RX MED GY IP 250 OP 636 PS 637: Performed by: INTERNAL MEDICINE

## 2022-12-15 PROCEDURE — 120N000001 HC R&B MED SURG/OB

## 2022-12-15 PROCEDURE — 97161 PT EVAL LOW COMPLEX 20 MIN: CPT | Mod: GP | Performed by: PHYSICAL THERAPIST

## 2022-12-15 PROCEDURE — 80048 BASIC METABOLIC PNL TOTAL CA: CPT | Performed by: INTERNAL MEDICINE

## 2022-12-15 PROCEDURE — 99233 SBSQ HOSP IP/OBS HIGH 50: CPT | Performed by: INTERNAL MEDICINE

## 2022-12-15 PROCEDURE — 36415 COLL VENOUS BLD VENIPUNCTURE: CPT | Performed by: INTERNAL MEDICINE

## 2022-12-15 PROCEDURE — 999N000156 HC STATISTIC RCP CONSULT EA 30 MIN

## 2022-12-15 PROCEDURE — 999N000157 HC STATISTIC RCP TIME EA 10 MIN

## 2022-12-15 PROCEDURE — 97530 THERAPEUTIC ACTIVITIES: CPT | Mod: GO

## 2022-12-15 PROCEDURE — 85025 COMPLETE CBC W/AUTO DIFF WBC: CPT | Performed by: INTERNAL MEDICINE

## 2022-12-15 PROCEDURE — 97530 THERAPEUTIC ACTIVITIES: CPT | Mod: GP | Performed by: PHYSICAL THERAPIST

## 2022-12-15 RX ADMIN — PREGABALIN 25 MG: 25 CAPSULE ORAL at 23:06

## 2022-12-15 RX ADMIN — Medication 250 MG: at 19:50

## 2022-12-15 RX ADMIN — HYDROMORPHONE HYDROCHLORIDE 2 MG: 2 TABLET ORAL at 04:13

## 2022-12-15 RX ADMIN — ROPINIROLE HYDROCHLORIDE 0.25 MG: 0.25 TABLET, FILM COATED ORAL at 23:06

## 2022-12-15 RX ADMIN — SERTRALINE HYDROCHLORIDE 100 MG: 100 TABLET ORAL at 09:01

## 2022-12-15 RX ADMIN — HYDROMORPHONE HYDROCHLORIDE 2 MG: 2 TABLET ORAL at 17:21

## 2022-12-15 RX ADMIN — HYDROMORPHONE HYDROCHLORIDE 2 MG: 2 TABLET ORAL at 23:06

## 2022-12-15 RX ADMIN — ACETAMINOPHEN 1000 MG: 500 TABLET, FILM COATED ORAL at 10:45

## 2022-12-15 RX ADMIN — DICLOFENAC SODIUM 4 G: 10 GEL TOPICAL at 15:25

## 2022-12-15 RX ADMIN — CARVEDILOL 25 MG: 25 TABLET, FILM COATED ORAL at 17:20

## 2022-12-15 RX ADMIN — LISINOPRIL 20 MG: 20 TABLET ORAL at 09:01

## 2022-12-15 RX ADMIN — CARVEDILOL 25 MG: 25 TABLET, FILM COATED ORAL at 09:01

## 2022-12-15 RX ADMIN — IPRATROPIUM BROMIDE AND ALBUTEROL SULFATE 3 ML: 2.5; .5 SOLUTION RESPIRATORY (INHALATION) at 20:38

## 2022-12-15 RX ADMIN — PREDNISOLONE ACETATE 1 DROP: 10 SUSPENSION/ DROPS OPHTHALMIC at 09:04

## 2022-12-15 RX ADMIN — ATROPINE SULFATE 1 DROP: 10 SOLUTION/ DROPS OPHTHALMIC at 21:05

## 2022-12-15 RX ADMIN — HALOPERIDOL LACTATE 2 MG: 5 INJECTION, SOLUTION INTRAMUSCULAR at 03:20

## 2022-12-15 RX ADMIN — ACETAMINOPHEN 1000 MG: 500 TABLET, FILM COATED ORAL at 17:20

## 2022-12-15 RX ADMIN — ROPINIROLE HYDROCHLORIDE 0.25 MG: 0.25 TABLET, FILM COATED ORAL at 09:01

## 2022-12-15 RX ADMIN — PREDNISOLONE ACETATE 1 DROP: 10 SUSPENSION/ DROPS OPHTHALMIC at 17:21

## 2022-12-15 RX ADMIN — ATROPINE SULFATE 1 DROP: 10 SOLUTION/ DROPS OPHTHALMIC at 09:04

## 2022-12-15 RX ADMIN — QUETIAPINE FUMARATE 50 MG: 50 TABLET ORAL at 21:03

## 2022-12-15 RX ADMIN — Medication 50 MCG: at 09:01

## 2022-12-15 RX ADMIN — ASPIRIN 81 MG: 81 TABLET, COATED ORAL at 09:01

## 2022-12-15 RX ADMIN — LISINOPRIL 20 MG: 20 TABLET ORAL at 21:03

## 2022-12-15 RX ADMIN — ATORVASTATIN CALCIUM 40 MG: 40 TABLET, FILM COATED ORAL at 19:50

## 2022-12-15 RX ADMIN — PREDNISONE 40 MG: 20 TABLET ORAL at 09:01

## 2022-12-15 RX ADMIN — POLYETHYLENE GLYCOL 3350 17 G: 17 POWDER, FOR SOLUTION ORAL at 09:01

## 2022-12-15 RX ADMIN — Medication 250 MG: at 09:03

## 2022-12-15 RX ADMIN — MICONAZOLE NITRATE: 20 POWDER TOPICAL at 09:07

## 2022-12-15 RX ADMIN — HYDROMORPHONE HYDROCHLORIDE 2 MG: 2 TABLET ORAL at 10:45

## 2022-12-15 RX ADMIN — ROPINIROLE HYDROCHLORIDE 0.25 MG: 0.25 TABLET, FILM COATED ORAL at 15:30

## 2022-12-15 RX ADMIN — Medication: at 15:25

## 2022-12-15 RX ADMIN — Medication: at 09:04

## 2022-12-15 RX ADMIN — PREGABALIN 25 MG: 25 CAPSULE ORAL at 09:01

## 2022-12-15 RX ADMIN — PANTOPRAZOLE SODIUM 40 MG: 40 INJECTION, POWDER, FOR SOLUTION INTRAVENOUS at 09:01

## 2022-12-15 RX ADMIN — Medication: at 23:11

## 2022-12-15 RX ADMIN — FLUCONAZOLE 200 MG: 50 TABLET ORAL at 09:01

## 2022-12-15 RX ADMIN — Medication 250 MG: at 15:24

## 2022-12-15 RX ADMIN — DICLOFENAC SODIUM 4 G: 10 GEL TOPICAL at 23:11

## 2022-12-15 RX ADMIN — DICLOFENAC SODIUM 4 G: 10 GEL TOPICAL at 09:04

## 2022-12-15 RX ADMIN — Medication 250 MG: at 23:06

## 2022-12-15 RX ADMIN — PREGABALIN 25 MG: 25 CAPSULE ORAL at 15:30

## 2022-12-15 RX ADMIN — ACETAMINOPHEN 1000 MG: 500 TABLET, FILM COATED ORAL at 01:55

## 2022-12-15 RX ADMIN — IPRATROPIUM BROMIDE AND ALBUTEROL SULFATE 3 ML: 2.5; .5 SOLUTION RESPIRATORY (INHALATION) at 16:13

## 2022-12-15 RX ADMIN — MULTIPLE VITAMINS W/ MINERALS TAB 1 TABLET: TAB at 09:01

## 2022-12-15 RX ADMIN — CALCIUM 1000 MG: 500 TABLET ORAL at 09:01

## 2022-12-15 RX ADMIN — IPRATROPIUM BROMIDE AND ALBUTEROL SULFATE 3 ML: 2.5; .5 SOLUTION RESPIRATORY (INHALATION) at 08:14

## 2022-12-15 RX ADMIN — PREDNISOLONE ACETATE 1 DROP: 10 SUSPENSION/ DROPS OPHTHALMIC at 23:07

## 2022-12-15 RX ADMIN — IPRATROPIUM BROMIDE AND ALBUTEROL SULFATE 3 ML: 2.5; .5 SOLUTION RESPIRATORY (INHALATION) at 12:27

## 2022-12-15 RX ADMIN — TAMSULOSIN HYDROCHLORIDE 0.4 MG: 0.4 CAPSULE ORAL at 19:50

## 2022-12-15 RX ADMIN — DICLOFENAC SODIUM 4 G: 10 GEL TOPICAL at 19:53

## 2022-12-15 RX ADMIN — MICONAZOLE NITRATE: 20 POWDER TOPICAL at 23:11

## 2022-12-15 RX ADMIN — CYANOCOBALAMIN TAB 1000 MCG 1000 MCG: 1000 TAB at 09:01

## 2022-12-15 RX ADMIN — QUETIAPINE FUMARATE 50 MG: 50 TABLET ORAL at 09:01

## 2022-12-15 RX ADMIN — Medication: at 19:54

## 2022-12-15 RX ADMIN — PANTOPRAZOLE SODIUM 40 MG: 40 INJECTION, POWDER, FOR SOLUTION INTRAVENOUS at 21:04

## 2022-12-15 ASSESSMENT — ACTIVITIES OF DAILY LIVING (ADL)
ADLS_ACUITY_SCORE: 59
ADLS_ACUITY_SCORE: 61
ADLS_ACUITY_SCORE: 52
ADLS_ACUITY_SCORE: 59
ADLS_ACUITY_SCORE: 56
ADLS_ACUITY_SCORE: 52
ADLS_ACUITY_SCORE: 56
ADLS_ACUITY_SCORE: 59
ADLS_ACUITY_SCORE: 59
ADLS_ACUITY_SCORE: 56

## 2022-12-15 NOTE — PROGRESS NOTES
12/15/22 1317   Appointment Info   Signing Clinician's Name / Credentials (PT) Jacqueline Beauchamp DPT   Living Environment   People in Home significant other   Current Living Arrangements house   Home Accessibility stairs to enter home   Number of Stairs, Main Entrance 2   Transportation Anticipated family or friend will provide   Living Environment Comments Pt poor historian will need to verify   Self-Care   Usual Activity Tolerance good   Current Activity Tolerance fair   Equipment Currently Used at Home raised toilet seat;walker, rolling   Fall history within last six months yes   Number of times patient has fallen within last six months   (unknown)   General Information   Onset of Illness/Injury or Date of Surgery 12/06/22   Referring Physician Eliu Noland MD   Patient/Family Therapy Goals Statement (PT) none stated   Pertinent History of Current Problem (include personal factors and/or comorbidities that impact the POC) 77 year old male with a history of htn/hlp, remote hx of CAD s/p DAYAN to the LAD-most recent echo performed in the ER with hyperdynamic EF 75 % and G1dd, hx of prostate cancer from earlier this year treated with XRT through [place], macrocytic anemia with hgb in the 11-12 range (normal B12 and folate), monoclonal gammopathy (no recent documentation of evaluation), PMR, obesity, hx of tob abuse  admitted on 12/6/2022 with hypovolemic shock of unclear etiology   Existing Precautions/Restrictions fall   General Observations supine, sleeping but easily arousable; very conversant but not making full sense   Cognition   Affect/Mental Status (Cognition) confused   Orientation Status (Cognition) oriented to;person;place  (knew month/year)   Follows Commands (Cognition) follows one-step commands;75-90% accuracy;delayed response/completion;increased processing time needed;initiation impaired;repetition of directions required   Safety Deficit (Cognition) awareness of need for assistance;insight into  deficits/self-awareness;judgment;problem-solving   Pain Assessment   Patient Currently in Pain No   Posture    Posture Forward head position;Protracted shoulders   Range of Motion (ROM)   Range of Motion ROM is WFL   Strength (Manual Muscle Testing)   Strength (Manual Muscle Testing) Deficits observed during functional mobility   Bed Mobility   Bed Mobility supine-sit   Supine-Sit Medon (Bed Mobility) dependent (less than 25% patient effort)  (until PT provided cues see tx for ability)   Bed Mobility Limitations cognitive deficits;decreased ability to use legs for bridging/pushing   Impairments Contributing to Impaired Bed Mobility decreased strength   Transfers   Transfers bed-chair transfer;sit-stand transfer   Bed-Chair Transfer   Assistive Device (Bed-Chair Transfers)   (harjinder stedy)   Bed-Chair Medon (Transfers) dependent (less than 25% patient effort)   Sit-Stand Transfer   Sit-Stand Medon (Transfers) minimum assist (75% patient effort);verbal cues   Assistive Device (Sit-Stand Transfers)   (harjinder stedy)   Gait/Stairs (Locomotion)   Comment, (Gait/Stairs) not assessed this date   Balance   Balance Comments Fair sitting and standing balance   Clinical Impression   Criteria for Skilled Therapeutic Intervention Yes, treatment indicated   PT Diagnosis (PT) decreased functional mobility   Influenced by the following impairments cognition/motor planning, strength, balance   Functional limitations due to impairments bed mobility, transfers, ambulation, stair climbing   Clinical Presentation (PT Evaluation Complexity) Stable/Uncomplicated   Clinical Presentation Rationale clinical judgement   Clinical Decision Making (Complexity) low complexity   Planned Therapy Interventions (PT) balance training;bed mobility training;gait training;home exercise program;neuromuscular re-education;patient/family education;postural re-education;ROM (range of motion);stair training;strengthening;transfer training;risk  factor education;home program guidelines   Anticipated Equipment Needs at Discharge (PT)   (TBD)   Risk & Benefits of therapy have been explained evaluation/treatment results reviewed;care plan/treatment goals reviewed;risks/benefits reviewed;current/potential barriers reviewed;participants voiced agreement with care plan;participants included;patient   PT Total Evaluation Time   PT Eval, Low Complexity Minutes (87233) 10   Physical Therapy Goals   PT Frequency 5x/week   PT Predicted Duration/Target Date for Goal Attainment 12/22/22   PT Goals Bed Mobility;Transfers;Gait;Stairs   PT: Bed Mobility Supervision/stand-by assist;Supine to/from sit   PT: Transfers Supervision/stand-by assist;Sit to/from stand;Bed to/from chair;Assistive device   PT: Gait Supervision/stand-by assist;Rolling walker;50 feet   PT: Stairs Minimal assist;2 stairs   PT Discharge Planning   PT Plan trial walker, Ax2 for safety (more d/t poor command following)   PT Discharge Recommendation (DC Rec) Transitional Care Facility   PT Rationale for DC Rec Pt currently below baseline for mobility and likely cognition.  Currently rec TCU as pt needing SS for transfers this date.   PT Brief overview of current status SS   Total Session Time   Timed Code Treatment Minutes 24   Total Session Time (sum of timed and untimed services) 34

## 2022-12-15 NOTE — PROGRESS NOTES
ICU End of Shift Summary.  For vital signs and complete assessments, please see documentation flowsheets.      Pertinent assessments:  CV: SR mid 70's, pt. Hypertensive overnight  Neuro: Confused, restless and unable to sleep for most of night. PRN IV Haldol 2 mg given once this shift. Scheduled pain medication given for lower back pain.   Pulm: 3 L NC  GI: Multiple small loose bowel movement overnight  Renal: used urinal overnight with some incontinent events of urine  Skin: No changes    Discharge/Transfer Needs: Pending clinical status     Bedside Shift Report Completed : Yes  Bedside Safety Check Completed: Yes

## 2022-12-15 NOTE — PROGRESS NOTES
[facility]  Hospitalist Progress Note  Tino Pablo M.D., M.B.A.   12/14/2022    Reason for Stay/active problem list      Acute encephalopathy    Hypotension-likely from hypovolemia    Non- ST segment elevation MI    Dehydration, resolved    Acute on chronic back pain assoc with possible compression fx    Suspected acute colitis    Large left inguinal hernia     Abnormally marked circumferential wall thickening of the entire esophagus suspicious for esophagitis.  Complaints of difficulty swallowing and recent hx Thrush.           Assessment and Plan:        Summary of Stay:   Pacheco Torres is a 77 year old male with a history of htn/hlp, remote hx of CAD s/p DAYAN to the LAD-most recent echo performed in the ER with hyperdynamic EF 75 % and G1dd, hx of prostate cancer from earlier this year treated with XRT through [place], macrocytic anemia with hgb in the 11-12 range (normal B12 and folate), monoclonal gammopathy (no recent documentation of evaluation), PMR, obesity, hx of tob abuse  admitted on 12/6/2022 with hypovolemic shock of unclear etiology     He believes he passed out either the day prior or the day of admission.  Unable to fill in any details. States he hasn't been sleeping well but denies and n/v/d.  States po intake has been well. No f/c/s.  No epistaxis/bloody gums/stools or emesis.      Discussed with  Carlos Enrique Goldberg who states her hasn't been eating well for at least 6 weeks, she thinks his fluid intake is ok.  She reports that he's had some nausea but no vomiting.  No diarrhea in fact she thinks he may be constipated.      In the ER BPs in the 60's/40's with tachycardia and tachypnea, he was hypothermic at 95.4, he was given 2 L of NS without improvement and so started on NE via Right IJ.       EKG with ST elevation throughout septal/anterior leads-cards was contacted and the story just didn't seem to fit- he was without any CP or anginal equivalents. Initial trop 29. He was  placed on heparin and stat echo completed showing hyperdynamic cardiac function.     troponins continue to rise 29->222-493 but EKG has normalized    CMP with AG 2/2 elevated lactic acid with respiratory compensation.    Lactates 5.4->1.4 after IVF/NE  CBC with stable macrocytic anemia, no leukocytosis  procal 0.15     CXR with pulmonary edema  CT Ao survey-diffuse CAD, Left inguinal canal hernia containing loops of the distal colon     He was covered with pip-tazo/vanco for septic shock of unclear etiology        Problem List with Assessment and Plan:    Shock/syncope  Acute colitis on CT imaging  --  Given initial EKG would certainly fit a CV shock picture although echo with hyperdynamic function.  -- Suspected combination of hypovolemia and sepsis.  --Hypotension resolved with IV fluid resuscitation and transient norepinephrine  --CT of chest abdomen pelvis obtained and December 7 suggested possible colitis and esophagitis.    Acute toxic metabolic metabolic encephalopathy   -- Likely combination of toxic metabolic and infectious encephalopathy.  -- Patient could have no underlying cognitive deficit.  Chronic insomnia due to back pain issues.  --Patient was agitated on December 8 and required Precedex drip.  -- MRI brain shows no acute abnormality    NSTEMI , sinus tachycardia  -- trops 29->222->493  Diffuse CAD noted on CT Ao survey.    --No CP and echo with hyperdynamic function.   -- Patient was treated with heparin and cardiology was consulted.  -- Patient was seen by cardiology and initially recommended coronary angiogram but patient was confused unable to provide consent and lay still for the procedure.  Coronary angiogram plan was consulted and patient was treated with heparin, started on aspirin.  -- Has been tachycardic.  Discussed with cardiology team and beta-blocker was titrated.  Coronary angiogram was recommended initially but patient was confused and agitated unable to sign consent and this was  consulted.  He was continued with heparin for several days anticipating coronary angiogram but patient is not stable enough for coronary angiogram at this time.  Discussed with cardiologist and ischemic work-up to be postponed as outpatient when he is more stable    CAD  Htn/hlp   --pta on asa 81 mg/lisinopril 10 mg every day/simvastain 40  Mg  -Home medications resumed       Large Left inguinal hernia with bowel contents-initial concern for small bowel obstruction.  -- CT on December 7 showed concern for bowel obstruction.  Patient had left lower quadrant abdominal tenderness but no rigidity or rebound tenderness.  Surgery was consulted and patient was seen and examined by Dr. Ruiz who recommended to continue to monitor for.  --No evidence of bowel obstruction, currently no complaint of abdominal pain or ischemic bowel concern.  Continue to monitor.  Advance diet as tolerate     Macrocytic anemia-MGUS  --Being worked up by PCP   --Currently hemoglobin is 11.0.Positive stool.  Esophageal thickening noted on CT scan.  GI consulted and recommendation obtained for no intervention at this time.    Incidental pulmonary nodules  --Given heavy smoking history will need repeat CT in 3-6 months    Dehydration  --Patient has evidence of dehydration with very dry tongue and buccal mucosa  Decreased intake PTA   --Advance diet as tolerated, encourage oral fluids, IVF today     Acute on chronic back pain-history of prostate cancer with recent treatment with radiation therapy, follows with [facility]  --Severe acute on chronic back pain requiring narcotic medications.  History of prostate cancer.  -- Patient was seen by pain management team.  Pain medications recommended.  Continue current pain regimen-scheduled Tylenol 1 g every 8 hours, Voltaren gel 4 g 4 times daily, lidocaine patch, menthol, Lyrica.  As needed Dilaudid 2 -4 mg every 3 hours as needed.  Improved with scheduled Dilaudid   Patient may need MRI study of his  "thoracic and lumbar spine for evaluation of compression fractures and pathology and is more stable    Abnormal CT with concern for esophagitis and colitis   -- empirically will treat for Candidal esophagitis using Fluconazole 400 mg x 1 followed by 200 mg daily x 13 doses    Chronic medical problems  Depression/gerd/prostate ca/pmr/monoclonal gammopathy   -- Continue pta sertraline     COVID 19  Negative  S/p 5 shot moderna series 9/2022 (biv)    Acute hypoxic respiratory failure  -- Patient is a smoker, has increased work of breathing and hypoxic requiring supplemental oxygen.  -- He has evidence of emphysema on CT scan . Mild acute exacerbation of COPD suspected  -- Patient was treated with DuoNeb, supplemental oxygen, steroid.  -- Currently he is wheezing, hypoxia is improving.      Community-acquired pneumonia-new left lung infiltrate on chest x-ray on December 8  -- Treated with  Zosyn for 7 days..  Afebrile    Restless leg syndrome  --Requip started this admission    Plan for today:  -- empirically treat for candidal esophagitis  -- encouraged increased activity.  Pt has been very sedentary for weeks now and likely will need TCU  -- May transfer to floor    VTE Prophylaxis: Heparin    Code Status: No CPR- Do NOT Intubate     Diet: Snacks/Supplements Adult: Ensure Max Protein (bariatric); With Meals  Regular Diet Adult      Blanchard Catheter: Not present    Family updated today: Yes  significant other and brother Nico.      Disposition: May transfer to the floor, telemetry        Interval History (Subjective):      Chart reviewed, pt interviewed.    Today, pt indicates his breathing is improving. Still coughing.    Notes discomfort with sswallowing and recent hx thrush.         Physical Exam:        Last Vital Signs:  BP (!) 149/76 (BP Location: Right arm)   Pulse 67   Temp 97.8  F (36.6  C) (Temporal)   Resp 15   Ht 1.6 m (5' 3\")   Wt 102.6 kg (226 lb 3.1 oz)   SpO2 97%   BMI 40.07 kg/m      I/O last 3 " completed shifts:  In: 480 [P.O.:480]  Out: 650 [Urine:650]    Wt Readings from Last 5 Encounters:   12/12/22 102.6 kg (226 lb 3.1 oz)   04/03/12 97.1 kg (214 lb)        Constitutional:  Awake, alert but restless and occasionally confused.     Respiratory:  Increased work of breathing, diffuse rhonchi.  No wheezing.   Cardiovascular: Regular rate and rhythm, normal S1 and S2, and no murmur noted   Abdomen: Normal bowel sounds, soft, non-distended, left lower quadrant abdominal tenderness.  No rebound tenderness, rigidity or guarding.   Skin: No new rashes, no cyanosis, dry to touch   Neuro: Alert with  no new focal weakness   Extremities: No edema   Other(s): Oropharynx with white plaque on tongue        All other systems:           Medications:        All current medications were reviewed with changes reflected in problem list.         Data:      All new lab and imaging data was reviewed.      Data reviewed today: I reviewed all new labs and imaging results over the last 24 hours. I personally reviewed       Recent Labs   Lab 12/14/22  0540 12/12/22  0518 12/11/22  0510   WBC 7.1 6.8 5.2   HGB 9.2* 11.0* 10.1*   HCT 30.8* 35.1* 32.5*   * 111* 111*    270 254     No results for input(s): CULT in the last 168 hours.  Recent Labs   Lab 12/14/22  0720 12/14/22  0540 12/13/22  1608 12/12/22  1936 12/12/22  0518 12/11/22  0910 12/11/22  0510   NA  --  142  --   --  145  --  144   POTASSIUM  --  4.1  --   --  3.8  --  3.4   CHLORIDE  --  104  --   --  104  --  105   CO2  --  30*  --   --  35*  --  32*   ANIONGAP  --  8  --   --  6*  --  7   GLC 96 87 115*   < > 114*   < > 109*   BUN  --  24.2*  --   --  28.1*  --  29.7*   CR  --  0.56*  --   --  0.58*  --  0.64*   GFRESTIMATED  --  >90  --   --  >90  --  >90   TAVO  --  8.6*  --   --  9.5  --  9.6   MAG  --   --   --   --   --   --  2.0    < > = values in this interval not displayed.       Recent Labs   Lab 12/14/22  0720 12/14/22  0540 12/13/22  1603  12/13/22  1215 12/13/22  0809   GLC 96 87 115* 111* 87       No results for input(s): INR in the last 168 hours.      No results for input(s): TROPONIN, TROPI, TROPR in the last 168 hours.    Invalid input(s): TROP, TROPONINIES    Recent Results (from the past 48 hour(s))   XR Chest Port 1 View    Narrative    CHEST PORTABLE ONE VIEW December 6, 2022 2:39 PM     HISTORY: STEMI. Altered mental status. Chest pain.    COMPARISON: 11/2/2022.      Impression    IMPRESSION: Hypoinflated lungs and cardiomegaly. Mild bilateral  vascular congestion appears increased. Correlate with any evidence of  developing pulmonary edema.    HAL BOGGS MD         SYSTEM ID:  [id number]   CT Head w/o Contrast    Narrative    CT SCAN OF THE HEAD WITHOUT CONTRAST December 6, 2022 3:07 PM     HISTORY: Altered mental status.    TECHNIQUE: Axial images of the head and coronal reformations without  IV contrast material. Radiation dose for this scan was reduced using  automated exposure control, adjustment of the mA and/or kV according  to patient size, or iterative reconstruction technique.    COMPARISON: None.      Impression    IMPRESSION: Mild motion artifact. No evidence of hemorrhage. Volume  loss and patchy areas of white matter hypoattenuation which likely  represent chronic small vessel ischemic change. Small area of focal  hypoattenuation within the central medulla, presumably artifactual  (brainstem infarct not excluded, MRI can be performed for basal  ganglia and thalamic lacunar infarcts, presumably chronic. No acute  osseous abnormality. Nonspecific right facial soft tissue swelling,  potentially contusion.    EVELYN VELAZQUEZ MD         SYSTEM ID:  [id number]   CT Aortic Survey w Contrast    Narrative    CT AORTIC SURVEY WITH CONTRAST  12/6/2022 3:07 PM    HISTORY:  Back pain, shock, wide mediastinum on x-ray.    TECHNIQUE: CT scan obtained of the chest, abdomen, and pelvis with IV  contrast. Post contrast scanning performed during  the arterial phase.  CT chest also obtained without IV contrast. 80 mL Isovue-370 IV  injected. Sagittal and coronal reformatted images acquired from the  source post contrast data. Radiation dose for this scan was reduced  using automated exposure control, adjustment of the mA and/or kV  according to patient size, or iterative reconstruction technique.    COMPARISON:  None.    FINDINGS:  Thoracic and abdominal aorta: No dissection involving the thoracic or  abdominal aorta. Patency of the main branch vessels from the thoracic  and abdominal aorta. Scattered areas of calcified atherosclerotic  disease noted. Atherosclerotic stenosis at the origins of the celiac  and superior mesenteric arteries but there is distal perfusion. No  periaortic hematoma or aneurysm.    Other vascular: Severe coronary artery calcifications.    Chest: No adenopathy or acute mediastinal abnormality. No acute  mediastinal fluid collection. Patchy atelectasis at the posterior  right lower lobe. No effusions. No pneumothorax. Stable nodule  posterolateral left upper lobe measuring 0.3 cm, series 6 image 60.    Abdomen/pelvis: Liver, gallbladder, adrenals, spleen, pancreas, and  kidneys do not show any acute abnormalities. Left inguinal hernia  measures 9.1 x 5.8 cm, series 5 image 265. Herniated loops of the  distal descending colon and sigmoid within the hernia. No upstream  obstruction. No bowel inflammatory change. No enlarged lymph nodes. No  acute pelvic abnormality. Diffuse degenerative changes throughout the  spine. A degree of height loss involving multiple thoracic and lumbar  spine levels.      Impression    IMPRESSION:   1. No acute thoracic or abdominal aortic abnormality. No dissection.  Scattered areas of atherosclerotic disease identified. Atherosclerotic  stenosis at the origins of the celiac artery and superior mesenteric  artery.  2. Diffuse coronary artery calcifications.  3. No acute mediastinal abnormality is seen.  4.  Stable pulmonary nodule on the left, see below for follow-up  imaging guidelines.   5. Left inguinal canal hernia containing herniated loops of the distal  descending colon and sigmoid. No upstream bowel obstruction. See above  for measurements.    Recommendations for one or multiple incidental lung nodules < 6mm:    Low risk patients: No routine follow-up.    High risk patients: Optional follow-up CT at 12 months; if  unchanged, no further follow-up.    *Low Risk: Minimal or absent history of smoking or other known risk  factors.  *Nonsolid (ground glass) or partly solid nodules may require longer  follow-up to exclude indolent adenocarcinoma.  *Recommendations based on Guidelines for the Management of Incidental  Pulmonary Nodules Detected at CT: From the Fleischner Society 2017,  Radiology 2017.    HAL BOGGS MD         SYSTEM ID:  [id number]   Echocardiogram Limited   Result Value    LVEF  75%    Narrative    [id number]  [id number]  [id number]  [id number]^ANGELIQUE^MINOO^NERI     [facility]  Echocardiography Laboratory  [address] [place] [address]     Name: BERTIN LYNCH  MRN: [id number]  : 1945  Study Date: 2022 02:59 PM  Age: 77 yrs  Gender: Male  Patient Location: [facility]  Reason For Study: Chest Pain  Ordering Physician: MINOO MERINO  Performed By: Mariah Carranza RDCS     BSA: 2.1 m2  Height: 66 in  Weight: 220 lb  HR: 110  BP: 82/65 mmHg  ______________________________________________________________________________  Procedure  Limited Portable Echo Adult. Optison (NDC #0262-1404) given intravenously.  (Emergent exam, abbreviated study performed).  ______________________________________________________________________________  Interpretation Summary     The visual ejection fraction is estimated at 75%.  Hyperdynamic left ventricular function  ER informed by phone that echo has been read. The study was technically  limited. Technically difficult, suboptimal  study.  ______________________________________________________________________________  Left Ventricle  Grade I or early diastolic dysfunction. The visual ejection fraction is  estimated at 75%. Hyperdynamic left ventricular function.     Right Ventricle  The right ventricle is normal in size and function.     Atria  Normal left atrial size. Right atrial size is normal.     Mitral Valve  There is trace mitral regurgitation.     Tricuspid Valve  There is trace tricuspid regurgitation. Right ventricular systolic pressure  could not be approximated due to inadequate tricuspid regurgitation.     Aortic Valve  The aortic valve is trileaflet. There is mild trileaflet aortic sclerosis. No  aortic regurgitation is present. No hemodynamically significant valvular  aortic stenosis.     Pulmonic Valve  Normal pulmonic valve velocity.     Vessels  IVC diameter >2.1 cm collapsing <50% with sniff suggests a high RA pressure  estimated at 15 mmHg or greater.     Pericardium  There is no pericardial effusion.     Rhythm  The rhythm was sinus tachycardia.  ______________________________________________________________________________  MMode/2D Measurements & Calculations     LA Volume (BP): 68.8 ml     Doppler Measurements & Calculations  MV E max dwayne: 93.3 cm/sec  MV A max dwayne: 121.6 cm/sec  MV E/A: 0.77  MV dec time: 0.12 sec     ______________________________________________________________________________  Report approved by: Meche English 12/06/2022 03:34 PM         XR Chest Port 1 View    Narrative    XR CHEST PORT 1 VIEW 12/6/2022 3:52 PM    HISTORY: CENTRAL LINE    COMPARISON: CT today      Impression    IMPRESSION: Right IJ central venous catheter tip in the low SVC. No  pneumothorax. Mild interstitial opacities in the lungs, could  represent pulmonary edema. No pleural effusion or pneumothorax.    EMELY MARTINEZ MD         SYSTEM ID:  [id number]   CT Chest/Abdomen/Pelvis w Contrast    Narrative    CT  CHEST/ABDOMEN/PELVIS W CONTRAST 12/7/2022 1:00 PM    CLINICAL HISTORY: back pain, abdominal pain    TECHNIQUE: CT scan of the chest, abdomen, and pelvis was performed  following injection of IV contrast. Multiplanar reformats were  obtained. Dose reduction techniques were used.   CONTRAST: 90 mL of Isovue 370    COMPARISON: Chest CT on 11/3/2022    FINDINGS:   LUNGS AND PLEURA: Mild emphysema. Mild bibasilar dependent  atelectasis/infiltrate and trace bilateral pleural effusions.    MEDIASTINUM/AXILLAE: No lymphadenopathy. Right IJ central venous  catheter tip is in the low SVC. No filling defects in the central  pulmonary arteries. No aortic aneurysm or dissection. Severe coronary  artery calcifications. No pericardial effusion.     Diffuse circumferential esophageal wall thickening with surrounding  mediastinal stranding and fluid (series 2, image 34-77 and coronal  series 5, image 74-68).    HEPATOBILIARY: Normal.    PANCREAS: Normal.    SPLEEN: Normal.    ADRENAL GLANDS: Normal.    KIDNEYS/BLADDER: No calculi, hydronephrosis or perinephric stranding.  Urinary bladder is decompressed with a Blanchard catheter.    BOWEL: There are a few mildly dilated loops of small bowel in the mid  and left abdomen (series 2, image 138) with bowel loops dilated up to  3.5 cm. A transition point is not identified. Distal loops of ileum  are decompressed and the terminal ileum is decompressed with  transition likely in the mid/right lower quadrant.     No colonic obstruction. Marked circumferential wall thickening of the  splenic flexure, descending colon and sigmoid colon. Left lower  quadrant large inguinal hernia containing a loop of the sigmoid colon  with marked wall thickening of the loop within the hernia (series 5,  image 56) and pinching/narrowing of the bowel loop at the hernia  mouth. No pneumatosis or extraluminal air.    Normal appendix.    PELVIC ORGANS: Atrophic prostate gland.    ADDITIONAL FINDINGS: No lymphadenopathy  in the abdomen and pelvis. No  abdominal aortic aneurysm. Trace free fluid in the left lower  quadrant. No abscess or free air.    MUSCULOSKELETAL: Stable mild wedge compression deformity T7, T9, T10,  T11, T12 and L1 vertebral. No acute-appearing fractures. No  destructive lesions of the bones.      Impression    IMPRESSION:  1.  Marked circumferential wall thickening of the splenic flexure,  descending colon and sigmoid colon may be due to acute colitis, either  infectious, inflammatory or ischemic.   2.  Large left inguinal hernia containing a loop of the sigmoid colon  with narrowing/pinched appearance of the sigmoid colon at the hernia  mouth and wall thickening of the sigmoid in the inguinal hernia.  Superimposed strangulation of this loop of colon is not excluded.  Consider surgical consultation.  3.  Mechanical small bowel obstruction appears to be a separate  process. Gradual transition to normal caliber small bowel loops in the  right abdomen.  4.  Marked circumferential wall thickening of the entire esophagus,  suspicious for esophagitis.  5.  Bibasilar atelectasis/infiltrate and small bilateral pleural  effusions.    EMELY MARTINEZ MD         SYSTEM ID:  [id number]       COVID Status:  COVID-19 PCR Results    COVID-19 PCR Results 11/2/22 12/6/22   SARS CoV2 PCR Negative Negative      Comments are available for some flowsheets but are not being displayed.         COVID-19 Antibody Results, Testing for Immunity    COVID-19 Antibody Results, Testing for Immunity   No data to display.              Disclaimer: This note consists of symbols derived from keyboarding, dictation and/or voice recognition software. As a result, there may be errors in the script that have gone undetected. Please consider this when interpreting information found in this chart.

## 2022-12-15 NOTE — PROGRESS NOTES
CHART REVIEW  78yo male with acute encephalopathy, colitis and NSTEMI  1. HR improved with titration of BB  2. Ischemic eval delayed until encephalopathy clears, if he doesn't, I would favor conservative medical management rather than invasive ischemic testing.  Please call or re consult when you feel ischemic HD needs to be addressed.

## 2022-12-15 NOTE — PROGRESS NOTES
12/15/22 0953   Appointment Info   Signing Clinician's Name / Credentials (OT) Leonie Dumont, OTR/L   Living Environment   People in Home significant other   Current Living Arrangements house   Home Accessibility stairs to enter home   Number of Stairs, Main Entrance 2   Transportation Anticipated family or friend will provide   Living Environment Comments [facility], [address], all needs can be met on main level, walk in shower, comfort height toilet. Pt is questionable historian and confused at times.   Self-Care   Usual Activity Tolerance good   Current Activity Tolerance poor   Equipment Currently Used at Home raised toilet seat;walker, rolling   Fall history within last six months yes   Activity/Exercise/Self-Care Comment Pt reports mod I in all ADLs, IADLs and mobility tasks with use of FWW or 4ww at baseline.   Instrumental Activities of Daily Living (IADL)   IADL Comments Unclear baseline as pt is questionable to historian   General Information   Onset of Illness/Injury or Date of Surgery 12/06/22   Referring Physician Eliu Noland MD   Patient/Family Therapy Goal Statement (OT) Pt's goal is to d/c home   Additional Occupational Profile Info/Pertinent History of Current Problem Per chart: Pt is a 77 year old male admitted with hypovolemic shock of unclear etiology   Existing Precautions/Restrictions fall;oxygen therapy device and L/min   Cognitive Status Examination   Affect/Mental Status (Cognitive) confused   Cognitive Status Comments Oriented x3 at times, however confused and rambling at other times.   Visual Perception   Visual Impairment/Limitations corrective lenses full-time   Sensory   Sensory Comments Denies   Pain Assessment   Patient Currently in Pain Yes, see Vital Sign flowsheet  (pain in R knee)   Range of Motion Comprehensive   Comment, General Range of Motion BUEs WFL   Strength Comprehensive (MMT)   Comment, General Manual Muscle Testing (MMT)  Assessment Generalized weakness   Bed Mobility   Bed Mobility supine-sit;sit-supine   Supine-Sit Elizabeth (Bed Mobility) unable to assess   Sit-Supine Elizabeth (Bed Mobility) unable to assess   Comment (Bed Mobility) Pt positioned in chair   Transfers   Transfers bed-chair transfer;sit-stand transfer;toilet transfer;shower transfer   Transfer Skill: Bed to Chair/Chair to Bed   Bed-Chair Elizabeth (Transfers) dependent (less than 25% patient effort)   Sit-Stand Transfer   Sit-Stand Elizabeth (Transfers) dependent (less than 25% patient effort)   Shower Transfer   Elizabeth Level (Shower Transfer) not tested   Toilet Transfer   Elizabeth Level (Toilet Transfer) dependent (less than 25% patient effort)   Balance   Balance Comments Able to sit upright in chair with UE support   Activities of Daily Living   BADL Assessment/Intervention upper body dressing;lower body dressing;grooming;toileting   Upper Body Dressing Assessment/Training   Elizabeth Level (Upper Body Dressing) maximum assist (25% patient effort)   Lower Body Dressing Assessment/Training   Elizabeth Level (Lower Body Dressing) dependent (less than 25% patient effort)   Grooming Assessment/Training   Elizabeth Level (Grooming) minimum assist (75% patient effort)   Toileting   Elizabeth Level (Toileting) dependent (less than 25% patient effort)   Clinical Impression   Criteria for Skilled Therapeutic Interventions Met (OT) Yes, treatment indicated   OT Diagnosis Impaired ADLs, IADLs and mobility tasks   OT Problem List-Impairments impacting ADL problems related to;activity tolerance impaired;pain;strength;cognition   ADL comments/analysis Pt below baseline level of functioning   Assessment of Occupational Performance 5 or more Performance Deficits   Identified Performance Deficits Bathing, dressing, grooming, toileting, homemaking, transfers   Planned Therapy Interventions (OT) ADL retraining;IADL  retraining;strengthening;transfer training;home program guidelines;progressive activity/exercise   Clinical Decision Making Complexity (OT) moderate complexity   Risk & Benefits of therapy have been explained evaluation/treatment results reviewed;care plan/treatment goals reviewed;risks/benefits reviewed;current/potential barriers reviewed;participants voiced agreement with care plan;participants included;patient   OT Total Evaluation Time   OT Eval, Moderate Complexity Minutes (26408) 10   OT Goals   Therapy Frequency (OT) 5 times/wk   OT Predicted Duration/Target Date for Goal Attainment 12/21/22   OT Goals Hygiene/Grooming;Lower Body Dressing;Toilet Transfer/Toileting;OT Goal 1;Cognition   OT: Hygiene/Grooming supervision/stand-by assist;using adaptive equipment;while standing   OT: Lower Body Dressing Supervision/stand-by assist;using adaptive equipment   OT: Toilet Transfer/Toileting Supervision/stand-by assist;toilet transfer;cleaning and garment management;using adaptive equipment   OT: Cognitive Patient/caregiver will verbalize understanding of cognitive assessment results/recommendations as needed for safe discharge planning   OT: Goal 1 Pt will perform walk in shower transfer with CGA in prep for safe transfer in discharge environment.   OT Discharge Planning   OT Discharge Recommendation (DC Rec) Transitional Care Facility   OT Rationale for DC Rec Pt below stated baseline level of functioning, limited by R knee pain and weakness. Recommend ongoing skilled OT while IP and in TCU setting to improve strength, functional activity tolerance, balance and safety needed for daily tasks. Pt intermittently confused.   OT Brief overview of current status Unable to perform sit > stand with max A; requiring lift assist   Total Session Time   Total Session Time (sum of timed and untimed services) 10

## 2022-12-15 NOTE — PROGRESS NOTES
[facility]  Hospitalist Progress Note  Tino Pablo M.D., M.B.A.   12/15/2022    Reason for Stay/active problem list      Acute encephalopathy, resolving.  Reportedly fully coherent at baseline. (Per brother)    Hypotension-likely from hypovolemia.  Resolved.    Non- ST segment elevation MI    Dehydration, resolved    Acute on chronic back pain assoc with reported compression fx    Suspected acute colitis    Large left inguinal hernia     Abnormally marked circumferential wall thickening of the entire esophagus suspicious for esophagitis.  Complaints of difficulty swallowing and recent hx Thrush.           Assessment and Plan:        Summary of Stay:   Pacheco Torres is a 77 year old male with a history of htn/hlp, remote hx of CAD s/p DAYAN to the LAD-most recent echo performed in the ER with hyperdynamic EF 75 % and G1dd, hx of prostate cancer from earlier this year treated with XRT through [facility], macrocytic anemia with hgb in the 11-12 range (normal B12 and folate), monoclonal gammopathy (no recent documentation of evaluation), PMR, obesity, hx of tob abuse  admitted on 12/6/2022 with hypovolemic shock of unclear etiology     He believes he passed out either the day prior to or on the day of admission.  he is unable to fill in any details. States he hasn't been sleeping well due to the severe back pain.  States po intake has been well, denies N/V/D. No f/c/s.  No epistaxis/bloody gums/stools or emesis. He has not been offered narcotics for the back pain as an outpatient.       Dr. Pablo discussed with  Carlos Enrique Goldberg, pt's SO who states the patient hasn't been eating well for at least 6 weeks, she thinks his fluid intake is ok.  She reports that he's had some nausea but no vomiting.       In the ER BPs in the 60's/40's with tachycardia and tachypnea, he was hypothermic at 95.4, he was given 2 L of NS without improvement and so started on NE via Right internal jugular (though this ran only  very briefly).       EKG with ST elevation throughout septal/anterior leads-cards was contacted and the story just didn't seem to fit- he was without any CP or anginal equivalents. Initial trop 29. He was placed on heparin and stat echo completed which showed hyperdynamic cardiac function.  The ST elevation resolved over the course of a couple of hours. Troponins continue to rise 29->222-493 before falling again.  Patient was seen by cardiology and initially recommended coronary angiogram but patient was confused unable to provide consent and lay still for the procedure.  Coronary angiogram plan was consulted and patient was treated with heparin, started on aspirin.     CXR with pulmonary edema  CT Ao survey-diffuse CAD, Left inguinal canal hernia containing loops of the distal colon  He was covered with pip-tazo/vanco for septic shock of unclear etiology        Problem List with Assessment and Plan:  Shock/syncope  Acute colitis on CT imaging (?significance?)  -- Initial EKG (A fib with RVR) could explain CV shock picture especially when paired with hypovolemia or sepsis  -- Hypotension resolved with IV fluid resuscitation and very brief period of support with norepinephrine  -- CT of chest abdomen pelvis obtained and December 7 suggested possible colitis and esophagitis.  (Nothing clinically to go along with colitis.  Some complaints of odynophagia suggest possible esophagitis.)    Acute toxic metabolic metabolic encephalopathy   -- Likely combination of toxic metabolic and infectious encephalopathy.  -- Unclear whether pt has an underlying cognitive deficit.  Brother blames pt's evident change in mentation on pain that he has had for the past 6 weeks due to vertebral compression fractures. Chronic insomnia due to back pain issues.  -- Patient was agitated on December 8 and required Precedex drip.  -- MRI brain shows no acute abnormality    NSTEMI v type 2 MI  CAD  Htn/hlp   --initial eval in ED showed trops  29->222->493   --pta on asa 81 mg/lisinopril 10 mg every day/simvastain 40  Mg  --Home medications resumed    --Diffuse CAD noted on CT Ao survey.  Hx mid LAD PCI  --No CP and echo with hyperdynamic function.   -- Patient was treated with heparin and cardiology was consulted.  -- Dr. Pablo discussed the case with cardiologist and ischemic work-up to be postponed as outpatient when he is more stable.  If he develops recurrent pain or other symptoms, could be done sooner in the hospital    Large Left inguinal hernia with bowel contents-initial concern for small bowel obstruction.  -- CT on December 7 showed possible bowel obstruction.  Patient had left lower quadrant abdominal tenderness but no rigidity or rebound tenderness.  Surgery was consulted and patient was seen and examined by Dr. Ruiz who recommended to continue to monitor for symptoms.  --No clinical symptoms c/w bowel obstruction, currently no complaint of abdominal pain or ischemic bowel concern.  Tolerating diet.     Macrocytic anemia-MGUS  --Being worked up by PCP   --Currently hemoglobin is 11.0.  Positive stool.  Esophageal thickening noted on CT scan.  GI consulted and recommendation obtained for no intervention at this time.    Incidental pulmonary nodules  --Given heavy smoking history will need repeat CT in 3-6 months    Acute on chronic back pain present for weeks without a trigger, thought due to vertebral compression fx  History of prostate cancer with recent treatment with proton beam (XRT), follows with [facility]  --Severe acute on chronic back pain requiring narcotic medications.    --Patient was seen by pain management team.  Pain medications recommended.  Continue current pain regimen-scheduled Tylenol 1 g every 8 hours, Voltaren gel 4 g 4 times daily, lidocaine patch, menthol, Lyrica.  As needed Dilaudid 2 -4 mg every 3 hours as needed.     Abnormal CT with concern for esophagitis and colitis   -- empirically will treat for Candidal  "esophagitis using Fluconazole 400 mg x 1 followed by 200 mg daily x 13 doses    Chronic medical problems  Depression/gerd/prostate ca/pmr/monoclonal gammopathy   -- Continue pta sertraline     COVID 19  Negative  S/p 5 shot moderna series 9/2022 (biv)    Acute hypoxic respiratory failure  -- Patient is a smoker, has increased work of breathing and hypoxic requiring supplemental oxygen.  -- He has evidence of emphysema on CT scan. Mild acute exacerbation of COPD suspected  -- Patient was treated with DuoNeb, supplemental oxygen, steroid.  -- Currently wheezing is improved but hypoxia persists    Community-acquired pneumonia  --new left lung infiltrate on chest x-ray on December 8, though not apparent on CT Chest on 12/11  --Treated with  Zosyn for 7 days..  Afebrile    Restless leg syndrome  --Requip started this admission    Plan for today:  -- continues Fluconazole for presumed candidal esophagitis  -- encouraged increased activity.  Pt has been very sedentary for weeks now and likely will need TCU  -- May transfer to floor    VTE Prophylaxis: Heparin    Code Status: No CPR- Do NOT Intubate     Diet: Snacks/Supplements Adult: Ensure Max Protein (bariatric); With Meals  Regular Diet Adult      Blanchard Catheter: Not present    Family updated today: No     Disposition: May transfer to the floor, telemetry  Anticipate discharge to TCU, as per recommendations of OT/PT        Interval History (Subjective):      Speech is more clear today.  Still with at least intermittent cough. Sitting up in chair.           Physical Exam:        Last Vital Signs:  /76 (BP Location: Left arm)   Pulse 78   Temp 97.2  F (36.2  C) (Temporal)   Resp 26   Ht 1.6 m (5' 3\")   Wt 99.2 kg (218 lb 11.1 oz)   SpO2 95%   BMI 38.74 kg/m      I/O last 3 completed shifts:  In: 189 [P.O.:180; I.V.:9]  Out: 700 [Urine:700]    Wt Readings from Last 5 Encounters:   12/15/22 99.2 kg (218 lb 11.1 oz)   04/03/12 97.1 kg (214 lb)    "     Constitutional:  Awake, alert but restless and occasionally confused.     Respiratory: Normal WOB, perhaps slightly increased.  Scattered loose rhonchi noted bilat.    No wheeze at this time   Cardiovascular: Regular rate and rhythm, normal S1 and S2, and no murmur noted   Abdomen: Normal bowel sounds, soft, non-distended, left lower quadrant abdominal tenderness.  No rebound tenderness, rigidity or guarding.   Skin: No new rashes, no cyanosis, dry to touch   Neuro: Alert with  no new focal weakness   Extremities: No edema   Other(s): Oropharynx with white plaque on tongue        All other systems:           Medications:        All current medications were reviewed with changes reflected in problem list.         Data:      All new lab and imaging data was reviewed.      Data reviewed today: I reviewed all new labs and imaging results over the last 24 hours. I personally reviewed       Recent Labs   Lab 12/15/22  0743 12/14/22  0540 12/12/22  0518   WBC 5.8 7.1 6.8   HGB 9.8* 9.2* 11.0*   HCT 32.1* 30.8* 35.1*   * 116* 111*    185 270     No results for input(s): CULT in the last 168 hours.  Recent Labs   Lab 12/15/22  0743 12/14/22  0720 12/14/22  0540 12/12/22  1936 12/12/22  0518 12/11/22  0910 12/11/22  0510     --  142  --  145  --  144   POTASSIUM 3.9  --  4.1  --  3.8  --  3.4   CHLORIDE 102  --  104  --  104  --  105   CO2 33*  --  30*  --  35*  --  32*   ANIONGAP 7  --  8  --  6*  --  7   GLC 96 96 87   < > 114*   < > 109*   BUN 21.9  --  24.2*  --  28.1*  --  29.7*   CR 0.65*  --  0.56*  --  0.58*  --  0.64*   GFRESTIMATED >90  --  >90  --  >90  --  >90   TAVO 8.8  --  8.6*  --  9.5  --  9.6   MAG  --   --   --   --   --   --  2.0    < > = values in this interval not displayed.       Recent Labs   Lab 12/15/22  0743 12/14/22  0720 12/14/22  0540 12/13/22  1608 12/13/22  1215   GLC 96 96 87 115* 111*       No results for input(s): INR in the last 168 hours.      No results for  input(s): TROPONIN, TROPI, TROPR in the last 168 hours.    Invalid input(s): TROP, TROPONINIES    Recent Results (from the past 48 hour(s))   XR Chest Port 1 View    Narrative    CHEST PORTABLE ONE VIEW December 6, 2022 2:39 PM     HISTORY: STEMI. Altered mental status. Chest pain.    COMPARISON: 11/2/2022.      Impression    IMPRESSION: Hypoinflated lungs and cardiomegaly. Mild bilateral  vascular congestion appears increased. Correlate with any evidence of  developing pulmonary edema.    HAL BOGGS MD         SYSTEM ID:  [id number]   CT Head w/o Contrast    Narrative    CT SCAN OF THE HEAD WITHOUT CONTRAST December 6, 2022 3:07 PM     HISTORY: Altered mental status.    TECHNIQUE: Axial images of the head and coronal reformations without  IV contrast material. Radiation dose for this scan was reduced using  automated exposure control, adjustment of the mA and/or kV according  to patient size, or iterative reconstruction technique.    COMPARISON: None.      Impression    IMPRESSION: Mild motion artifact. No evidence of hemorrhage. Volume  loss and patchy areas of white matter hypoattenuation which likely  represent chronic small vessel ischemic change. Small area of focal  hypoattenuation within the central medulla, presumably artifactual  (brainstem infarct not excluded, MRI can be performed for basal  ganglia and thalamic lacunar infarcts, presumably chronic. No acute  osseous abnormality. Nonspecific right facial soft tissue swelling,  potentially contusion.    EVELYN VELAZQUEZ MD         SYSTEM ID:  [id number]   CT Aortic Survey w Contrast    Narrative    CT AORTIC SURVEY WITH CONTRAST  12/6/2022 3:07 PM    HISTORY:  Back pain, shock, wide mediastinum on x-ray.    TECHNIQUE: CT scan obtained of the chest, abdomen, and pelvis with IV  contrast. Post contrast scanning performed during the arterial phase.  CT chest also obtained without IV contrast. 80 mL Isovue-370 IV  injected. Sagittal and coronal reformatted  images acquired from the  source post contrast data. Radiation dose for this scan was reduced  using automated exposure control, adjustment of the mA and/or kV  according to patient size, or iterative reconstruction technique.    COMPARISON:  None.    FINDINGS:  Thoracic and abdominal aorta: No dissection involving the thoracic or  abdominal aorta. Patency of the main branch vessels from the thoracic  and abdominal aorta. Scattered areas of calcified atherosclerotic  disease noted. Atherosclerotic stenosis at the origins of the celiac  and superior mesenteric arteries but there is distal perfusion. No  periaortic hematoma or aneurysm.    Other vascular: Severe coronary artery calcifications.    Chest: No adenopathy or acute mediastinal abnormality. No acute  mediastinal fluid collection. Patchy atelectasis at the posterior  right lower lobe. No effusions. No pneumothorax. Stable nodule  posterolateral left upper lobe measuring 0.3 cm, series 6 image 60.    Abdomen/pelvis: Liver, gallbladder, adrenals, spleen, pancreas, and  kidneys do not show any acute abnormalities. Left inguinal hernia  measures 9.1 x 5.8 cm, series 5 image 265. Herniated loops of the  distal descending colon and sigmoid within the hernia. No upstream  obstruction. No bowel inflammatory change. No enlarged lymph nodes. No  acute pelvic abnormality. Diffuse degenerative changes throughout the  spine. A degree of height loss involving multiple thoracic and lumbar  spine levels.      Impression    IMPRESSION:   1. No acute thoracic or abdominal aortic abnormality. No dissection.  Scattered areas of atherosclerotic disease identified. Atherosclerotic  stenosis at the origins of the celiac artery and superior mesenteric  artery.  2. Diffuse coronary artery calcifications.  3. No acute mediastinal abnormality is seen.  4. Stable pulmonary nodule on the left, see below for follow-up  imaging guidelines.   5. Left inguinal canal hernia containing  herniated loops of the distal  descending colon and sigmoid. No upstream bowel obstruction. See above  for measurements.    Recommendations for one or multiple incidental lung nodules < 6mm:    Low risk patients: No routine follow-up.    High risk patients: Optional follow-up CT at 12 months; if  unchanged, no further follow-up.    *Low Risk: Minimal or absent history of smoking or other known risk  factors.  *Nonsolid (ground glass) or partly solid nodules may require longer  follow-up to exclude indolent adenocarcinoma.  *Recommendations based on Guidelines for the Management of Incidental  Pulmonary Nodules Detected at CT: From the Fleischner Society 2017,  Radiology 2017.    HAL BOGGS MD         SYSTEM ID:  [id number]   Echocardiogram Limited   Result Value    LVEF  75%    Narrative    [id number]  [id number]  [id number]  [id number]^ANGELIQUE^MINOO^NERI     [facility]  Echocardiography Laboratory  [address] [place] [address]     Name: BERTIN LYNCH  MRN: [id number]  : 1945  Study Date: 2022 02:59 PM  Age: 77 yrs  Gender: Male  Patient Location: [facility]  Reason For Study: Chest Pain  Ordering Physician: MINOO MERINO  Performed By: Mariah Carranza RDCS     BSA: 2.1 m2  Height: 66 in  Weight: 220 lb  HR: 110  BP: 82/65 mmHg  ______________________________________________________________________________  Procedure  Limited Portable Echo Adult. Optison (NDC #[id number]) given intravenously.  (Emergent exam, abbreviated study performed).  ______________________________________________________________________________  Interpretation Summary     The visual ejection fraction is estimated at 75%.  Hyperdynamic left ventricular function  ER informed by phone that echo has been read. The study was technically  limited. Technically difficult, suboptimal study.  ______________________________________________________________________________  Left Ventricle  Grade I or early diastolic  dysfunction. The visual ejection fraction is  estimated at 75%. Hyperdynamic left ventricular function.     Right Ventricle  The right ventricle is normal in size and function.     Atria  Normal left atrial size. Right atrial size is normal.     Mitral Valve  There is trace mitral regurgitation.     Tricuspid Valve  There is trace tricuspid regurgitation. Right ventricular systolic pressure  could not be approximated due to inadequate tricuspid regurgitation.     Aortic Valve  The aortic valve is trileaflet. There is mild trileaflet aortic sclerosis. No  aortic regurgitation is present. No hemodynamically significant valvular  aortic stenosis.     Pulmonic Valve  Normal pulmonic valve velocity.     Vessels  IVC diameter >2.1 cm collapsing <50% with sniff suggests a high RA pressure  estimated at 15 mmHg or greater.     Pericardium  There is no pericardial effusion.     Rhythm  The rhythm was sinus tachycardia.  ______________________________________________________________________________  MMode/2D Measurements & Calculations     LA Volume (BP): 68.8 ml     Doppler Measurements & Calculations  MV E max dwayne: 93.3 cm/sec  MV A max dwayne: 121.6 cm/sec  MV E/A: 0.77  MV dec time: 0.12 sec     ______________________________________________________________________________  Report approved by: Meche English 12/06/2022 03:34 PM         XR Chest Port 1 View    Narrative    XR CHEST PORT 1 VIEW 12/6/2022 3:52 PM    HISTORY: CENTRAL LINE    COMPARISON: CT today      Impression    IMPRESSION: Right IJ central venous catheter tip in the low SVC. No  pneumothorax. Mild interstitial opacities in the lungs, could  represent pulmonary edema. No pleural effusion or pneumothorax.    EMELY MARTINEZ MD         SYSTEM ID:  [id number]   CT Chest/Abdomen/Pelvis w Contrast    Narrative    CT CHEST/ABDOMEN/PELVIS W CONTRAST 12/7/2022 1:00 PM    CLINICAL HISTORY: back pain, abdominal pain    TECHNIQUE: CT scan of the chest, abdomen, and  pelvis was performed  following injection of IV contrast. Multiplanar reformats were  obtained. Dose reduction techniques were used.   CONTRAST: 90 mL of Isovue 370    COMPARISON: Chest CT on 11/3/2022    FINDINGS:   LUNGS AND PLEURA: Mild emphysema. Mild bibasilar dependent  atelectasis/infiltrate and trace bilateral pleural effusions.    MEDIASTINUM/AXILLAE: No lymphadenopathy. Right IJ central venous  catheter tip is in the low SVC. No filling defects in the central  pulmonary arteries. No aortic aneurysm or dissection. Severe coronary  artery calcifications. No pericardial effusion.     Diffuse circumferential esophageal wall thickening with surrounding  mediastinal stranding and fluid (series 2, image 34-77 and coronal  series 5, image 74-68).    HEPATOBILIARY: Normal.    PANCREAS: Normal.    SPLEEN: Normal.    ADRENAL GLANDS: Normal.    KIDNEYS/BLADDER: No calculi, hydronephrosis or perinephric stranding.  Urinary bladder is decompressed with a Blanchard catheter.    BOWEL: There are a few mildly dilated loops of small bowel in the mid  and left abdomen (series 2, image 138) with bowel loops dilated up to  3.5 cm. A transition point is not identified. Distal loops of ileum  are decompressed and the terminal ileum is decompressed with  transition likely in the mid/right lower quadrant.     No colonic obstruction. Marked circumferential wall thickening of the  splenic flexure, descending colon and sigmoid colon. Left lower  quadrant large inguinal hernia containing a loop of the sigmoid colon  with marked wall thickening of the loop within the hernia (series 5,  image 56) and pinching/narrowing of the bowel loop at the hernia  mouth. No pneumatosis or extraluminal air.    Normal appendix.    PELVIC ORGANS: Atrophic prostate gland.    ADDITIONAL FINDINGS: No lymphadenopathy in the abdomen and pelvis. No  abdominal aortic aneurysm. Trace free fluid in the left lower  quadrant. No abscess or free  air.    MUSCULOSKELETAL: Stable mild wedge compression deformity T7, T9, T10,  T11, T12 and L1 vertebral. No acute-appearing fractures. No  destructive lesions of the bones.      Impression    IMPRESSION:  1.  Marked circumferential wall thickening of the splenic flexure,  descending colon and sigmoid colon may be due to acute colitis, either  infectious, inflammatory or ischemic.   2.  Large left inguinal hernia containing a loop of the sigmoid colon  with narrowing/pinched appearance of the sigmoid colon at the hernia  mouth and wall thickening of the sigmoid in the inguinal hernia.  Superimposed strangulation of this loop of colon is not excluded.  Consider surgical consultation.  3.  Mechanical small bowel obstruction appears to be a separate  process. Gradual transition to normal caliber small bowel loops in the  right abdomen.  4.  Marked circumferential wall thickening of the entire esophagus,  suspicious for esophagitis.  5.  Bibasilar atelectasis/infiltrate and small bilateral pleural  effusions.    EMELY MARTINEZ MD         SYSTEM ID:  [id number]       COVID Status:  COVID-19 PCR Results    COVID-19 PCR Results 11/2/22 12/6/22   SARS CoV2 PCR Negative Negative      Comments are available for some flowsheets but are not being displayed.         COVID-19 Antibody Results, Testing for Immunity    COVID-19 Antibody Results, Testing for Immunity   No data to display.              Disclaimer: This note consists of symbols derived from keyboarding, dictation and/or voice recognition software. As a result, there may be errors in the script that have gone undetected. Please consider this when interpreting information found in this chart.

## 2022-12-16 ENCOUNTER — APPOINTMENT (OUTPATIENT)
Dept: OCCUPATIONAL THERAPY | Facility: CLINIC | Age: 77
DRG: 871 | End: 2022-12-16
Payer: COMMERCIAL

## 2022-12-16 LAB
FOLATE SERPL-MCNC: 17.7 NG/ML (ref 4.6–34.8)
PHOSPHATE SERPL-MCNC: 2.2 MG/DL (ref 2.5–4.5)

## 2022-12-16 PROCEDURE — 250N000013 HC RX MED GY IP 250 OP 250 PS 637: Performed by: INTERNAL MEDICINE

## 2022-12-16 PROCEDURE — 36415 COLL VENOUS BLD VENIPUNCTURE: CPT | Performed by: HOSPITALIST

## 2022-12-16 PROCEDURE — 250N000013 HC RX MED GY IP 250 OP 250 PS 637: Performed by: HOSPITALIST

## 2022-12-16 PROCEDURE — 94640 AIRWAY INHALATION TREATMENT: CPT | Mod: 76

## 2022-12-16 PROCEDURE — 82746 ASSAY OF FOLIC ACID SERUM: CPT | Performed by: HOSPITALIST

## 2022-12-16 PROCEDURE — 94640 AIRWAY INHALATION TREATMENT: CPT

## 2022-12-16 PROCEDURE — 99233 SBSQ HOSP IP/OBS HIGH 50: CPT | Performed by: HOSPITALIST

## 2022-12-16 PROCEDURE — 250N000013 HC RX MED GY IP 250 OP 250 PS 637: Performed by: NURSE PRACTITIONER

## 2022-12-16 PROCEDURE — 999N000157 HC STATISTIC RCP TIME EA 10 MIN

## 2022-12-16 PROCEDURE — 250N000009 HC RX 250: Performed by: INTERNAL MEDICINE

## 2022-12-16 PROCEDURE — 250N000012 HC RX MED GY IP 250 OP 636 PS 637: Performed by: HOSPITALIST

## 2022-12-16 PROCEDURE — 97535 SELF CARE MNGMENT TRAINING: CPT | Mod: GO

## 2022-12-16 PROCEDURE — 120N000001 HC R&B MED SURG/OB

## 2022-12-16 PROCEDURE — 250N000013 HC RX MED GY IP 250 OP 250 PS 637: Performed by: CLINICAL NURSE SPECIALIST

## 2022-12-16 PROCEDURE — C9113 INJ PANTOPRAZOLE SODIUM, VIA: HCPCS | Performed by: INTERNAL MEDICINE

## 2022-12-16 PROCEDURE — 250N000011 HC RX IP 250 OP 636: Performed by: INTERNAL MEDICINE

## 2022-12-16 PROCEDURE — 84100 ASSAY OF PHOSPHORUS: CPT | Performed by: HOSPITALIST

## 2022-12-16 PROCEDURE — 250N000009 HC RX 250: Performed by: HOSPITALIST

## 2022-12-16 PROCEDURE — 250N000011 HC RX IP 250 OP 636: Performed by: HOSPITALIST

## 2022-12-16 RX ORDER — IPRATROPIUM BROMIDE AND ALBUTEROL SULFATE 2.5; .5 MG/3ML; MG/3ML
3 SOLUTION RESPIRATORY (INHALATION)
Status: DISCONTINUED | OUTPATIENT
Start: 2022-12-16 | End: 2022-12-20 | Stop reason: HOSPADM

## 2022-12-16 RX ORDER — FUROSEMIDE 10 MG/ML
40 INJECTION INTRAMUSCULAR; INTRAVENOUS ONCE
Status: COMPLETED | OUTPATIENT
Start: 2022-12-16 | End: 2022-12-16

## 2022-12-16 RX ORDER — PREDNISONE 20 MG/1
20 TABLET ORAL DAILY
Status: DISCONTINUED | OUTPATIENT
Start: 2022-12-16 | End: 2022-12-17

## 2022-12-16 RX ADMIN — PREGABALIN 25 MG: 25 CAPSULE ORAL at 17:43

## 2022-12-16 RX ADMIN — PREDNISONE 20 MG: 20 TABLET ORAL at 09:37

## 2022-12-16 RX ADMIN — HYDROMORPHONE HYDROCHLORIDE 2 MG: 2 TABLET ORAL at 22:15

## 2022-12-16 RX ADMIN — MICONAZOLE NITRATE: 20 POWDER TOPICAL at 22:17

## 2022-12-16 RX ADMIN — ACETAMINOPHEN 1000 MG: 500 TABLET, FILM COATED ORAL at 09:27

## 2022-12-16 RX ADMIN — CALCIUM 1000 MG: 500 TABLET ORAL at 09:29

## 2022-12-16 RX ADMIN — Medication 250 MG: at 12:47

## 2022-12-16 RX ADMIN — SERTRALINE HYDROCHLORIDE 100 MG: 100 TABLET ORAL at 09:33

## 2022-12-16 RX ADMIN — ATORVASTATIN CALCIUM 40 MG: 40 TABLET, FILM COATED ORAL at 20:32

## 2022-12-16 RX ADMIN — PREGABALIN 25 MG: 25 CAPSULE ORAL at 22:15

## 2022-12-16 RX ADMIN — Medication 250 MG: at 22:15

## 2022-12-16 RX ADMIN — LISINOPRIL 20 MG: 20 TABLET ORAL at 20:32

## 2022-12-16 RX ADMIN — Medication 50 MCG: at 09:36

## 2022-12-16 RX ADMIN — HYDROMORPHONE HYDROCHLORIDE 2 MG: 2 TABLET ORAL at 11:13

## 2022-12-16 RX ADMIN — ATROPINE SULFATE 1 DROP: 10 SOLUTION/ DROPS OPHTHALMIC at 20:34

## 2022-12-16 RX ADMIN — FUROSEMIDE 40 MG: 10 INJECTION, SOLUTION INTRAMUSCULAR; INTRAVENOUS at 11:15

## 2022-12-16 RX ADMIN — IPRATROPIUM BROMIDE AND ALBUTEROL SULFATE 3 ML: 2.5; .5 SOLUTION RESPIRATORY (INHALATION) at 15:24

## 2022-12-16 RX ADMIN — CYANOCOBALAMIN TAB 1000 MCG 1000 MCG: 1000 TAB at 09:33

## 2022-12-16 RX ADMIN — POTASSIUM & SODIUM PHOSPHATES POWDER PACK 280-160-250 MG 1 PACKET: 280-160-250 PACK at 20:31

## 2022-12-16 RX ADMIN — PREDNISOLONE ACETATE 1 DROP: 10 SUSPENSION/ DROPS OPHTHALMIC at 09:50

## 2022-12-16 RX ADMIN — ATROPINE SULFATE 1 DROP: 10 SOLUTION/ DROPS OPHTHALMIC at 09:50

## 2022-12-16 RX ADMIN — PREDNISOLONE ACETATE 1 DROP: 10 SUSPENSION/ DROPS OPHTHALMIC at 22:17

## 2022-12-16 RX ADMIN — ASPIRIN 81 MG: 81 TABLET, COATED ORAL at 09:27

## 2022-12-16 RX ADMIN — POTASSIUM & SODIUM PHOSPHATES POWDER PACK 280-160-250 MG 1 PACKET: 280-160-250 PACK at 17:43

## 2022-12-16 RX ADMIN — POTASSIUM & SODIUM PHOSPHATES POWDER PACK 280-160-250 MG 1 PACKET: 280-160-250 PACK at 22:14

## 2022-12-16 RX ADMIN — DICLOFENAC SODIUM 4 G: 10 GEL TOPICAL at 09:50

## 2022-12-16 RX ADMIN — ACETAMINOPHEN 1000 MG: 500 TABLET, FILM COATED ORAL at 02:24

## 2022-12-16 RX ADMIN — Medication: at 09:49

## 2022-12-16 RX ADMIN — MICONAZOLE NITRATE: 20 POWDER TOPICAL at 09:46

## 2022-12-16 RX ADMIN — TAMSULOSIN HYDROCHLORIDE 0.4 MG: 0.4 CAPSULE ORAL at 20:32

## 2022-12-16 RX ADMIN — THIAMINE HCL TAB 100 MG 100 MG: 100 TAB at 12:47

## 2022-12-16 RX ADMIN — PANTOPRAZOLE SODIUM 40 MG: 40 INJECTION, POWDER, FOR SOLUTION INTRAVENOUS at 20:32

## 2022-12-16 RX ADMIN — PREGABALIN 25 MG: 25 CAPSULE ORAL at 09:37

## 2022-12-16 RX ADMIN — QUETIAPINE FUMARATE 50 MG: 50 TABLET ORAL at 20:32

## 2022-12-16 RX ADMIN — CARVEDILOL 25 MG: 25 TABLET, FILM COATED ORAL at 17:43

## 2022-12-16 RX ADMIN — HYDROMORPHONE HYDROCHLORIDE 2 MG: 2 TABLET ORAL at 17:43

## 2022-12-16 RX ADMIN — ACETAMINOPHEN 1000 MG: 500 TABLET, FILM COATED ORAL at 17:43

## 2022-12-16 RX ADMIN — IPRATROPIUM BROMIDE AND ALBUTEROL SULFATE 3 ML: 2.5; .5 SOLUTION RESPIRATORY (INHALATION) at 11:48

## 2022-12-16 RX ADMIN — FLUCONAZOLE 200 MG: 50 TABLET ORAL at 09:27

## 2022-12-16 RX ADMIN — LISINOPRIL 20 MG: 20 TABLET ORAL at 09:37

## 2022-12-16 RX ADMIN — IPRATROPIUM BROMIDE AND ALBUTEROL SULFATE 3 ML: 2.5; .5 SOLUTION RESPIRATORY (INHALATION) at 19:49

## 2022-12-16 RX ADMIN — POLYETHYLENE GLYCOL 3350 17 G: 17 POWDER, FOR SOLUTION ORAL at 09:47

## 2022-12-16 RX ADMIN — QUETIAPINE FUMARATE 50 MG: 50 TABLET ORAL at 09:36

## 2022-12-16 RX ADMIN — Medication 250 MG: at 17:43

## 2022-12-16 RX ADMIN — HYDROMORPHONE HYDROCHLORIDE 2 MG: 2 TABLET ORAL at 05:08

## 2022-12-16 RX ADMIN — Medication 250 MG: at 09:33

## 2022-12-16 RX ADMIN — IPRATROPIUM BROMIDE AND ALBUTEROL SULFATE 3 ML: 2.5; .5 SOLUTION RESPIRATORY (INHALATION) at 08:50

## 2022-12-16 RX ADMIN — MULTIPLE VITAMINS W/ MINERALS TAB 1 TABLET: TAB at 09:27

## 2022-12-16 RX ADMIN — CARVEDILOL 25 MG: 25 TABLET, FILM COATED ORAL at 09:35

## 2022-12-16 RX ADMIN — PANTOPRAZOLE SODIUM 40 MG: 40 INJECTION, POWDER, FOR SOLUTION INTRAVENOUS at 09:22

## 2022-12-16 RX ADMIN — ROPINIROLE HYDROCHLORIDE 0.25 MG: 0.25 TABLET, FILM COATED ORAL at 09:29

## 2022-12-16 RX ADMIN — PREDNISOLONE ACETATE 1 DROP: 10 SUSPENSION/ DROPS OPHTHALMIC at 17:44

## 2022-12-16 ASSESSMENT — ACTIVITIES OF DAILY LIVING (ADL)
ADLS_ACUITY_SCORE: 61
ADLS_ACUITY_SCORE: 50
ADLS_ACUITY_SCORE: 50
ADLS_ACUITY_SCORE: 61
ADLS_ACUITY_SCORE: 50
ADLS_ACUITY_SCORE: 61
ADLS_ACUITY_SCORE: 52
ADLS_ACUITY_SCORE: 50
ADLS_ACUITY_SCORE: 52
ADLS_ACUITY_SCORE: 52

## 2022-12-16 NOTE — PROGRESS NOTES
"[facility]AT     Date:12/15/2022    Admission Dx:Hypovolemia    Pulmonary History: COPD(?)    Home Nebulizer/MDI Use:None    Home Oxygen:No    Acuity Level (RCAT flow sheet):3    Aerosol Therapy initiated:Duoneb QID, alb Q2 prn      Pulmonary Hygiene initiated:Deep breath and coughing techniques      Volume Expansion initiated:IS      Current Oxygen Requirements:4L  Current SpO2:95%    Re-evaluation date:12/18/2022    Patient Education:Education was performed with the patient in regards to indications/benefits and possible side effects of bronchodilators. Will continue to do education with patient.           See \"RT Assessments\" flow sheet for patient assessment scoring and Acuity Level Details.             "

## 2022-12-16 NOTE — PLAN OF CARE
"Care from [id number]  Inpatient Progress Note    BP (!) 155/82 (BP Location: Left arm)   Pulse 78   Temp 98.5  F (36.9  C) (Oral)   Resp 20   Ht 1.6 m (5' 3\")   Wt 99.2 kg (218 lb 11.1 oz)   SpO2 98%   BMI 38.74 kg/m      A&O x3, disoriented to time. Up with A2 harjinder steady. Using bedside urinal. No BM this shift but passing gas. Reg diet. Pain managed with sched tylenol and sched dilaudid this shift. On 4L via NC this shift. Pt's O2 sats drop w/ exertion. 2 PIVs SL. Mepilex to left hip for redness, no open wound. Plan for TCU.                       "

## 2022-12-16 NOTE — CARE PLAN
Patient Transfer Information    Patient tolerated transfer: Yes    Patient connected to monitoring equipment on arrival (if yes, what equipment): Yes: 5L NC, oximetry, Tele    Safety equipment at bedside (if applicable): Yes     Patient connected to wall oxygen on arrival (if applicable): Yes    Belongings: Transferred with patient    Report received from ICU nurse, RN.  ICU aide (transporter) physically handed patient off to receiving RN: Yes    I have reviewed the Medications, Allergies in the Epic system. I have reviewed pending test results and orders. No further questions at this time.    *See flowsheets for vital signs and focused assessment of admission/transfer*      No

## 2022-12-16 NOTE — PROGRESS NOTES
"CLINICAL NUTRITION SERVICES - REASSESSMENT NOTE     Nutrition Prescription    RECOMMENDATIONS FOR MDs/PROVIDERS TO ORDER:  Pt meeting < 50% estimated energy and protein needs since admission. Consider initiating nutrition support. Please place consult for RD to order TF if indicated.    Malnutrition Status:    Moderate in the context of acute on chronic illness    Recommendations already ordered by Registered Dietitian (RD):  Orange Magic cup QID b/n meals  Folate lab w/ cosign to rule out folate deficiency  100mg thiamine daily w/ cosign as pt is at risk for refeeding syndrome  Phos lab w/ cosign as pt is at risk for refeeding syndrome    Future/Additional Recommendations:  Adjust supplements pending PO intake/patient preference       EVALUATION OF THE PROGRESS TOWARD GOALS   Diet: Regular  Nutrition Support: none  Intake: Pt eating 25-50% of meals per nursing records and receiving on average 546kcals and 40g protein daily.     NEW FINDINGS   Pt does not like the Ensure Max Protein supplement and would prefer chocolate but instead doesn't want to have it at all. Doesn't like the taste of the water. Willing to try Orange Magic Cup.     12/15/22 0620 99.2 kg (218 lb 11.1 oz)   12/12/22 0400 102.6 kg (226 lb 3.1 oz)   12/10/22 0400 99.4 kg (219 lb 2.2 oz)   12/09/22 0600 98.2 kg (216 lb 7.9 oz)   12/06/22 2200 101.3 kg (223 lb 5.2 oz)   12/06/22 1412 100 kg (220 lb 7.4 oz)     0.8% wt loss in 1 week         ANTHROPOMETRICS  Height: 160 cm (5' 3\")  Most Recent Weight: 99.2 kg (218 lb 11.1 oz)    IBW: 56.9 kg  BMI: Obesity Grade II BMI 35-39.9  Weight History:   Wt Readings from Last 30 Encounters:   12/15/22 99.2 kg (218 lb 11.1 oz)   04/03/12 97.1 kg (214 lb)     Dosing Weight: 98.2 kg     ASSESSED NUTRITION NEEDS  Estimated Energy Needs: 1935+ kcals/day ([place] Jeor x AF 1.2)  Justification: Maintenance  Estimated Protein Needs: 98+ grams protein/day (1+g/kg)  Justification: Repletion--no adjustment for obesity " in calculation  Estimated Fluid Needs: Per provider pending fluid status    PHYSICAL FINDINGS  See malnutrition section below.  Dry skin, bruised face, abrasion/excoriated hip     GI CONCERNS  Diarrhea LBM 12/15/22    LABS  Reviewed: Cr 0.65, hgb 9.8, hematocrit 32.1, rbc 2.85, , MCH 34.4, MCHC 30.5    MEDICATIONS  Reviewed: oscal, vitamin b12, diflucan, thera vit m, protonix, miralax, deltasone, cholecalciferol     MALNUTRITION:  % Weight Loss:  Up to 7.5% in 3 months (moderate malnutrition)  % Intake:  </= 50% for >/= 5 days (severe malnutrition)  Subcutaneous Fat Loss:  Orbital region mild depletion  Muscle Loss:  Temporal region mild depletion  Fluid Retention:  Unable to assess, pt declined NFPE-visual observation only    Malnutrition Diagnosis: Moderate malnutrition  In Context of:  Acute illness or injury  Chronic illness or disease    Previous Goals   Patient to consume % of nutritionally adequate meal trays TID, or the equivalent with supplements/snacks.  Evaluation: Not met    Previous Nutrition Diagnosis  Inadequate oral intake related to esophagitis and AMS as evidenced by PO meeting <50% of needs since admission, reported poor PO prior to admission with associated weight loss    Evaluation: No change    CURRENT NUTRITION DIAGNOSIS  Malnutrition related to esophagitis/colitis, COPD, PNA as evidenced by mild subcutaneous fat loss, mild muscle loss, prolonged poor intake of < 50% estimated energy needs for 10 days, and up to 7.5% unintentional weight loss in 3 months      INTERVENTIONS  Implementation  Medical food supplement therapy    Goals  Patient to consume % of nutritionally adequate meals three times per day, or the equivalent with supplements/snacks.  Total avg nutritional intake to meet a minimum of 1935 kcal/kg and 98 g PRO/kg daily (per dosing wt 98.2 kg).    Monitoring/Evaluation  Progress toward goals will be monitored and evaluated per protocol. PO intake, supplement  tolerance, wt, labs

## 2022-12-16 NOTE — PROGRESS NOTES
[facility]    Hospitalist Progress Note             Date of Admission:  12/6/2022                   Day of hospitalization: 10    Assessment and Plan:   Pacheco Torres is a 77 year old male with a history of htn/hlp, remote hx of CAD s/p DAYAN to the LAD-most recent echo performed in the ER with hyperdynamic EF 75 % and G1dd, hx of prostate cancer from earlier this year treated with XRT through [facility], macrocytic anemia with hgb in the 11-12 range (normal B12 and folate), monoclonal gammopathy (no recent documentation of evaluation), PMR, obesity, hx of tob abuse  admitted on 12/6/2022 with hypovolemic shock of unclear etiology     He believes he passed out either the day prior to or on the day of admission.  he is unable to fill in any details. States he hasn't been sleeping well due to the severe back pain.  States po intake has been well, denies N/V/D. No f/c/s.  No epistaxis/bloody gums/stools or emesis. He has not been offered narcotics for the back pain as an outpatient.       Dr. Pablo discussed with  Carlos Enrique Goldberg, pt's SO who states the patient hasn't been eating well for at least 6 weeks, she thinks his fluid intake is ok.  She reports that he's had some nausea but no vomiting.       In the ER BPs in the 60's/40's with tachycardia and tachypnea, he was hypothermic at 95.4, he was given 2 L of NS without improvement and so started on NE via Right internal jugular (though this ran only very briefly).       EKG with ST elevation throughout septal/anterior leads-cards was contacted and the story just didn't seem to fit- he was without any CP or anginal equivalents. Initial trop 29. He was placed on heparin and stat echo completed which showed hyperdynamic cardiac function.  The ST elevation resolved over the course of a couple of hours. Troponins continue to rise 29->222-493 before falling again.  Patient was seen by cardiology and initially recommended coronary angiogram but patient was  confused unable to provide consent and lay still for the procedure.  Coronary angiogram plan was consulted and patient was treated with heparin, started on aspirin.     CXR with pulmonary edema  CT Ao survey-diffuse CAD, Left inguinal canal hernia containing loops of the distal colon  He was covered with pip-tazo/vanco for septic shock of unclear etiology      Patient appears close to ready for discharge.  We will work on weaning off of Seroquel, will also work on weaning off steroids.  Appears mildly overloaded and will work on volume status with Lasix dosing daily.     Shock/syncope  Acute colitis on CT imaging (?significance?)  -- Initial EKG (A fib with RVR) could explain CV shock picture especially when paired with hypovolemia or sepsis  -- Hypotension resolved with IV fluid resuscitation and very brief period of support with norepinephrine  -- CT of chest abdomen pelvis obtained and December 7 suggested possible colitis and esophagitis.  (Nothing clinically to go along with colitis.  Some complaints of odynophagia suggest possible esophagitis.)     Acute toxic metabolic metabolic encephalopathy   -- Likely combination of toxic metabolic and infectious encephalopathy.  -- Unclear whether pt has an underlying cognitive deficit.  Brother blames pt's evident change in mentation on pain that he has had for the past 6 weeks due to vertebral compression fractures. Chronic insomnia due to back pain issues.  -- Patient was agitated on December 8 and required Precedex drip.  -- MRI brain shows no acute abnormality  -- on Seroquel will work on weaning off    NSTEMI v type 2 MI  CAD  Htn/hlp   --initial eval in ED showed trops 29->222->493   --pta on asa 81 mg/lisinopril 10 mg every day/simvastain 40  Mg  --Home medications resumed    --Diffuse CAD noted on CT Ao survey.  Hx mid LAD PCI  --No CP and echo with hyperdynamic function.   -- Patient was treated with heparin and cardiology was consulted.  -- Dr. Pablo  discussed the case with cardiologist and ischemic work-up to be postponed as outpatient when he is more stable.  If he develops recurrent pain or other symptoms, could be done sooner in the hospital     Large Left inguinal hernia with bowel contents-initial concern for small bowel obstruction.  -- CT on December 7 showed possible bowel obstruction.  Patient had left lower quadrant abdominal tenderness but no rigidity or rebound tenderness.  Surgery was consulted and patient was seen and examined by Dr. Ruiz who recommended to continue to monitor for symptoms.  --No clinical symptoms c/w bowel obstruction, currently no complaint of abdominal pain or ischemic bowel concern.  Tolerating diet.     Macrocytic anemia-MGUS  --Being worked up by PCP   --Currently hemoglobin is 9.2.  Positive stool.  Esophageal thickening noted on CT scan.  GI consulted and recommendation obtained for no intervention at this time.     Incidental pulmonary nodules  --Given heavy smoking history will need repeat CT in 3-6 months     Acute on chronic back pain present for weeks without a trigger, thought due to vertebral compression fx  History of prostate cancer with recent treatment with proton beam (XRT), follows with [facility]  --Severe acute on chronic back pain requiring narcotic medications.    --Patient was seen by pain management team.  Pain medications recommended.  Continue current pain regimen-scheduled Tylenol 1 g every 8 hours, Voltaren gel 4 g 4 times daily, lidocaine patch, menthol, Lyrica.  As needed Dilaudid 2 -4 mg every 3 hours as needed.      Abnormal CT with concern for esophagitis and colitis   -- empirically will treat for Candidal esophagitis using Fluconazole 400 mg x 1 followed by 200 mg daily x 13 doses     Chronic medical problems  Depression/gerd/prostate ca/pmr/monoclonal gammopathy   -- Continue pta sertraline     COVID 19  Negative  S/p 5 shot moderna series 9/2022 (biv)     Acute hypoxic respiratory  "failure  -- Patient is a smoker, has increased work of breathing and hypoxic requiring supplemental oxygen.  -- He has evidence of emphysema on CT scan. Mild acute exacerbation of COPD suspected  -- Patient was treated with DuoNeb, supplemental oxygen, steroid.  -- Currently wheezing is improved but hypoxia persists  -- will work on weaning prednisone, ++7 days of therapy  -- lasix 40 mg IV today     Community-acquired pneumonia  --new left lung infiltrate on chest x-ray on December 8, though not apparent on CT Chest on 12/11  --Treated with  Zosyn for 7 days..  Afebrile     Restless leg syndrome  --Requip started this admission     # Code status: DNR/DNI  # Anticipated discharge date and Disposition: 1-2 days, needs tcu  # DVT: Heparin  # IVF: none                       Christy Ridley MD  Text Page (7am - 6pm, M-F)               Subjective   Chief Complaint:  Sob  Subjective: main complaints are sob with activity. No nausea, vomiting, fevers, chills, abdominal pain or diarrhea.         Objective   BP (!) 155/82 (BP Location: Left arm)   Pulse 76   Temp 98.5  F (36.9  C) (Oral)   Resp 20   Ht 1.6 m (5' 3\")   Wt 99.2 kg (218 lb 11.1 oz)   SpO2 95%   BMI 38.74 kg/m       Physical Exam  General: Pt in NAD, normal appearance  HEENT: OP clear MMM, no JVD  Lungs: baseline crackles, normal breathing  without accessory muscle usage, no wheezing, rhonchi  Cardiac: +S1, S2, RRR, no MRG, +1 edema BLE  Abdominal: normal bowel sounds, NT/ND, no hepatosplenomegaly  Skin: warm, dry, normal turgor, no rash  Psyche: A& O x3, appropriate affect             Intake/Output Summary (Last 24 hours) at 12/16/2022 1133  Last data filed at 12/15/2022 2013  Gross per 24 hour   Intake 360 ml   Output 440 ml   Net -80 ml           Labs and Imaging Results:      Recent Labs   Lab 12/15/22  0743 12/14/22  0540   WBC 5.8 7.1   HGB 9.8* 9.2*    185        Recent Labs   Lab 12/15/22  0743 12/14/22  0540    142   CO2 33* 30*   BUN " 21.9 24.2*      No results for input(s): INR, PTT in the last 168 hours.   No results for input(s): CKMB in the last 168 hours.    Invalid input(s): TROPONINT   No results for input(s): ALBUMIN, AST, ALT, ALKPHOS, BILITOT in the last 168 hours.     Micro:     Radio:  MR Brain w/o & w Contrast   Final Result   IMPRESSION:   1.  No acute intracranial process.   2.  Generalized brain atrophy and presumed microvascular ischemic changes as detailed above.      CT Chest/Abdomen/Pelvis w Contrast   Final Result   IMPRESSION:   1.  No acute pulmonary embolism, although suboptimal opacification of the segmental and subsegmental branches. Enlarged pulmonary arteries suggestive of chronic pulmonary hypertension.      2.  Unchanged small bilateral pleural effusions and bilateral groundglass opacities, likely infectious or inflammatory.      3.  Diffuse small and large bowel dilation has increased compared to 12/07/2022, but no discrete transition point, suggesting systemic hypomotility. Unchanged left inguinal hernia containing a short segment of colon, which is mildly thickened and with    mild fat stranding.      XR Bone Survey Complete   Final Result   IMPRESSION: No large destructive lytic lesions. There is a generalized mottled appearance throughout the bones, most significantly in the humeral and femoral diaphyses, which may be related to osteoporosis/age rather than myeloma. Multilevel degenerative    changes and compression fractures throughout the spine, better assessed on recent CT abdomen pelvis 12/07/2022. Severe bilateral glenohumeral joint osteoarthrosis. Multiple remote rib fractures. Chronic appearing osteochondral defect in the right medial    femoral condyle. No definite acute fracture.      Additional findings better seen on prior CT chest, abdomen, and pelvis 12/07/2022 including multiple distended bowel loops, which could represent underlying obstruction. Right central venous catheter with tip in SVC.  Bibasilar atelectasis and volume loss    in the lung fields with crowding of the central vasculature.               XR Chest Port 1 View   Final Result   IMPRESSION: New left lung infiltrate.      CT Chest/Abdomen/Pelvis w Contrast   Final Result   IMPRESSION:   1.  Marked circumferential wall thickening of the splenic flexure,   descending colon and sigmoid colon may be due to acute colitis, either   infectious, inflammatory or ischemic.    2.  Large left inguinal hernia containing a loop of the sigmoid colon   with narrowing/pinched appearance of the sigmoid colon at the hernia   mouth and wall thickening of the sigmoid in the inguinal hernia.   Superimposed strangulation of this loop of colon is not excluded.   Consider surgical consultation.   3.  Mechanical small bowel obstruction appears to be a separate   process. Gradual transition to normal caliber small bowel loops in the   right abdomen.   4.  Marked circumferential wall thickening of the entire esophagus,   suspicious for esophagitis.   5.  Bibasilar atelectasis/infiltrate and small bilateral pleural   effusions.      EMELY MARTINEZ MD            SYSTEM ID:  [id number]      XR Chest Port 1 View   Final Result   IMPRESSION: Right IJ central venous catheter tip in the low SVC. No   pneumothorax. Mild interstitial opacities in the lungs, could   represent pulmonary edema. No pleural effusion or pneumothorax.      EMELY MARTINEZ MD            SYSTEM ID:  [id number]      Echocardiogram Limited   Final Result      CT Aortic Survey w Contrast   Final Result   IMPRESSION:    1. No acute thoracic or abdominal aortic abnormality. No dissection.   Scattered areas of atherosclerotic disease identified. 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   2. Diffuse coronary artery calcifications.   3. No acute mediastinal abnormality is seen.   4. Stable pulmonary nodule on the left, see below for follow-up   imaging  guidelines.    5. Left inguinal canal hernia containing herniated loops of the distal   descending colon and sigmoid. No upstream bowel obstruction. See above   for measurements.      Recommendations for one or multiple incidental lung nodules < 6mm:     Low risk patients: No routine follow-up.     High risk patients: Optional follow-up CT at 12 months; if   unchanged, no further follow-up.      *Low Risk: Minimal or absent history of smoking or other known risk   factors.   *Nonsolid (ground glass) or partly solid nodules may require longer   follow-up to exclude indolent adenocarcinoma.   *Recommendations based on Guidelines for the Management of Incidental   Pulmonary Nodules Detected at CT: From the Fleischner Society 2017,   Radiology 2017.      HAL BOGGS MD            SYSTEM ID:  [id number]      CT Head w/o Contrast   Final Result   IMPRESSION: Mild motion artifact. No evidence of hemorrhage. Volume   loss and patchy areas of white matter hypoattenuation which likely   represent chronic small vessel ischemic change. Small area of focal   hypoattenuation within the central medulla, presumably artifactual   (brainstem infarct not excluded, MRI can be performed for basal   ganglia and thalamic lacunar infarcts, presumably chronic. No acute   osseous abnormality. Nonspecific right facial soft tissue swelling,   potentially contusion.      EVELYN VELAZQUEZ MD            SYSTEM ID:  [id number]      XR Chest Port 1 View   Final Result   IMPRESSION: Hypoinflated lungs and cardiomegaly. Mild bilateral   vascular congestion appears increased. Correlate with any evidence of   developing pulmonary edema.      HAL BOGGS MD            SYSTEM ID:  [id number]              Medications:      Scheduled Meds:      acetaminophen  1,000 mg Oral Q8H     aspirin  81 mg Oral Daily     atorvastatin  40 mg Oral QPM     atropine  1 drop Right Eye BID     calcium carbonate 500 mg (elemental)  2 tablet Oral Daily     carvedilol  25 mg Oral  BID w/meals     cyanocobalamin  1,000 mcg Oral Daily     diclofenac  4 g Topical 4x Daily     fluconazole  200 mg Oral Daily     HYDROmorphone  2 mg Oral Q6H     ipratropium - albuterol 0.5 mg/2.5 mg/3 mL  3 mL Nebulization 4x daily     lidocaine  2 patch Transdermal Q24h    And     lidocaine   Transdermal Q8H YOMI     lisinopril  20 mg Oral BID     menthol (Topical Analgesic) 2.5%   Topical 4x Daily     methocarbamol  250-500 mg Oral 4x Daily     miconazole   Topical BID     multivitamin w/minerals  1 tablet Oral Daily     pantoprazole  40 mg Intravenous BID     polyethylene glycol  17 g Oral BID     prednisoLONE acetate  1 drop Both Eyes TID     predniSONE  20 mg Oral Daily     pregabalin  25 mg Oral TID     QUEtiapine  50 mg Oral BID     [Held by provider] rOPINIRole  0.25 mg Oral TID     sertraline  100 mg Oral Daily     sodium chloride (PF)  3 mL Intracatheter Q8H     sodium chloride (PF)  3 mL Intracatheter Q8H     tamsulosin  0.4 mg Oral QPM     thiamine  100 mg Oral Daily     vitamin D3  50 mcg Oral Daily     Continuous Infusions:      - MEDICATION INSTRUCTIONS -       - MEDICATION INSTRUCTIONS -       PRN Meds:  acetaminophen **OR** acetaminophen, albuterol, alum & mag hydroxide-simethicone, benztropine, cyclobenzaprine, haloperidol lactate, hydrALAZINE, HYDROmorphone **OR** HYDROmorphone, HYDROmorphone, levalbuterol, lidocaine 4%, lidocaine (buffered or not buffered), magnesium hydroxide, naloxone **OR** naloxone **OR** naloxone **OR** naloxone, nitroGLYcerin, OLANZapine zydis, ondansetron **OR** ondansetron, - MEDICATION INSTRUCTIONS -, - MEDICATION INSTRUCTIONS -, sodium chloride (PF), sodium chloride (PF), sodium chloride (PF)

## 2022-12-16 NOTE — PLAN OF CARE
"    Alert and oriented x4, confusion, forgetfulness,   Up with A2 harjinder steady  Using bedside urinal  BM on this shift  Pain reported as 0-4 throughout shift  Reg diet with strict I/O  VSS titrated O2 to 2L  PIV x2 SL  Tele SR  Phos 2.2  Plan for TCU      BP (!) 155/82 (BP Location: Left arm)   Pulse 76   Temp 98.5  F (36.9  C) (Oral)   Resp 20   Ht 1.6 m (5' 3\")   Wt 99.2 kg (218 lb 11.1 oz)   SpO2 97%   BMI 38.74 kg/m        "

## 2022-12-16 NOTE — CONSULTS
Care Management Initial Consult    General Information  Assessment completed with: Patient, Spouse or significant other, Carlos Enrique Sanchez  Type of CM/SW Visit: Offer D/C Planning    Primary Care Provider verified and updated as needed:     Readmission within the last 30 days:        Reason for Consult: discharge planning  Advance Care Planning:            Communication Assessment  Patient's communication style: spoken language (English or Bilingual)    Hearing Difficulty or Deaf: no   Wear Glasses or Blind: other (see comments)    Cognitive  Cognitive/Neuro/Behavioral: .WDL except, orientation  Level of Consciousness: confused  Arousal Level: opens eyes spontaneously  Orientation: disoriented to, time  Mood/Behavior: calm  Best Language: 0 - No aphasia  Speech: illogical, rambling    Living Environment:   People in home: significant other  Carlos Enrique  Current living Arrangements: house      Able to return to prior arrangements:         Family/Social Support:  Care provided by:    Provides care for:       Significant Other          Description of Support System:           Current Resources:   Patient receiving home care services: No     Community Resources: Skilled Nursing Facility  Equipment currently used at home: raised toilet seat, walker, rolling  Supplies currently used at home:      Employment/Financial:  Employment Status:          Financial Concerns:             Lifestyle & Psychosocial Needs:  Social Determinants of Health     Tobacco Use: Medium Risk     Smoking Tobacco Use: Former     Smokeless Tobacco Use: Unknown     Passive Exposure: Not on file   Alcohol Use: Not on file   Financial Resource Strain: Not on file   Food Insecurity: Not on file   Transportation Needs: Not on file   Physical Activity: Not on file   Stress: Not on file   Social Connections: Not on file   Intimate Partner Violence: Not on file   Depression: Not on file   Housing Stability: Not on file               Additional Information:  CM met  with pt and significant other at the bedside to discuss discharge needs. Therapy currently recommending TCU at discharge. Pt and significant other, Carlos Enrique are in agreement with this plan. They live together in a house and have 2 stairs to enter the home. They would like TCU [facility] to:  [facility]. [facility]  [facility]  3. [facility]    Pt prefers a private room if available. Carlos Enrique states they will need wheelchair transport at discharge as pt is not moving very well. CM will send TCU referrals.    CM/SW will continue to follow with discharge planning.    Narcisa Aquino RN, BSN CTS  Care Coordinator  [facility]   [phone]

## 2022-12-16 NOTE — PLAN OF CARE
Problem: Oral Intake Inadequate  Goal: Improved Oral Intake  Outcome: Not Progressing     Goal Outcome Evaluation:      Plan of Care Reviewed With: patient    Overall Patient Progress: decliningOverall Patient Progress: declining    Outcome Evaluation: Pt meeting < 50% estimated energy needs for 10 days. Doesn't like Ensure Max (bariatric) supplement. Ordered Orange Magic cup QID b/n meals  Folate lab w/ cosign to rule out folate deficiency  100mg thiamine daily w/ cosign as pt is at risk for refeeding syndrome  Phos lab w/ cosign as pt is at risk for refeeding syndrome. Consider initiating nutrition support. Please place consult for RD to order TF if indicated.

## 2022-12-16 NOTE — PLAN OF CARE
Goal Outcome Evaluation:    Temp: 98.5  F (36.9  C) Temp src: Oral BP: 113/63 Pulse: 70   Resp: 24 SpO2: 95 % O2 Device: Nasal cannula with humidification Oxygen Delivery: 4 LPM     A/O2. Up A2 lift/sera steady. Tele SR. Uses urinal. 2 PIV SL. Back and R knee pain, relief with scheduled pain meds. Discharge to TCU.

## 2022-12-16 NOTE — PLAN OF CARE
Shift Events: No significant change. Worked with PT and OT. Able to get up with Kaya Steady and strong 2A.     Neuro: Mentation continues to wax and wane. Periods of A&O x4, then has illogical speech and appears to have visual hallucinations- asked about a dog in his room.   CV: SR, Hypertensive at times. On Coreg and Lisinopril   ID: Fluconazole   Pulm: Requires 2-5L NC. VILLAR and at rest. Poor activity tolerance. Attempted IS eduction.   GI: One BM this afternoon. Appetite poor/fair.   : Incontinent/voiding urinal. Urine dark.   Lines/gtts: PIV SL  Skin: Bruising    Plan: Pt an belongings transferred to [facility]. RN attempted to notify brother Florencio (called @ 1820) with status of transfer but no answer. Continue to work with therapies. May need TCU at discharge.

## 2022-12-17 ENCOUNTER — APPOINTMENT (OUTPATIENT)
Dept: PHYSICAL THERAPY | Facility: CLINIC | Age: 77
DRG: 871 | End: 2022-12-17
Payer: COMMERCIAL

## 2022-12-17 LAB
ANION GAP SERPL CALCULATED.3IONS-SCNC: 5 MMOL/L (ref 7–15)
BUN SERPL-MCNC: 16.9 MG/DL (ref 8–23)
CALCIUM SERPL-MCNC: 8.3 MG/DL (ref 8.8–10.2)
CHLORIDE SERPL-SCNC: 101 MMOL/L (ref 98–107)
CREAT SERPL-MCNC: 0.62 MG/DL (ref 0.67–1.17)
DEPRECATED HCO3 PLAS-SCNC: 34 MMOL/L (ref 22–29)
ERYTHROCYTE [DISTWIDTH] IN BLOOD BY AUTOMATED COUNT: 13.6 % (ref 10–15)
GFR SERPL CREATININE-BSD FRML MDRD: >90 ML/MIN/1.73M2
GLUCOSE SERPL-MCNC: 104 MG/DL (ref 70–99)
HCT VFR BLD AUTO: 28.8 % (ref 40–53)
HGB BLD-MCNC: 8.9 G/DL (ref 13.3–17.7)
MCH RBC QN AUTO: 34.5 PG (ref 26.5–33)
MCHC RBC AUTO-ENTMCNC: 30.9 G/DL (ref 31.5–36.5)
MCV RBC AUTO: 112 FL (ref 78–100)
PHOSPHATE SERPL-MCNC: 2.1 MG/DL (ref 2.5–4.5)
PLATELET # BLD AUTO: 337 10E3/UL (ref 150–450)
POTASSIUM SERPL-SCNC: 3.4 MMOL/L (ref 3.4–5.3)
RBC # BLD AUTO: 2.58 10E6/UL (ref 4.4–5.9)
SODIUM SERPL-SCNC: 140 MMOL/L (ref 136–145)
WBC # BLD AUTO: 4.6 10E3/UL (ref 4–11)

## 2022-12-17 PROCEDURE — 94640 AIRWAY INHALATION TREATMENT: CPT | Mod: 76

## 2022-12-17 PROCEDURE — 250N000013 HC RX MED GY IP 250 OP 250 PS 637: Performed by: CLINICAL NURSE SPECIALIST

## 2022-12-17 PROCEDURE — 82310 ASSAY OF CALCIUM: CPT | Performed by: HOSPITALIST

## 2022-12-17 PROCEDURE — 84100 ASSAY OF PHOSPHORUS: CPT | Performed by: HOSPITALIST

## 2022-12-17 PROCEDURE — 97530 THERAPEUTIC ACTIVITIES: CPT | Mod: GP | Performed by: PHYSICAL THERAPIST

## 2022-12-17 PROCEDURE — 99232 SBSQ HOSP IP/OBS MODERATE 35: CPT | Performed by: HOSPITALIST

## 2022-12-17 PROCEDURE — 250N000013 HC RX MED GY IP 250 OP 250 PS 637: Performed by: INTERNAL MEDICINE

## 2022-12-17 PROCEDURE — 94640 AIRWAY INHALATION TREATMENT: CPT

## 2022-12-17 PROCEDURE — C9113 INJ PANTOPRAZOLE SODIUM, VIA: HCPCS | Performed by: INTERNAL MEDICINE

## 2022-12-17 PROCEDURE — 250N000011 HC RX IP 250 OP 636: Performed by: INTERNAL MEDICINE

## 2022-12-17 PROCEDURE — 999N000157 HC STATISTIC RCP TIME EA 10 MIN

## 2022-12-17 PROCEDURE — 250N000013 HC RX MED GY IP 250 OP 250 PS 637: Performed by: HOSPITALIST

## 2022-12-17 PROCEDURE — 120N000001 HC R&B MED SURG/OB

## 2022-12-17 PROCEDURE — 85027 COMPLETE CBC AUTOMATED: CPT | Performed by: HOSPITALIST

## 2022-12-17 PROCEDURE — 36415 COLL VENOUS BLD VENIPUNCTURE: CPT | Performed by: HOSPITALIST

## 2022-12-17 PROCEDURE — 250N000013 HC RX MED GY IP 250 OP 250 PS 637: Performed by: NURSE PRACTITIONER

## 2022-12-17 PROCEDURE — 97116 GAIT TRAINING THERAPY: CPT | Mod: GP | Performed by: PHYSICAL THERAPIST

## 2022-12-17 PROCEDURE — 250N000009 HC RX 250: Performed by: HOSPITALIST

## 2022-12-17 PROCEDURE — 250N000012 HC RX MED GY IP 250 OP 636 PS 637: Performed by: HOSPITALIST

## 2022-12-17 RX ORDER — PREDNISONE 10 MG/1
10 TABLET ORAL DAILY
Status: DISCONTINUED | OUTPATIENT
Start: 2022-12-18 | End: 2022-12-18

## 2022-12-17 RX ORDER — QUETIAPINE FUMARATE 25 MG/1
25 TABLET, FILM COATED ORAL 2 TIMES DAILY
Status: DISCONTINUED | OUTPATIENT
Start: 2022-12-17 | End: 2022-12-18

## 2022-12-17 RX ADMIN — HYDROMORPHONE HYDROCHLORIDE 2 MG: 2 TABLET ORAL at 16:26

## 2022-12-17 RX ADMIN — PREGABALIN 25 MG: 25 CAPSULE ORAL at 16:25

## 2022-12-17 RX ADMIN — ATROPINE SULFATE 1 DROP: 10 SOLUTION/ DROPS OPHTHALMIC at 10:51

## 2022-12-17 RX ADMIN — Medication 50 MCG: at 10:40

## 2022-12-17 RX ADMIN — LISINOPRIL 20 MG: 20 TABLET ORAL at 20:43

## 2022-12-17 RX ADMIN — LISINOPRIL 20 MG: 20 TABLET ORAL at 10:44

## 2022-12-17 RX ADMIN — Medication 250 MG: at 18:31

## 2022-12-17 RX ADMIN — PREDNISOLONE ACETATE 1 DROP: 10 SUSPENSION/ DROPS OPHTHALMIC at 23:29

## 2022-12-17 RX ADMIN — THIAMINE HCL TAB 100 MG 100 MG: 100 TAB at 10:43

## 2022-12-17 RX ADMIN — QUETIAPINE FUMARATE 50 MG: 50 TABLET ORAL at 10:45

## 2022-12-17 RX ADMIN — TAMSULOSIN HYDROCHLORIDE 0.4 MG: 0.4 CAPSULE ORAL at 20:43

## 2022-12-17 RX ADMIN — MICONAZOLE NITRATE: 20 POWDER TOPICAL at 23:29

## 2022-12-17 RX ADMIN — CALCIUM 1000 MG: 500 TABLET ORAL at 10:40

## 2022-12-17 RX ADMIN — PREGABALIN 25 MG: 25 CAPSULE ORAL at 10:44

## 2022-12-17 RX ADMIN — Medication 250 MG: at 14:29

## 2022-12-17 RX ADMIN — POLYETHYLENE GLYCOL 3350 17 G: 17 POWDER, FOR SOLUTION ORAL at 20:44

## 2022-12-17 RX ADMIN — ATORVASTATIN CALCIUM 40 MG: 40 TABLET, FILM COATED ORAL at 20:43

## 2022-12-17 RX ADMIN — FLUCONAZOLE 200 MG: 50 TABLET ORAL at 10:39

## 2022-12-17 RX ADMIN — PREDNISONE 20 MG: 20 TABLET ORAL at 10:42

## 2022-12-17 RX ADMIN — QUETIAPINE FUMARATE 25 MG: 25 TABLET ORAL at 20:43

## 2022-12-17 RX ADMIN — PREGABALIN 25 MG: 25 CAPSULE ORAL at 23:29

## 2022-12-17 RX ADMIN — ATROPINE SULFATE 1 DROP: 10 SOLUTION/ DROPS OPHTHALMIC at 20:46

## 2022-12-17 RX ADMIN — POLYETHYLENE GLYCOL 3350 17 G: 17 POWDER, FOR SOLUTION ORAL at 10:48

## 2022-12-17 RX ADMIN — IPRATROPIUM BROMIDE AND ALBUTEROL SULFATE 3 ML: 2.5; .5 SOLUTION RESPIRATORY (INHALATION) at 15:37

## 2022-12-17 RX ADMIN — ASPIRIN 81 MG: 81 TABLET, COATED ORAL at 10:41

## 2022-12-17 RX ADMIN — PANTOPRAZOLE SODIUM 40 MG: 40 INJECTION, POWDER, FOR SOLUTION INTRAVENOUS at 20:42

## 2022-12-17 RX ADMIN — MULTIPLE VITAMINS W/ MINERALS TAB 1 TABLET: TAB at 10:42

## 2022-12-17 RX ADMIN — PREDNISOLONE ACETATE 1 DROP: 10 SUSPENSION/ DROPS OPHTHALMIC at 10:51

## 2022-12-17 RX ADMIN — SERTRALINE HYDROCHLORIDE 100 MG: 100 TABLET ORAL at 10:43

## 2022-12-17 RX ADMIN — CARVEDILOL 25 MG: 25 TABLET, FILM COATED ORAL at 18:31

## 2022-12-17 RX ADMIN — POTASSIUM & SODIUM PHOSPHATES POWDER PACK 280-160-250 MG 1 PACKET: 280-160-250 PACK at 10:49

## 2022-12-17 RX ADMIN — POTASSIUM & SODIUM PHOSPHATES POWDER PACK 280-160-250 MG 1 PACKET: 280-160-250 PACK at 14:29

## 2022-12-17 RX ADMIN — Medication 250 MG: at 23:29

## 2022-12-17 RX ADMIN — IPRATROPIUM BROMIDE AND ALBUTEROL SULFATE 3 ML: 2.5; .5 SOLUTION RESPIRATORY (INHALATION) at 19:53

## 2022-12-17 RX ADMIN — ACETAMINOPHEN 1000 MG: 500 TABLET, FILM COATED ORAL at 18:31

## 2022-12-17 RX ADMIN — PANTOPRAZOLE SODIUM 40 MG: 40 INJECTION, POWDER, FOR SOLUTION INTRAVENOUS at 10:52

## 2022-12-17 RX ADMIN — ACETAMINOPHEN 1000 MG: 500 TABLET, FILM COATED ORAL at 10:41

## 2022-12-17 RX ADMIN — IPRATROPIUM BROMIDE AND ALBUTEROL SULFATE 3 ML: 2.5; .5 SOLUTION RESPIRATORY (INHALATION) at 07:58

## 2022-12-17 RX ADMIN — PREDNISOLONE ACETATE 1 DROP: 10 SUSPENSION/ DROPS OPHTHALMIC at 16:27

## 2022-12-17 RX ADMIN — Medication 250 MG: at 10:40

## 2022-12-17 RX ADMIN — CYANOCOBALAMIN TAB 1000 MCG 1000 MCG: 1000 TAB at 10:46

## 2022-12-17 RX ADMIN — IPRATROPIUM BROMIDE AND ALBUTEROL SULFATE 3 ML: 2.5; .5 SOLUTION RESPIRATORY (INHALATION) at 11:38

## 2022-12-17 RX ADMIN — HYDROMORPHONE HYDROCHLORIDE 2 MG: 2 TABLET ORAL at 10:44

## 2022-12-17 RX ADMIN — CARVEDILOL 25 MG: 25 TABLET, FILM COATED ORAL at 10:41

## 2022-12-17 RX ADMIN — HYDROMORPHONE HYDROCHLORIDE 2 MG: 2 TABLET ORAL at 03:33

## 2022-12-17 RX ADMIN — POTASSIUM & SODIUM PHOSPHATES POWDER PACK 280-160-250 MG 1 PACKET: 280-160-250 PACK at 16:25

## 2022-12-17 RX ADMIN — ACETAMINOPHEN 1000 MG: 500 TABLET, FILM COATED ORAL at 03:33

## 2022-12-17 ASSESSMENT — ACTIVITIES OF DAILY LIVING (ADL)
ADLS_ACUITY_SCORE: 57
ADLS_ACUITY_SCORE: 55
ADLS_ACUITY_SCORE: 55
ADLS_ACUITY_SCORE: 58
ADLS_ACUITY_SCORE: 55
ADLS_ACUITY_SCORE: 57
ADLS_ACUITY_SCORE: 55

## 2022-12-17 NOTE — PROGRESS NOTES
[facility]    Hospitalist Progress Note             Date of Admission:  12/6/2022                   Day of hospitalization: 11    Assessment and Plan:   Pacheco Torres is a 77 year old male with a history of htn/hlp, remote hx of CAD s/p DAYAN to the LAD-most recent echo performed in the ER with hyperdynamic EF 75 % and G1dd, hx of prostate cancer from earlier this year treated with XRT through [facility], macrocytic anemia with hgb in the 11-12 range (normal B12 and folate), monoclonal gammopathy (no recent documentation of evaluation), PMR, obesity, hx of tob abuse  admitted on 12/6/2022 with hypovolemic shock of unclear etiology     He believes he passed out either the day prior to or on the day of admission.  he is unable to fill in any details. States he hasn't been sleeping well due to the severe back pain.  States po intake has been well, denies N/V/D. No f/c/s.  No epistaxis/bloody gums/stools or emesis. He has not been offered narcotics for the back pain as an outpatient.       Dr. Pablo discussed with  Carlos Enrique Goldberg, pt's SO who states the patient hasn't been eating well for at least 6 weeks, she thinks his fluid intake is ok.  She reports that he's had some nausea but no vomiting.       In the ER BPs in the 60's/40's with tachycardia and tachypnea, he was hypothermic at 95.4, he was given 2 L of NS without improvement and so started on NE via Right internal jugular (though this ran only very briefly).       EKG with ST elevation throughout septal/anterior leads-cards was contacted and the story just didn't seem to fit- he was without any CP or anginal equivalents. Initial trop 29. He was placed on heparin and stat echo completed which showed hyperdynamic cardiac function.  The ST elevation resolved over the course of a couple of hours. Troponins continue to rise 29->222-493 before falling again.  Patient was seen by cardiology and initially recommended coronary angiogram but patient was  confused unable to provide consent and lay still for the procedure.  Coronary angiogram plan was consulted and patient was treated with heparin, started on aspirin.     CXR with pulmonary edema  CT Ao survey-diffuse CAD, Left inguinal canal hernia containing loops of the distal colon  He was covered with pip-tazo/vanco for septic shock of unclear etiology      Patient appears close to ready for discharge.  We will work on weaning off of Seroquel, will also work on weaning off steroids.  Appears mildly overloaded and will work on volume status with Lasix dosing daily.     Shock/syncope  Acute colitis on CT imaging (?significance?)  -- Initial EKG (A fib with RVR) could explain CV shock picture especially when paired with hypovolemia or sepsis  -- Hypotension resolved with IV fluid resuscitation and very brief period of support with norepinephrine  -- CT of chest abdomen pelvis obtained and December 7 suggested possible colitis and esophagitis.  (Nothing clinically to go along with colitis.  Some complaints of odynophagia suggest possible esophagitis.)     Acute toxic metabolic metabolic encephalopathy   -- Likely combination of toxic metabolic and infectious encephalopathy.  -- Unclear whether pt has an underlying cognitive deficit.  Brother blames pt's evident change in mentation on pain that he has had for the past 6 weeks due to vertebral compression fractures. Chronic insomnia due to back pain issues.  -- Patient was agitated on December 8 and required Precedex drip.  -- MRI brain shows no acute abnormality  -- on Seroquel will work on weaning off, changed to 25 mg twice daily daily today    NSTEMI v type 2 MI  CAD  Htn/hlp   --initial eval in ED showed trops 29->222->493   --pta on asa 81 mg/lisinopril 10 mg every day/simvastain 40  Mg  --Home medications resumed    --Diffuse CAD noted on CT Ao survey.  Hx mid LAD PCI  --No CP and echo with hyperdynamic function.   -- Patient was treated with heparin and  cardiology was consulted.  -- Dr. Pablo discussed the case with cardiologist and ischemic work-up to be postponed as outpatient when he is more stable.  If he develops recurrent pain or other symptoms, could be done sooner in the hospital     Large Left inguinal hernia with bowel contents-initial concern for small bowel obstruction.  -- CT on December 7 showed possible bowel obstruction.  Patient had left lower quadrant abdominal tenderness but no rigidity or rebound tenderness.  Surgery was consulted and patient was seen and examined by Dr. Ruiz who recommended to continue to monitor for symptoms.  --No clinical symptoms c/w bowel obstruction, currently no complaint of abdominal pain or ischemic bowel concern.  Tolerating diet.     Macrocytic anemia-MGUS  --Being worked up by PCP   --Currently hemoglobin is 9.2.  Positive stool.  Esophageal thickening noted on CT scan.  GI consulted and recommendation obtained for no intervention at this time.     Incidental pulmonary nodules  --Given heavy smoking history will need repeat CT in 3-6 months     Acute on chronic back pain present for weeks without a trigger, thought due to vertebral compression fx  History of prostate cancer with recent treatment with proton beam (XRT), follows with [facility]  --Severe acute on chronic back pain requiring narcotic medications.    --Patient was seen by pain management team.  Pain medications recommended.  Continue current pain regimen-scheduled Tylenol 1 g every 8 hours, Voltaren gel 4 g 4 times daily, lidocaine patch, menthol, Lyrica.  As needed Dilaudid 2 -4 mg every 3 hours as needed.      Abnormal CT with concern for esophagitis and colitis   -- empirically will treat for Candidal esophagitis using Fluconazole 400 mg x 1 followed by 200 mg daily x 13 doses     Chronic medical problems  Depression/gerd/prostate ca/pmr/monoclonal gammopathy   -- Continue pta sertraline     COVID 19  Negative  S/p 5 shot moderna series 9/2022  "(biv)     Acute hypoxic respiratory failure  -- Patient is a smoker, has increased work of breathing and hypoxic requiring supplemental oxygen.  -- He has evidence of emphysema on CT scan. Mild acute exacerbation of COPD suspected  -- Patient was treated with DuoNeb, supplemental oxygen, steroid.  -- Currently wheezing is improved but hypoxia persists  -- will work on weaning prednisone, changed to 10 mg tomorrow  -- Hypoxia somewhat improved, received Lasix 40 mg IV 12/16.  Appears more euvolemic we will hold off further Lasix     Community-acquired pneumonia  --new left lung infiltrate on chest x-ray on December 8, though not apparent on CT Chest on 12/11  --Treated with  Zosyn for 7 days..  Afebrile     # Code status: DNR/DNI  # Anticipated discharge date and Disposition: in 1 day, appears medically ready, needs tcu  # DVT: Heparin  # IVF: none                       Christy Ridley MD  Text Page (7am - 6pm, M-F)               Subjective   Chief Complaint:  Sob  Subjective: Has dyspnea on exertion with activity.  Fairly debilitated patient states he has difficulty moving around but not so happy about going to TCU during the holidays.  States he has sons that are strong that can help him move around.  Discussed with patient importance of TCU.  Otherwise no other complaints no chest pain nausea vomiting abdominal pain        Objective   BP (!) 146/68 (BP Location: Left arm)   Pulse 68   Temp 98  F (36.7  C) (Oral)   Resp 20   Ht 1.6 m (5' 3\")   Wt 102.9 kg (226 lb 14.4 oz)   SpO2 98%   BMI 40.19 kg/m       Physical Exam  General: Pt in NAD, normal appearance  HEENT: OP clear MMM, no JVD  Lungs: baseline crackles, normal breathing  without accessory muscle usage, no wheezing, rhonchi  Cardiac: +S1, S2, RRR, no MRG, +1 edema BLE  Abdominal: normal bowel sounds, NT/ND, no hepatosplenomegaly  Skin: warm, dry, normal turgor, no rash  Psyche: A& O x3, appropriate affect             Intake/Output Summary (Last 24 " hours) at 12/16/2022 1133  Last data filed at 12/15/2022 2013  Gross per 24 hour   Intake 360 ml   Output 440 ml   Net -80 ml           Labs and Imaging Results:      Recent Labs   Lab 12/17/22  0723 12/15/22  0743   WBC 4.6 5.8   HGB 8.9* 9.8*    312        Recent Labs   Lab 12/17/22  0723 12/15/22  0743    142   CO2 34* 33*   BUN 16.9 21.9      No results for input(s): INR, PTT in the last 168 hours.   No results for input(s): CKMB in the last 168 hours.    Invalid input(s): TROPONINT   No results for input(s): ALBUMIN, AST, ALT, ALKPHOS, BILITOT in the last 168 hours.     Micro:     Radio:  MR Brain w/o & w Contrast   Final Result   IMPRESSION:   1.  No acute intracranial process.   2.  Generalized brain atrophy and presumed microvascular ischemic changes as detailed above.      CT Chest/Abdomen/Pelvis w Contrast   Final Result   IMPRESSION:   1.  No acute pulmonary embolism, although suboptimal opacification of the segmental and subsegmental branches. Enlarged pulmonary arteries suggestive of chronic pulmonary hypertension.      2.  Unchanged small bilateral pleural effusions and bilateral groundglass opacities, likely infectious or inflammatory.      3.  Diffuse small and large bowel dilation has increased compared to 12/07/2022, but no discrete transition point, suggesting systemic hypomotility. Unchanged left inguinal hernia containing a short segment of colon, which is mildly thickened and with    mild fat stranding.      XR Bone Survey Complete   Final Result   IMPRESSION: No large destructive lytic lesions. There is a generalized mottled appearance throughout the bones, most significantly in the humeral and femoral diaphyses, which may be related to osteoporosis/age rather than myeloma. Multilevel degenerative    changes and compression fractures throughout the spine, better assessed on recent CT abdomen pelvis 12/07/2022. Severe bilateral glenohumeral joint osteoarthrosis. Multiple remote rib  fractures. Chronic appearing osteochondral defect in the right medial    femoral condyle. No definite acute fracture.      Additional findings better seen on prior CT chest, abdomen, and pelvis 12/07/2022 including multiple distended bowel loops, which could represent underlying obstruction. Right central venous catheter with tip in SVC. Bibasilar atelectasis and volume loss    in the lung fields with crowding of the central vasculature.               XR Chest Port 1 View   Final Result   IMPRESSION: New left lung infiltrate.      CT Chest/Abdomen/Pelvis w Contrast   Final Result   IMPRESSION:   1.  Marked circumferential wall thickening of the splenic flexure,   descending colon and sigmoid colon may be due to acute colitis, either   infectious, inflammatory or ischemic.    2.  Large left inguinal hernia containing a loop of the sigmoid colon   with narrowing/pinched appearance of the sigmoid colon at the hernia   mouth and wall thickening of the sigmoid in the inguinal hernia.   Superimposed strangulation of this loop of colon is not excluded.   Consider surgical consultation.   3.  Mechanical small bowel obstruction appears to be a separate   process. Gradual transition to normal caliber small bowel loops in the   right abdomen.   4.  Marked circumferential wall thickening of the entire esophagus,   suspicious for esophagitis.   5.  Bibasilar atelectasis/infiltrate and small bilateral pleural   effusions.      EMELY MARTINEZ MD            SYSTEM ID:  [id number]      XR Chest Port 1 View   Final Result   IMPRESSION: Right IJ central venous catheter tip in the low SVC. No   pneumothorax. Mild interstitial opacities in the lungs, could   represent pulmonary edema. No pleural effusion or pneumothorax.      EMELY MARTINEZ MD            SYSTEM ID:  [id number]      Echocardiogram Limited   Final Result      CT Aortic Survey w Contrast   Final Result   IMPRESSION:    1. No acute thoracic or abdominal aortic abnormality. No  dissection.   Scattered areas of atherosclerotic disease identified. 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   2. Diffuse coronary artery calcifications.   3. No acute mediastinal abnormality is seen.   4. Stable pulmonary nodule on the left, see below for follow-up   imaging guidelines.    5. Left inguinal canal hernia containing herniated loops of the distal   descending colon and sigmoid. No upstream bowel obstruction. See above   for measurements.      Recommendations for one or multiple incidental lung nodules < 6mm:     Low risk patients: No routine follow-up.     High risk patients: Optional follow-up CT at 12 months; if   unchanged, no further follow-up.      *Low Risk: Minimal or absent history of smoking or other known risk   factors.   *Nonsolid (ground glass) or partly solid nodules may require longer   follow-up to exclude indolent adenocarcinoma.   *Recommendations based on Guidelines for the Management of Incidental   Pulmonary Nodules Detected at CT: From the Fleischner Society 2017,   Radiology 2017.      HAL BOGGS MD            SYSTEM ID:  [id number]      CT Head w/o Contrast   Final Result   IMPRESSION: Mild motion artifact. No evidence of hemorrhage. Volume   loss and patchy areas of white matter hypoattenuation which likely   represent chronic small vessel ischemic change. Small area of focal   hypoattenuation within the central medulla, presumably artifactual   (brainstem infarct not excluded, MRI can be performed for basal   ganglia and thalamic lacunar infarcts, presumably chronic. No acute   osseous abnormality. Nonspecific right facial soft tissue swelling,   potentially contusion.      EVELYN VELAZQUEZ MD            SYSTEM ID:  [id number]      XR Chest Port 1 View   Final Result   IMPRESSION: Hypoinflated lungs and cardiomegaly. Mild bilateral   vascular congestion appears increased. Correlate with any evidence of   developing  pulmonary edema.      HAL BOGGS MD            SYSTEM ID:  [id number]              Medications:      Scheduled Meds:      acetaminophen  1,000 mg Oral Q8H     aspirin  81 mg Oral Daily     atorvastatin  40 mg Oral QPM     atropine  1 drop Right Eye BID     calcium carbonate 500 mg (elemental)  2 tablet Oral Daily     carvedilol  25 mg Oral BID w/meals     cyanocobalamin  1,000 mcg Oral Daily     diclofenac  4 g Topical 4x Daily     fluconazole  200 mg Oral Daily     HYDROmorphone  2 mg Oral Q6H     ipratropium - albuterol 0.5 mg/2.5 mg/3 mL  3 mL Nebulization 4x daily     lidocaine  2 patch Transdermal Q24h    And     lidocaine   Transdermal Q8H YOMI     lisinopril  20 mg Oral BID     menthol (Topical Analgesic) 2.5%   Topical 4x Daily     methocarbamol  250-500 mg Oral 4x Daily     miconazole   Topical BID     multivitamin w/minerals  1 tablet Oral Daily     pantoprazole  40 mg Intravenous BID     polyethylene glycol  17 g Oral BID     potassium & sodium phosphates  1 packet Oral or Feeding Tube Q4H     prednisoLONE acetate  1 drop Both Eyes TID     predniSONE  20 mg Oral Daily     pregabalin  25 mg Oral TID     QUEtiapine  25 mg Oral BID     sertraline  100 mg Oral Daily     sodium chloride (PF)  3 mL Intracatheter Q8H     sodium chloride (PF)  3 mL Intracatheter Q8H     tamsulosin  0.4 mg Oral QPM     thiamine  100 mg Oral Daily     vitamin D3  50 mcg Oral Daily     Continuous Infusions:      - MEDICATION INSTRUCTIONS -       - MEDICATION INSTRUCTIONS -       PRN Meds:  acetaminophen **OR** acetaminophen, albuterol, alum & mag hydroxide-simethicone, benztropine, cyclobenzaprine, haloperidol lactate, hydrALAZINE, HYDROmorphone **OR** HYDROmorphone, HYDROmorphone, levalbuterol, lidocaine 4%, lidocaine (buffered or not buffered), magnesium hydroxide, naloxone **OR** naloxone **OR** naloxone **OR** naloxone, nitroGLYcerin, OLANZapine zydis, ondansetron **OR** ondansetron, - MEDICATION INSTRUCTIONS -, - MEDICATION  INSTRUCTIONS -, sodium chloride (PF), sodium chloride (PF), sodium chloride (PF)

## 2022-12-17 NOTE — PLAN OF CARE
Goal Outcome Evaluation:    Temp: 98.3  F (36.8  C) Temp src: Oral BP: 126/71 Pulse: 75   Resp: 20 SpO2: 100 % O2 Device: Nasal cannula with humidification Oxygen Delivery: 2 LPM     A/O3. Up A2 sera steady, was up in chair. Tele SR. Uses urinal. Incontinent of urine x1. Phos replaced, AM redraw. Denies pain. 2 PIV SL. Discharge to TCU Monday.

## 2022-12-17 NOTE — PROGRESS NOTES
Care Management Follow Up    Length of Stay (days): 11    Expected Discharge Date: 12/19/2022     Concerns to be Addressed: discharge planning     Patient plan of care discussed at interdisciplinary rounds: Yes    Anticipated Discharge Disposition: TCU     Anticipated Discharge Services:   Anticipated Discharge DME:     Education Provided on the Discharge Plan: yes  Patient/Family in Agreement with the Plan: yes    Referrals Placed by CM/SW: skilled nursing facilities  Private pay costs discussed: Not applicable    Additional Information:  CM following for discharge planning and TCU placement.     PORFIRIO updated that pt's significant other Sobia is requesting a call from writer.     Placed phone call to Sobia p: [phone] as requested. Provided update that at this time[facility] but per MD, does not sound like pt will be medically ready to discharge until Monday at the earliest. Sobia requested additional referral be sent to [facility]. PORFIRIO faxed referral.     CM will continue to follow and assist with discharge planning as needed. Per previous note from RNCC, pt needing [facility] w/c arranged at discharge.     ELLY Mathias, LSW  Inpatient Care Coordination  MS3, [facility]  [phone]    ELLY De Leon

## 2022-12-17 NOTE — PLAN OF CARE
Goal Outcome Evaluation:       VSS, patient remains confused, more so after he awakens, patient slept intermittley, pain appears managed with scheduled medications, no acute needs seen this NOC will cont to monitor.

## 2022-12-17 NOTE — PLAN OF CARE
"  Alert and oriented x4, confusion, forgetfulness,   Up with A2 harjinder steady  Using bedside urinal  BM on this shift  Pain reported as 0-4 throughout shift  Reg diet with strict I/O  VSS  O2 needs between 2L and 4L throughout shift.   PIV x2 SL  Tele SR  Phos 2.1, replaced  Plan for TCU            BP (!) 146/68 (BP Location: Left arm)   Pulse 68   Temp 98  F (36.7  C) (Oral)   Resp 20   Ht 1.6 m (5' 3\")   Wt 102.9 kg (226 lb 14.4 oz)   SpO2 98%   BMI 40.19 kg/m        "

## 2022-12-18 ENCOUNTER — APPOINTMENT (OUTPATIENT)
Dept: OCCUPATIONAL THERAPY | Facility: CLINIC | Age: 77
DRG: 871 | End: 2022-12-18
Payer: COMMERCIAL

## 2022-12-18 LAB — PHOSPHATE SERPL-MCNC: 2.7 MG/DL (ref 2.5–4.5)

## 2022-12-18 PROCEDURE — 250N000013 HC RX MED GY IP 250 OP 250 PS 637: Performed by: INTERNAL MEDICINE

## 2022-12-18 PROCEDURE — 250N000013 HC RX MED GY IP 250 OP 250 PS 637: Performed by: NURSE PRACTITIONER

## 2022-12-18 PROCEDURE — 94640 AIRWAY INHALATION TREATMENT: CPT

## 2022-12-18 PROCEDURE — 120N000001 HC R&B MED SURG/OB

## 2022-12-18 PROCEDURE — 250N000009 HC RX 250: Performed by: HOSPITALIST

## 2022-12-18 PROCEDURE — 94640 AIRWAY INHALATION TREATMENT: CPT | Mod: 76

## 2022-12-18 PROCEDURE — 250N000013 HC RX MED GY IP 250 OP 250 PS 637: Performed by: HOSPITALIST

## 2022-12-18 PROCEDURE — 250N000013 HC RX MED GY IP 250 OP 250 PS 637: Performed by: CLINICAL NURSE SPECIALIST

## 2022-12-18 PROCEDURE — 99233 SBSQ HOSP IP/OBS HIGH 50: CPT | Performed by: INTERNAL MEDICINE

## 2022-12-18 PROCEDURE — 97530 THERAPEUTIC ACTIVITIES: CPT | Mod: GO

## 2022-12-18 PROCEDURE — 999N000156 HC STATISTIC RCP CONSULT EA 30 MIN

## 2022-12-18 PROCEDURE — 999N000157 HC STATISTIC RCP TIME EA 10 MIN

## 2022-12-18 PROCEDURE — 250N000012 HC RX MED GY IP 250 OP 636 PS 637: Performed by: HOSPITALIST

## 2022-12-18 PROCEDURE — 36415 COLL VENOUS BLD VENIPUNCTURE: CPT | Performed by: HOSPITALIST

## 2022-12-18 PROCEDURE — 84100 ASSAY OF PHOSPHORUS: CPT | Performed by: HOSPITALIST

## 2022-12-18 RX ORDER — PREDNISONE 10 MG/1
10 TABLET ORAL DAILY
Status: DISCONTINUED | OUTPATIENT
Start: 2022-12-19 | End: 2022-12-20 | Stop reason: HOSPADM

## 2022-12-18 RX ORDER — PANTOPRAZOLE SODIUM 40 MG/1
40 TABLET, DELAYED RELEASE ORAL
Status: DISCONTINUED | OUTPATIENT
Start: 2022-12-18 | End: 2022-12-20 | Stop reason: HOSPADM

## 2022-12-18 RX ADMIN — ATROPINE SULFATE 1 DROP: 10 SOLUTION/ DROPS OPHTHALMIC at 09:12

## 2022-12-18 RX ADMIN — Medication 50 MCG: at 09:09

## 2022-12-18 RX ADMIN — POLYETHYLENE GLYCOL 3350 17 G: 17 POWDER, FOR SOLUTION ORAL at 09:09

## 2022-12-18 RX ADMIN — MULTIPLE VITAMINS W/ MINERALS TAB 1 TABLET: TAB at 09:08

## 2022-12-18 RX ADMIN — MICONAZOLE NITRATE: 20 POWDER TOPICAL at 20:26

## 2022-12-18 RX ADMIN — CALCIUM 1000 MG: 500 TABLET ORAL at 09:08

## 2022-12-18 RX ADMIN — ACETAMINOPHEN 1000 MG: 500 TABLET, FILM COATED ORAL at 17:11

## 2022-12-18 RX ADMIN — PREDNISOLONE ACETATE 1 DROP: 10 SUSPENSION/ DROPS OPHTHALMIC at 21:54

## 2022-12-18 RX ADMIN — POLYETHYLENE GLYCOL 3350 17 G: 17 POWDER, FOR SOLUTION ORAL at 20:23

## 2022-12-18 RX ADMIN — IPRATROPIUM BROMIDE AND ALBUTEROL SULFATE 3 ML: 2.5; .5 SOLUTION RESPIRATORY (INHALATION) at 08:02

## 2022-12-18 RX ADMIN — THIAMINE HCL TAB 100 MG 100 MG: 100 TAB at 09:08

## 2022-12-18 RX ADMIN — QUETIAPINE FUMARATE 12.5 MG: 25 TABLET ORAL at 21:53

## 2022-12-18 RX ADMIN — IPRATROPIUM BROMIDE AND ALBUTEROL SULFATE 3 ML: 2.5; .5 SOLUTION RESPIRATORY (INHALATION) at 11:19

## 2022-12-18 RX ADMIN — Medication 250 MG: at 13:13

## 2022-12-18 RX ADMIN — CYANOCOBALAMIN TAB 1000 MCG 1000 MCG: 1000 TAB at 09:08

## 2022-12-18 RX ADMIN — ATROPINE SULFATE 1 DROP: 10 SOLUTION/ DROPS OPHTHALMIC at 20:26

## 2022-12-18 RX ADMIN — PREGABALIN 25 MG: 25 CAPSULE ORAL at 17:12

## 2022-12-18 RX ADMIN — ATORVASTATIN CALCIUM 40 MG: 40 TABLET, FILM COATED ORAL at 20:24

## 2022-12-18 RX ADMIN — PREDNISONE 10 MG: 10 TABLET ORAL at 09:08

## 2022-12-18 RX ADMIN — Medication 250 MG: at 21:53

## 2022-12-18 RX ADMIN — PANTOPRAZOLE SODIUM 40 MG: 40 TABLET, DELAYED RELEASE ORAL at 09:15

## 2022-12-18 RX ADMIN — MICONAZOLE NITRATE: 20 POWDER TOPICAL at 09:11

## 2022-12-18 RX ADMIN — ASPIRIN 81 MG: 81 TABLET, COATED ORAL at 09:15

## 2022-12-18 RX ADMIN — PREDNISOLONE ACETATE 1 DROP: 10 SUSPENSION/ DROPS OPHTHALMIC at 17:13

## 2022-12-18 RX ADMIN — TAMSULOSIN HYDROCHLORIDE 0.4 MG: 0.4 CAPSULE ORAL at 20:24

## 2022-12-18 RX ADMIN — SERTRALINE HYDROCHLORIDE 100 MG: 100 TABLET ORAL at 09:09

## 2022-12-18 RX ADMIN — FLUCONAZOLE 200 MG: 50 TABLET ORAL at 09:07

## 2022-12-18 RX ADMIN — Medication 250 MG: at 17:11

## 2022-12-18 RX ADMIN — LIDOCAINE 2 PATCH: 246 PATCH TOPICAL at 20:24

## 2022-12-18 RX ADMIN — PREGABALIN 25 MG: 25 CAPSULE ORAL at 21:53

## 2022-12-18 RX ADMIN — IPRATROPIUM BROMIDE AND ALBUTEROL SULFATE 3 ML: 2.5; .5 SOLUTION RESPIRATORY (INHALATION) at 19:53

## 2022-12-18 RX ADMIN — PREGABALIN 25 MG: 25 CAPSULE ORAL at 09:31

## 2022-12-18 RX ADMIN — CARVEDILOL 25 MG: 25 TABLET, FILM COATED ORAL at 09:08

## 2022-12-18 RX ADMIN — PANTOPRAZOLE SODIUM 40 MG: 40 TABLET, DELAYED RELEASE ORAL at 17:11

## 2022-12-18 RX ADMIN — ACETAMINOPHEN 1000 MG: 500 TABLET, FILM COATED ORAL at 09:31

## 2022-12-18 RX ADMIN — LISINOPRIL 20 MG: 20 TABLET ORAL at 20:24

## 2022-12-18 RX ADMIN — QUETIAPINE FUMARATE 25 MG: 25 TABLET ORAL at 09:08

## 2022-12-18 RX ADMIN — LISINOPRIL 20 MG: 20 TABLET ORAL at 09:08

## 2022-12-18 RX ADMIN — Medication 250 MG: at 09:07

## 2022-12-18 RX ADMIN — PREDNISOLONE ACETATE 1 DROP: 10 SUSPENSION/ DROPS OPHTHALMIC at 09:11

## 2022-12-18 RX ADMIN — CARVEDILOL 25 MG: 25 TABLET, FILM COATED ORAL at 17:11

## 2022-12-18 ASSESSMENT — ACTIVITIES OF DAILY LIVING (ADL)
ADLS_ACUITY_SCORE: 57
ADLS_ACUITY_SCORE: 51
ADLS_ACUITY_SCORE: 47
ADLS_ACUITY_SCORE: 57
ADLS_ACUITY_SCORE: 53
ADLS_ACUITY_SCORE: 57
ADLS_ACUITY_SCORE: 51
ADLS_ACUITY_SCORE: 57

## 2022-12-18 NOTE — PLAN OF CARE
Goal Outcome Evaluation:    Temp: 98.6  F (37  C) Temp src: Oral BP: 132/78 Pulse: 62   Resp: 18 SpO2: 100 % O2 Device: Nasal cannula with humidification Oxygen Delivery: 3 LPM     A/O3-4. Alert/confused. Tele SR. Up A2 Sera steady. Up in chair for dinner. Uses urinal. 2 PIV SL. C/O Aches and pains, scheduled pain medication. Phos Am redraw. Discharge to TCU.

## 2022-12-18 NOTE — PLAN OF CARE
Pt alert but confused intermittently.  Sleeping between cares.  Pt denying pain, held oxy and tylenol.  Pt calling to make needs known.  Encouraged to turn, weight shifting assistance provided. Plan is for transfer to TCU as pt continues to improve.

## 2022-12-18 NOTE — PROGRESS NOTES
Date:12/18/2022     Admission Dx:Hypovolemia     Pulmonary History: COPD(?)     Home Nebulizer/MDI Use:None     Home Oxygen:No     Acuity Level (RCAT flow sheet):3     Aerosol Therapy initiated:Duoneb QID, alb Q2 prn        Pulmonary Hygiene initiated:Deep breath and coughing techniques        Volume Expansion initiated:IS        Current Oxygen Requirements:4L  Current SpO2:95%     Re-evaluation date:12/21/2022     Patient Education:Education was performed with the patient in regards to indications/benefits and possible side effects of bronchodilators. Will continue to do education with patient.

## 2022-12-18 NOTE — PROGRESS NOTES
[facility]    Hospitalist Progress Note             Date of Admission:  12/6/2022                   Day of hospitalization: 12    Assessment and Plan:   Pacheco Torres is a 77 year old male with a history of HTN/HLP, remote hx of CAD s/p DAYAN to the LAD (EF 75 % and G1dd), hx of prostate cancer from earlier this year treated with XRT through [facility], macrocytic anemia with Hgb in the 11-12 range (normal B12 and folate), monoclonal gammopathy (no recent documentation of evaluation), PMR, obesity, hx of tob abuse who admitted on 12/6/2022 with hypovolemic shock of unclear etiology.     He believes he passed out either the day prior to or on the day of admission, unable to fill in any details. States he hasn't been sleeping well due to the severe back pain.  States po intake has been well, denies N/V/D. No f/c/s.  No epistaxis/bloody gums/stools or emesis. He has not been offered narcotics for the back pain as an outpatient.       Dr. Pablo discussed with  Carlos Enrique Goldberg, pt's SO who states the patient hasn't been eating well for at least 6 weeks, she thinks his fluid intake is ok.  She reports that he's had some nausea but no vomiting.       In the ER BPs in the 60's/40's with tachycardia and tachypnea, he was hypothermic at 95.4, he was given 2 L of NS without improvement and so started on NE via Right internal jugular (though this ran only very briefly).       EKG with ST elevation throughout septal/anterior leads-cards was contacted and the story just didn't seem to fit- he was without any CP or anginal equivalents. Initial trop 29. He was placed on heparin and stat echo completed which showed hyperdynamic cardiac function.  The ST elevation resolved over the course of a couple of hours. Troponins continue to rise 29->222-493 before falling again.  Patient was seen by cardiology and initially recommended coronary angiogram but patient was confused unable to provide consent and lay still for  the procedure, treated with heparin, started on aspirin.     CXR with pulmonary edema  CT Ao survey-diffuse CAD, Left inguinal canal hernia containing loops of the distal colon  He was covered with pip-tazo/vanco for septic shock of unclear etiology      Patient appears close to ready for discharge.  We will work on weaning off of Seroquel, will also work on weaning off steroids.  Appears mildly overloaded and will work on volume status with Lasix dosing daily.     Shock/syncope  Acute colitis on CT imaging (?significance?)  -- Initial EKG (A fib with RVR) could explain CV shock picture especially when paired with hypovolemia or sepsis  -- Hypotension resolved with IV fluid resuscitation and very brief period of support with norepinephrine  -- CT of chest abdomen pelvis obtained and December 7 suggested possible colitis and esophagitis.  (Nothing clinically to go along with colitis.  Some complaints of odynophagia suggest possible esophagitis.)     Acute toxic metabolic encephalopathy   -- Likely combination of toxic metabolic and infectious encephalopathy.  -- Unclear whether pt has an underlying cognitive deficit.  Brother blames pt's evident change in mentation on pain that he has had for the past 6 weeks due to vertebral compression fractures. Chronic insomnia due to back pain issues.  -- Patient was agitated on December 8 and required Precedex drip.  -- MRI brain shows no acute abnormality  -- on Seroquel will work on weaning off, today will decrease to 12.5 mg QHS (already had 25 mg this morning, was on 25 mg BID)    NSTEMI v type 2 MI  CAD  HTN/HLP   --initial eval in ED showed trops 29->222->493   --pta on asa 81 mg/lisinopril 10 mg every day/simvastatin 40  Mg  --Home medications resumed    --Diffuse CAD noted on CT Ao survey.  Hx mid LAD PCI  --No CP and echo with hyperdynamic function.   -- Patient was treated with heparin and cardiology was consulted.  -- Dr. Pablo discussed the case with cardiologist  and ischemic work-up to be postponed as outpatient when he is more stable.  If he develops recurrent pain or other symptoms, could be done sooner in the hospital     Large Left inguinal hernia with bowel contents-initial concern for small bowel obstruction.  -- CT on December 7 showed possible bowel obstruction.  Patient had left lower quadrant abdominal tenderness but no rigidity or rebound tenderness.  Surgery was consulted and patient was seen and examined by Dr. Ruiz who recommended to continue to monitor for symptoms.  --No clinical symptoms c/w bowel obstruction     Macrocytic anemia-MGUS  --Being worked up by PCP   --Currently hemoglobin is 9.2.  Positive stool.  Esophageal thickening noted on CT scan.  GI consulted and recommendation obtained for no intervention at this time.     Incidental pulmonary nodules  --Given heavy smoking history will need repeat CT in 3-6 months     Acute on chronic back pain present for weeks without a trigger, thought due to vertebral compression fx  History of prostate cancer with recent treatment with proton beam (XRT), follows with [facility]  --Severe acute on chronic back pain requiring narcotic medications.  PTA he was not on opiates regularly  --Patient was seen by pain management team.  Pain medications recommended.  He had been on scheduled Dilaudid but he is confused and currently denies pain.  I am stopping scheduled Dilaudid and will continue with PRN dosing  --Continue current pain regimen-scheduled Tylenol 1 g every 8 hours, Voltaren gel 4 g 4 times daily, lidocaine patch, menthol, Lyrica.     Abnormal CT with concern for esophagitis and colitis: Does describe pressure type pain in the epigastric region with eating.    -- empirically will treat for Candidal esophagitis using Fluconazole 400 mg x 1 followed by 200 mg daily x 13 doses  -- Changed IV Protonix to oral Protonix BID     Chronic medical problems  Depression/gerd/prostate ca/pmr/monoclonal  "gammopathy   -- Continue pta sertraline     COVID 19  Negative  S/p 5 shot moderna series 9/2022 (biv)     Acute hypoxic respiratory failure  -- Patient is a smoker, had increased work of breathing and hypoxic requiring supplemental oxygen.  -- He has evidence of emphysema on CT scan. Mild acute exacerbation of COPD suspected  -- Patient was treated with DuoNeb, supplemental oxygen, steroid.  -- Currently wheezing is improved but hypoxia persists  -- will work on weaning prednisone, changed to 10 mg daily on 12/18 x3 doses then stop  -- Hypoxia somewhat improved, received Lasix 40 mg IV 12/16.  Appears more euvolemic we will hold off further Lasix     Community-acquired pneumonia - Resolved.  --new left lung infiltrate on chest x-ray on December 8, though not apparent on CT Chest on 12/11  --Treated with  Zosyn for 7 days..  Afebrile    Moderate Malnutrition: Nutrition following.     # Code status: DNR/DNI  # Anticipated discharge date and Disposition: Discharge to TCU once bed is found  # DVT: Heparin                  Geno Paris MD  Text Page (7am - 6pm, M-F)               Subjective   Patient was seen with his bedside nurse.  He states his breathing still is slightly short.  Denies CP.  He tells me about getting epigastric pressure type pain in his abdomen when he eats. He is unsure how long that has been there.  He feels that soups are better, notes when he was a bachelor he would eat soup with rice.  He does not have nausea or vomiting.  We discussed that we are working on finding a TCU and while he is sad to miss the holidays at home he understands this is the next step.      Objective   BP (!) 166/83 (BP Location: Left arm)   Pulse 72   Temp 98.4  F (36.9  C) (Oral)   Resp 18   Ht 1.6 m (5' 3\")   Wt 101.1 kg (222 lb 12.8 oz)   SpO2 97%   BMI 39.47 kg/m       Physical Exam  General: Alert, awake, no acute distress.  HEENT: Normocephalic and atraumatic, eyes anicteric and without scleral injection, " EOMI, face symmetric, MMM.  Cardiac: RRR, normal S1, S2. No m/g/r, no LE edema.  Pulmonary: Normal chest rise, normal work of breathing.  Diminished breath sounds in the bases but no crackles or wheezing  Abdomen: soft, non-tender, non-distended.  Normoactive bowel sounds, no guarding or rebound tenderness.  Extremities: no deformities.  Warm, well perfused.  Skin: no rashes or lesions.  Warm and Dry.  Neuro: No focal deficits.  Speech clear. Moving extremities in bed.  Psych: Alert and oriented to person and place, does tell me the same story a few times throughout our interview, seems confused. Appropriate affect.       Labs and Imaging Results:      Recent Labs   Lab 12/17/22  0723 12/15/22  0743   WBC 4.6 5.8   HGB 8.9* 9.8*    312        Recent Labs   Lab 12/17/22  0723 12/15/22  0743    142   CO2 34* 33*   BUN 16.9 21.9      No results for input(s): INR, PTT in the last 168 hours.   No results for input(s): CKMB in the last 168 hours.    Invalid input(s): TROPONINT   No results for input(s): ALBUMIN, AST, ALT, ALKPHOS, BILITOT in the last 168 hours.     Micro:     Radio:  MR Brain w/o & w Contrast   Final Result   IMPRESSION:   1.  No acute intracranial process.   2.  Generalized brain atrophy and presumed microvascular ischemic changes as detailed above.      CT Chest/Abdomen/Pelvis w Contrast   Final Result   IMPRESSION:   1.  No acute pulmonary embolism, although suboptimal opacification of the segmental and subsegmental branches. Enlarged pulmonary arteries suggestive of chronic pulmonary hypertension.      2.  Unchanged small bilateral pleural effusions and bilateral groundglass opacities, likely infectious or inflammatory.      3.  Diffuse small and large bowel dilation has increased compared to 12/07/2022, but no discrete transition point, suggesting systemic hypomotility. Unchanged left inguinal hernia containing a short segment of colon, which is mildly thickened and with    mild fat  stranding.      XR Bone Survey Complete   Final Result   IMPRESSION: No large destructive lytic lesions. There is a generalized mottled appearance throughout the bones, most significantly in the humeral and femoral diaphyses, which may be related to osteoporosis/age rather than myeloma. Multilevel degenerative    changes and compression fractures throughout the spine, better assessed on recent CT abdomen pelvis 12/07/2022. Severe bilateral glenohumeral joint osteoarthrosis. Multiple remote rib fractures. Chronic appearing osteochondral defect in the right medial    femoral condyle. No definite acute fracture.      Additional findings better seen on prior CT chest, abdomen, and pelvis 12/07/2022 including multiple distended bowel loops, which could represent underlying obstruction. Right central venous catheter with tip in SVC. Bibasilar atelectasis and volume loss    in the lung fields with crowding of the central vasculature.               XR Chest Port 1 View   Final Result   IMPRESSION: New left lung infiltrate.      CT Chest/Abdomen/Pelvis w Contrast   Final Result   IMPRESSION:   1.  Marked circumferential wall thickening of the splenic flexure,   descending colon and sigmoid colon may be due to acute colitis, either   infectious, inflammatory or ischemic.    2.  Large left inguinal hernia containing a loop of the sigmoid colon   with narrowing/pinched appearance of the sigmoid colon at the hernia   mouth and wall thickening of the sigmoid in the inguinal hernia.   Superimposed strangulation of this loop of colon is not excluded.   Consider surgical consultation.   3.  Mechanical small bowel obstruction appears to be a separate   process. Gradual transition to normal caliber small bowel loops in the   right abdomen.   4.  Marked circumferential wall thickening of the entire esophagus,   suspicious for esophagitis.   5.  Bibasilar atelectasis/infiltrate and small bilateral pleural   effusions.      EMELY MARTINEZ,  MD            SYSTEM ID:  [id number]      XR Chest Port 1 View   Final Result   IMPRESSION: Right IJ central venous catheter tip in the low SVC. No   pneumothorax. Mild interstitial opacities in the lungs, could   represent pulmonary edema. No pleural effusion or pneumothorax.      EMELY MARTINEZ MD            SYSTEM ID:  [id number]      Echocardiogram Limited   Final Result      CT Aortic Survey w Contrast   Final Result   IMPRESSION:    1. No acute thoracic or abdominal aortic abnormality. No dissection.   Scattered areas of atherosclerotic disease identified. 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   2. Diffuse coronary artery calcifications.   3. No acute mediastinal abnormality is seen.   4. Stable pulmonary nodule on the left, see below for follow-up   imaging guidelines.    5. Left inguinal canal hernia containing herniated loops of the distal   descending colon and sigmoid. No upstream bowel obstruction. See above   for measurements.      Recommendations for one or multiple incidental lung nodules < 6mm:     Low risk patients: No routine follow-up.     High risk patients: Optional follow-up CT at 12 months; if   unchanged, no further follow-up.      *Low Risk: Minimal or absent history of smoking or other known risk   factors.   *Nonsolid (ground glass) or partly solid nodules may require longer   follow-up to exclude indolent adenocarcinoma.   *Recommendations based on Guidelines for the Management of Incidental   Pulmonary Nodules Detected at CT: From the Fleischner Society 2017,   Radiology 2017.      HAL BOGGS MD            SYSTEM ID:  [id number]      CT Head w/o Contrast   Final Result   IMPRESSION: Mild motion artifact. No evidence of hemorrhage. Volume   loss and patchy areas of white matter hypoattenuation which likely   represent chronic small vessel ischemic change. Small area of focal   hypoattenuation within the central medulla, presumably  artifactual   (brainstem infarct not excluded, MRI can be performed for basal   ganglia and thalamic lacunar infarcts, presumably chronic. No acute   osseous abnormality. Nonspecific right facial soft tissue swelling,   potentially contusion.      EVELYN VELAZQUEZ MD            SYSTEM ID:  [id number]      XR Chest Port 1 View   Final Result   IMPRESSION: Hypoinflated lungs and cardiomegaly. Mild bilateral   vascular congestion appears increased. Correlate with any evidence of   developing pulmonary edema.      HAL BOGGS MD            SYSTEM ID:  [id number]              Medications:      Scheduled Meds:      acetaminophen  1,000 mg Oral Q8H     aspirin  81 mg Oral Daily     atorvastatin  40 mg Oral QPM     atropine  1 drop Right Eye BID     calcium carbonate 500 mg (elemental)  2 tablet Oral Daily     carvedilol  25 mg Oral BID w/meals     cyanocobalamin  1,000 mcg Oral Daily     diclofenac  4 g Topical 4x Daily     fluconazole  200 mg Oral Daily     ipratropium - albuterol 0.5 mg/2.5 mg/3 mL  3 mL Nebulization 4x daily     lidocaine  2 patch Transdermal Q24h    And     lidocaine   Transdermal Q8H YOMI     lisinopril  20 mg Oral BID     menthol (Topical Analgesic) 2.5%   Topical 4x Daily     methocarbamol  250-500 mg Oral 4x Daily     miconazole   Topical BID     multivitamin w/minerals  1 tablet Oral Daily     pantoprazole  40 mg Oral BID AC     polyethylene glycol  17 g Oral BID     prednisoLONE acetate  1 drop Both Eyes TID     predniSONE  10 mg Oral Daily     pregabalin  25 mg Oral TID     QUEtiapine  25 mg Oral BID     sertraline  100 mg Oral Daily     sodium chloride (PF)  3 mL Intracatheter Q8H     sodium chloride (PF)  3 mL Intracatheter Q8H     tamsulosin  0.4 mg Oral QPM     thiamine  100 mg Oral Daily     vitamin D3  50 mcg Oral Daily

## 2022-12-18 NOTE — PLAN OF CARE
"Goal Outcome Evaluation:      Plan of Care Reviewed With: patient    Overall Patient Progress: no changeOverall Patient Progress: no change    Outcome Evaluation: monitor tele, vs, maintain safety, work this therapy, dc to TCU when bed found      Vss ex elevated BP this am prior to meds given, no co pain/cp/sob, VILLAR. Tele SR.  Strict I and O, alarms on for safety, up to chair for meals, using urinal and commode with Ax2+harjinder steady.  Forgetful, intermittently confused, see assessment for further details. Obese, R eye bruising noted from fall at home PTA. PT/OT following, plan for TCU at discharge. Continue poc and monitoring.           Problem: Plan of Care - These are the overarching goals to be used throughout the patient stay.    Goal: Plan of Care Review  Description: The Plan of Care Review/Shift note should be completed every shift.  The Outcome Evaluation is a brief statement about your assessment that the patient is improving, declining, or no change.  This information will be displayed automatically on your shift note.  Outcome: Not Progressing  Flowsheets (Taken 12/18/2022 5611)  Outcome Evaluation: monitor tele, vs, maintain safety, work this therapy, dc to TCU when bed found  Plan of Care Reviewed With: patient  Overall Patient Progress: no change  Goal: Patient-Specific Goal (Individualized)  Description: You can add care plan individualizations to a care plan. Examples of Individualization might be:  \"Parent requests to be called daily at 9am for status\", \"I have a hard time hearing out of my right ear\", or \"Do not touch me to wake me up as it startles me\".  Outcome: Not Progressing  Goal: Absence of Hospital-Acquired Illness or Injury  Outcome: Not Progressing  Intervention: Identify and Manage Fall Risk  Recent Flowsheet Documentation  Taken 12/18/2022 1406 by Niki Roth, RN  Safety Promotion/Fall Prevention:    chair alarm on    safety round/check completed  Taken 12/18/2022 1316 by Gonzalez, " Niki INTERIANO RN  Safety Promotion/Fall Prevention:    chair alarm on    safety round/check completed  Taken 12/18/2022 1245 by Niki Roth RN  Safety Promotion/Fall Prevention:    chair alarm on    safety round/check completed  Taken 12/18/2022 1044 by Niki Roth RN  Safety Promotion/Fall Prevention:    chair alarm on    safety round/check completed  Taken 12/18/2022 0908 by Niki Roth RN  Safety Promotion/Fall Prevention:    safety round/check completed    chair alarm on  Taken 12/18/2022 0741 by Niki Roth RN  Safety Promotion/Fall Prevention:    fall prevention program maintained    treat underlying cause    treat reversible contributory factors    supervised activity    safety round/check completed    room organization consistent    room near nurse's station    patient and family education    nonskid shoes/slippers when out of bed    lighting adjusted    increase visualization of patient    increased rounding and observation    clutter free environment maintained    activity supervised    assistive device/personal items within reach    bed alarm on  Taken 12/18/2022 0737 by Niki Roth RN  Safety Promotion/Fall Prevention:    bed alarm on    safety round/check completed  Intervention: Prevent Skin Injury  Recent Flowsheet Documentation  Taken 12/18/2022 0737 by Niki Roth, RN  Body Position: (boosted up in bed)    weight shifting    other (see comments)  Goal: Optimal Comfort and Wellbeing  Outcome: Not Progressing  Goal: Readiness for Transition of Care  Outcome: Not Progressing     Problem: Fall Injury Risk  Goal: Absence of Fall and Fall-Related Injury  Outcome: Not Progressing  Intervention: Identify and Manage Contributors  Recent Flowsheet Documentation  Taken 12/18/2022 0741 by Niki Roth RN  Medication Review/Management: medications reviewed  Intervention: Promote Injury-Free Environment  Recent Flowsheet Documentation  Taken 12/18/2022 1406 by Niki Roth,  RN  Safety Promotion/Fall Prevention:    chair alarm on    safety round/check completed  Taken 12/18/2022 1316 by Niki Roth RN  Safety Promotion/Fall Prevention:    chair alarm on    safety round/check completed  Taken 12/18/2022 1245 by Niki Roth RN  Safety Promotion/Fall Prevention:    chair alarm on    safety round/check completed  Taken 12/18/2022 1044 by Niki Roth RN  Safety Promotion/Fall Prevention:    chair alarm on    safety round/check completed  Taken 12/18/2022 0908 by Niki Roth RN  Safety Promotion/Fall Prevention:    safety round/check completed    chair alarm on  Taken 12/18/2022 0741 by Niki Roth RN  Safety Promotion/Fall Prevention:    fall prevention program maintained    treat underlying cause    treat reversible contributory factors    supervised activity    safety round/check completed    room organization consistent    room near nurse's station    patient and family education    nonskid shoes/slippers when out of bed    lighting adjusted    increase visualization of patient    increased rounding and observation    clutter free environment maintained    activity supervised    assistive device/personal items within reach    bed alarm on  Taken 12/18/2022 0737 by Niki Roth, RN  Safety Promotion/Fall Prevention:    bed alarm on    safety round/check completed     Problem: Hypertension Comorbidity  Goal: Blood Pressure in Desired Range  Outcome: Not Progressing  Intervention: Maintain Blood Pressure Management  Recent Flowsheet Documentation  Taken 12/18/2022 0741 by Niki Roth, RN  Medication Review/Management: medications reviewed     Problem: Oral Intake Inadequate  Goal: Improved Oral Intake  Outcome: Not Progressing     Problem: Pain Acute  Goal: Optimal Pain Control and Function  Outcome: Progressing  Intervention: Prevent or Manage Pain  Recent Flowsheet Documentation  Taken 12/18/2022 0741 by Niki Roth, RN  Medication  Review/Management: medications reviewed

## 2022-12-19 ENCOUNTER — APPOINTMENT (OUTPATIENT)
Dept: PHYSICAL THERAPY | Facility: CLINIC | Age: 77
DRG: 871 | End: 2022-12-19
Payer: COMMERCIAL

## 2022-12-19 LAB
ANION GAP SERPL CALCULATED.3IONS-SCNC: 5 MMOL/L (ref 7–15)
BUN SERPL-MCNC: 9 MG/DL (ref 8–23)
CALCIUM SERPL-MCNC: 8.6 MG/DL (ref 8.8–10.2)
CHLORIDE SERPL-SCNC: 99 MMOL/L (ref 98–107)
CREAT SERPL-MCNC: 0.62 MG/DL (ref 0.67–1.17)
DEPRECATED HCO3 PLAS-SCNC: 34 MMOL/L (ref 22–29)
ERYTHROCYTE [DISTWIDTH] IN BLOOD BY AUTOMATED COUNT: 13.4 % (ref 10–15)
GFR SERPL CREATININE-BSD FRML MDRD: >90 ML/MIN/1.73M2
GLUCOSE SERPL-MCNC: 97 MG/DL (ref 70–99)
HCT VFR BLD AUTO: 28.8 % (ref 40–53)
HGB BLD-MCNC: 8.8 G/DL (ref 13.3–17.7)
MCH RBC QN AUTO: 34 PG (ref 26.5–33)
MCHC RBC AUTO-ENTMCNC: 30.6 G/DL (ref 31.5–36.5)
MCV RBC AUTO: 111 FL (ref 78–100)
PHOSPHATE SERPL-MCNC: 2.7 MG/DL (ref 2.5–4.5)
PLATELET # BLD AUTO: 427 10E3/UL (ref 150–450)
POTASSIUM SERPL-SCNC: 3.7 MMOL/L (ref 3.4–5.3)
RBC # BLD AUTO: 2.59 10E6/UL (ref 4.4–5.9)
SODIUM SERPL-SCNC: 138 MMOL/L (ref 136–145)
WBC # BLD AUTO: 5.7 10E3/UL (ref 4–11)

## 2022-12-19 PROCEDURE — 94640 AIRWAY INHALATION TREATMENT: CPT

## 2022-12-19 PROCEDURE — G0008 ADMIN INFLUENZA VIRUS VAC: HCPCS | Performed by: INTERNAL MEDICINE

## 2022-12-19 PROCEDURE — 36415 COLL VENOUS BLD VENIPUNCTURE: CPT | Performed by: INTERNAL MEDICINE

## 2022-12-19 PROCEDURE — 250N000009 HC RX 250: Performed by: HOSPITALIST

## 2022-12-19 PROCEDURE — 90662 IIV NO PRSV INCREASED AG IM: CPT | Performed by: INTERNAL MEDICINE

## 2022-12-19 PROCEDURE — 80048 BASIC METABOLIC PNL TOTAL CA: CPT | Performed by: INTERNAL MEDICINE

## 2022-12-19 PROCEDURE — 250N000013 HC RX MED GY IP 250 OP 250 PS 637: Performed by: INTERNAL MEDICINE

## 2022-12-19 PROCEDURE — 999N000157 HC STATISTIC RCP TIME EA 10 MIN

## 2022-12-19 PROCEDURE — 250N000011 HC RX IP 250 OP 636: Performed by: INTERNAL MEDICINE

## 2022-12-19 PROCEDURE — 97116 GAIT TRAINING THERAPY: CPT | Mod: GP | Performed by: PHYSICAL THERAPIST

## 2022-12-19 PROCEDURE — 84100 ASSAY OF PHOSPHORUS: CPT | Performed by: INTERNAL MEDICINE

## 2022-12-19 PROCEDURE — 94640 AIRWAY INHALATION TREATMENT: CPT | Mod: 76

## 2022-12-19 PROCEDURE — 97530 THERAPEUTIC ACTIVITIES: CPT | Mod: GP | Performed by: PHYSICAL THERAPIST

## 2022-12-19 PROCEDURE — 85027 COMPLETE CBC AUTOMATED: CPT | Performed by: INTERNAL MEDICINE

## 2022-12-19 PROCEDURE — 250N000013 HC RX MED GY IP 250 OP 250 PS 637: Performed by: CLINICAL NURSE SPECIALIST

## 2022-12-19 PROCEDURE — 250N000013 HC RX MED GY IP 250 OP 250 PS 637: Performed by: NURSE PRACTITIONER

## 2022-12-19 PROCEDURE — 250N000012 HC RX MED GY IP 250 OP 636 PS 637: Performed by: INTERNAL MEDICINE

## 2022-12-19 PROCEDURE — 250N000013 HC RX MED GY IP 250 OP 250 PS 637: Performed by: HOSPITALIST

## 2022-12-19 PROCEDURE — 97110 THERAPEUTIC EXERCISES: CPT | Mod: GP | Performed by: PHYSICAL THERAPIST

## 2022-12-19 PROCEDURE — 120N000001 HC R&B MED SURG/OB

## 2022-12-19 RX ADMIN — Medication: at 21:19

## 2022-12-19 RX ADMIN — IPRATROPIUM BROMIDE AND ALBUTEROL SULFATE 3 ML: 2.5; .5 SOLUTION RESPIRATORY (INHALATION) at 08:04

## 2022-12-19 RX ADMIN — ATROPINE SULFATE 1 DROP: 10 SOLUTION/ DROPS OPHTHALMIC at 10:03

## 2022-12-19 RX ADMIN — LISINOPRIL 20 MG: 20 TABLET ORAL at 20:07

## 2022-12-19 RX ADMIN — SERTRALINE HYDROCHLORIDE 100 MG: 100 TABLET ORAL at 09:55

## 2022-12-19 RX ADMIN — TAMSULOSIN HYDROCHLORIDE 0.4 MG: 0.4 CAPSULE ORAL at 20:07

## 2022-12-19 RX ADMIN — PREDNISOLONE ACETATE 1 DROP: 10 SUSPENSION/ DROPS OPHTHALMIC at 21:19

## 2022-12-19 RX ADMIN — ACETAMINOPHEN 1000 MG: 500 TABLET, FILM COATED ORAL at 09:55

## 2022-12-19 RX ADMIN — LISINOPRIL 20 MG: 20 TABLET ORAL at 09:53

## 2022-12-19 RX ADMIN — ASPIRIN 81 MG: 81 TABLET, COATED ORAL at 09:55

## 2022-12-19 RX ADMIN — ACETAMINOPHEN 1000 MG: 500 TABLET, FILM COATED ORAL at 18:21

## 2022-12-19 RX ADMIN — Medication 250 MG: at 18:22

## 2022-12-19 RX ADMIN — ATROPINE SULFATE 1 DROP: 10 SOLUTION/ DROPS OPHTHALMIC at 20:12

## 2022-12-19 RX ADMIN — ATORVASTATIN CALCIUM 40 MG: 40 TABLET, FILM COATED ORAL at 20:07

## 2022-12-19 RX ADMIN — Medication 250 MG: at 13:41

## 2022-12-19 RX ADMIN — THIAMINE HCL TAB 100 MG 100 MG: 100 TAB at 09:56

## 2022-12-19 RX ADMIN — PANTOPRAZOLE SODIUM 40 MG: 40 TABLET, DELAYED RELEASE ORAL at 06:31

## 2022-12-19 RX ADMIN — Medication 250 MG: at 21:19

## 2022-12-19 RX ADMIN — QUETIAPINE FUMARATE 12.5 MG: 25 TABLET ORAL at 21:19

## 2022-12-19 RX ADMIN — ACETAMINOPHEN 1000 MG: 500 TABLET, FILM COATED ORAL at 01:33

## 2022-12-19 RX ADMIN — PREDNISOLONE ACETATE 1 DROP: 10 SUSPENSION/ DROPS OPHTHALMIC at 10:00

## 2022-12-19 RX ADMIN — MICONAZOLE NITRATE: 20 POWDER TOPICAL at 09:58

## 2022-12-19 RX ADMIN — CARVEDILOL 25 MG: 25 TABLET, FILM COATED ORAL at 09:53

## 2022-12-19 RX ADMIN — IPRATROPIUM BROMIDE AND ALBUTEROL SULFATE 3 ML: 2.5; .5 SOLUTION RESPIRATORY (INHALATION) at 11:49

## 2022-12-19 RX ADMIN — MICONAZOLE NITRATE: 20 POWDER TOPICAL at 20:12

## 2022-12-19 RX ADMIN — PREGABALIN 25 MG: 25 CAPSULE ORAL at 21:19

## 2022-12-19 RX ADMIN — IPRATROPIUM BROMIDE AND ALBUTEROL SULFATE 3 ML: 2.5; .5 SOLUTION RESPIRATORY (INHALATION) at 19:17

## 2022-12-19 RX ADMIN — Medication: at 18:25

## 2022-12-19 RX ADMIN — DICLOFENAC SODIUM 4 G: 10 GEL TOPICAL at 18:25

## 2022-12-19 RX ADMIN — PREGABALIN 25 MG: 25 CAPSULE ORAL at 16:35

## 2022-12-19 RX ADMIN — PREDNISONE 10 MG: 10 TABLET ORAL at 09:56

## 2022-12-19 RX ADMIN — CARVEDILOL 25 MG: 25 TABLET, FILM COATED ORAL at 18:22

## 2022-12-19 RX ADMIN — FLUCONAZOLE 200 MG: 50 TABLET ORAL at 09:53

## 2022-12-19 RX ADMIN — LIDOCAINE 1 PATCH: 246 PATCH TOPICAL at 20:08

## 2022-12-19 RX ADMIN — PANTOPRAZOLE SODIUM 40 MG: 40 TABLET, DELAYED RELEASE ORAL at 16:35

## 2022-12-19 RX ADMIN — CYANOCOBALAMIN TAB 1000 MCG 1000 MCG: 1000 TAB at 09:55

## 2022-12-19 RX ADMIN — POLYETHYLENE GLYCOL 3350 17 G: 17 POWDER, FOR SOLUTION ORAL at 09:51

## 2022-12-19 RX ADMIN — PREDNISOLONE ACETATE 1 DROP: 10 SUSPENSION/ DROPS OPHTHALMIC at 16:35

## 2022-12-19 RX ADMIN — PREGABALIN 25 MG: 25 CAPSULE ORAL at 09:54

## 2022-12-19 RX ADMIN — IPRATROPIUM BROMIDE AND ALBUTEROL SULFATE 3 ML: 2.5; .5 SOLUTION RESPIRATORY (INHALATION) at 15:12

## 2022-12-19 RX ADMIN — DICLOFENAC SODIUM 4 G: 10 GEL TOPICAL at 21:19

## 2022-12-19 RX ADMIN — Medication 250 MG: at 09:55

## 2022-12-19 RX ADMIN — MULTIPLE VITAMINS W/ MINERALS TAB 1 TABLET: TAB at 09:55

## 2022-12-19 RX ADMIN — CALCIUM 1000 MG: 500 TABLET ORAL at 09:53

## 2022-12-19 RX ADMIN — INFLUENZA A VIRUS A/VICTORIA/2570/2019 IVR-215 (H1N1) ANTIGEN (FORMALDEHYDE INACTIVATED), INFLUENZA A VIRUS A/DARWIN/9/2021 SAN-010 (H3N2) ANTIGEN (FORMALDEHYDE INACTIVATED), INFLUENZA B VIRUS B/PHUKET/3073/2013 ANTIGEN (FORMALDEHYDE INACTIVATED), AND INFLUENZA B VIRUS B/MICHIGAN/01/2021 ANTIGEN (FORMALDEHYDE INACTIVATED) 0.7 ML: 60; 60; 60; 60 INJECTION, SUSPENSION INTRAMUSCULAR at 10:42

## 2022-12-19 RX ADMIN — Medication 50 MCG: at 09:56

## 2022-12-19 ASSESSMENT — ACTIVITIES OF DAILY LIVING (ADL)
ADLS_ACUITY_SCORE: 50
ADLS_ACUITY_SCORE: 49
ADLS_ACUITY_SCORE: 50
ADLS_ACUITY_SCORE: 47
ADLS_ACUITY_SCORE: 47
ADLS_ACUITY_SCORE: 52
ADLS_ACUITY_SCORE: 50
ADLS_ACUITY_SCORE: 52
ADLS_ACUITY_SCORE: 50
ADLS_ACUITY_SCORE: 49
ADLS_ACUITY_SCORE: 49
ADLS_ACUITY_SCORE: 47

## 2022-12-19 NOTE — PLAN OF CARE
"Goal Outcome Evaluation:      Plan of Care Reviewed With: patient, spouse    Overall Patient Progress: improvingOverall Patient Progress: improving    Outcome Evaluation: monitor tele VS, maintain safety, encourage activity, TCU when bed found      Vss, no co pain/cp/sob, VILLAR. Tele SR.  Strict I and O, alarms on for safety, up to chair for meals, using urinal and commode with Ax1-2 with GB and walker now, +BM loose but pt requesting to continue mirrlax.  Forgetful, obese, R eye bruising noted from fall at home PTA. PT/OT following, plan for TCU at discharge pending bed availability (see SW note for details), DNR/DNI, has not been using IS, alarms on for safety. Continue poc and monitoring.         Problem: Plan of Care - These are the overarching goals to be used throughout the patient stay.    Goal: Plan of Care Review  Description: The Plan of Care Review/Shift note should be completed every shift.  The Outcome Evaluation is a brief statement about your assessment that the patient is improving, declining, or no change.  This information will be displayed automatically on your shift note.  Outcome: Progressing  Flowsheets (Taken 12/19/2022 1321)  Outcome Evaluation: monitor tele VS, maintain safety, encourage activity, TCU when bed found  Plan of Care Reviewed With:    patient    spouse  Overall Patient Progress: improving  Goal: Patient-Specific Goal (Individualized)  Description: You can add care plan individualizations to a care plan. Examples of Individualization might be:  \"Parent requests to be called daily at 9am for status\", \"I have a hard time hearing out of my right ear\", or \"Do not touch me to wake me up as it startles me\".  Outcome: Progressing  Goal: Absence of Hospital-Acquired Illness or Injury  Outcome: Progressing  Intervention: Identify and Manage Fall Risk  Recent Flowsheet Documentation  Taken 12/19/2022 1202 by Niki Roth RN  Safety Promotion/Fall Prevention:    bed alarm on    safety " round/check completed  Taken 12/19/2022 1149 by Niki Roth RN  Safety Promotion/Fall Prevention:    bed alarm on    safety round/check completed  Taken 12/19/2022 1003 by Niki Roth RN  Safety Promotion/Fall Prevention:    bed alarm on    safety round/check completed  Taken 12/19/2022 0946 by Niki Roth RN  Safety Promotion/Fall Prevention:    fall prevention program maintained    treat underlying cause    treat reversible contributory factors    supervised activity    safety round/check completed    room organization consistent    room near nurse's station    patient and family education    nonskid shoes/slippers when out of bed    mobility aid in reach    lighting adjusted    increase visualization of patient    increased rounding and observation    clutter free environment maintained    assistive device/personal items within reach    activity supervised    bed alarm on  Taken 12/19/2022 0815 by Niki Roth RN  Safety Promotion/Fall Prevention:    chair alarm on    safety round/check completed  Taken 12/19/2022 0737 by Niki Roth RN  Safety Promotion/Fall Prevention:    bed alarm on    safety round/check completed  Intervention: Prevent Skin Injury  Recent Flowsheet Documentation  Taken 12/19/2022 1003 by Niki Roth RN  Body Position: (back to bed)    weight shifting    other (see comments)  Intervention: Prevent and Manage VTE (Venous Thromboembolism) Risk  Recent Flowsheet Documentation  Taken 12/19/2022 0946 by Niki Roth RN  VTE Prevention/Management: SCDs (sequential compression devices) off  Goal: Optimal Comfort and Wellbeing  Outcome: Progressing  Intervention: Monitor Pain and Promote Comfort  Recent Flowsheet Documentation  Taken 12/19/2022 0946 by Niki Roth RN  Pain Management Interventions: medication (see MAR)  Goal: Readiness for Transition of Care  Outcome: Progressing     Problem: Pain Acute  Goal: Optimal Pain Control and Function  Outcome:  Progressing  Intervention: Develop Pain Management Plan  Recent Flowsheet Documentation  Taken 12/19/2022 0946 by Niki Roth RN  Pain Management Interventions: medication (see MAR)  Intervention: Prevent or Manage Pain  Recent Flowsheet Documentation  Taken 12/19/2022 0946 by Niki Roth RN  Medication Review/Management:    medications reviewed    high-risk medications identified     Problem: Fall Injury Risk  Goal: Absence of Fall and Fall-Related Injury  Outcome: Progressing  Intervention: Identify and Manage Contributors  Recent Flowsheet Documentation  Taken 12/19/2022 0946 by Niki Roth RN  Medication Review/Management:    medications reviewed    high-risk medications identified  Intervention: Promote Injury-Free Environment  Recent Flowsheet Documentation  Taken 12/19/2022 1202 by Niki Roth RN  Safety Promotion/Fall Prevention:    bed alarm on    safety round/check completed  Taken 12/19/2022 1149 by Niki Roht RN  Safety Promotion/Fall Prevention:    bed alarm on    safety round/check completed  Taken 12/19/2022 1003 by Niki Roth RN  Safety Promotion/Fall Prevention:    bed alarm on    safety round/check completed  Taken 12/19/2022 0946 by Niki Roth RN  Safety Promotion/Fall Prevention:    fall prevention program maintained    treat underlying cause    treat reversible contributory factors    supervised activity    safety round/check completed    room organization consistent    room near nurse's station    patient and family education    nonskid shoes/slippers when out of bed    mobility aid in reach    lighting adjusted    increase visualization of patient    increased rounding and observation    clutter free environment maintained    assistive device/personal items within reach    activity supervised    bed alarm on  Taken 12/19/2022 0815 by Niki Roth RN  Safety Promotion/Fall Prevention:    chair alarm on    safety round/check completed  Taken  12/19/2022 0737 by Niki Roth, RN  Safety Promotion/Fall Prevention:    bed alarm on    safety round/check completed     Problem: Hypertension Comorbidity  Goal: Blood Pressure in Desired Range  Outcome: Progressing  Intervention: Maintain Blood Pressure Management  Recent Flowsheet Documentation  Taken 12/19/2022 0946 by Niki Roth, RN  Medication Review/Management:    medications reviewed    high-risk medications identified     Problem: Oral Intake Inadequate  Goal: Improved Oral Intake  Outcome: Progressing

## 2022-12-19 NOTE — PROGRESS NOTES
Care Management Follow Up    Length of Stay (days): 13    Expected Discharge Date: 12/19/2022     Concerns to be Addressed: discharge planning     Patient plan of care discussed at interdisciplinary rounds: Yes    Anticipated Discharge Disposition: Skilled Nursing Facility     Anticipated Discharge Services: Transportation Services  Anticipated Discharge DME: Oxygen        Additional Information:  CM following for discharge planning. Met with patient's wife this morning regarding Transitional Care Unit placement. Informed her that patient has been accepted at both [facility]. She was hoping to get patient into [facility] if possible. Call placed to [facility] admissions to review patient for discharge today or tomorrow. They will review and return call to CM with availability.     Will cont to follow for discharge plan of care.                 Mariam Arteaga RN BSN CM  Inpatient Care Coordination  [facility]  [phone]

## 2022-12-19 NOTE — PLAN OF CARE
Assumed care for this patient at 1500.     A&Ox4, forgetful. VSS ex htn, on scheduled BP meds. On 3 LPM nasal cannula. Denies pain. LS clear/dim. PIV x2 SL. Up in chair for most of the shift. Up w/ assist x2 harjinder steady. Discharge pending TCU placement.

## 2022-12-19 NOTE — PROGRESS NOTES
"Vitals: BP (!) 149/82 (BP Location: Left arm)   Pulse 66   Temp 98.3  F (36.8  C) (Axillary)   Resp 24   Ht 1.6 m (5' 3\")   Wt 100.7 kg (221 lb 14.4 oz)   SpO2 100%   BMI 39.31 kg/m      Neuro: A&Ox 4,forgetful  Resp:  Diminished with fine crackles, NC 3L  Cardiac: tele SR  GI: WDL  : WDL  Skin: WDL  Lines/ Drains: PIV SL x 2  Activity: A2 sera steady  Nutrition: Regular   Pain: Denies  Labs: Protocol phosphorus AM check    Plans: pending TCU placement  "

## 2022-12-20 ENCOUNTER — LAB REQUISITION (OUTPATIENT)
Dept: LAB | Facility: CLINIC | Age: 77
End: 2022-12-20
Payer: COMMERCIAL

## 2022-12-20 VITALS
SYSTOLIC BLOOD PRESSURE: 150 MMHG | TEMPERATURE: 97.8 F | OXYGEN SATURATION: 97 % | HEART RATE: 74 BPM | DIASTOLIC BLOOD PRESSURE: 89 MMHG | BODY MASS INDEX: 36.64 KG/M2 | HEIGHT: 63 IN | RESPIRATION RATE: 20 BRPM | WEIGHT: 206.8 LBS

## 2022-12-20 DIAGNOSIS — Z11.1 ENCOUNTER FOR SCREENING FOR RESPIRATORY TUBERCULOSIS: ICD-10-CM

## 2022-12-20 LAB — PHOSPHATE SERPL-MCNC: 2.8 MG/DL (ref 2.5–4.5)

## 2022-12-20 PROCEDURE — 36415 COLL VENOUS BLD VENIPUNCTURE: CPT | Performed by: HOSPITALIST

## 2022-12-20 PROCEDURE — 250N000013 HC RX MED GY IP 250 OP 250 PS 637: Performed by: INTERNAL MEDICINE

## 2022-12-20 PROCEDURE — 250N000013 HC RX MED GY IP 250 OP 250 PS 637: Performed by: NURSE PRACTITIONER

## 2022-12-20 PROCEDURE — 250N000013 HC RX MED GY IP 250 OP 250 PS 637: Performed by: HOSPITALIST

## 2022-12-20 PROCEDURE — 84100 ASSAY OF PHOSPHORUS: CPT | Performed by: HOSPITALIST

## 2022-12-20 PROCEDURE — 250N000009 HC RX 250: Performed by: HOSPITALIST

## 2022-12-20 PROCEDURE — 250N000012 HC RX MED GY IP 250 OP 636 PS 637: Performed by: INTERNAL MEDICINE

## 2022-12-20 PROCEDURE — 999N000157 HC STATISTIC RCP TIME EA 10 MIN

## 2022-12-20 PROCEDURE — 99239 HOSP IP/OBS DSCHRG MGMT >30: CPT | Performed by: HOSPITALIST

## 2022-12-20 PROCEDURE — 250N000013 HC RX MED GY IP 250 OP 250 PS 637: Performed by: CLINICAL NURSE SPECIALIST

## 2022-12-20 PROCEDURE — 94640 AIRWAY INHALATION TREATMENT: CPT | Mod: 76

## 2022-12-20 PROCEDURE — 94640 AIRWAY INHALATION TREATMENT: CPT

## 2022-12-20 RX ORDER — PANTOPRAZOLE SODIUM 40 MG/1
40 TABLET, DELAYED RELEASE ORAL
DISCHARGE
Start: 2022-12-20

## 2022-12-20 RX ORDER — ACETAMINOPHEN 325 MG/1
650 TABLET ORAL EVERY 6 HOURS PRN
DISCHARGE
Start: 2022-12-20 | End: 2023-02-20

## 2022-12-20 RX ORDER — PREDNISONE 10 MG/1
10 TABLET ORAL DAILY
DISCHARGE
Start: 2022-12-21 | End: 2023-02-20

## 2022-12-20 RX ORDER — ATORVASTATIN CALCIUM 40 MG/1
40 TABLET, FILM COATED ORAL EVERY EVENING
DISCHARGE
Start: 2022-12-20

## 2022-12-20 RX ORDER — LEVALBUTEROL INHALATION SOLUTION 0.63 MG/3ML
0.63 SOLUTION RESPIRATORY (INHALATION) EVERY 4 HOURS PRN
Qty: 90 ML | COMMUNITY
Start: 2022-12-20 | End: 2024-05-08

## 2022-12-20 RX ORDER — HYDROMORPHONE HYDROCHLORIDE 2 MG/1
2 TABLET ORAL
Qty: 10 TABLET | Refills: 0 | Status: SHIPPED | OUTPATIENT
Start: 2022-12-20 | End: 2023-02-20

## 2022-12-20 RX ORDER — MAGNESIUM HYDROXIDE/ALUMINUM HYDROXICE/SIMETHICONE 120; 1200; 1200 MG/30ML; MG/30ML; MG/30ML
30 SUSPENSION ORAL EVERY 4 HOURS PRN
DISCHARGE
Start: 2022-12-20 | End: 2023-02-20

## 2022-12-20 RX ORDER — QUETIAPINE FUMARATE 25 MG/1
12.5 TABLET, FILM COATED ORAL AT BEDTIME
DISCHARGE
Start: 2022-12-20 | End: 2024-05-08

## 2022-12-20 RX ORDER — IPRATROPIUM BROMIDE AND ALBUTEROL SULFATE 2.5; .5 MG/3ML; MG/3ML
3 SOLUTION RESPIRATORY (INHALATION) 4 TIMES DAILY
Qty: 90 ML | DISCHARGE
Start: 2022-12-20 | End: 2024-05-08

## 2022-12-20 RX ORDER — ACETAMINOPHEN 650 MG/1
650 SUPPOSITORY RECTAL EVERY 6 HOURS PRN
DISCHARGE
Start: 2022-12-20 | End: 2023-02-20

## 2022-12-20 RX ORDER — PREGABALIN 25 MG/1
25 CAPSULE ORAL 3 TIMES DAILY
Status: SHIPPED | DISCHARGE
Start: 2022-12-20 | End: 2023-02-20

## 2022-12-20 RX ORDER — LANOLIN ALCOHOL/MO/W.PET/CERES
100 CREAM (GRAM) TOPICAL DAILY
DISCHARGE
Start: 2022-12-21

## 2022-12-20 RX ORDER — CARVEDILOL 25 MG/1
25 TABLET ORAL 2 TIMES DAILY WITH MEALS
DISCHARGE
Start: 2022-12-20 | End: 2024-05-08

## 2022-12-20 RX ORDER — MULTIPLE VITAMINS W/ MINERALS TAB 9MG-400MCG
1 TAB ORAL DAILY
DISCHARGE
Start: 2022-12-21

## 2022-12-20 RX ORDER — BENZTROPINE MESYLATE 1 MG/1
1 TABLET ORAL 3 TIMES DAILY PRN
DISCHARGE
Start: 2022-12-20 | End: 2024-05-08

## 2022-12-20 RX ADMIN — ACETAMINOPHEN 1000 MG: 500 TABLET, FILM COATED ORAL at 09:10

## 2022-12-20 RX ADMIN — PREDNISOLONE ACETATE 1 DROP: 10 SUSPENSION/ DROPS OPHTHALMIC at 08:50

## 2022-12-20 RX ADMIN — POLYETHYLENE GLYCOL 3350 17 G: 17 POWDER, FOR SOLUTION ORAL at 08:44

## 2022-12-20 RX ADMIN — Medication 250 MG: at 08:42

## 2022-12-20 RX ADMIN — MICONAZOLE NITRATE: 20 POWDER TOPICAL at 08:48

## 2022-12-20 RX ADMIN — FLUCONAZOLE 200 MG: 50 TABLET ORAL at 08:42

## 2022-12-20 RX ADMIN — MULTIPLE VITAMINS W/ MINERALS TAB 1 TABLET: TAB at 08:42

## 2022-12-20 RX ADMIN — PREGABALIN 25 MG: 25 CAPSULE ORAL at 16:22

## 2022-12-20 RX ADMIN — SERTRALINE HYDROCHLORIDE 100 MG: 100 TABLET ORAL at 08:43

## 2022-12-20 RX ADMIN — CARVEDILOL 25 MG: 25 TABLET, FILM COATED ORAL at 08:42

## 2022-12-20 RX ADMIN — Medication: at 12:45

## 2022-12-20 RX ADMIN — THIAMINE HCL TAB 100 MG 100 MG: 100 TAB at 08:42

## 2022-12-20 RX ADMIN — Medication 50 MCG: at 08:42

## 2022-12-20 RX ADMIN — IPRATROPIUM BROMIDE AND ALBUTEROL SULFATE 3 ML: 2.5; .5 SOLUTION RESPIRATORY (INHALATION) at 15:36

## 2022-12-20 RX ADMIN — CYANOCOBALAMIN TAB 1000 MCG 1000 MCG: 1000 TAB at 08:43

## 2022-12-20 RX ADMIN — PREDNISOLONE ACETATE 1 DROP: 10 SUSPENSION/ DROPS OPHTHALMIC at 16:23

## 2022-12-20 RX ADMIN — IPRATROPIUM BROMIDE AND ALBUTEROL SULFATE 3 ML: 2.5; .5 SOLUTION RESPIRATORY (INHALATION) at 07:55

## 2022-12-20 RX ADMIN — DICLOFENAC SODIUM 4 G: 10 GEL TOPICAL at 12:45

## 2022-12-20 RX ADMIN — LISINOPRIL 20 MG: 20 TABLET ORAL at 08:43

## 2022-12-20 RX ADMIN — ASPIRIN 81 MG: 81 TABLET, COATED ORAL at 08:42

## 2022-12-20 RX ADMIN — IPRATROPIUM BROMIDE AND ALBUTEROL SULFATE 3 ML: 2.5; .5 SOLUTION RESPIRATORY (INHALATION) at 11:43

## 2022-12-20 RX ADMIN — ACETAMINOPHEN 1000 MG: 500 TABLET, FILM COATED ORAL at 02:45

## 2022-12-20 RX ADMIN — CALCIUM 1000 MG: 500 TABLET ORAL at 08:42

## 2022-12-20 RX ADMIN — DICLOFENAC SODIUM 4 G: 10 GEL TOPICAL at 08:48

## 2022-12-20 RX ADMIN — Medication: at 08:49

## 2022-12-20 RX ADMIN — ATROPINE SULFATE 1 DROP: 10 SOLUTION/ DROPS OPHTHALMIC at 08:55

## 2022-12-20 RX ADMIN — PREDNISONE 10 MG: 10 TABLET ORAL at 08:43

## 2022-12-20 RX ADMIN — PANTOPRAZOLE SODIUM 40 MG: 40 TABLET, DELAYED RELEASE ORAL at 06:24

## 2022-12-20 RX ADMIN — PANTOPRAZOLE SODIUM 40 MG: 40 TABLET, DELAYED RELEASE ORAL at 16:22

## 2022-12-20 RX ADMIN — Medication 250 MG: at 12:42

## 2022-12-20 RX ADMIN — PREGABALIN 25 MG: 25 CAPSULE ORAL at 08:42

## 2022-12-20 ASSESSMENT — ACTIVITIES OF DAILY LIVING (ADL)
ADLS_ACUITY_SCORE: 49
ADLS_ACUITY_SCORE: 45
ADLS_ACUITY_SCORE: 47
ADLS_ACUITY_SCORE: 49
ADLS_ACUITY_SCORE: 49
ADLS_ACUITY_SCORE: 45
ADLS_ACUITY_SCORE: 49
ADLS_ACUITY_SCORE: 49
ADLS_ACUITY_SCORE: 47

## 2022-12-20 NOTE — PLAN OF CARE
Physical Therapy Discharge Summary    Reason for therapy discharge:    Discharged to transitional care facility.    Progress towards therapy goal(s). See goals on Care Plan in [facility] electronic health record for goal details.  Goals not met.  Barriers to achieving goals:   limited tolerance for therapy and discharge from facility.    Therapy recommendation(s):    Continued therapy is recommended.  Rationale/Recommendations:  TCU to maximize return to PLOF.    **Not seen by discharging PT; information based on medical record

## 2022-12-20 NOTE — PROGRESS NOTES
Care Management Discharge Note    Discharge Date: 12/20/2022       Discharge Disposition: Skilled Nursing Facility, [facility] TCU    Discharge Services: Transportation Services, [facility] wheelchair with O2 at 1700    Discharge DME: Oxygen    Discharge Transportation: agency    Private pay costs discussed: private room/amenity fees    PAS Confirmation Code:  You have successfully submitted the preadmission screening (PAS) to the [employer] Line on:  Created On  12/20/2022 11:27 AM  Your confirmation number is:  [id number]      Education Provided on the Discharge Plan:  yes  Persons Notified of Discharge Plans: patient, friend Sobia, bedside RN, Charge RN  Patient/Family in Agreement with the Plan: yes    Handoff Referral Completed: No    Additional Information:  CM following for discharge planning to Transitional Care Unit today. He has been accepted at [facility] into a shared room. Spoke to patient's friend Sobia to update her on bed today. Met with patient at bedside to discuss discharge plan and bed availability at [facility]. He will accept that bed today. We discussed shared vs private and the daily cost associated. Explained to patient that a private room would be opening up tomorrow and he could talk to the facility staff if he wanted to change to private room. He is agreeable to shared room today. Verified that [facility] wheelchair transport would be arranged. Call placed and wheelchair transport was arranged for 1700 today. Patient update. Bedside RN, Charge RN and MD updated.     ADDENDUM 1306:  Discharge orders received and faxed via [facility] to [facility] admissions.              Mariam Arteaga RN BSN CM  Inpatient Care Coordination  [facility]  [phone]

## 2022-12-20 NOTE — PLAN OF CARE
Goal Outcome Evaluation:    A&Ox4 this shift- forget at times and rambling. Reports pain 4/10- given tylenol and robaxin. Voltaren and bengay given as well. Pt up with PT this shift. Able to walk Ax1 wth gait and walker at times. Other times pt states he is too tired. Using Ax2 with harjinder steady when needed. Pt had medium loose BM this shift.

## 2022-12-20 NOTE — DISCHARGE SUMMARY
[facility]    Discharge Summary  Hospitalist    Date of Admission:  12/6/2022  Date of Discharge:  12/20/2022  Provider:  Musa Gray MD  Date of Service (when I last saw the patient): 12/20/22    Discharge Diagnoses   Shock/syncope secondary to hypovolemia without proven blood losses. Unclear cause.   Acute colitis on CT imaging of unclear clinical significance  Type II MI secondary to hypovolemia  Coronary artery disease  Essential hypertension  Hyperlipidemia  Large left inguinal hernia with bowel content, noncomplicated.  Macrocytic anemia/  MGUS.  Incidental pulmonary nodules.  Acute on chronic back pain  History of prostate cancer with recent radiation treatment  CT evidence of esophagitis and colitis, NOS.  GERD.  Polymyalgia rheumatica  Depression  Generalized muscle deconditioning, severe.     Nutritional status   Other medical issues:  Past Medical History:   Diagnosis Date     Benign essential hypertension      Coronary artery disease involving autologous artery coronary bypass graft without angina pectoris     s/p DAYAN LAD 5/08     Depression      Gastroesophageal reflux disease with esophagitis      Hyperlipidemia LDL goal <70      Macrocytic anemia     11-12 range with MCV in the 105-107 range.  normal B12 and folate     Monoclonal gammopathy      Obesity      PMR (polymyalgia rheumatica) (H)      Tobacco abuse      Vitamin D deficiency        History of Present Illness   Pacheco Torres is an 77 year old male who presented with fainting episode.  Please see the admission history and physical for full details.    Hospital Course   Pacheco Torres is a 77 year old male with a history of HTN/HLP, remote hx of CAD s/p DAYAN to the LAD (EF 75 % and G1dd), hx of prostate cancer from earlier this year treated with XRT through [place], macrocytic anemia with Hgb in the 11-12 range (normal B12 and folate), monoclonal gammopathy (no recent documentation of evaluation), PMR, obesity, hx  of tob abuse who admitted on 12/6/2022 with hypovolemic shock of unclear etiology.     He believes he passed out either the day prior to or on the day of admission, unable to fill in any details. States he hasn't been sleeping well due to the severe back pain.  States po intake has been well, denies N/V/D. No f/c/s.  No epistaxis/bloody gums/stools or emesis. He has not been offered narcotics for the back pain as an outpatient.       Dr. Pablo discussed with  Carlos Enrique Goldberg, pt's SO who states the patient hasn't been eating well for at least 6 weeks, she thinks his fluid intake is ok.  She reports that he's had some nausea but no vomiting.       In the ER BPs in the 60's/40's with tachycardia and tachypnea, he was hypothermic at 95.4, he was given 2 L of NS without improvement and so started on NE via Right internal jugular (though this ran only very briefly).       EKG with ST elevation throughout septal/anterior leads-cards was contacted and the story just didn't seem to fit- he was without any CP or anginal equivalents. Initial trop 29. He was placed on heparin and stat echo completed which showed hyperdynamic cardiac function.  The ST elevation resolved over the course of a couple of hours. Troponins continue to rise 29->222-493 before falling again.  Patient was seen by cardiology and initially recommended coronary angiogram but patient was confused unable to provide consent and lay still for the procedure, treated with heparin, started on aspirin.     CXR with pulmonary edema  CT Ao survey-diffuse CAD, Left inguinal canal hernia containing loops of the distal colon  He was covered with pip-tazo/vanco for septic shock of unclear etiology      Patient appears close to ready for discharge.  We will work on weaning off of Seroquel, will also work on weaning off steroids.  Appears mildly overloaded and will work on volume status with Lasix dosing daily.     Shock/syncope  Acute colitis on CT  imaging (?significance?)  -- Initial EKG (A fib with RVR) could explain CV shock picture especially when paired with hypovolemia or sepsis  -- Hypotension resolved with IV fluid resuscitation and very brief period of support with norepinephrine  -- CT of chest abdomen pelvis obtained and December 7 suggested possible colitis and esophagitis.  (Nothing clinically to go along with colitis.  Some complaints of odynophagia suggest possible esophagitis.)     Acute toxic metabolic encephalopathy   -- Likely combination of toxic metabolic and infectious encephalopathy.  -- Unclear whether pt has an underlying cognitive deficit.  Brother blames pt's evident change in mentation on pain that he has had for the past 6 weeks due to vertebral compression fractures. Chronic insomnia due to back pain issues.  -- Patient was agitated on December 8 and required Precedex drip.  -- MRI brain shows no acute abnormality  -- on Seroquel will work on weaning off, today will decrease to 12.5 mg QHS (already had 25 mg this morning, was on 25 mg BID)     NSTEMI v type 2 MI  CAD  HTN/HLP   --initial eval in ED showed trops 29->222->493   --pta on asa 81 mg/lisinopril 10 mg every day/simvastatin 40  Mg  --Home medications resumed    --Diffuse CAD noted on CT Ao survey.  Hx mid LAD PCI  --No CP and echo with hyperdynamic function.   -- Patient was treated with heparin and cardiology was consulted.  -- Dr. Pablo discussed the case with cardiologist and ischemic work-up to be postponed as outpatient when he is more stable.  If he develops recurrent pain or other symptoms, could be done sooner in the hospital     Large Left inguinal hernia with bowel contents-initial concern for small bowel obstruction.  -- CT on December 7 showed possible bowel obstruction.  Patient had left lower quadrant abdominal tenderness but no rigidity or rebound tenderness.  Surgery was consulted and patient was seen and examined by Dr. Ruiz who recommended to  continue to monitor for symptoms.  --No clinical symptoms c/w bowel obstruction     Macrocytic anemia-MGUS  --Being worked up by PCP   --Currently hemoglobin is 9.2.  Positive stool.  Esophageal thickening noted on CT scan.  GI consulted and recommendation obtained for no intervention at this time.     Incidental pulmonary nodules  --Given heavy smoking history will need repeat CT in 3-6 months     Acute on chronic back pain present for weeks without a trigger, thought due to vertebral compression fx  History of prostate cancer with recent treatment with proton beam (XRT), follows with [facility]  --Severe acute on chronic back pain requiring narcotic medications.  PTA he was not on opiates regularly  --Patient was seen by pain management team.  Pain medications recommended.  He had been on scheduled Dilaudid but he is confused and currently denies pain.  I am stopping scheduled Dilaudid and will continue with PRN dosing  --Continue current pain regimen-scheduled Tylenol 1 g every 8 hours, Voltaren gel 4 g 4 times daily, lidocaine patch, menthol, Lyrica.     Abnormal CT with concern for esophagitis and colitis: Does describe pressure type pain in the epigastric region with eating.    -- empirically will treat for Candidal esophagitis using Fluconazole 400 mg x 1 followed by 200 mg daily x 13 doses  -- Changed IV Protonix to oral Protonix BID     Chronic medical problems  Depression/gerd/prostate ca/pmr/monoclonal gammopathy   -- Continue pta sertraline     COVID 19  Negative  S/p 5 shot moderna series 9/2022 (biv)     Acute hypoxic respiratory failure  -- Patient is a smoker, had increased work of breathing and hypoxic requiring supplemental oxygen.  -- He has evidence of emphysema on CT scan. Mild acute exacerbation of COPD suspected  -- Patient was treated with DuoNeb, supplemental oxygen, steroid.  -- Currently wheezing is improved but hypoxia persists  -- we have worked on weaning prednisone, changed to 10  mg daily on 12/18 x3 doses then stop  -- Hypoxia improved, received Lasix 40 mg IV 12/16.  Appears more euvolemic we will hold off further Lasix     Community-acquired pneumonia - Resolved.  --new left lung infiltrate on chest x-ray on December 8, though not apparent on CT Chest on 12/11  --Treated with  Zosyn for 7 days..  Afebrile    # Discharge Pain Plan:    - During his hospitalization, Pacheco experienced pain due to back pain, compression fracture and prostate cancer.  The pain plan for discharge was discussed with Pacheco and the plan was created in a collaborative fashion.    - Opioids prescribed on discharge: Dilaudid 2 mg tab  - Duration of opioids after discharge: Per CDC opioid prescribing guidelines, a 3 day prescription of opioids was provided.  - Bowel regimen: docusate      Significant Results and Procedures   See below     Pending Results      Unresulted Labs Ordered in the Past 30 Days of this Admission     No orders found from 11/6/2022 to 12/7/2022.          Code Status   DNR / DNI       Primary Care Physician   Stuart Wolfe    GEN:  Alert, oriented x 3, appears comfortable, NAD.  HEENT:  Normocephalic/atraumatic, no scleral icterus, no nasal discharge, mouth moist.  CV:  Regular rate and rhythm, no murmur or JVD.  S1 + S2 noted, no S3 or S4.  LUNGS:  Clear to auscultation bilaterally without rales/rhonchi/wheezing/retractions.  Symmetric chest rise on inhalation noted.  ABD:  Active bowel sounds, soft, non-tender/non-distended.  No rebound/guarding/rigidity.  EXT:  No edema or cyanosis.  No joint synovitis noted.  SKIN:  Dry to touch, no exanthems noted in the visualized areas.     Discharge Disposition   Discharged to home    Consultations This Hospital Stay   PHARMACY TO DOSE VANCO  PHARMACY IP CONSULT  PHARMACY IP CONSULT  CARDIOLOGY IP CONSULT  PAIN MANAGEMENT ADULT IP CONSULT  GASTROENTEROLOGY IP CONSULT  SURGERY GENERAL IP CONSULT  RESPIRATORY CARE IP CONSULT  PHARMACY IP  CONSULT  PHYSICAL THERAPY ADULT IP CONSULT  OCCUPATIONAL THERAPY ADULT IP CONSULT  CARE MANAGEMENT / SOCIAL WORK IP CONSULT  SMOKING CESSATION PROGRAM IP CONSULT    Time Spent on this Encounter   I, Musa Gray MD, personally saw the patient today and spent greater than 30 minutes discharging this patient.     Discharge Orders   No discharge procedures on file.  Discharge Medications   Current Discharge Medication List      START taking these medications    Details   !! acetaminophen (TYLENOL) 325 MG tablet Take 2 tablets (650 mg) by mouth every 6 hours as needed for mild pain or other (and adjunct with moderate or severe pain or per patient request)    Associated Diagnoses: Prostate cancer (H)      acetaminophen (TYLENOL) 650 MG suppository Place 1 suppository (650 mg) rectally every 6 hours as needed for mild pain or other (and adjunct with moderate or severe pain or per patient request)    Associated Diagnoses: Prostate cancer (H)      alum & mag hydroxide-simethicone (MAALOX) 200-200-20 MG/5ML SUSP suspension Take 30 mLs by mouth every 4 hours as needed for indigestion    Associated Diagnoses: Gastroesophageal reflux disease, unspecified whether esophagitis present      atorvastatin (LIPITOR) 40 MG tablet Take 1 tablet (40 mg) by mouth every evening    Associated Diagnoses: NSTEMI (non-ST elevated myocardial infarction) (H)      benztropine (COGENTIN) 1 MG tablet Take 1 tablet (1 mg) by mouth 3 times daily as needed for other (for extrapyramidal effects)    Associated Diagnoses: Polymyalgia rheumatica (H)      carvedilol (COREG) 25 MG tablet Take 1 tablet (25 mg) by mouth 2 times daily (with meals)    Associated Diagnoses: NSTEMI (non-ST elevated myocardial infarction) (H)      diclofenac (VOLTAREN) 1 % topical gel Apply 4 g topically 4 times daily    Associated Diagnoses: Prostate cancer (H)      HYDROmorphone (DILAUDID) 2 MG tablet Take 1 tablet (2 mg) by mouth every 3 hours as needed for moderate pain  (4-6)  Qty: 10 tablet, Refills: 0    Associated Diagnoses: Prostate cancer (H)      ipratropium - albuterol 0.5 mg/2.5 mg/3 mL (DUONEB) 0.5-2.5 (3) MG/3ML neb solution Take 1 vial (3 mLs) by nebulization 4 times daily  Qty: 90 mL    Associated Diagnoses: NSTEMI (non-ST elevated myocardial infarction) (H)      levalbuterol (XOPENEX) 0.63 MG/3ML neb solution Take 3 mLs (0.63 mg) by nebulization every 4 hours as needed for wheezing  Qty: 90 mL      menthol, Topical Analgesic, 2.5% (BENGAY VANISHING SCENT) 2.5 % GEL topical gel Apply topically 4 times daily    Associated Diagnoses: Prostate cancer (H)      miconazole (MICATIN) 2 % external powder Apply topically 2 times daily    Associated Diagnoses: Morbid exogenous obesity (H)      multivitamin w/minerals (THERA-VIT-M) tablet Take 1 tablet by mouth daily    Associated Diagnoses: MGUS (monoclonal gammopathy of unknown significance)      pantoprazole (PROTONIX) 40 MG EC tablet Take 1 tablet (40 mg) by mouth 2 times daily (before meals)    Associated Diagnoses: Gastroesophageal reflux disease, unspecified whether esophagitis present      predniSONE (DELTASONE) 10 MG tablet Take 1 tablet (10 mg) by mouth daily    Associated Diagnoses: Prostate cancer (H)      pregabalin (LYRICA) 25 MG capsule Take 1 capsule (25 mg) by mouth 3 times daily    Associated Diagnoses: Prostate cancer (H)      QUEtiapine (SEROQUEL) 25 MG tablet Take 0.5 tablets (12.5 mg) by mouth At Bedtime    Associated Diagnoses: Recurrent major depression in full remission (H)      thiamine (B-1) 100 MG tablet Take 1 tablet (100 mg) by mouth daily    Associated Diagnoses: MGUS (monoclonal gammopathy of unknown significance)       !! - Potential duplicate medications found. Please discuss with provider.      CONTINUE these medications which have NOT CHANGED    Details   !! acetaminophen (TYLENOL) 500 MG tablet Take 1,000 mg by mouth every 6 hours as needed for pain      aspirin (ASA) 81 MG EC tablet Take 81 mg  by mouth daily      atropine 1 % ophthalmic solution Place 1 drop into the right eye 2 times daily      calcium carbonate (OS-TAVO) 500 MG tablet Take 2 tablets by mouth daily      cyanocobalamin (VITAMIN B-12) 1000 MCG tablet Take 1,000 mcg by mouth daily      cyclobenzaprine (FLEXERIL) 5 MG tablet Take 5 mg by mouth nightly as needed      gabapentin (NEURONTIN) 100 MG capsule Take 1 capsule (100 mg) by mouth 3 times daily for 30 days  Qty: 90 capsule, Refills: 0      lisinopril (ZESTRIL) 10 MG tablet Take 10 mg by mouth daily      nitroGLYcerin (NITROSTAT) 0.4 MG sublingual tablet Place 0.4 mg under the tongue every 5 minutes as needed for chest pain      prednisoLONE acetate (PRED FORTE) 1 % ophthalmic suspension Place 1 drop into both eyes 3 times daily      sertraline (ZOLOFT) 100 MG tablet Take 100 mg by mouth daily      tamsulosin (FLOMAX) 0.4 MG capsule Take 0.4 mg by mouth daily      vitamin D3 (CHOLECALCIFEROL) 50 mcg (2000 units) tablet Take 1 tablet by mouth daily       !! - Potential duplicate medications found. Please discuss with provider.      STOP taking these medications       simvastatin (ZOCOR) 40 MG tablet Comments:   Reason for Stopping:             Allergies   Allergies   Allergen Reactions     Gabapentin Visual Disturbance     Data   Most Recent 3 CBC's:Recent Labs   Lab Test 12/19/22  0608 12/17/22  0723 12/15/22  0743   WBC 5.7 4.6 5.8   HGB 8.8* 8.9* 9.8*   * 112* 113*    337 312      Most Recent 3 BMP's:  Recent Labs   Lab Test 12/19/22  0608 12/17/22  0723 12/15/22  0743    140 142   POTASSIUM 3.7 3.4 3.9   CHLORIDE 99 101 102   CO2 34* 34* 33*   BUN 9.0 16.9 21.9   CR 0.62* 0.62* 0.65*   ANIONGAP 5* 5* 7   TAVO 8.6* 8.3* 8.8   GLC 97 104* 96     Most Recent 2 LFT's:  Recent Labs   Lab Test 12/06/22  1421 11/03/22  1221   AST 78* 86*   ALT 17 16   ALKPHOS 147* 143*   BILITOTAL 0.3 0.3     Most Recent INR's and Anticoagulation Dosing History:  Anticoagulation Dose  History     Recent Dosing and Labs Latest Ref Rng & Units 12/6/2022    INR 0.85 - 1.15 1.03        Most Recent 3 Troponin's:No lab results found.  Most Recent Cholesterol Panel:No lab results found.  Most Recent 6 Bacteria Isolates From Any Culture (See EPIC Reports for Culture Details):No lab results found.  Most Recent TSH, T4 and A1c Labs:  Recent Labs   Lab Test 11/02/22  1550   TSH 1.36     Results for orders placed or performed during the hospital encounter of 12/06/22   CT Head w/o Contrast    Narrative    CT SCAN OF THE HEAD WITHOUT CONTRAST December 6, 2022 3:07 PM     HISTORY: Altered mental status.    TECHNIQUE: Axial images of the head and coronal reformations without  IV contrast material. Radiation dose for this scan was reduced using  automated exposure control, adjustment of the mA and/or kV according  to patient size, or iterative reconstruction technique.    COMPARISON: None.      Impression    IMPRESSION: Mild motion artifact. No evidence of hemorrhage. Volume  loss and patchy areas of white matter hypoattenuation which likely  represent chronic small vessel ischemic change. Small area of focal  hypoattenuation within the central medulla, presumably artifactual  (brainstem infarct not excluded, MRI can be performed for basal  ganglia and thalamic lacunar infarcts, presumably chronic. No acute  osseous abnormality. Nonspecific right facial soft tissue swelling,  potentially contusion.    EVELYN VELAZQUEZ MD         SYSTEM ID:  [id number]   XR Chest Port 1 View    Narrative    CHEST PORTABLE ONE VIEW December 6, 2022 2:39 PM     HISTORY: STEMI. Altered mental status. Chest pain.    COMPARISON: 11/2/2022.      Impression    IMPRESSION: Hypoinflated lungs and cardiomegaly. Mild bilateral  vascular congestion appears increased. Correlate with any evidence of  developing pulmonary edema.    HAL BOGGS MD         SYSTEM ID:  [id number]   CT Aortic Survey w Contrast    Narrative    CT AORTIC SURVEY WITH  CONTRAST  12/6/2022 3:07 PM    HISTORY:  Back pain, shock, wide mediastinum on x-ray.    TECHNIQUE: CT scan obtained of the chest, abdomen, and pelvis with IV  contrast. Post contrast scanning performed during the arterial phase.  CT chest also obtained without IV contrast. 80 mL Isovue-370 IV  injected. Sagittal and coronal reformatted images acquired from the  source post contrast data. Radiation dose for this scan was reduced  using automated exposure control, adjustment of the mA and/or kV  according to patient size, or iterative reconstruction technique.    COMPARISON:  None.    FINDINGS:  Thoracic and abdominal aorta: No dissection involving the thoracic or  abdominal aorta. Patency of the main branch vessels from the thoracic  and abdominal aorta. Scattered areas of calcified atherosclerotic  disease noted. Atherosclerotic stenosis at the origins of the celiac  and superior mesenteric arteries but there is distal perfusion. No  periaortic hematoma or aneurysm.    Other vascular: Severe coronary artery calcifications.    Chest: No adenopathy or acute mediastinal abnormality. No acute  mediastinal fluid collection. Patchy atelectasis at the posterior  right lower lobe. No effusions. No pneumothorax. Stable nodule  posterolateral left upper lobe measuring 0.3 cm, series 6 image 60.    Abdomen/pelvis: Liver, gallbladder, adrenals, spleen, pancreas, and  kidneys do not show any acute abnormalities. Left inguinal hernia  measures 9.1 x 5.8 cm, series 5 image 265. Herniated loops of the  distal descending colon and sigmoid within the hernia. No upstream  obstruction. No bowel inflammatory change. No enlarged lymph nodes. No  acute pelvic abnormality. Diffuse degenerative changes throughout the  spine. A degree of height loss involving multiple thoracic and lumbar  spine levels.      Impression    IMPRESSION:   1. No acute thoracic or abdominal aortic abnormality. No dissection.  Scattered areas of atherosclerotic  disease identified. Atherosclerotic  stenosis at the origins of the celiac artery and superior mesenteric  artery.  2. Diffuse coronary artery calcifications.  3. No acute mediastinal abnormality is seen.  4. Stable pulmonary nodule on the left, see below for follow-up  imaging guidelines.   5. Left inguinal canal hernia containing herniated loops of the distal  descending colon and sigmoid. No upstream bowel obstruction. See above  for measurements.    Recommendations for one or multiple incidental lung nodules < 6mm:    Low risk patients: No routine follow-up.    High risk patients: Optional follow-up CT at 12 months; if  unchanged, no further follow-up.    *Low Risk: Minimal or absent history of smoking or other known risk  factors.  *Nonsolid (ground glass) or partly solid nodules may require longer  follow-up to exclude indolent adenocarcinoma.  *Recommendations based on Guidelines for the Management of Incidental  Pulmonary Nodules Detected at CT: From the Fleischner Society 2017,  Radiology 2017.    HAL BOGGS MD         SYSTEM ID:  [id number]   XR Chest Port 1 View    Narrative    XR CHEST PORT 1 VIEW 12/6/2022 3:52 PM    HISTORY: CENTRAL LINE    COMPARISON: CT today      Impression    IMPRESSION: Right IJ central venous catheter tip in the low SVC. No  pneumothorax. Mild interstitial opacities in the lungs, could  represent pulmonary edema. No pleural effusion or pneumothorax.    EMELY MARTINEZ MD         SYSTEM ID:  [id number]   CT Chest/Abdomen/Pelvis w Contrast    Narrative    CT CHEST/ABDOMEN/PELVIS W CONTRAST 12/7/2022 1:00 PM    CLINICAL HISTORY: back pain, abdominal pain    TECHNIQUE: CT scan of the chest, abdomen, and pelvis was performed  following injection of IV contrast. Multiplanar reformats were  obtained. Dose reduction techniques were used.   CONTRAST: 90 mL of Isovue 370    COMPARISON: Chest CT on 11/3/2022    FINDINGS:   LUNGS AND PLEURA: Mild emphysema. Mild bibasilar  dependent  atelectasis/infiltrate and trace bilateral pleural effusions.    MEDIASTINUM/AXILLAE: No lymphadenopathy. Right IJ central venous  catheter tip is in the low SVC. No filling defects in the central  pulmonary arteries. No aortic aneurysm or dissection. Severe coronary  artery calcifications. No pericardial effusion.     Diffuse circumferential esophageal wall thickening with surrounding  mediastinal stranding and fluid (series 2, image [id number] and coronal  series 5, image [id number]).    HEPATOBILIARY: Normal.    PANCREAS: Normal.    SPLEEN: Normal.    ADRENAL GLANDS: Normal.    KIDNEYS/BLADDER: No calculi, hydronephrosis or perinephric stranding.  Urinary bladder is decompressed with a Blanchard catheter.    BOWEL: There are a few mildly dilated loops of small bowel in the mid  and left abdomen (series 2, image 138) with bowel loops dilated up to  3.5 cm. A transition point is not identified. Distal loops of ileum  are decompressed and the terminal ileum is decompressed with  transition likely in the mid/right lower quadrant.     No colonic obstruction. Marked circumferential wall thickening of the  splenic flexure, descending colon and sigmoid colon. Left lower  quadrant large inguinal hernia containing a loop of the sigmoid colon  with marked wall thickening of the loop within the hernia (series 5,  image 56) and pinching/narrowing of the bowel loop at the hernia  mouth. No pneumatosis or extraluminal air.    Normal appendix.    PELVIC ORGANS: Atrophic prostate gland.    ADDITIONAL FINDINGS: No lymphadenopathy in the abdomen and pelvis. No  abdominal aortic aneurysm. Trace free fluid in the left lower  quadrant. No abscess or free air.    MUSCULOSKELETAL: Stable mild wedge compression deformity T7, T9, T10,  T11, T12 and L1 vertebral. No acute-appearing fractures. No  destructive lesions of the bones.      Impression    IMPRESSION:  1.  Marked circumferential wall thickening of the splenic flexure,  descending  colon and sigmoid colon may be due to acute colitis, either  infectious, inflammatory or ischemic.   2.  Large left inguinal hernia containing a loop of the sigmoid colon  with narrowing/pinched appearance of the sigmoid colon at the hernia  mouth and wall thickening of the sigmoid in the inguinal hernia.  Superimposed strangulation of this loop of colon is not excluded.  Consider surgical consultation.  3.  Mechanical small bowel obstruction appears to be a separate  process. Gradual transition to normal caliber small bowel loops in the  right abdomen.  4.  Marked circumferential wall thickening of the entire esophagus,  suspicious for esophagitis.  5.  Bibasilar atelectasis/infiltrate and small bilateral pleural  effusions.    EMELY MARTINEZ MD         SYSTEM ID:  [id number]   XR Chest Port 1 View    Narrative    EXAM: XR CHEST PORT 1 VIEW  LOCATION: [facility]  DATE/TIME: 12/8/2022 2:28 AM    INDICATION: Shortness of breath.  COMPARISON: 12/6/2022.    FINDINGS: Right IJ central venous catheter. No pneumothorax. The heart is at the upper limits of normal in size. There is no pulmonary edema. There is new infiltrate in the left mid and lower lung. Mild probable scarring at the lung apices. Old right rib   fractures.      Impression    IMPRESSION: New left lung infiltrate.   XR Bone Survey Complete    Narrative    EXAM: XR BONE SURVEY COMPLETE  LOCATION: [facility]  DATE/TIME: 12/10/2022 3:44 PM    INDICATION: Patient with MGUS, r o lytic bone lesions. Pain.  COMPARISON: CT chest abdomen pelvis 12/07/2022.      Impression    IMPRESSION: No large destructive lytic lesions. There is a generalized mottled appearance throughout the bones, most significantly in the humeral and femoral diaphyses, which may be related to osteoporosis/age rather than myeloma. Multilevel degenerative   changes and compression fractures throughout the spine, better assessed on recent CT abdomen  pelvis 12/07/2022. Severe bilateral glenohumeral joint osteoarthrosis. Multiple remote rib fractures. Chronic appearing osteochondral defect in the right medial   femoral condyle. No definite acute fracture.    Additional findings better seen on prior CT chest, abdomen, and pelvis 12/07/2022 including multiple distended bowel loops, which could represent underlying obstruction. Right central venous catheter with tip in SVC. Bibasilar atelectasis and volume loss   in the lung fields with crowding of the central vasculature.         CT Chest/Abdomen/Pelvis w Contrast    Narrative    EXAM: CT CHEST/ABDOMEN/PELVIS W CONTRAST  LOCATION: [facility]  DATE/TIME: 12/11/2022 5:31 PM    INDICATION: sepsis ,r o PE  COMPARISON: 12/07/2022  TECHNIQUE: CT scan of the chest, abdomen, and pelvis was performed following injection of IV contrast. Multiplanar reformats were obtained. Dose reduction techniques were used.   CONTRAST: 80mL Isovue 370    FINDINGS:   LUNGS AND PLEURA: Bilateral groundglass opacities with more confluent airspace opacities at the lung bases, similar to prior. Small bilateral pleural effusions are unchanged.    MEDIASTINUM/AXILLAE: Suboptimal opacification of the segmental and subsegmental pulmonary artery branches, but there are no central filling defects. The pulmonary arteries are enlarged suggesting pulmonary artery hypertension. No enlarged thoracic lymph   nodes. No thoracic aortic aneurysm.    CORONARY ARTERY CALCIFICATION: Moderate.    HEPATOBILIARY: Normal.    PANCREAS: Normal.    SPLEEN: Normal.    ADRENAL GLANDS: Normal.    KIDNEYS/BLADDER: No significant mass, stone, or hydronephrosis. Gas within the bladder, presumably iatrogenic    BOWEL: Diffuse small bowel dilation, which has increased compared to 12/07/2022 2. No single abrupt transition point. The colon is also mildly distended, particularly the cecum and ascending colon. Gas and stool throughout the colon. No  pneumoperitoneum.    LYMPH NODES: Normal.    VASCULATURE: No abdominal aortic aneurysm. Mild atherosclerotic calcification.    PELVIC ORGANS: Normal.    MUSCULOSKELETAL: Left inguinal hernia containing a short segment of colon, which is mildly thickened and with mild fat stranding, similar to prior. No aggressive or destructive osseous lesions. Degenerative changes in the spine with multilevel   compression deformities that are similar to prior.      Impression    IMPRESSION:  1.  No acute pulmonary embolism, although suboptimal opacification of the segmental and subsegmental branches. Enlarged pulmonary arteries suggestive of chronic pulmonary hypertension.    2.  Unchanged small bilateral pleural effusions and bilateral groundglass opacities, likely infectious or inflammatory.    3.  Diffuse small and large bowel dilation has increased compared to 12/07/2022, but no discrete transition point, suggesting systemic hypomotility. Unchanged left inguinal hernia containing a short segment of colon, which is mildly thickened and with   mild fat stranding.   MR Brain w/o & w Contrast    Narrative    EXAM: MR BRAIN W/O and W CONTRAST  LOCATION: [facility]  DATE/TIME: 12/13/2022 5:21 PM    INDICATION: Altered mental status.  COMPARISON: Head CT 12/06/2022.  CONTRAST: 10 mL Gadavist  TECHNIQUE: Routine multiplanar multisequence head MRI without and with intravenous contrast.    FINDINGS:  INTRACRANIAL CONTENTS: No acute or subacute infarct. No mass, acute hemorrhage, or extra-axial fluid collections. Scattered nonspecific T2/FLAIR hyperintensities within the cerebral white matter most consistent with mild chronic microvascular ischemic   change. Mild generalized cerebral atrophy. No hydrocephalus. Normal position of the cerebellar tonsils. No pathologic contrast enhancement.    SELLA: No abnormality accounting for technique.    OSSEOUS STRUCTURES/SOFT TISSUES: Normal marrow signal. The major  intracranial vascular flow voids are maintained.     ORBITS: No abnormality accounting for technique.     SINUSES/MASTOIDS: No paranasal sinus mucosal disease. No middle ear or mastoid effusion.       Impression    IMPRESSION:  1.  No acute intracranial process.  2.  Generalized brain atrophy and presumed microvascular ischemic changes as detailed above.   Echocardiogram Limited     Value    LVEF  75%    Narrative    [id number]  [id number]  [id number]  [id number]^ANGELIQUE^MINOO^NERI     [facility]  Echocardiography Laboratory  [address] [place] [address]     Name: BERTIN LYNCH  MRN: [id number]  : 1945  Study Date: 2022 02:59 PM  Age: 77 yrs  Gender: Male  Patient Location: [facility]  Reason For Study: Chest Pain  Ordering Physician: MINOO MERINO  Performed By: Mariah Carranza RDCS     BSA: 2.1 m2  Height: 66 in  Weight: 220 lb  HR: 110  BP: 82/65 mmHg  ______________________________________________________________________________  Procedure  Limited Portable Echo Adult. Optison (NDC #[id number]) given intravenously.  (Emergent exam, abbreviated study performed).  ______________________________________________________________________________  Interpretation Summary     The visual ejection fraction is estimated at 75%.  Hyperdynamic left ventricular function  ER informed by phone that echo has been read. The study was technically  limited. Technically difficult, suboptimal study.  ______________________________________________________________________________  Left Ventricle  Grade I or early diastolic dysfunction. The visual ejection fraction is  estimated at 75%. Hyperdynamic left ventricular function.     Right Ventricle  The right ventricle is normal in size and function.     Atria  Normal left atrial size. Right atrial size is normal.     Mitral Valve  There is trace mitral regurgitation.     Tricuspid Valve  There is trace tricuspid regurgitation. Right ventricular systolic  pressure  could not be approximated due to inadequate tricuspid regurgitation.     Aortic Valve  The aortic valve is trileaflet. There is mild trileaflet aortic sclerosis. No  aortic regurgitation is present. No hemodynamically significant valvular  aortic stenosis.     Pulmonic Valve  Normal pulmonic valve velocity.     Vessels  IVC diameter >2.1 cm collapsing <50% with sniff suggests a high RA pressure  estimated at 15 mmHg or greater.     Pericardium  There is no pericardial effusion.     Rhythm  The rhythm was sinus tachycardia.  ______________________________________________________________________________  MMode/2D Measurements & Calculations     LA Volume (BP): 68.8 ml     Doppler Measurements & Calculations  MV E max dwayne: 93.3 cm/sec  MV A max dwayne: 121.6 cm/sec  MV E/A: 0.77  MV dec time: 0.12 sec     ______________________________________________________________________________  Report approved by: Meche English 12/06/2022 03:34 PM           Disclaimer: This note consists of symbols derived from keyboarding, dictation and/or voice recognition software. As a result, there may be errors in the script that have gone undetected. Please consider this when interpreting information found in this chart.

## 2022-12-20 NOTE — PLAN OF CARE
Occupational Therapy Discharge Summary    Reason for therapy discharge:    Discharged to transitional care facility.    Progress towards therapy goal(s). See goals on Care Plan in [facility] electronic health record for goal details.  Goals partially met.  Barriers to achieving goals:   discharge from facility.    Therapy recommendation(s):    Continued therapy is recommended.  Rationale/Recommendations:  OT eval and treat at TCU.        **Pt not seen by discharging therapist on this date, note written based on previous treating therapist's notes and recommendations

## 2022-12-20 NOTE — PLAN OF CARE
"  Problem: Plan of Care - These are the overarching goals to be used throughout the patient stay.    Goal: Plan of Care Review  Description: The Plan of Care Review/Shift note should be completed every shift.  The Outcome Evaluation is a brief statement about your assessment that the patient is improving, declining, or no change.  This information will be displayed automatically on your shift note.  Outcome: Adequate for Care Transition  Flowsheets (Taken 12/20/2022 1639)  Plan of Care Reviewed With:    patient    spouse     Problem: Plan of Care - These are the overarching goals to be used throughout the patient stay.    Goal: Patient-Specific Goal (Individualized)  Description: You can add care plan individualizations to a care plan. Examples of Individualization might be:  \"Parent requests to be called daily at 9am for status\", \"I have a hard time hearing out of my right ear\", or \"Do not touch me to wake me up as it startles me\".  Outcome: Adequate for Care Transition     Problem: Plan of Care - These are the overarching goals to be used throughout the patient stay.    Goal: Absence of Hospital-Acquired Illness or Injury  Outcome: Adequate for Care Transition  Intervention: Identify and Manage Fall Risk  Recent Flowsheet Documentation  Taken 12/20/2022 0857 by Tam Mejia RN  Safety Promotion/Fall Prevention:    bed alarm on    safety round/check completed    room organization consistent  Intervention: Prevent Skin Injury  Recent Flowsheet Documentation  Taken 12/20/2022 1407 by Tam Mejia RN  Body Position:    weight shifting    side-lying    right  Taken 12/20/2022 0857 by Tam Mejia RN  Body Position: (up to chair) other (see comments)  Intervention: Prevent and Manage VTE (Venous Thromboembolism) Risk  Recent Flowsheet Documentation  Taken 12/20/2022 0857 by Tam Mejia RN  VTE Prevention/Management: SCDs (sequential compression devices) off   Goal Outcome " Evaluation:      Plan of Care Reviewed With: patient, spouse        RN discussed discharge instruction to patient and spouse, verbalized understanding, prescription sent via fax to facility, left facility at 1720  [facility] wheelchair transport.

## 2022-12-20 NOTE — PLAN OF CARE
Goal Outcome Evaluation:    Alert, disoriented to situation. Regular diet. Ax1-2, not OOB overnight. Phos protocol. PIV SL. Denies pain. 3L NC. TELE:

## 2022-12-21 ENCOUNTER — PATIENT OUTREACH (OUTPATIENT)
Dept: CARE COORDINATION | Facility: CLINIC | Age: 77
End: 2022-12-21

## 2022-12-21 PROCEDURE — P9604 ONE-WAY ALLOW PRORATED TRIP: HCPCS | Mod: ORL | Performed by: INTERNAL MEDICINE

## 2022-12-21 PROCEDURE — 86481 TB AG RESPONSE T-CELL SUSP: CPT | Mod: ORL | Performed by: INTERNAL MEDICINE

## 2022-12-21 PROCEDURE — 36415 COLL VENOUS BLD VENIPUNCTURE: CPT | Mod: ORL | Performed by: INTERNAL MEDICINE

## 2022-12-21 NOTE — PROGRESS NOTES
[facility] Care Resource [place]    Background: Transitional Care Management program identified per system criteria and reviewed by [facility] team for possible outreach.    Assessment: Upon chart review, CCRC Team member will not proceed with patient outreach related to this episode of Transitional Care Management program due to reason below:    Non-[facility] TCU: Patient is not established within [facility] Primary Care and CCRC team member noted patient discharged to TCU/ARU/LTACH.    Plan: Transitional Care Management episode addressed appropriately per reason noted above.      Elias Darling  [facility] Resource [place], [facility]    *Connected Care Resource Team does NOT follow patient ongoing. Referrals are identified based on internal discharge reports and the outreach is to ensure patient has an understanding of their discharge instructions.

## 2022-12-22 LAB
GAMMA INTERFERON BACKGROUND BLD IA-ACNC: 0.06 IU/ML
M TB IFN-G BLD-IMP: NEGATIVE
M TB IFN-G CD4+ BCKGRND COR BLD-ACNC: 2.69 IU/ML
MITOGEN IGNF BCKGRD COR BLD-ACNC: 0 IU/ML
MITOGEN IGNF BCKGRD COR BLD-ACNC: 0 IU/ML
QUANTIFERON MITOGEN: 2.75 IU/ML
QUANTIFERON NIL TUBE: 0.06 IU/ML
QUANTIFERON TB1 TUBE: 0.06 IU/ML
QUANTIFERON TB2 TUBE: 0.06

## 2022-12-23 ENCOUNTER — LAB REQUISITION (OUTPATIENT)
Dept: LAB | Facility: CLINIC | Age: 77
End: 2022-12-23
Payer: COMMERCIAL

## 2022-12-23 DIAGNOSIS — E66.01 MORBID (SEVERE) OBESITY DUE TO EXCESS CALORIES (H): ICD-10-CM

## 2022-12-23 DIAGNOSIS — I21.4 NON-ST ELEVATION (NSTEMI) MYOCARDIAL INFARCTION (H): ICD-10-CM

## 2022-12-26 LAB
ANION GAP SERPL CALCULATED.3IONS-SCNC: 10 MMOL/L (ref 7–15)
BUN SERPL-MCNC: 13.2 MG/DL (ref 8–23)
CALCIUM SERPL-MCNC: 9 MG/DL (ref 8.8–10.2)
CHLORIDE SERPL-SCNC: 98 MMOL/L (ref 98–107)
CREAT SERPL-MCNC: 0.65 MG/DL (ref 0.67–1.17)
DEPRECATED HCO3 PLAS-SCNC: 25 MMOL/L (ref 22–29)
GFR SERPL CREATININE-BSD FRML MDRD: >90 ML/MIN/1.73M2
GLUCOSE SERPL-MCNC: 95 MG/DL (ref 70–99)
HGB BLD-MCNC: 9.2 G/DL (ref 13.3–17.7)
POTASSIUM SERPL-SCNC: 4.1 MMOL/L (ref 3.4–5.3)
SODIUM SERPL-SCNC: 133 MMOL/L (ref 136–145)

## 2022-12-26 PROCEDURE — 85018 HEMOGLOBIN: CPT | Mod: ORL | Performed by: INTERNAL MEDICINE

## 2022-12-26 PROCEDURE — 80048 BASIC METABOLIC PNL TOTAL CA: CPT | Mod: ORL | Performed by: INTERNAL MEDICINE

## 2022-12-26 PROCEDURE — 36415 COLL VENOUS BLD VENIPUNCTURE: CPT | Mod: ORL | Performed by: INTERNAL MEDICINE

## 2022-12-26 PROCEDURE — P9604 ONE-WAY ALLOW PRORATED TRIP: HCPCS | Mod: ORL | Performed by: INTERNAL MEDICINE

## 2023-01-05 PROCEDURE — 88312 SPECIAL STAINS GROUP 1: CPT | Performed by: PATHOLOGY

## 2023-01-06 ENCOUNTER — TELEPHONE (OUTPATIENT)
Dept: MULTI SPECIALTY CLINIC | Facility: CLINIC | Age: 78
End: 2023-01-06

## 2023-01-06 ENCOUNTER — LAB REQUISITION (OUTPATIENT)
Dept: LAB | Facility: CLINIC | Age: 78
End: 2023-01-06

## 2023-01-06 DIAGNOSIS — T18.198A OTHER FOREIGN OBJECT IN ESOPHAGUS CAUSING OTHER INJURY, INITIAL ENCOUNTER: ICD-10-CM

## 2023-01-06 DIAGNOSIS — K22.89 OTHER SPECIFIED DISEASE OF ESOPHAGUS: ICD-10-CM

## 2023-01-06 DIAGNOSIS — K22.2 ESOPHAGEAL OBSTRUCTION: ICD-10-CM

## 2023-01-06 DIAGNOSIS — T18.108A UNSPECIFIED FOREIGN BODY IN ESOPHAGUS CAUSING OTHER INJURY, INITIAL ENCOUNTER: ICD-10-CM

## 2023-01-06 NOTE — TELEPHONE ENCOUNTER
Spouse Nelida Torres contacted out office with concern for out patient pain management. Patient was in patient status recently and seen by the pain service for acute pain management of  pathologic fracture of Lumbar spine. He had complications with  PNA acute respiratory failure excess sedation, encephalopathy complicated by scheduled opioids. He was discharged and currently in TCU. Spouse anticipates another week or two there.  She is also establishing him with new PCP.  He is following with [place] [facility], she is not aware of his provider for Prostate Cancer. States his back pain is in pretty good control.  She asked about services being brought into the home for pain management. Then asked about a palliative care referral and if this is appropriate.  Reviewed pain discharge medications form last admission   Advised to communicate with his care team at TCU and asked about a pain management referral Vs palliative referral   Advise follow up with oncology and orthopedics (if pain still a concern)  Spouse understands information discussed by this writer and recommendations     DWAINE HooperC,APRN,CNP,ACHPN   Acute pain Management Team   [facility]   [phone] phone

## 2023-01-10 LAB
PATH REPORT.COMMENTS IMP SPEC: NORMAL
PATH REPORT.FINAL DX SPEC: NORMAL
PATH REPORT.GROSS SPEC: NORMAL
PATH REPORT.MICROSCOPIC SPEC OTHER STN: NORMAL
PATH REPORT.RELEVANT HX SPEC: NORMAL
PHOTO IMAGE: NORMAL

## 2023-01-19 PROCEDURE — 88305 TISSUE EXAM BY PATHOLOGIST: CPT | Performed by: PATHOLOGY

## 2023-01-20 ENCOUNTER — LAB REQUISITION (OUTPATIENT)
Dept: LAB | Facility: CLINIC | Age: 78
End: 2023-01-20

## 2023-01-20 DIAGNOSIS — R13.10 DYSPHAGIA, UNSPECIFIED: ICD-10-CM

## 2023-01-20 DIAGNOSIS — K22.10 ULCER OF ESOPHAGUS WITHOUT BLEEDING: ICD-10-CM

## 2023-01-20 DIAGNOSIS — K22.2 ESOPHAGEAL OBSTRUCTION: ICD-10-CM

## 2023-01-23 ENCOUNTER — HOSPITAL ENCOUNTER (OUTPATIENT)
Dept: GENERAL RADIOLOGY | Facility: CLINIC | Age: 78
Discharge: HOME | End: 2023-01-23
Attending: INTERNAL MEDICINE | Admitting: INTERNAL MEDICINE
Payer: COMMERCIAL

## 2023-01-23 DIAGNOSIS — K22.2 ESOPHAGEAL OBSTRUCTION: ICD-10-CM

## 2023-01-23 DIAGNOSIS — R13.10 DYSPHAGIA, UNSPECIFIED: ICD-10-CM

## 2023-01-23 DIAGNOSIS — K22.10 ULCER OF ESOPHAGUS WITHOUT BLEEDING: ICD-10-CM

## 2023-01-23 LAB — RADIOLOGIST FLAGS: ABNORMAL

## 2023-01-23 PROCEDURE — 74220 X-RAY XM ESOPHAGUS 1CNTRST: CPT

## 2023-01-24 LAB
PATH REPORT.COMMENTS IMP SPEC: NORMAL
PATH REPORT.COMMENTS IMP SPEC: NORMAL
PATH REPORT.FINAL DX SPEC: NORMAL
PATH REPORT.GROSS SPEC: NORMAL
PATH REPORT.MICROSCOPIC SPEC OTHER STN: NORMAL
PATH REPORT.RELEVANT HX SPEC: NORMAL
PHOTO IMAGE: NORMAL

## 2023-01-31 ENCOUNTER — HOSPITAL ENCOUNTER (OUTPATIENT)
Dept: CT IMAGING | Facility: CLINIC | Age: 78
Discharge: HOME | End: 2023-01-31
Admitting: INTERNAL MEDICINE
Payer: COMMERCIAL

## 2023-01-31 DIAGNOSIS — K22.10 ULCER OF ESOPHAGUS WITHOUT BLEEDING: ICD-10-CM

## 2023-01-31 DIAGNOSIS — R13.10 DYSPHAGIA, UNSPECIFIED: ICD-10-CM

## 2023-01-31 DIAGNOSIS — K22.2 ESOPHAGEAL OBSTRUCTION: ICD-10-CM

## 2023-01-31 PROCEDURE — 250N000009 HC RX 250: Performed by: RADIOLOGY

## 2023-01-31 PROCEDURE — 74170 CT ABD WO CNTRST FLWD CNTRST: CPT

## 2023-01-31 PROCEDURE — 250N000011 HC RX IP 250 OP 636: Performed by: RADIOLOGY

## 2023-01-31 RX ORDER — IOPAMIDOL 755 MG/ML
101 INJECTION, SOLUTION INTRAVASCULAR ONCE
Status: COMPLETED | OUTPATIENT
Start: 2023-01-31 | End: 2023-01-31

## 2023-01-31 RX ADMIN — SODIUM CHLORIDE 69 ML: 9 INJECTION, SOLUTION INTRAVENOUS at 11:59

## 2023-01-31 RX ADMIN — IOPAMIDOL 101 ML: 755 INJECTION, SOLUTION INTRAVENOUS at 11:58

## 2023-02-20 ENCOUNTER — HOSPITAL ENCOUNTER (OUTPATIENT)
Facility: CLINIC | Age: 78
Setting detail: OBSERVATION
Discharge: HOME | End: 2023-02-23
Attending: EMERGENCY MEDICINE | Admitting: INTERNAL MEDICINE
Payer: COMMERCIAL

## 2023-02-20 DIAGNOSIS — K22.2 ESOPHAGEAL STENOSIS: ICD-10-CM

## 2023-02-20 DIAGNOSIS — R13.19 ESOPHAGEAL DYSPHAGIA: ICD-10-CM

## 2023-02-20 DIAGNOSIS — E87.6 HYPOKALEMIA: ICD-10-CM

## 2023-02-20 LAB
ANION GAP SERPL CALCULATED.3IONS-SCNC: 7 MMOL/L (ref 7–15)
BUN SERPL-MCNC: 6.3 MG/DL (ref 8–23)
CALCIUM SERPL-MCNC: 9.4 MG/DL (ref 8.8–10.2)
CHLORIDE SERPL-SCNC: 105 MMOL/L (ref 98–107)
CREAT SERPL-MCNC: 0.98 MG/DL (ref 0.67–1.17)
DEPRECATED HCO3 PLAS-SCNC: 33 MMOL/L (ref 22–29)
ERYTHROCYTE [DISTWIDTH] IN BLOOD BY AUTOMATED COUNT: 14 % (ref 10–15)
GFR SERPL CREATININE-BSD FRML MDRD: 80 ML/MIN/1.73M2
GLUCOSE SERPL-MCNC: 102 MG/DL (ref 70–99)
HCT VFR BLD AUTO: 33.6 % (ref 40–53)
HGB BLD-MCNC: 10.2 G/DL (ref 13.3–17.7)
HOLD SPECIMEN: NORMAL
HOLD SPECIMEN: NORMAL
IRON BINDING CAPACITY (ROCHE): 195 UG/DL (ref 240–430)
IRON SATN MFR SERPL: 23 % (ref 15–46)
IRON SERPL-MCNC: 44 UG/DL (ref 61–157)
MAGNESIUM SERPL-MCNC: 1.9 MG/DL (ref 1.7–2.3)
MAGNESIUM SERPL-MCNC: 1.9 MG/DL (ref 1.7–2.3)
MCH RBC QN AUTO: 33.1 PG (ref 26.5–33)
MCHC RBC AUTO-ENTMCNC: 30.4 G/DL (ref 31.5–36.5)
MCV RBC AUTO: 109 FL (ref 78–100)
PLATELET # BLD AUTO: 360 10E3/UL (ref 150–450)
POTASSIUM SERPL-SCNC: 2.8 MMOL/L (ref 3.4–5.3)
RBC # BLD AUTO: 3.08 10E6/UL (ref 4.4–5.9)
SODIUM SERPL-SCNC: 145 MMOL/L (ref 136–145)
WBC # BLD AUTO: 7.4 10E3/UL (ref 4–11)

## 2023-02-20 PROCEDURE — 85027 COMPLETE CBC AUTOMATED: CPT | Performed by: EMERGENCY MEDICINE

## 2023-02-20 PROCEDURE — G0378 HOSPITAL OBSERVATION PER HR: HCPCS

## 2023-02-20 PROCEDURE — C9113 INJ PANTOPRAZOLE SODIUM, VIA: HCPCS | Performed by: PHYSICIAN ASSISTANT

## 2023-02-20 PROCEDURE — 84132 ASSAY OF SERUM POTASSIUM: CPT | Performed by: INTERNAL MEDICINE

## 2023-02-20 PROCEDURE — 96365 THER/PROPH/DIAG IV INF INIT: CPT

## 2023-02-20 PROCEDURE — 83550 IRON BINDING TEST: CPT | Performed by: PHYSICIAN ASSISTANT

## 2023-02-20 PROCEDURE — 83735 ASSAY OF MAGNESIUM: CPT | Performed by: PHYSICIAN ASSISTANT

## 2023-02-20 PROCEDURE — 36415 COLL VENOUS BLD VENIPUNCTURE: CPT | Performed by: EMERGENCY MEDICINE

## 2023-02-20 PROCEDURE — 99222 1ST HOSP IP/OBS MODERATE 55: CPT | Mod: AI | Performed by: PHYSICIAN ASSISTANT

## 2023-02-20 PROCEDURE — 99285 EMERGENCY DEPT VISIT HI MDM: CPT | Mod: 25

## 2023-02-20 PROCEDURE — 83735 ASSAY OF MAGNESIUM: CPT | Performed by: EMERGENCY MEDICINE

## 2023-02-20 PROCEDURE — 258N000003 HC RX IP 258 OP 636: Performed by: EMERGENCY MEDICINE

## 2023-02-20 PROCEDURE — 80048 BASIC METABOLIC PNL TOTAL CA: CPT | Performed by: EMERGENCY MEDICINE

## 2023-02-20 PROCEDURE — 250N000011 HC RX IP 250 OP 636: Performed by: EMERGENCY MEDICINE

## 2023-02-20 PROCEDURE — 250N000011 HC RX IP 250 OP 636: Performed by: PHYSICIAN ASSISTANT

## 2023-02-20 PROCEDURE — 36415 COLL VENOUS BLD VENIPUNCTURE: CPT | Performed by: PHYSICIAN ASSISTANT

## 2023-02-20 PROCEDURE — 96375 TX/PRO/DX INJ NEW DRUG ADDON: CPT

## 2023-02-20 RX ORDER — FLUMAZENIL 0.1 MG/ML
0.2 INJECTION, SOLUTION INTRAVENOUS
Status: DISCONTINUED | OUTPATIENT
Start: 2023-02-20 | End: 2023-02-21 | Stop reason: HOSPADM

## 2023-02-20 RX ORDER — HYDRALAZINE HYDROCHLORIDE 20 MG/ML
5 INJECTION INTRAMUSCULAR; INTRAVENOUS EVERY 4 HOURS PRN
Status: DISCONTINUED | OUTPATIENT
Start: 2023-02-20 | End: 2023-02-20

## 2023-02-20 RX ORDER — NALOXONE HYDROCHLORIDE 0.4 MG/ML
0.2 INJECTION, SOLUTION INTRAMUSCULAR; INTRAVENOUS; SUBCUTANEOUS
Status: DISCONTINUED | OUTPATIENT
Start: 2023-02-20 | End: 2023-02-21 | Stop reason: HOSPADM

## 2023-02-20 RX ORDER — NALOXONE HYDROCHLORIDE 0.4 MG/ML
0.4 INJECTION, SOLUTION INTRAMUSCULAR; INTRAVENOUS; SUBCUTANEOUS
Status: DISCONTINUED | OUTPATIENT
Start: 2023-02-20 | End: 2023-02-21 | Stop reason: HOSPADM

## 2023-02-20 RX ORDER — SODIUM CHLORIDE 9 MG/ML
INJECTION, SOLUTION INTRAVENOUS CONTINUOUS
Status: ACTIVE | OUTPATIENT
Start: 2023-02-21 | End: 2023-02-21

## 2023-02-20 RX ORDER — ATROPINE SULFATE 0.1 MG/ML
1 INJECTION INTRAVENOUS
Status: DISCONTINUED | OUTPATIENT
Start: 2023-02-20 | End: 2023-02-21 | Stop reason: HOSPADM

## 2023-02-20 RX ORDER — ONDANSETRON 4 MG/1
4 TABLET, ORALLY DISINTEGRATING ORAL EVERY 6 HOURS PRN
Status: DISCONTINUED | OUTPATIENT
Start: 2023-02-20 | End: 2023-02-23 | Stop reason: HOSPADM

## 2023-02-20 RX ORDER — ONDANSETRON 2 MG/ML
4 INJECTION INTRAMUSCULAR; INTRAVENOUS EVERY 6 HOURS PRN
Status: DISCONTINUED | OUTPATIENT
Start: 2023-02-20 | End: 2023-02-23 | Stop reason: HOSPADM

## 2023-02-20 RX ORDER — HYDROMORPHONE HCL IN WATER/PF 6 MG/30 ML
0.2 PATIENT CONTROLLED ANALGESIA SYRINGE INTRAVENOUS
Status: DISCONTINUED | OUTPATIENT
Start: 2023-02-20 | End: 2023-02-23 | Stop reason: HOSPADM

## 2023-02-20 RX ORDER — POTASSIUM CHLORIDE 7.45 MG/ML
10 INJECTION INTRAVENOUS ONCE
Status: COMPLETED | OUTPATIENT
Start: 2023-02-20 | End: 2023-02-20

## 2023-02-20 RX ORDER — FENTANYL CITRATE 50 UG/ML
50-100 INJECTION, SOLUTION INTRAMUSCULAR; INTRAVENOUS EVERY 5 MIN PRN
Status: DISCONTINUED | OUTPATIENT
Start: 2023-02-20 | End: 2023-02-21 | Stop reason: HOSPADM

## 2023-02-20 RX ORDER — EPINEPHRINE 1 MG/ML
0.1 INJECTION, SOLUTION, CONCENTRATE INTRAVENOUS
Status: DISCONTINUED | OUTPATIENT
Start: 2023-02-20 | End: 2023-02-21 | Stop reason: HOSPADM

## 2023-02-20 RX ORDER — SIMETHICONE 40MG/0.6ML
133 SUSPENSION, DROPS(FINAL DOSAGE FORM)(ML) ORAL
Status: DISCONTINUED | OUTPATIENT
Start: 2023-02-20 | End: 2023-02-21 | Stop reason: HOSPADM

## 2023-02-20 RX ORDER — IPRATROPIUM BROMIDE AND ALBUTEROL SULFATE 2.5; .5 MG/3ML; MG/3ML
3 SOLUTION RESPIRATORY (INHALATION) EVERY 4 HOURS PRN
Status: DISCONTINUED | OUTPATIENT
Start: 2023-02-20 | End: 2023-02-23 | Stop reason: HOSPADM

## 2023-02-20 RX ORDER — HYDRALAZINE HYDROCHLORIDE 20 MG/ML
5 INJECTION INTRAMUSCULAR; INTRAVENOUS EVERY 4 HOURS PRN
Status: DISCONTINUED | OUTPATIENT
Start: 2023-02-20 | End: 2023-02-23 | Stop reason: HOSPADM

## 2023-02-20 RX ORDER — LIDOCAINE 40 MG/G
CREAM TOPICAL
Status: DISCONTINUED | OUTPATIENT
Start: 2023-02-20 | End: 2023-02-23 | Stop reason: HOSPADM

## 2023-02-20 RX ORDER — DIPHENHYDRAMINE HYDROCHLORIDE 50 MG/ML
25-50 INJECTION INTRAMUSCULAR; INTRAVENOUS
Status: DISCONTINUED | OUTPATIENT
Start: 2023-02-20 | End: 2023-02-21 | Stop reason: HOSPADM

## 2023-02-20 RX ADMIN — POTASSIUM CHLORIDE 10 MEQ: 7.46 INJECTION, SOLUTION INTRAVENOUS at 20:01

## 2023-02-20 RX ADMIN — SODIUM CHLORIDE 500 ML: 9 INJECTION, SOLUTION INTRAVENOUS at 19:32

## 2023-02-20 RX ADMIN — PANTOPRAZOLE SODIUM 40 MG: 40 INJECTION, POWDER, FOR SOLUTION INTRAVENOUS at 22:14

## 2023-02-20 RX ADMIN — HYDRALAZINE HYDROCHLORIDE 5 MG: 20 INJECTION, SOLUTION INTRAMUSCULAR; INTRAVENOUS at 22:15

## 2023-02-20 ASSESSMENT — ACTIVITIES OF DAILY LIVING (ADL)
ADLS_ACUITY_SCORE: 35
ADLS_ACUITY_SCORE: 35
ADLS_ACUITY_SCORE: 33

## 2023-02-20 ASSESSMENT — ENCOUNTER SYMPTOMS
TROUBLE SWALLOWING: 1
UNEXPECTED WEIGHT CHANGE: 1

## 2023-02-20 NOTE — ED TRIAGE NOTES
"Patient presents to the ED stating that his esophagus is \"closed down.\" Reports a history of frequent esophageal stretching. States has been unable to swallow since Friday afternoon.       "

## 2023-02-21 LAB
ANION GAP SERPL CALCULATED.3IONS-SCNC: 8 MMOL/L (ref 7–15)
BUN SERPL-MCNC: 8.3 MG/DL (ref 8–23)
CALCIUM SERPL-MCNC: 9.2 MG/DL (ref 8.8–10.2)
CHLORIDE SERPL-SCNC: 107 MMOL/L (ref 98–107)
CREAT SERPL-MCNC: 0.9 MG/DL (ref 0.67–1.17)
DEPRECATED HCO3 PLAS-SCNC: 31 MMOL/L (ref 22–29)
GFR SERPL CREATININE-BSD FRML MDRD: 89 ML/MIN/1.73M2
GLUCOSE SERPL-MCNC: 94 MG/DL (ref 70–99)
MAGNESIUM SERPL-MCNC: 2.5 MG/DL (ref 1.7–2.3)
POTASSIUM SERPL-SCNC: 2.8 MMOL/L (ref 3.4–5.3)
POTASSIUM SERPL-SCNC: 3.3 MMOL/L (ref 3.4–5.3)
POTASSIUM SERPL-SCNC: 3.3 MMOL/L (ref 3.4–5.3)
SODIUM SERPL-SCNC: 146 MMOL/L (ref 136–145)
UPPER GI ENDOSCOPY: NORMAL

## 2023-02-21 PROCEDURE — 36415 COLL VENOUS BLD VENIPUNCTURE: CPT | Performed by: PHYSICIAN ASSISTANT

## 2023-02-21 PROCEDURE — 36415 COLL VENOUS BLD VENIPUNCTURE: CPT | Performed by: INTERNAL MEDICINE

## 2023-02-21 PROCEDURE — 250N000013 HC RX MED GY IP 250 OP 250 PS 637: Performed by: INTERNAL MEDICINE

## 2023-02-21 PROCEDURE — 84132 ASSAY OF SERUM POTASSIUM: CPT | Performed by: INTERNAL MEDICINE

## 2023-02-21 PROCEDURE — C9113 INJ PANTOPRAZOLE SODIUM, VIA: HCPCS | Performed by: PHYSICIAN ASSISTANT

## 2023-02-21 PROCEDURE — 83735 ASSAY OF MAGNESIUM: CPT | Performed by: INTERNAL MEDICINE

## 2023-02-21 PROCEDURE — 96367 TX/PROPH/DG ADDL SEQ IV INF: CPT

## 2023-02-21 PROCEDURE — 250N000011 HC RX IP 250 OP 636: Performed by: PHYSICIAN ASSISTANT

## 2023-02-21 PROCEDURE — 258N000003 HC RX IP 258 OP 636: Performed by: PHYSICIAN ASSISTANT

## 2023-02-21 PROCEDURE — 999N000099 HC STATISTIC MODERATE SEDATION < 10 MIN: Performed by: INTERNAL MEDICINE

## 2023-02-21 PROCEDURE — 80048 BASIC METABOLIC PNL TOTAL CA: CPT | Performed by: PHYSICIAN ASSISTANT

## 2023-02-21 PROCEDURE — G0378 HOSPITAL OBSERVATION PER HR: HCPCS

## 2023-02-21 PROCEDURE — 250N000011 HC RX IP 250 OP 636: Performed by: INTERNAL MEDICINE

## 2023-02-21 PROCEDURE — 96361 HYDRATE IV INFUSION ADD-ON: CPT | Mod: XU

## 2023-02-21 PROCEDURE — 99232 SBSQ HOSP IP/OBS MODERATE 35: CPT | Performed by: INTERNAL MEDICINE

## 2023-02-21 PROCEDURE — 96366 THER/PROPH/DIAG IV INF ADDON: CPT | Mod: XU

## 2023-02-21 PROCEDURE — 43249 ESOPH EGD DILATION <30 MM: CPT | Performed by: INTERNAL MEDICINE

## 2023-02-21 PROCEDURE — 96376 TX/PRO/DX INJ SAME DRUG ADON: CPT | Mod: XU

## 2023-02-21 PROCEDURE — 250N000009 HC RX 250: Performed by: INTERNAL MEDICINE

## 2023-02-21 RX ORDER — NALOXONE HYDROCHLORIDE 0.4 MG/ML
0.4 INJECTION, SOLUTION INTRAMUSCULAR; INTRAVENOUS; SUBCUTANEOUS
Status: DISCONTINUED | OUTPATIENT
Start: 2023-02-21 | End: 2023-02-21 | Stop reason: HOSPADM

## 2023-02-21 RX ORDER — SUCRALFATE 1 G/1
1 TABLET ORAL 4 TIMES DAILY
Qty: 120 TABLET | Refills: 0 | Status: SHIPPED | OUTPATIENT
Start: 2023-02-21 | End: 2023-03-23

## 2023-02-21 RX ORDER — DIPHENHYDRAMINE HYDROCHLORIDE 50 MG/ML
25-50 INJECTION INTRAMUSCULAR; INTRAVENOUS
Status: DISCONTINUED | OUTPATIENT
Start: 2023-02-21 | End: 2023-02-21 | Stop reason: HOSPADM

## 2023-02-21 RX ORDER — LIDOCAINE 40 MG/G
CREAM TOPICAL
Status: DISCONTINUED | OUTPATIENT
Start: 2023-02-21 | End: 2023-02-21 | Stop reason: HOSPADM

## 2023-02-21 RX ORDER — POTASSIUM CHLORIDE 7.45 MG/ML
10 INJECTION INTRAVENOUS
Status: DISPENSED | OUTPATIENT
Start: 2023-02-21 | End: 2023-02-21

## 2023-02-21 RX ORDER — POTASSIUM CHLORIDE 1.5 G/1.58G
40 POWDER, FOR SOLUTION ORAL ONCE
Status: COMPLETED | OUTPATIENT
Start: 2023-02-21 | End: 2023-02-21

## 2023-02-21 RX ORDER — FLUMAZENIL 0.1 MG/ML
0.2 INJECTION, SOLUTION INTRAVENOUS
Status: DISCONTINUED | OUTPATIENT
Start: 2023-02-21 | End: 2023-02-21 | Stop reason: HOSPADM

## 2023-02-21 RX ORDER — FLUMAZENIL 0.1 MG/ML
0.2 INJECTION, SOLUTION INTRAVENOUS
Status: ACTIVE | OUTPATIENT
Start: 2023-02-21 | End: 2023-02-22

## 2023-02-21 RX ORDER — EPINEPHRINE 1 MG/ML
0.1 INJECTION, SOLUTION, CONCENTRATE INTRAVENOUS
Status: DISCONTINUED | OUTPATIENT
Start: 2023-02-21 | End: 2023-02-21 | Stop reason: HOSPADM

## 2023-02-21 RX ORDER — ATROPINE SULFATE 0.1 MG/ML
1 INJECTION INTRAVENOUS
Status: DISCONTINUED | OUTPATIENT
Start: 2023-02-21 | End: 2023-02-21 | Stop reason: HOSPADM

## 2023-02-21 RX ORDER — SIMETHICONE 40MG/0.6ML
133 SUSPENSION, DROPS(FINAL DOSAGE FORM)(ML) ORAL
Status: DISCONTINUED | OUTPATIENT
Start: 2023-02-21 | End: 2023-02-21 | Stop reason: HOSPADM

## 2023-02-21 RX ORDER — FENTANYL CITRATE 50 UG/ML
50-100 INJECTION, SOLUTION INTRAMUSCULAR; INTRAVENOUS EVERY 5 MIN PRN
Status: DISCONTINUED | OUTPATIENT
Start: 2023-02-21 | End: 2023-02-21 | Stop reason: HOSPADM

## 2023-02-21 RX ORDER — NALOXONE HYDROCHLORIDE 0.4 MG/ML
0.2 INJECTION, SOLUTION INTRAMUSCULAR; INTRAVENOUS; SUBCUTANEOUS
Status: DISCONTINUED | OUTPATIENT
Start: 2023-02-21 | End: 2023-02-21 | Stop reason: HOSPADM

## 2023-02-21 RX ORDER — MAGNESIUM SULFATE HEPTAHYDRATE 40 MG/ML
2 INJECTION, SOLUTION INTRAVENOUS ONCE
Status: COMPLETED | OUTPATIENT
Start: 2023-02-21 | End: 2023-02-21

## 2023-02-21 RX ADMIN — PANTOPRAZOLE SODIUM 40 MG: 40 INJECTION, POWDER, FOR SOLUTION INTRAVENOUS at 08:51

## 2023-02-21 RX ADMIN — TOPICAL ANESTHETIC 0.5 ML: 200 SPRAY DENTAL; PERIODONTAL at 13:09

## 2023-02-21 RX ADMIN — POTASSIUM CHLORIDE 10 MEQ: 7.46 INJECTION, SOLUTION INTRAVENOUS at 14:29

## 2023-02-21 RX ADMIN — POTASSIUM CHLORIDE 10 MEQ: 7.46 INJECTION, SOLUTION INTRAVENOUS at 11:28

## 2023-02-21 RX ADMIN — POTASSIUM CHLORIDE 40 MEQ: 1.5 POWDER, FOR SOLUTION ORAL at 20:35

## 2023-02-21 RX ADMIN — MIDAZOLAM 1 MG: 1 INJECTION INTRAMUSCULAR; INTRAVENOUS at 13:04

## 2023-02-21 RX ADMIN — POTASSIUM CHLORIDE 10 MEQ: 7.46 INJECTION, SOLUTION INTRAVENOUS at 06:31

## 2023-02-21 RX ADMIN — SODIUM CHLORIDE: 9 INJECTION, SOLUTION INTRAVENOUS at 02:41

## 2023-02-21 RX ADMIN — MAGNESIUM SULFATE HEPTAHYDRATE 2 G: 2 INJECTION, SOLUTION INTRAVENOUS at 04:01

## 2023-02-21 RX ADMIN — POTASSIUM CHLORIDE 10 MEQ: 7.46 INJECTION, SOLUTION INTRAVENOUS at 02:40

## 2023-02-21 RX ADMIN — FENTANYL CITRATE 50 MCG: 50 INJECTION, SOLUTION INTRAMUSCULAR; INTRAVENOUS at 13:04

## 2023-02-21 RX ADMIN — PANTOPRAZOLE SODIUM 40 MG: 40 INJECTION, POWDER, FOR SOLUTION INTRAVENOUS at 20:42

## 2023-02-21 RX ADMIN — POTASSIUM CHLORIDE 10 MEQ: 7.46 INJECTION, SOLUTION INTRAVENOUS at 04:09

## 2023-02-21 RX ADMIN — POTASSIUM CHLORIDE 10 MEQ: 7.46 INJECTION, SOLUTION INTRAVENOUS at 05:20

## 2023-02-21 ASSESSMENT — ACTIVITIES OF DAILY LIVING (ADL)
ADLS_ACUITY_SCORE: 32
ADLS_ACUITY_SCORE: 32
ADLS_ACUITY_SCORE: 39
ADLS_ACUITY_SCORE: 39
ADLS_ACUITY_SCORE: 41
ADLS_ACUITY_SCORE: 35
ADLS_ACUITY_SCORE: 32
ADLS_ACUITY_SCORE: 41
ADLS_ACUITY_SCORE: 39
ADLS_ACUITY_SCORE: 35
ADLS_ACUITY_SCORE: 35
ADLS_ACUITY_SCORE: 41

## 2023-02-21 NOTE — ED NOTES
"[facility]  ED Nurse Handoff Report    Pacheco Torres is a 77 year old male   ED Chief complaint: Esophagus problem  . ED Diagnosis:   Final diagnoses:   Esophageal stenosis   Esophageal dysphagia   Hypokalemia     Allergies:   Allergies   Allergen Reactions     Gabapentin Visual Disturbance       Code Status: DNR / DNI  Activity level - Baseline/Home:  Independent. Activity Level - Current:   Assist X 1. Lift room needed: No. Bariatric: No   Needed: No   Isolation: No. Infection: Not Applicable.     Vital Signs:   Vitals:    02/20/23 1138 02/20/23 1930 02/20/23 1931   BP: (!) 159/109 (!) 174/93    Pulse: 94 68    Resp: 18     Temp: 97.8  F (36.6  C)     TempSrc: Temporal     SpO2: 94%  96%       Cardiac Rhythm:  ,      Pain level:    Patient confused: No. Patient Falls Risk: Yes.   Elimination Status: Has voided   Patient Report - Initial Complaint: Patient presents to the ED stating that his esophagus is \"closed down.\" Reports a history of frequent esophageal stretching. States has been unable to swallow since Friday afternoon.   . Focused Assessment: A&OX4. Pleasant and cooperative. NPO due to esophageal stenosis.    Tests Performed:   Labs Ordered and Resulted from Time of ED Arrival to Time of ED Departure   CBC WITH PLATELETS - Abnormal       Result Value    WBC Count 7.4      RBC Count 3.08 (*)     Hemoglobin 10.2 (*)     Hematocrit 33.6 (*)      (*)     MCH 33.1 (*)     MCHC 30.4 (*)     RDW 14.0      Platelet Count 360     BASIC METABOLIC PANEL - Abnormal    Sodium 145      Potassium 2.8 (*)     Chloride 105      Carbon Dioxide (CO2) 33 (*)     Anion Gap 7      Urea Nitrogen 6.3 (*)     Creatinine 0.98      Calcium 9.4      Glucose 102 (*)     GFR Estimate 80     MAGNESIUM - Normal    Magnesium 1.9       No orders to display     . Abnormal Results: see results.   Treatments provided: see MAR.   Family Comments: Wife went home.  OBS brochure/video discussed/provided to " patient:  No  ED Medications:   Medications   0.9% sodium chloride BOLUS (500 mLs Intravenous New Bag 2/20/23 1932)   potassium chloride 10 mEq in 100 mL sterile water infusion (10 mEq Intravenous New Bag 2/20/23 2001)     Drips infusing:  No  For the majority of the shift, the patient's behavior Green. Interventions performed were NA.    Sepsis treatment initiated: No     Patient tested for COVID 19 prior to admission: NO    ED Nurse Name/Phone Number: Chilo Isaacs RN,   8:02 PM  RECEIVING UNIT ED HANDOFF REVIEW    Above ED Nurse Handoff Report was reviewed: Yes  Reviewed by: Daisy Abdalla RN on February 21, 2023 at 4:43 PM

## 2023-02-21 NOTE — PLAN OF CARE
Pt. Alert to self and situation, denied pain, on N.P.O. for medical reason, will have ESOPHAGOGASTRODUODENOSCOPY today, SBA for mobility, potasium still replacing, P-3.3 last lab result, Mag-2.5, pt. Is on 2 L oxygen, on telemetry monitor.    BP (!) 161/96 (BP Location: Right leg)   Pulse 71   Temp 97.7  F (36.5  C) (Tympanic)   Resp 18   Wt 77.1 kg (170 lb)   SpO2 97%   BMI 30.11 kg/m

## 2023-02-21 NOTE — CONSULTS
Very pleasant 79-year-old gentleman well-known to GI practice with known history of hypertension coronary artery disease, dysphagia tight esophageal stricture which was evaluated patient had benign-appearing stricture in mid esophagus with lumen diameter of less than 5 mm which was dilated up to 13 mm week ago patient was supposed to have repeat dilation however patient developed worsening of symptoms patient presented to ER now with significant dysphagia and inability to swallow liquids and solids along with electrolyte abnormalities.  Finding and plan discussed with the ER team.  Plan to continue on supportive care IV fluid, n.p.o.  Plan to proceed with repeat upper GI endoscopy and dilation.    [facility]

## 2023-02-21 NOTE — ED PROVIDER NOTES
History     Chief Complaint:  Esophagus problem       The history is provided by the patient and a significant other.      Pacheco Torres is a 77 year old male with a history of esophageal stenosis who presents with esophagus stricture. The patient states that starting at the beginning of January, he has had to have his esophagus dilated every 2 weeks, having had 3 dilations so far. Starting about 3 days ago, he has been unable to swallow anything, though before, he had been able to keep half pills down. He was able to keep a few sips of soda down about 30 minutes ago and has been losing weight.    Independent Historian: Significant other    Review of External Notes: I reviewed a nursing home visit 1/20/23. He has since followed up with GI for difficulties swallowing. Scheduled for XR esophagram.     I reviewed an upper end note from 1/5/23. They found a pill in the upper throat of esophagus. Severe stenosis in upper throat, unable to pass endoscope, dilated to 7 cm, recommended repeat upper endoscopy in 2 weeks. I reviewed an esophagram 01/23/23 with contrast,     Reviewed office visit 02/08/23 at the patient's PCP [facility]. He went for  throat discomfort, and was on fluconosole for canita esophagitis, testing at that time inconclusive for malignancy.    Continuing on fuill liquid diet, pills crushed    ROS:  Review of Systems   Constitutional: Positive for unexpected weight change.   HENT: Positive for trouble swallowing.    All other systems reviewed and are negative.      Allergies:  Gabapentin  Codeine    Medications:    Maalox  Aspirin 81 mg  Lipitor  Cogentin  Coreg  Flexeril  Dilaudid  Duoneb  Xopenex  Lisinopril  Nitrostat  Protonix  Deltasone  Lyrica  Seroquel  Zoloft  Flomax  Thiamine    Past Medical History:    Hypertension  CAD  Depression  GERD  Hyperlipidemia  Macrocytic anemia  Monoclonal gammopathy  Obesity  PMR  Tobacco abuse  Vitamin D deficiency  Prostate cancer  Polymyalgia  rheumatica  Atherosclerotic heart disease  Pure hypercholesteremia  Monoclonal gammopathy  Osteoarthritis    Past Surgical History:     Coronary angiogram  Hernia repair  Meniscus repair  Cataract surgery    Family History:    Brother: alcoholism  Father: CAD, heart failure, colon cancer, dementia, prostate cancer, kidney failure  Mother: MI    Social History:  The patient presents with his significant other  PCP: Stuart Wolfe     Physical Exam     Patient Vitals for the past 24 hrs:   BP Temp Temp src Pulse Resp SpO2   02/20/23 1931 -- -- -- -- -- 96 %   02/20/23 1930 (!) 174/93 -- -- 68 -- --   02/20/23 1138 (!) 159/109 97.8  F (36.6  C) Temporal 94 18 94 %      Physical Exam  General:              Well-nourished              Speaking in full sentences              Attempted at swallow at bedside, and spit up food  Eyes:              Conjunctiva without injection or scleral icterus  ENT:              Moist mucous membranes              Nares patent              Pinnae normal  Neck:              Full ROM              No stiffness appreciated  Resp:              Lungs CTAB              No crackles, wheezing or audible rubs              Good air movement  CV:                    Normal rate, regular rhythm              S1 and S2 present              No murmur, gallop or rub  GI:              BS present              Abdomen soft without distention              Non-tender to light and deep palpation              No guarding or rebound tenderness  Skin:              Warm, dry, well perfused              No rashes or open wounds on exposed skin  MSK:              Moves all extremities              No focal deformities or swelling  Neuro:              Alert              Answers questions appropriately              Moves all extremities equally              Gait stable  Psych:              Normal affect, normal mood    Emergency Department Course   Laboratory:  Labs Ordered and Resulted from Time of ED Arrival to  Time of ED Departure   CBC WITH PLATELETS - Abnormal       Result Value    WBC Count 7.4      RBC Count 3.08 (*)     Hemoglobin 10.2 (*)     Hematocrit 33.6 (*)      (*)     MCH 33.1 (*)     MCHC 30.4 (*)     RDW 14.0      Platelet Count 360     BASIC METABOLIC PANEL - Abnormal    Sodium 145      Potassium 2.8 (*)     Chloride 105      Carbon Dioxide (CO2) 33 (*)     Anion Gap 7      Urea Nitrogen 6.3 (*)     Creatinine 0.98      Calcium 9.4      Glucose 102 (*)     GFR Estimate 80     MAGNESIUM - Normal    Magnesium 1.9        Emergency Department Course & Assessments:     Interventions:  Medications   0.9% sodium chloride BOLUS (500 mLs Intravenous New Bag 2/20/23 1932)   potassium chloride 10 mEq in 100 mL sterile water infusion (has no administration in time range)     Consultations/Discussion of Management or Tests:  ED Course as of 02/20/23 2001 Mon Feb 20, 2023 1917 I greeted the patient and obtained relevant information   1923 I spoke with MISSY Landry, regarding the patient   1948 I spoke with MISSY Bhat, regarding the patient   1952 I rechecked the patient, explained findings, and prepared for admission   1959 I consulted with Glenis Anderson for Dr. Beyer of the hospitalist service and discussed patient admission. They accepted care of the patient.         Social Determinants of Health affecting care:   None    Assessments:  See above    Disposition:  The patient was admitted to the hospital under the care of Dr. Beyer.     Impression & Plan    Medical Decision Making:  Pacheco Torres is a 77-year-old male with a complex PMH, most notable for esophageal stenosis requiring repeat dilations who presents to the ED with concerns for esophageal problem.  VS on presentation reveal elevated BP though otherwise are unremarkable.  History and evaluation concerning for recurrent esophageal stricture resulting in limited oral intake and esophageal dysphagia.  Patient reports possibly keeping some  liquids down while in triage, though reattempt at bedside unsuccessful.  Given patient's limited ability to tolerate p.o., signs of dehydration on exam and by laboratory analysis, as well as electrolyte derangements requiring IV repletion, will plan for hospital observation.  I discussed the case with gastroenterology, who graciously agreed to perform repeat EGD tomorrow for likely redilation of his known stenosis.  Patient and spouse updated at bedside and in agreement with outlined plan of care.  Questions of been answered prior to admission.    Diagnosis:    ICD-10-CM    1. Esophageal stenosis  K22.2       2. Esophageal dysphagia  R13.19       3. Hypokalemia  E87.6          Scribe Disclosure:  I, Zacarias Moncada, am serving as a scribe at 7:30 PM on 2/20/2023 to document services personally performed by Vasu Vera MD based on my observations and the provider's statements to me.     2/20/2023   Vasu Vera MD Roach, Brian Donald, MD  02/20/23 2019

## 2023-02-21 NOTE — PHARMACY-ADMISSION MEDICATION HISTORY
Admission medication history interview status for this patient is complete. See [facility] admission navigator for allergy information, prior to admission medications and immunization status.     Medication history interview done, indicate source(s): Patient  Medication history resources (including written lists, pill bottles, clinic record):[facility] list  Pharmacy: [facility] [place]    Changes made to PTA medication list:  Added: None  Changed: None  Reported as Not Taking: None  Removed: tylenol 325mg, tylenol supp, maalox, pred forte opth susp, prednisone, pregabalin    Actions taken by pharmacist (provider contacted, etc):None     Additional medication history information: Pt usually crushes his pills as unable to swallow pills whole. Pt mentioned that he hasn't taken his meds since last Thursday due to esophageal stricture. He is still waiting to get his nebulizer machine.    Medication reconciliation/reorder completed by provider prior to medication history?  N   (Y/N)   Medication Affordability:  Not including over the counter (OTC) medications, was there a time in the past 12 months when you did not take your medications as prescribed because of cost?: No       Prior to Admission medications    Medication Sig Last Dose Taking? Auth Provider Long Term End Date   acetaminophen (TYLENOL) 500 MG tablet Take 1,000 mg by mouth every 6 hours as needed for pain Past Week Yes Reported, Patient     aspirin (ASA) 81 MG EC tablet Take 81 mg by mouth daily  Yes Reported, Patient     atorvastatin (LIPITOR) 40 MG tablet Take 1 tablet (40 mg) by mouth every evening  Yes Musa Gray MD Yes    benztropine (COGENTIN) 1 MG tablet Take 1 tablet (1 mg) by mouth 3 times daily as needed for other (for extrapyramidal effects)  Yes Musa Gray MD Yes    calcium carbonate (OS-TAVO) 500 MG tablet Take 2 tablets by mouth daily  Yes Unknown, Entered By History     carvedilol (COREG) 25 MG tablet Take 1 tablet (25 mg) by mouth 2 times daily (with  meals) Past Week Yes Musa Gray MD Yes    cyanocobalamin (VITAMIN B-12) 1000 MCG tablet Take 1,000 mcg by mouth daily Past Week Yes Unknown, Entered By History     cyclobenzaprine (FLEXERIL) 5 MG tablet Take 5 mg by mouth nightly as needed  Yes Reported, Patient     diclofenac (VOLTAREN) 1 % topical gel Apply 4 g topically 4 times daily  Yes Musa Gray MD     ipratropium - albuterol 0.5 mg/2.5 mg/3 mL (DUONEB) 0.5-2.5 (3) MG/3ML neb solution Take 1 vial (3 mLs) by nebulization 4 times daily  Yes Musa Gray MD Yes    levalbuterol (XOPENEX) 0.63 MG/3ML neb solution Take 3 mLs (0.63 mg) by nebulization every 4 hours as needed for wheezing  Yes Musa Gray MD Yes    multivitamin w/minerals (THERA-VIT-M) tablet Take 1 tablet by mouth daily  Yes Musa Gray MD     nitroGLYcerin (NITROSTAT) 0.4 MG sublingual tablet Place 0.4 mg under the tongue every 5 minutes as needed for chest pain  Yes Reported, Patient Yes    pantoprazole (PROTONIX) 40 MG EC tablet Take 1 tablet (40 mg) by mouth 2 times daily (before meals)  Yes Musa Gray MD     QUEtiapine (SEROQUEL) 25 MG tablet Take 0.5 tablets (12.5 mg) by mouth At Bedtime Past Week Yes Musa Gray MD Yes    sertraline (ZOLOFT) 100 MG tablet Take 100 mg by mouth daily Past Week Yes Reported, Patient Yes    tamsulosin (FLOMAX) 0.4 MG capsule Take 0.4 mg by mouth daily Past Week Yes Reported, Patient     thiamine (B-1) 100 MG tablet Take 1 tablet (100 mg) by mouth daily  Yes Musa Gray MD     vitamin D3 (CHOLECALCIFEROL) 50 mcg (2000 units) tablet Take 1 tablet by mouth daily  Yes Unknown, Entered By History     lisinopril (ZESTRIL) 10 MG tablet Take 10 mg by mouth daily   Reported, Patient Yes

## 2023-02-21 NOTE — PROGRESS NOTES
[facility]  Gastroenterology Progress Note     Pacheco Torres MRN# [id number]   YOB: 1945 Age: 77 year old          Assessment and Plan:     Patient did well overnight patient continues to complain of difficulty swallowing patient is having significant trouble keeping liquid.  Upper GI endoscopy showed impacted piece in the previously open stricture esophageal lumen was 7 to 8 mm.  Esophagus was dilated up to 13 mm scar and esophagitis noticed in mid esophagus low-grade stricture also noticed in the distal esophagus scope was advanced up to second portion of duodenum rest of the exam appears unremarkable.  I will recommend to start on clear liquid diet advance to full liquid diet patient can be discharged on full liquid diet plan for repeat endoscopy in 1 to 2 weeks in the meantime continue on Protonix and Carafate liquid 1 g 4 times a day.  Patient's finding and plan discussed with the hospitalist team.            Esophageal stenosis  Esophageal dysphagia  Hypokalemia      Interval History:     doing well; no cp, sob, n/v/d, or abd pain.              Review of Systems:     C: NEGATIVE for fever, chills, change in weight  E/M: NEGATIVE for ear, mouth and throat problems  R: NEGATIVE for significant cough or SOB  CV: NEGATIVE for chest pain, palpitations or peripheral edema             Medications:   I have reviewed this patient's current medications    pantoprazole  40 mg Intravenous Q12H     potassium chloride  10 mEq Intravenous Q1H     sodium chloride (PF)  3 mL Intracatheter Q8H     sodium chloride (PF)  3 mL Intracatheter Q8H     sodium chloride (PF)  3 mL Intracatheter Q8H                  Physical Exam:   Vitals were reviewed  Vital Signs with Ranges  Temp:  [97  F (36.1  C)-97.7  F (36.5  C)] 97  F (36.1  C)  Pulse:  [64-96] 82  Resp:  [10-23] 20  BP: (149-179)/() 174/106  SpO2:  [90 %-98 %] 93 %  No intake/output data recorded.  Constitutional: healthy, alert and  no distress   Cardiovascular: negative, PMI normal. No lifts, heaves, or thrills. RRR. No murmurs, clicks gallops or rub  Respiratory: negative, Percussion normal. Good diaphragmatic excursion. Lungs clear  Head: Normocephalic. No masses, lesions, tenderness or abnormalities  Neck: Neck supple. No adenopathy. Thyroid symmetric, normal size,, Carotids without bruits.  Abdomen: Abdomen soft, non-tender. BS normal. No masses, organomegaly  SKIN: no suspicious lesions or rashes           Data:   I reviewed the patient's new clinical lab test results.   Recent Labs   Lab Test 02/20/23  1141 12/26/22  0543 12/19/22  0608 12/17/22  0723 12/06/22  1951 12/06/22  1421   WBC 7.4  --  5.7 4.6   < > 7.6   HGB 10.2* 9.2* 8.8* 8.9*   < > 11.5*   *  --  111* 112*   < > 103*     --  427 337   < > 331   INR  --   --   --   --   --  1.03    < > = values in this interval not displayed.     Recent Labs   Lab Test 02/21/23  0836 02/20/23  2146 02/20/23  1141 12/26/22  0543   POTASSIUM 3.3* 2.8* 2.8* 4.1   CHLORIDE 107  --  105 98   CO2 31*  --  33* 25   BUN 8.3  --  6.3* 13.2   ANIONGAP 8  --  7 10     Recent Labs   Lab Test 12/06/22  1608 12/06/22  1421 11/03/22  1221 11/02/22  1550   ALBUMIN  --  3.7 3.7 3.7   BILITOTAL  --  0.3 0.3 0.2   ALT  --  17 16 14   AST  --  78* 86* 74*   PROTEIN 30*  --   --   --    LIPASE  --  143*  --   --        I reviewed the patient's new imaging results.    All laboratory data reviewed  All imaging studies reviewed by me.    Gómez Caicedo MD,  2/21/2023  [facility]  Office : [phone]  Cell:       [facility]  Ph: [phone] Fax: [phone]

## 2023-02-21 NOTE — PROGRESS NOTES
[facility]    Hospitalist Progress Note  Provider : Xu Kwok MD, MD  Date of Service (when I saw the patient): 02/21/2023    Assessment & Plan   Pacheco Torres is a 77 year old male with a history of HTN, OA, CAD, HLD, Depression, PMR, Monoclonal Gammopathy, Prostate Cancer, Obesity, Chronic Back Pain, GERD, Esophagitis, Esophageal stricture who presents to the ED today with dysphagia.     Dysphagia  Hx Esophageal Stricture requiring repeat Dilatation   -Patient has a history of GERD, Candida Esophagitis and Esophageal strictures in the past.  Most recent EGD on file to review was with [facility] 1/5/23 which revealed esophageal stenosis which was dilated. Biopsy was obtained (I am not able to see pathology).  Esophagram 1/23/23 revealed an Indeterminate mid esophageal moderate to severe persistent stricture, concerning for mass.       -Patient reports he has repeat dilatations since that time, most recently on 2/10/23 and planned for repeat on 2/24/23.  Unfortunately, starting last week, he has had progressively worsening dysphagia with pills, food, liquids and has had limited po intake.  -We will keep him n.p.o.  We will continue IV fluids.    -Protonix switched to IV.  -Gastroenterology consulted and planning to proceed with repeat upper endoscopy and dilation today  -Discussed with Dr. Caicedo and he started him on clears and then advance to full liquid diet. He recommended to discharge him on full liquid diet, Carafate and protonix and follow up with GI as outpatient.   Addendum: notified by RN that patient was regurgitated liquids twice and coughing both times. Will keep him NPO for now and try later. Will hold discharge for now  -Will continue IV fluids    Hypokalemia.   -Replace with potassium replacement protocol     Hypertension  HLD  CAD - hx NSTEMI s/p DAYAN placed to LAD May 2008  - resume statin  -Has had no problems ASA, Atorvastatin, Coreg, Lisinopril  when able to take po  - IV Hydralazine prn     Chronic Hypoxic Respiratory Failure - on 1 liter of oxygen at baseline.  Hx of tobacco abuse and COPD. -reports he has been on oxygen since his hospital stay 12/2022; treated for PNA and COPD at that time.  Currently at baseline.  - duonebs prn     Chronic Anemia - hgb 10.2 at recent baseline.  B12/Folic Acid normal 9/2022.  No recent iron studies on file.  Hx MGUS with no recent follow up.  - add on iron studies     Chronic Back Pain  OA  Polymyalgia Rheumatica - followed by pain clinic.  On Lyrica, Tylenol, Flexeril, Lyrica, pta; resume when taking po.  IV Dilaudid prn for now.     Depression -continue pta Sertraline, Seroquel when taking po       Hx Prostate Cancer - reported to be in remission. On Flomax pta.    DVT Prophylaxis: Pneumatic Compression Devices    Code Status: Prior    Disposition: Expected discharge in 1-2 days     Xu Kwok MD    Interval History   Patient seen and examined today.  Chart reviewed.  He states that he is feeling thirsty.  Denies abdominal pain or vomiting today.    -Data reviewed today: I reviewed all new labs and imaging results over the last 24 hours. I personally reviewed    Physical Exam   Temp: 97.7  F (36.5  C) Temp src: Tympanic BP: (!) 161/96 Pulse: 71   Resp: 18 SpO2: 97 % O2 Device: Nasal cannula Oxygen Delivery: 2 LPM  Vitals:    02/21/23 0200   Weight: 77.1 kg (170 lb)     Vital Signs with Ranges  Temp:  [97.7  F (36.5  C)-97.8  F (36.6  C)] 97.7  F (36.5  C)  Pulse:  [68-94] 71  Resp:  [18] 18  BP: (155-179)/() 161/96  SpO2:  [94 %-98 %] 97 %  No intake/output data recorded.    GEN:  Alert, oriented x 3, appears comfortable, NAD.  HEENT:  Normocephalic/atraumatic, no scleral icterus, no nasal discharge, mouth moist.  CV:  Regular rate and rhythm, no murmur or JVD.  S1 + S2 noted, no S3 or S4.  LUNGS:  Clear to auscultation bilaterally without rales/rhonchi/wheezing/retractions.  Symmetric chest rise on  inhalation noted.  ABD:  Active bowel sounds, soft, non-tender/non-distended.  No rebound/guarding/rigidity.  EXT:  No edema or cyanosis.  Hands/feet warm to touch with good signs of peripheral perfusion.  No joint synovitis noted.  SKIN:  Dry to touch, no exanthems noted in the visualized areas.  NEURO:  Symmetric muscle strength, sensation to touch grossly intact.  No new focal deficits appreciated.    Medications       pantoprazole  40 mg Intravenous Q12H     potassium chloride  10 mEq Intravenous Q1H     sodium chloride (PF)  3 mL Intracatheter Q8H       Data   Recent Labs   Lab 02/21/23  0836 02/20/23  2146 02/20/23  1141   WBC  --   --  7.4   HGB  --   --  10.2*   MCV  --   --  109*   PLT  --   --  360   *  --  145   POTASSIUM 3.3* 2.8* 2.8*   CHLORIDE 107  --  105   CO2 31*  --  33*   BUN 8.3  --  6.3*   CR 0.90  --  0.98   ANIONGAP 8  --  7   TAVO 9.2  --  9.4   GLC 94  --  102*       No results found for this or any previous visit (from the past 24 hour(s)).

## 2023-02-21 NOTE — PROVIDER NOTIFICATION
1717: Som page: Two swallow trials attempted. Patient foams and swishes liquids in mouth and spits out. Coughing both times.     Orders: Dr. Kwok - hold discharge. Keep NPO with ice chips for now. Attempt clears later. Continue IV fluids,

## 2023-02-21 NOTE — PLAN OF CARE
Goal Outcome Evaluation:      Patient on tele monitor, alert oriented x4, vital sign stable, denies pain, no fever, pt up standby assist, voids ok, pt on 1L 02  via NC, pt is NPO states has been unable to swallow since Friday afternoon. Magnesium and potassium replaced and recheck this morning.

## 2023-02-21 NOTE — H&P
[facility]  Internal Medicine  H & P      Patient Name: Pacheco Torres MRN# [id number]   Age: 77 year old YOB: 1945     Date of Admission:2/20/2023    Primary care provider: Stuart Wolfe  Date of Service: 2/20/2023         Assessment and Plan:   Pacheco Torres is a 77 year old male with a history of HTN, OA, CAD, HLD, Depression, PMR, Monoclonal Gammopathy, Prostate Cancer, Obesity, Chronic Back Pain, GERD, Esophagitis, Esophageal Stricture who presents to the ED today with dysphagia.      Dysphagia  Hx Esophageal Stricture requiring repeat Dilatation - Patient has a history of GERD, Candida Esophagitis and Esophageal strictures in the past.  Most recent EGD on file to review was with [facility] 1/5/23 which revealed esophageal stenosis which was dilated.  Biopsy was obtained (I am not able to see pathology).  Esophagram 1/23/23 revealed an Indeterminate mid esophageal moderate to severe persistent stricture, concerning for mass.       Pt reports he has repeat dilatations since that time, most recently on 2/10/23 and planned for repeat on 2/24/23.  Unfortunately, starting last week, he has had progressively worsening dysphagia with pills, food, liquids and has had limited po intake.  - npo  - IVF  - GI consult for EGD, pt of Dr. Caicedo  - change po Protonix to IV         HTN  HLD  CAD - hx NSTEMI s/p DAYAN placed to LAD May 2008  - resume ASA, Atorvastatin, Coreg, Lisinopril when able to take po  - IV Hydralazine prn    Chronic Hypoxic Respiratory Failure - on 1 liter of oxygen at baseline.  Hx of tobacco abuse and COPD.  Pt reports he has been on oxygen since his hospital stay 12/2022; treated for PNA and COPD at that time.  Currently at baseline.  - duonebs prn    Chronic Anemia - hgb 10.2 at recent baseline.  B12/Folic Acid normal 9/2022.  No recent iron studies on file.  Hx MGUS with no recent follow up.  - add on iron studies    Chronic Back  "Pain  OA  Polymyalgia Rheumatica - followed by pain clinic.  On Lyrica, Tylenol, Flexeril, Lyrica, pta; resume when taking po.  IV Dilaudid prn for now.    Depression - resume pta Sertraline, Seroquel when taking po      Hx Prostate Cancer - reported to be in remission.  On Flomax pta.    Glenis Trivedi MS, PA-C  Physician Assistant   Hospitalist Service  Pager: [phone]           Chief Complaint:   \"esophageal problem\"         HPI:   77 year old male with a history of HTN, OA, CAD, HLD, Depression, PMR, Monoclonal Gammopathy, Prostate Cancer, Obesity, Chronic Back Pain, GERD, Esophagitis, Esophageal Stricture who presents to the ED today with dysphagia.    Patient has a history of GERD, Candida Esophagitis and Esophageal strictures starting in January this year. Pt reports he has repeat dilatations since that time, most recently on 2/10/23 and planned for repeat on 2/24/23.  Unfortunately, starting last week, he has had progressively worsening dysphagia with pills, food, liquids and has had limited po intake.               Past Medical History:     Past Medical History:   Diagnosis Date     Benign essential hypertension      Coronary artery disease involving autologous artery coronary bypass graft without angina pectoris     s/p DAYAN LAD 5/08     Depression      Gastroesophageal reflux disease with esophagitis      Hyperlipidemia LDL goal <70      Macrocytic anemia     11-12 range with MCV in the 105-107 range.  normal B12 and folate     Monoclonal gammopathy      Obesity      PMR (polymyalgia rheumatica) (H)      Tobacco abuse      Vitamin D deficiency           Past Surgical History:     Past Surgical History:   Procedure Laterality Date     CORONARY ANGIOPLASTY  05/2008    with DAYAN to the LAD     HERNIA REPAIR            Social History:     Social History     Socioeconomic History     Marital status: Single     Spouse name: Not on file     Number of children: Not on file     Years of education: Not on file     " Highest education level: Not on file   Occupational History     Not on file   Tobacco Use     Smoking status: Former     Types: Cigarettes     Quit date: 2010     Years since quittin.1     Smokeless tobacco: Not on file   Substance and Sexual Activity     Alcohol use: Not on file     Drug use: Not on file     Sexual activity: Not on file   Other Topics Concern     Not on file   Social History Narrative     Not on file     Social Determinants of Health     Financial Resource Strain: Not on file   Food Insecurity: Not on file   Transportation Needs: Not on file   Physical Activity: Not on file   Stress: Not on file   Social Connections: Not on file   Intimate Partner Violence: Not on file   Housing Stability: Not on file          Family History:     Family History   Problem Relation Age of Onset     Coronary Artery Disease Mother      Coronary Artery Disease Father      Heart Failure Father      Alcoholism Brother           Allergies:      Allergies   Allergen Reactions     Gabapentin Visual Disturbance          Medications:     Prior to Admission medications    Medication Sig Last Dose Taking? Auth Provider Long Term End Date   acetaminophen (TYLENOL) 325 MG tablet Take 2 tablets (650 mg) by mouth every 6 hours as needed for mild pain or other (and adjunct with moderate or severe pain or per patient request)   Musa Gray MD     acetaminophen (TYLENOL) 500 MG tablet Take 1,000 mg by mouth every 6 hours as needed for pain   Reported, Patient     acetaminophen (TYLENOL) 650 MG suppository Place 1 suppository (650 mg) rectally every 6 hours as needed for mild pain or other (and adjunct with moderate or severe pain or per patient request)   Musa Gray MD     alum & mag hydroxide-simethicone (MAALOX) 200-200-20 MG/5ML SUSP suspension Take 30 mLs by mouth every 4 hours as needed for indigestion   Musa Gray MD     aspirin (ASA) 81 MG EC tablet Take 81 mg by mouth daily   Reported, Patient      atorvastatin (LIPITOR) 40 MG tablet Take 1 tablet (40 mg) by mouth every evening   Musa Gray MD Yes    atropine 1 % ophthalmic solution Place 1 drop into the right eye 2 times daily   Unknown, Entered By History     benztropine (COGENTIN) 1 MG tablet Take 1 tablet (1 mg) by mouth 3 times daily as needed for other (for extrapyramidal effects)   Musa Gray MD Yes    calcium carbonate (OS-TAVO) 500 MG tablet Take 2 tablets by mouth daily   Unknown, Entered By History     carvedilol (COREG) 25 MG tablet Take 1 tablet (25 mg) by mouth 2 times daily (with meals)   Musa Gray MD Yes    cyanocobalamin (VITAMIN B-12) 1000 MCG tablet Take 1,000 mcg by mouth daily   Unknown, Entered By History     cyclobenzaprine (FLEXERIL) 5 MG tablet Take 5 mg by mouth nightly as needed   Reported, Patient     diclofenac (VOLTAREN) 1 % topical gel Apply 4 g topically 4 times daily   Musa Gray MD     HYDROmorphone (DILAUDID) 2 MG tablet Take 1 tablet (2 mg) by mouth every 3 hours as needed for moderate pain (4-6)   Musa Gray MD     ipratropium - albuterol 0.5 mg/2.5 mg/3 mL (DUONEB) 0.5-2.5 (3) MG/3ML neb solution Take 1 vial (3 mLs) by nebulization 4 times daily   Musa Gray MD Yes    levalbuterol (XOPENEX) 0.63 MG/3ML neb solution Take 3 mLs (0.63 mg) by nebulization every 4 hours as needed for wheezing   Musa Gray MD Yes    lisinopril (ZESTRIL) 10 MG tablet Take 10 mg by mouth daily   Reported, Patient Yes    menthol, Topical Analgesic, 2.5% (BENGAY VANISHING SCENT) 2.5 % GEL topical gel Apply topically 4 times daily   Musa Gray MD     miconazole (MICATIN) 2 % external powder Apply topically 2 times daily   Musa Gray MD     multivitamin w/minerals (THERA-VIT-M) tablet Take 1 tablet by mouth daily   Musa Gray MD     nitroGLYcerin (NITROSTAT) 0.4 MG sublingual tablet Place 0.4 mg under the tongue every 5 minutes as needed for chest pain   Reported, Patient Yes     pantoprazole (PROTONIX) 40 MG EC tablet Take 1 tablet (40 mg) by mouth 2 times daily (before meals)   Musa Gray MD     prednisoLONE acetate (PRED FORTE) 1 % ophthalmic suspension Place 1 drop into both eyes 3 times daily   Unknown, Entered By History     predniSONE (DELTASONE) 10 MG tablet Take 1 tablet (10 mg) by mouth daily   Musa Gray MD     pregabalin (LYRICA) 25 MG capsule Take 1 capsule (25 mg) by mouth 3 times daily   Musa Gray MD Yes    QUEtiapine (SEROQUEL) 25 MG tablet Take 0.5 tablets (12.5 mg) by mouth At Bedtime   Musa Gray MD Yes    sertraline (ZOLOFT) 100 MG tablet Take 100 mg by mouth daily   Reported, Patient Yes    tamsulosin (FLOMAX) 0.4 MG capsule Take 0.4 mg by mouth daily   Reported, Patient     thiamine (B-1) 100 MG tablet Take 1 tablet (100 mg) by mouth daily   Musa Gray MD     vitamin D3 (CHOLECALCIFEROL) 50 mcg (2000 units) tablet Take 1 tablet by mouth daily   Unknown, Entered By History            Review of Systems:   A complete ROS was performed and is negative other than what is stated in the HPI.       Physical Exam:   Blood pressure (!) 174/93, pulse 68, temperature 97.8  F (36.6  C), temperature source Temporal, resp. rate 18, SpO2 96 %.  General: Alert, interactive, NAD  HEENT: AT/NC, sclera anicteric, PERRL, EOMI, MMM  Neck: Supple, no JVD  Chest/Resp: clear to auscultation bilaterally, no crackles or wheezes  Heart/CV: regular rate and rhythm, no murmur  Abdomen/GI: Soft, nontender, nondistended. +BS.  No rebound or guarding.  Extremities/MSK: No LE edema  Skin: Warm and dry  Neuro: Alert & oriented x 3, no focal deficits, moves all extremities equally         Labs:   ROUTINE ICU LABS (Last four results)  CMP  Recent Labs   Lab 02/20/23  1141      POTASSIUM 2.8*   CHLORIDE 105   CO2 33*   ANIONGAP 7   *   BUN 6.3*   CR 0.98   GFRESTIMATED 80   TAVO 9.4   MAG 1.9     CBC  Recent Labs   Lab 02/20/23  1141   WBC 7.4   RBC 3.08*    HGB 10.2*   HCT 33.6*   *   MCH 33.1*   MCHC 30.4*   RDW 14.0        INRNo lab results found in last 7 days.  Arterial Blood GasNo lab results found in last 7 days.       Imaging/Procedures:     Results for orders placed or performed during the hospital encounter of 01/31/23   CT Chest Abdomen w Contrast    Narrative    CT CHEST AND ABDOMEN WITH CONTRAST January 31, 2023 12:28 PM     HISTORY: Esophageal obstruction. Ulcer of esophagus without bleeding.  Dysphagia, unspecified.    COMPARISON: December 11, 2022.    TECHNIQUE: Volumetric helical acquisition of CT images of the chest  and abdomen after the administration of 101ML isovue 370 intravenous   contrast. Radiation dose for this scan was reduced using automated  exposure control, adjustment of the mA and/or kV according to patient  size, or iterative reconstruction technique.    FINDINGS:     LUNGS AND PLEURA: 4 mm nodule in left lower lobe image 108. 3 mm  nodule left upper lobe image 72. These were in areas of infiltrate on  the comparison study. No infiltrates or effusions.    MEDIASTINUM/AXILLAE: No lymphadenopathy. No thoracic aortic aneurysms.  Small amount of fluid in the esophagus with areas of increased  density.    CORONARY ARTERY CALCIFICATIONS: Severe and/or stents.    HEPATOBILIARY: No significant mass or bile duct dilatation. No  calcified gallstones. Low-attenuation subcentimeter liver lesion(s)  compatible with benign cysts or other benign lesions. No specific  evaluation or follow-up is recommended in a low risk patient.    PANCREAS: No significant mass, duct dilatation, or inflammatory  change.    SPLEEN: Normal size.    ADRENAL GLANDS: No significant nodules.    KIDNEYS/BLADDER: No significant mass, stones, or hydronephrosis.    BOWEL: No obstruction or inflammatory change.    ADDITIONAL FINDINGS: No ascites.    MUSCULOSKELETAL: No frankly destructive bony lesions. Multifocal  thoracolumbar compression deformities. These  appear similar to  previous.      Impression    IMPRESSION:   1. Small amount of fluid in the esophagus. Hyperdensity within the  esophageal lumen is noted which may be ingested contents. Active  extravasation given history would be difficult to exclude in this  setting.  2. Pulmonary nodules measuring up to 4 mm noted, in areas of  infiltrate on the comparison study.    Recommendations for one or multiple incidental lung nodules < 6mm :    Low risk patients: No routine follow-up.    High risk patients: Optional follow-up CT at 12 months; if  unchanged, no further follow-up.    *Low Risk: Minimal or absent history of smoking or other known risk  factors.  *Nonsolid (ground glass) or partly solid nodules may require longer  follow-up to exclude indolent adenocarcinoma.  *Recommendations based on Guidelines for the Management of Incidental  Pulmonary Nodules Detected at CT: From the Fleischner Society 2017,  Radiology 2017.    CONRADO CARTWRIGHT MD         SYSTEM ID:  [id number]

## 2023-02-22 LAB
ANION GAP SERPL CALCULATED.3IONS-SCNC: 7 MMOL/L (ref 7–15)
BUN SERPL-MCNC: 7.4 MG/DL (ref 8–23)
CALCIUM SERPL-MCNC: 9.2 MG/DL (ref 8.8–10.2)
CHLORIDE SERPL-SCNC: 108 MMOL/L (ref 98–107)
CREAT SERPL-MCNC: 0.84 MG/DL (ref 0.67–1.17)
DEPRECATED HCO3 PLAS-SCNC: 31 MMOL/L (ref 22–29)
ERYTHROCYTE [DISTWIDTH] IN BLOOD BY AUTOMATED COUNT: 14.1 % (ref 10–15)
GFR SERPL CREATININE-BSD FRML MDRD: 90 ML/MIN/1.73M2
GLUCOSE SERPL-MCNC: 97 MG/DL (ref 70–99)
HCT VFR BLD AUTO: 29.7 % (ref 40–53)
HGB BLD-MCNC: 9.1 G/DL (ref 13.3–17.7)
MAGNESIUM SERPL-MCNC: 2.2 MG/DL (ref 1.7–2.3)
MAGNESIUM SERPL-MCNC: 2.2 MG/DL (ref 1.7–2.3)
MCH RBC QN AUTO: 33.7 PG (ref 26.5–33)
MCHC RBC AUTO-ENTMCNC: 30.6 G/DL (ref 31.5–36.5)
MCV RBC AUTO: 110 FL (ref 78–100)
PLATELET # BLD AUTO: 276 10E3/UL (ref 150–450)
POTASSIUM SERPL-SCNC: 3.3 MMOL/L (ref 3.4–5.3)
POTASSIUM SERPL-SCNC: 3.5 MMOL/L (ref 3.4–5.3)
RBC # BLD AUTO: 2.7 10E6/UL (ref 4.4–5.9)
SODIUM SERPL-SCNC: 146 MMOL/L (ref 136–145)
WBC # BLD AUTO: 6.2 10E3/UL (ref 4–11)

## 2023-02-22 PROCEDURE — 84132 ASSAY OF SERUM POTASSIUM: CPT | Mod: 91 | Performed by: INTERNAL MEDICINE

## 2023-02-22 PROCEDURE — 80048 BASIC METABOLIC PNL TOTAL CA: CPT | Performed by: INTERNAL MEDICINE

## 2023-02-22 PROCEDURE — 250N000011 HC RX IP 250 OP 636: Performed by: PHYSICIAN ASSISTANT

## 2023-02-22 PROCEDURE — 83735 ASSAY OF MAGNESIUM: CPT | Performed by: PHYSICIAN ASSISTANT

## 2023-02-22 PROCEDURE — 36415 COLL VENOUS BLD VENIPUNCTURE: CPT | Performed by: INTERNAL MEDICINE

## 2023-02-22 PROCEDURE — C9113 INJ PANTOPRAZOLE SODIUM, VIA: HCPCS | Performed by: PHYSICIAN ASSISTANT

## 2023-02-22 PROCEDURE — 85027 COMPLETE CBC AUTOMATED: CPT | Performed by: INTERNAL MEDICINE

## 2023-02-22 PROCEDURE — 99233 SBSQ HOSP IP/OBS HIGH 50: CPT | Performed by: PHYSICIAN ASSISTANT

## 2023-02-22 PROCEDURE — 83735 ASSAY OF MAGNESIUM: CPT | Performed by: NURSE PRACTITIONER

## 2023-02-22 PROCEDURE — G0378 HOSPITAL OBSERVATION PER HR: HCPCS

## 2023-02-22 PROCEDURE — 250N000013 HC RX MED GY IP 250 OP 250 PS 637: Performed by: PHYSICIAN ASSISTANT

## 2023-02-22 PROCEDURE — 96361 HYDRATE IV INFUSION ADD-ON: CPT

## 2023-02-22 PROCEDURE — 96376 TX/PRO/DX INJ SAME DRUG ADON: CPT

## 2023-02-22 RX ORDER — NALOXONE HYDROCHLORIDE 0.4 MG/ML
0.4 INJECTION, SOLUTION INTRAMUSCULAR; INTRAVENOUS; SUBCUTANEOUS
Status: DISCONTINUED | OUTPATIENT
Start: 2023-02-22 | End: 2023-02-23 | Stop reason: HOSPADM

## 2023-02-22 RX ORDER — NALOXONE HYDROCHLORIDE 0.4 MG/ML
0.2 INJECTION, SOLUTION INTRAMUSCULAR; INTRAVENOUS; SUBCUTANEOUS
Status: DISCONTINUED | OUTPATIENT
Start: 2023-02-22 | End: 2023-02-23 | Stop reason: HOSPADM

## 2023-02-22 RX ORDER — LISINOPRIL 10 MG/1
10 TABLET ORAL DAILY
Status: DISCONTINUED | OUTPATIENT
Start: 2023-02-22 | End: 2023-02-23 | Stop reason: HOSPADM

## 2023-02-22 RX ORDER — ATORVASTATIN CALCIUM 40 MG/1
40 TABLET, FILM COATED ORAL EVERY EVENING
Status: DISCONTINUED | OUTPATIENT
Start: 2023-02-22 | End: 2023-02-23 | Stop reason: HOSPADM

## 2023-02-22 RX ORDER — SODIUM CHLORIDE AND POTASSIUM CHLORIDE 150; 450 MG/100ML; MG/100ML
INJECTION, SOLUTION INTRAVENOUS CONTINUOUS
Status: DISCONTINUED | OUTPATIENT
Start: 2023-02-22 | End: 2023-02-23 | Stop reason: HOSPADM

## 2023-02-22 RX ORDER — CARVEDILOL 25 MG/1
25 TABLET ORAL 2 TIMES DAILY WITH MEALS
Status: DISCONTINUED | OUTPATIENT
Start: 2023-02-22 | End: 2023-02-23 | Stop reason: HOSPADM

## 2023-02-22 RX ORDER — ASPIRIN 81 MG/1
81 TABLET ORAL DAILY
Status: DISCONTINUED | OUTPATIENT
Start: 2023-02-22 | End: 2023-02-23 | Stop reason: HOSPADM

## 2023-02-22 RX ORDER — POTASSIUM CHLORIDE 7.45 MG/ML
10 INJECTION INTRAVENOUS
Status: COMPLETED | OUTPATIENT
Start: 2023-02-22 | End: 2023-02-22

## 2023-02-22 RX ADMIN — LISINOPRIL 10 MG: 10 TABLET ORAL at 11:52

## 2023-02-22 RX ADMIN — POTASSIUM CHLORIDE 10 MEQ: 7.46 INJECTION, SOLUTION INTRAVENOUS at 11:56

## 2023-02-22 RX ADMIN — POTASSIUM CHLORIDE 10 MEQ: 7.46 INJECTION, SOLUTION INTRAVENOUS at 09:44

## 2023-02-22 RX ADMIN — ATORVASTATIN CALCIUM 40 MG: 40 TABLET, FILM COATED ORAL at 20:12

## 2023-02-22 RX ADMIN — PANTOPRAZOLE SODIUM 40 MG: 40 INJECTION, POWDER, FOR SOLUTION INTRAVENOUS at 09:41

## 2023-02-22 RX ADMIN — ASPIRIN 81 MG: 81 TABLET, COATED ORAL at 11:52

## 2023-02-22 RX ADMIN — CARVEDILOL 25 MG: 25 TABLET, FILM COATED ORAL at 18:25

## 2023-02-22 RX ADMIN — CARVEDILOL 25 MG: 25 TABLET, FILM COATED ORAL at 11:52

## 2023-02-22 RX ADMIN — PANTOPRAZOLE SODIUM 40 MG: 40 INJECTION, POWDER, FOR SOLUTION INTRAVENOUS at 20:12

## 2023-02-22 RX ADMIN — POTASSIUM CHLORIDE AND SODIUM CHLORIDE: 450; 150 INJECTION, SOLUTION INTRAVENOUS at 13:32

## 2023-02-22 ASSESSMENT — ACTIVITIES OF DAILY LIVING (ADL)
ADLS_ACUITY_SCORE: 32
ADLS_ACUITY_SCORE: 32
ADLS_ACUITY_SCORE: 26
ADLS_ACUITY_SCORE: 32
ADLS_ACUITY_SCORE: 26
ADLS_ACUITY_SCORE: 32
ADLS_ACUITY_SCORE: 26
ADLS_ACUITY_SCORE: 26
ADLS_ACUITY_SCORE: 32
ADLS_ACUITY_SCORE: 32

## 2023-02-22 NOTE — CONSULTS
Care Management Discharge Note    Discharge Date: 02/22/2023     Discharge Disposition:  Home    Discharge Transportation:  Family or friend    Private pay costs discussed: Not applicable    Handoff Referral Completed: No    Additional Information:  Patient identified as an elevated unplanned readmission risk. Patient was admitted 2/20 for hypokalemia, esophageal dysphasia. GI following. Patient lives at home with spouse. Anticipated discharge to home today. No CC/SW needs identified this admission.     YVONNE Dan, [facility]   Inpatient Care Coordination  [facility]   [phone]

## 2023-02-22 NOTE — PLAN OF CARE
PRIMARY DIAGNOSIS: Esophageal Stenosis   OUTPATIENT/OBSERVATION GOALS TO BE MET BEFORE DISCHARGE:  1. Pain Status: Pain free.    2. Return to near baseline physical activity: Yes    3. Cleared for discharge by consultants (if involved): No    Discharge Planner Nurse   Safe discharge environment identified: Yes  Barriers to discharge: Yes       Entered by: Sophie Elliott RN 02/22/2023    Please review provider order for any additional goals.   Nurse to notify provider when observation goals have been met and patient is ready for discharge.    Patient A&O x4. IV infusing. On full liquid diet, tolerating well. SBA in room. On Mg (2.2) & K (3.5) High RN replacement protocol. Tolerates oral medication. Currently resting in bed, able to make needs known.

## 2023-02-22 NOTE — PROGRESS NOTES
SPIRITUAL HEALTH SERVICES  SPIRITUAL ASSESSMENT Progress Note  [facility]. Unit Obs     REFERRAL SOURCE: Pt request for spiritual care on admission.    Daniel declined support at this time stating he did not recall making a request for spiritual care and does not usually ask for a .    Spiritual Health remains available at patient's request for the duration of admission.    Eliza Crespo MDiv  Staff   [facility] routine referrals *[phone]  [facility] available 24/7 for emergent requests/referrals, either by having the on-call  paged or by entering an ASAP/STAT consult in Epic (this will also page the on-call ).

## 2023-02-22 NOTE — PLAN OF CARE
PRIMARY DIAGNOSIS: Esophageal stenosis,Esophageal dysphagia, Hypokalemia  OUTPATIENT/OBSERVATION GOALS TO BE MET BEFORE DISCHARGE:  1. Stable vital signs Yes  2. Tolerating diet:No, unable to swallow with out coughing.  3. Pain controlled with oral pain medications:  Yes  4. Positive bowel sounds:  Yes  5. Voiding without difficulty:  Yes  6. Able to ambulate:  Yes  7. Provider specific discharge goals met:  No    Discharge Planner Nurse   Safe discharge environment identified: Yes  Barriers to discharge: Yes       Entered by: Phyllis Morales RN 02/21/2023 6:52 PM   Pt was cleared for discharge but unable to swallow safely. See provider notification.  Please review provider order for any additional goals.   Nurse to notify provider when observation goals have been met and patient is ready for discharge.

## 2023-02-22 NOTE — PLAN OF CARE
Goal Outcome Evaluation:    Patient alert & oriented x 4.VSS. SBA with transfers.PIV saline locked.On full liquid diet.Mag,K+ protocol.D/C on hold due to failure to hold fluids down.Gastroentrology following.Plan to repeat endoscopy in 1-2 weeks post discharge.Continues to be monitored.

## 2023-02-22 NOTE — PLAN OF CARE
PRIMARY DIAGNOSIS: Esophageal Stenosis   OUTPATIENT/OBSERVATION GOALS TO BE MET BEFORE DISCHARGE:  1. Pain Status: Pain free.    2. Return to near baseline physical activity: Yes    3. Cleared for discharge by consultants (if involved): No    Discharge Planner Nurse   Safe discharge environment identified: Yes  Barriers to discharge: Yes       Entered by: Sophie Elliott RN 02/22/2023    Please review provider order for any additional goals.   Nurse to notify provider when observation goals have been met and patient is ready for discharge.    Patient A&O x4. IV SL. On clear liquid diet, tolerating well. SBA in room. Currently resting in bed, able to make needs known.

## 2023-02-22 NOTE — PLAN OF CARE
"Note: [id number] - [id number]  PRIMARY DIAGNOSIS: Esophagus Problem  OBSERVATION GOALS TO BE MET BEFORE DISCHARGE:  1. ADLs back to baseline: Yes    2. Activity and level of assistance: Up with standby assistance.    3. Pain status: Pain free.    4. Return to near baseline physical activity: Yes     Discharge Planner Nurse   Safe discharge environment identified: Yes  Barriers to discharge: No       Entered by: Daisy Abdalla RN 02/22/2023     Patient A & O x 4, Lung sounds CTA, bowel sounds active, LBM 2/18. Denies pain/SOB, sats 94% RA. Pt on full liquid diet. Nacl+Kcl infusing @ 100 ml/hr. Pt may discharge today if ride available. Gastroenterology following. Will keep monitoring.    BP (!) 142/80 (BP Location: Left arm)   Pulse 63   Temp 98.4  F (36.9  C) (Oral)   Resp 16   Ht 1.6 m (5' 3\")   Wt 77.1 kg (170 lb)   SpO2 94%   BMI 30.11 kg/m      Please review provider order for any additional goals.   Nurse to notify provider when observation goals have been met and patient is ready for discharge.      "

## 2023-02-22 NOTE — PLAN OF CARE
ROOM #: 206-1    Living Situation (if not independent, order SW consult): At home with wife  Facility name: NA  : Wife, Alexus    Activity level at baseline: Indep  Activity level on admit: Assist x 1    Who will be transporting you at discharge: Alexus    Patient registered to observation; given Patient Bill of Rights; given the opportunity to ask questions about observation status and their plan of care.  Patient has been oriented to the observation room, bathroom and call light is in place.    Discussed discharge goals and expectations with patient/family.

## 2023-02-23 VITALS
OXYGEN SATURATION: 90 % | BODY MASS INDEX: 30.12 KG/M2 | SYSTOLIC BLOOD PRESSURE: 128 MMHG | TEMPERATURE: 97.9 F | DIASTOLIC BLOOD PRESSURE: 85 MMHG | HEART RATE: 83 BPM | RESPIRATION RATE: 22 BRPM | HEIGHT: 63 IN | WEIGHT: 170 LBS

## 2023-02-23 PROCEDURE — 99238 HOSP IP/OBS DSCHRG MGMT 30/<: CPT | Performed by: PHYSICIAN ASSISTANT

## 2023-02-23 PROCEDURE — 250N000013 HC RX MED GY IP 250 OP 250 PS 637: Performed by: PHYSICIAN ASSISTANT

## 2023-02-23 PROCEDURE — C9113 INJ PANTOPRAZOLE SODIUM, VIA: HCPCS | Performed by: PHYSICIAN ASSISTANT

## 2023-02-23 PROCEDURE — 250N000011 HC RX IP 250 OP 636: Performed by: PHYSICIAN ASSISTANT

## 2023-02-23 PROCEDURE — G0378 HOSPITAL OBSERVATION PER HR: HCPCS

## 2023-02-23 PROCEDURE — 96361 HYDRATE IV INFUSION ADD-ON: CPT

## 2023-02-23 PROCEDURE — 96376 TX/PRO/DX INJ SAME DRUG ADON: CPT

## 2023-02-23 RX ADMIN — PANTOPRAZOLE SODIUM 40 MG: 40 INJECTION, POWDER, FOR SOLUTION INTRAVENOUS at 08:26

## 2023-02-23 RX ADMIN — LISINOPRIL 10 MG: 10 TABLET ORAL at 08:26

## 2023-02-23 RX ADMIN — ASPIRIN 81 MG: 81 TABLET, COATED ORAL at 08:26

## 2023-02-23 RX ADMIN — CARVEDILOL 25 MG: 25 TABLET, FILM COATED ORAL at 08:26

## 2023-02-23 ASSESSMENT — ACTIVITIES OF DAILY LIVING (ADL)
ADLS_ACUITY_SCORE: 26

## 2023-02-23 NOTE — PROGRESS NOTES
[facility]    Medicine Progress Note - Hospitalist Service    Date of Admission:  2/20/2023    Assessment & Plan   Pacheco Torres is a 77 year old male with a history of HTN, OA, CAD, HLD, Depression, PMR, Monoclonal Gammopathy, Prostate Cancer, Obesity, Chronic Back Pain, GERD, Esophagitis, Esophageal stricture who presented to the ED with dysphagia.     Dysphagia  Hx Esophageal Stricture requiring repeat Dilatation   -Patient has a history of GERD, Candida Esophagitis and Esophageal strictures in the past.  Most recent EGD on file to review was with [facility] 1/5/23 which revealed esophageal stenosis which was dilated. Biopsy was obtained (I am not able to see pathology).  Esophagram 1/23/23 revealed an Indeterminate mid esophageal moderate to severe persistent stricture, concerning for mass.       -Patient reports he has repeat dilatations since that time, most recently on 2/10/23 and planned for repeat on 2/24/23.  Unfortunately, starting last week, he has had progressively worsening dysphagia with pills, food, liquids and has had limited po intake.  -Gastroenterology consulted and did upper endoscopy and dilation on 2/21  -Dr. Caicedo from GI recommended discharge on full liquid diet, Carafate and protonix and follow up with GI as outpatient.   -Patient will have repeat dilation in a couple of weeks    Hypernatremia  -Switch fluids to 0.45 NaCl    Hypokalemia.   -Replace with potassium replacement protocol     Hypertension  HLD  CAD - hx NSTEMI s/p DAYAN placed to LAD May 2008  - resume statin  -Continue ASA, Atorvastatin, Coreg, Lisinopril   - IV Hydralazine prn     Chronic Hypoxic Respiratory Failure - on 1 liter of oxygen at baseline.  Hx of tobacco abuse and COPD. -reports he has been on oxygen since his hospital stay 12/2022; treated for PNA and COPD at that time.  Currently at baseline.  - duonebs prn     Chronic Anemia - hgb 10.2 at recent baseline.  B12/Folic Acid  normal 9/2022.  No recent iron studies on file.  Hx MGUS with no recent follow up.  - add on iron studies     Chronic Back Pain  OA  Polymyalgia Rheumatica - followed by pain clinic.  On Lyrica, Tylenol, Flexeril, Lyrica, pta; resume when taking po.  IV Dilaudid prn for now.     Depression -continue pta Sertraline, Seroquel when taking po       Hx Prostate Cancer - reported to be in remission. On Flomax pta.      Diet: Diet  Full Liquid Diet    DVT Prophylaxis: Pneumatic Compression Devices  Blanchard Catheter: Not present  Lines: None     Cardiac Monitoring: None  Code Status: Full Code      Clinically Significant Risk Factors Present on Admission            The patient's care was discussed with the Bedside Nurse and Patient.    Marquita Celis PA-C  Hospitalist Service  [facility]  Securely message with [employer] (more info)  Text page via [facility] Paging/Directory   ______________________________________________________________________    Interval History   Patient reports he feels well and is requesting to go home. He has some regurgitation of liquid but states this is normal for him. No shortness of breath, cough or chest discomfort    Physical Exam   Vital Signs: Temp: 98.3  F (36.8  C) Temp src: Oral BP: (!) 140/75 Pulse: 69   Resp: 22 SpO2: 90 % O2 Device: None (Room air) Oxygen Delivery: 1 LPM  Weight: 170 lbs 0 oz    General Appearance: Alert and orientated  Respiratory: CTAB  Cardiovascular: RRR without murmur  GI: Bowel sounds are present without tenderness  Skin: No rash or open sores      Medical Decision Making       45 MINUTES SPENT BY ME on the date of service doing chart review, history, exam, documentation & further activities per the note.      Data         Imaging results reviewed over the past 24 hrs:   No results found for this or any previous visit (from the past 24 hour(s)).

## 2023-02-23 NOTE — DISCHARGE SUMMARY
[facility]  Hospitalist Discharge Summary      Date of Admission:  2/20/2023  Date of Discharge:  2/23/2023  Discharging Provider: Marquita Celis PA-C  Discharge Service: Hospitalist Service    Discharge Diagnoses   Esophageal stricture s/p dilation    Follow-ups Needed After Discharge   Follow-up Appointments     Follow-up and recommended labs and tests       Follow up with primary care provider, Stuart Wolfe, within 7 days   and recheck potassium, unclear if he needs to be on supplement  Follow up with gastroenterology 1-2 weeks( as scheduled)  for repeat   endoscopy             Unresulted Labs Ordered in the Past 30 Days of this Admission     No orders found from 1/21/2023 to 2/21/2023.          Discharge Disposition   Discharged to home  Condition at discharge: Stable      Hospital Course    Pacheco Torres is a 77 year old male with a history of HTN, OA, CAD, HLD, Depression, PMR, Monoclonal Gammopathy, Prostate Cancer, Obesity, Chronic Back Pain, GERD, Esophagitis, Esophageal stricture who presented to the ED with dysphagia.    In the ED temp 97.8, pressure 174/93, pulse 68 and breathing comfortably on room air without hypoxia.  Lab work showed creatinine 0.98, potassium decreased at 2.8 with normal magnesium, sodium 145, normal anion gap, glucose 102, iron 44, iron binding capacity of 195, WBC 7.4, hemoglobin 10.2 and platelet count 360.  GI was consulted and performed EGD where food was found in the esophagus and successfully removed and dilation of esophageal stenosis was performed.  They recommend Carafate 1 g orally 4 times daily and full liquid diet at discharge until his next dilation.    We did discuss his iron deficiency anemia and he has been told in the past to take iron supplement which he has yet to start    Patient has been chronically on oxygen since December.  We did note that he seems not to require oxygen at rest but only requires it at night and if he  is mobile.  He may be able to discontinue oxygen in the near future.          Consultations This Hospital Stay   GASTROENTEROLOGY IP CONSULT  CARE MANAGEMENT / SOCIAL WORK IP CONSULT    Code Status   Full Code    Time Spent on this Encounter   I, Marquita Celis PA-C, personally saw the patient today and spent less than or equal to 30 minutes discharging this patient.       Marquita Celis PA-C  [facility] OBSERVATION DEPT  [address]  [facility] [address]  Phone: [phone]  ______________________________________________________________________    Physical Exam   Vital Signs: Temp: 97.9  F (36.6  C) Temp src: Oral BP: 128/85 Pulse: 83   Resp: 22 SpO2: 90 % O2 Device: None (Room air)    Weight: 170 lbs 0 oz  General Appearance: Alert and orientated x 3  Respiratory: CTAB  Cardiovascular: RRR without murmur  GI: Bowel sounds are present without tenderness  Skin: No rash or open sores         Primary Care Physician   Stuart Wolfe    Discharge Orders      Reason for your hospital stay    Esophageal stenosis     Activity    Your activity upon discharge: activity as tolerated     Follow-up and recommended labs and tests     Follow up with primary care provider, Stuart Wolfe, within 7 days and recheck potassium, unclear if he needs to be on supplement  Follow up with gastroenterology 1-2 weeks( as scheduled)  for repeat endoscopy     Diet    Follow this diet upon discharge: Full liquid diet.   Avoid solid food       Significant Results and Procedures   Most Recent 3 CBC's:Recent Labs   Lab Test 02/22/23  0626 02/20/23  1141 12/26/22  0543 12/19/22  0608   WBC 6.2 7.4  --  5.7   HGB 9.1* 10.2* 9.2* 8.8*   * 109*  --  111*    360  --  427     Most Recent 3 BMP's:Recent Labs   Lab Test 02/22/23  0626 02/22/23  0105 02/21/23  1724 02/21/23  0836 02/20/23  2146 02/20/23  1141   *  --   --  146*  --  145   POTASSIUM 3.5 3.3* 3.3* 3.3*   < > 2.8*    CHLORIDE 108*  --   --  107  --  105   CO2 31*  --   --  31*  --  33*   BUN 7.4*  --   --  8.3  --  6.3*   CR 0.84  --   --  0.90  --  0.98   ANIONGAP 7  --   --  8  --  7   TAVO 9.2  --   --  9.2  --  9.4   GLC 97  --   --  94  --  102*    < > = values in this interval not displayed.       Discharge Medications   Current Discharge Medication List      START taking these medications    Details   sucralfate (CARAFATE) 1 GM tablet Take 1 tablet (1 g) by mouth 4 times daily for 30 days  Qty: 120 tablet, Refills: 0    Associated Diagnoses: Esophageal stenosis; Esophageal dysphagia         CONTINUE these medications which have NOT CHANGED    Details   acetaminophen (TYLENOL) 500 MG tablet Take 1,000 mg by mouth every 6 hours as needed for pain      aspirin (ASA) 81 MG EC tablet Take 81 mg by mouth daily      atorvastatin (LIPITOR) 40 MG tablet Take 1 tablet (40 mg) by mouth every evening    Associated Diagnoses: NSTEMI (non-ST elevated myocardial infarction) (H)      benztropine (COGENTIN) 1 MG tablet Take 1 tablet (1 mg) by mouth 3 times daily as needed for other (for extrapyramidal effects)    Associated Diagnoses: Polymyalgia rheumatica (H)      calcium carbonate (OS-TAVO) 500 MG tablet Take 2 tablets by mouth daily      carvedilol (COREG) 25 MG tablet Take 1 tablet (25 mg) by mouth 2 times daily (with meals)    Associated Diagnoses: NSTEMI (non-ST elevated myocardial infarction) (H)      cyanocobalamin (VITAMIN B-12) 1000 MCG tablet Take 1,000 mcg by mouth daily      cyclobenzaprine (FLEXERIL) 5 MG tablet Take 5 mg by mouth nightly as needed      diclofenac (VOLTAREN) 1 % topical gel Apply 4 g topically 4 times daily    Associated Diagnoses: Prostate cancer (H)      ipratropium - albuterol 0.5 mg/2.5 mg/3 mL (DUONEB) 0.5-2.5 (3) MG/3ML neb solution Take 1 vial (3 mLs) by nebulization 4 times daily  Qty: 90 mL    Associated Diagnoses: NSTEMI (non-ST elevated myocardial infarction) (H)      levalbuterol (XOPENEX) 0.63  MG/3ML neb solution Take 3 mLs (0.63 mg) by nebulization every 4 hours as needed for wheezing  Qty: 90 mL      multivitamin w/minerals (THERA-VIT-M) tablet Take 1 tablet by mouth daily    Associated Diagnoses: MGUS (monoclonal gammopathy of unknown significance)      nitroGLYcerin (NITROSTAT) 0.4 MG sublingual tablet Place 0.4 mg under the tongue every 5 minutes as needed for chest pain      pantoprazole (PROTONIX) 40 MG EC tablet Take 1 tablet (40 mg) by mouth 2 times daily (before meals)    Associated Diagnoses: Gastroesophageal reflux disease, unspecified whether esophagitis present      QUEtiapine (SEROQUEL) 25 MG tablet Take 0.5 tablets (12.5 mg) by mouth At Bedtime    Associated Diagnoses: Recurrent major depression in full remission (H)      tamsulosin (FLOMAX) 0.4 MG capsule Take 0.4 mg by mouth daily      thiamine (B-1) 100 MG tablet Take 1 tablet (100 mg) by mouth daily    Associated Diagnoses: MGUS (monoclonal gammopathy of unknown significance)      vitamin D3 (CHOLECALCIFEROL) 50 mcg (2000 units) tablet Take 1 tablet by mouth daily      lisinopril (ZESTRIL) 10 MG tablet Take 10 mg by mouth daily           Allergies   Allergies   Allergen Reactions     Gabapentin Visual Disturbance

## 2023-02-23 NOTE — PLAN OF CARE
PRIMARY DIAGNOSIS: Esophageal Stenosis  OUTPATIENT/OBSERVATION GOALS TO BE MET BEFORE DISCHARGE:  1. ADLs back to baseline: Yes    2. Activity and level of assistance: Up with standby assistance.    3. Pain status: Pain free.    4. Return to near baseline physical activity: Yes     Discharge Planner Nurse   Safe discharge environment identified: Yes  Barriers to discharge: No       Entered by: Adelita Espinosa RN 02/23/2023   A&O x4. VSS, RA. Up w/ SBA. Full liquid diet. PIV infiltrated, maintenance fluids stopped. No acute needs noted at this time. Pt denies any pain or discomfort. Sleeping in between cares. Pt cleared for discharge just needs to coordinate a ride home.     Please review provider order for any additional goals.   Nurse to notify provider when observation goals have been met and patient is ready for discharge.

## 2023-02-23 NOTE — PLAN OF CARE
"PRIMARY DIAGNOSIS: Dysphagia r/t Esophageal Stenosis  OUTPATIENT/OBSERVATION GOALS TO BE MET BEFORE DISCHARGE:  ADLs back to baseline: Yes    Activity and level of assistance: Up with standby assistance.    Pain status: Pain free.    Return to near baseline physical activity: Yes     Discharge Planner Nurse   Safe discharge environment identified: Yes  Barriers to discharge: Yes       Entered by: Rox Kamara RN 02/23/2023 10:30 AM    Pt A/O x4 and makes needs known. SBA with ambulation. Denies pain. Tolerating clear liquid diet. PIV out, not replacing as pt will be discharging home today. VSS. Ride pending, SW working on this.  Vital signs:  Temp: 98.3  F (36.8  C) Temp src: Oral BP: (!) 140/75 Pulse: 69   Resp: 22 SpO2: 90 % O2 Device: None (Room air) Oxygen Delivery: 1 LPM Height: 160 cm (5' 3\") Weight: 77.1 kg (170 lb)  Estimated body mass index is 30.11 kg/m  as calculated from the following:    Height as of this encounter: 1.6 m (5' 3\").    Weight as of this encounter: 77.1 kg (170 lb).    Please review provider order for any additional goals.   Nurse to notify provider when observation goals have been met and patient is ready for discharge.  "

## 2023-02-23 NOTE — PLAN OF CARE
PRIMARY DIAGNOSIS: Esophageal Stenosis  OUTPATIENT/OBSERVATION GOALS TO BE MET BEFORE DISCHARGE:  ADLs back to baseline: Yes    Activity and level of assistance: Up with standby assistance.    Pain status: Pain free.    Return to near baseline physical activity: Yes     Discharge Planner Nurse   Safe discharge environment identified: Yes  Barriers to discharge: No       Entered by: Adelita Espinosa RN 02/23/2023   A&O x4. VSS, RA. Up w/ SBA. Full liquid diet. PIV infiltrated, maintenance fluids stopped. No acute needs noted at this time. Pt denies any pain or discomfort. Sleeping in between cares. Pt cleared for discharge just needs to coordinate a ride home.   Please review provider order for any additional goals.   Nurse to notify provider when observation goals have been met and patient is ready for discharge.

## 2023-02-23 NOTE — PROGRESS NOTES
Care Management Discharge Note    Discharge Date: 02/23/2023     Discharge Disposition:  Home    Discharge Transportation:  [employer]     Private pay costs discussed: Not applicable    Education Provided on the Discharge Plan:  Yes  Persons Notified of Discharge Plans: Patient  Patient/Family in Agreement with the Plan:  Yes    Handoff Referral Completed: No    Additional Information:  [employer] arranged under hospital account for next available ride, [phone]. They will call RN station when they have a ride on the way. RN updated.    YVONNE Dan, [facility]   Inpatient Care Coordination  [facility]   [phone]

## 2023-02-23 NOTE — PLAN OF CARE
"Note: 1815 - 2300  PRIMARY DIAGNOSIS: Esophagus Problem  OBSERVATION GOALS TO BE MET BEFORE DISCHARGE:  1. ADLs back to baseline: Yes    2. Activity and level of assistance: Up with standby assistance.    3. Pain status: Pain free.    4. Return to near baseline physical activity: Yes     Discharge Planner Nurse   Safe discharge environment identified: Yes  Barriers to discharge: No       Entered by: Daisy Abdalla RN 02/22/2023     Patient A & O x 4, Lung sounds CTA, bowel sounds active, LBM 2/18. Denies pain/SOB, sats 94% RA. Pt on full liquid diet now. Tolerating the diet and oral meds however, preffers tablet to be broken into small pieces. Nacl+Kcl infusing @ 100 ml/hr. Pt may did not discharge d/t lack of ride. Will keep monitoring. Gastroenterology following.     BP (!) 147/81 (BP Location: Right arm)   Pulse 70   Temp 98  F (36.7  C) (Oral)   Resp 16   Ht 1.6 m (5' 3\")   Wt 77.1 kg (170 lb)   SpO2 94%   BMI 30.11 kg/m      Please review provider order for any additional goals.   Nurse to notify provider when observation goals have been met and patient is ready for discharge.      "

## 2023-02-23 NOTE — DISCHARGE SUMMARY
Patient's After Visit Summary was reviewed with patient   Patient verbalized understanding of After Visit Summary, recommended follow up and was given an opportunity to ask questions.   Discharge medications sent home with patient/family: Not applicable   Discharged with other: [employer]

## 2023-02-24 ENCOUNTER — PATIENT OUTREACH (OUTPATIENT)
Dept: CARE COORDINATION | Facility: CLINIC | Age: 78
End: 2023-02-24
Payer: COMMERCIAL

## 2023-02-24 NOTE — PROGRESS NOTES
"Clinic Care Coordination Contact  [facility]: Post-Discharge Note  SITUATION                                                      Admission:    Admission Date: 02/20/23   Reason for Admission: esophagus problem  Discharge:   Discharge Date: 02/23/23  Discharge Diagnosis: Esophageal stricture s/p dilation    BACKGROUND                                                      Per hospital discharge summary and inpatient provider notes:Pacheco Torres is a 77 year old male with a history of HTN, OA, CAD, HLD, Depression, PMR, Monoclonal Gammopathy, Prostate Cancer, Obesity, Chronic Back Pain, GERD, Esophagitis, Esophageal stricture who presented to the ED with dysphagia.     In the ED temp 97.8, pressure 174/93, pulse 68 and breathing comfortably on room air without hypoxia.  Lab work showed creatinine 0.98, potassium decreased at 2.8 with normal magnesium, sodium 145, normal anion gap, glucose 102, iron 44, iron binding capacity of 195, WBC 7.4, hemoglobin 10.2 and platelet count 360.  GI was consulted and performed EGD where food was found in the esophagus and successfully removed and dilation of esophageal stenosis was performed.  They recommend Carafate 1 g orally 4 times daily and full liquid diet at discharge until his next dilation.      ASSESSMENT           Discharge Assessment  How are you doing now that you are home?: \" I am doing fine \"  How are your symptoms? (Red Flag symptoms escalate to triage hotline per guidelines): Improved  Do you feel your condition is stable enough to be safe at home until your provider visit?: Yes  Does the patient have their discharge instructions? : Yes  Does the patient have questions regarding their discharge instructions? : No  Were you started on any new medications or were there changes to any of your previous medications? : Yes  Does the patient have all of their medications?: Yes  Do you have all of your needed medical supplies or equipment (DME)?  (i.e. oxygen tank, " CPAP, cane, etc.): Yes  Discharge follow-up appointment scheduled within 14 calendar days? : No    Post-op ([facility] CTA Only)  If the patient had a surgery or procedure, do they have any questions for a nurse?: No             PLAN                                                      Outpatient Plan:    Follow up with primary care provider, Stuart Wolfe, within 7 days and recheck potassium, unclear if he needs to be on supplement  Follow up with gastroenterology 1-2 weeks( as scheduled)  for repeat endoscopy         No future appointments.      For any urgent concerns, please contact our 24 hour nurse triage line: [phone] ([phone])         Elias Darling

## 2023-04-19 PROCEDURE — 88305 TISSUE EXAM BY PATHOLOGIST: CPT | Performed by: PATHOLOGY

## 2023-04-20 ENCOUNTER — LAB REQUISITION (OUTPATIENT)
Dept: LAB | Facility: CLINIC | Age: 78
End: 2023-04-20

## 2023-04-20 DIAGNOSIS — R13.10 DYSPHAGIA, UNSPECIFIED: ICD-10-CM

## 2023-04-20 DIAGNOSIS — K31.89 OTHER DISEASES OF STOMACH AND DUODENUM: ICD-10-CM

## 2023-04-20 DIAGNOSIS — K22.2 ESOPHAGEAL OBSTRUCTION: ICD-10-CM

## 2023-04-20 DIAGNOSIS — K44.9 DIAPHRAGMATIC HERNIA WITHOUT OBSTRUCTION OR GANGRENE: ICD-10-CM

## 2023-09-18 ENCOUNTER — APPOINTMENT (OUTPATIENT)
Dept: URBAN - METROPOLITAN AREA CLINIC 255 | Age: 78
Setting detail: DERMATOLOGY
End: 2023-09-19

## 2023-09-18 VITALS — WEIGHT: 180 LBS | HEIGHT: 62 IN

## 2023-09-18 DIAGNOSIS — L57.8 OTHER SKIN CHANGES DUE TO CHRONIC EXPOSURE TO NONIONIZING RADIATION: ICD-10-CM

## 2023-09-18 DIAGNOSIS — L82.1 OTHER SEBORRHEIC KERATOSIS: ICD-10-CM

## 2023-09-18 DIAGNOSIS — D18.0 HEMANGIOMA: ICD-10-CM

## 2023-09-18 DIAGNOSIS — D22 MELANOCYTIC NEVI: ICD-10-CM

## 2023-09-18 DIAGNOSIS — L81.4 OTHER MELANIN HYPERPIGMENTATION: ICD-10-CM

## 2023-09-18 DIAGNOSIS — Z71.89 OTHER SPECIFIED COUNSELING: ICD-10-CM

## 2023-09-18 PROBLEM — D18.01 HEMANGIOMA OF SKIN AND SUBCUTANEOUS TISSUE: Status: ACTIVE | Noted: 2023-09-18

## 2023-09-18 PROBLEM — D22.5 MELANOCYTIC NEVI OF TRUNK: Status: ACTIVE | Noted: 2023-09-18

## 2023-09-18 PROCEDURE — 99213 OFFICE O/P EST LOW 20 MIN: CPT

## 2023-09-18 PROCEDURE — OTHER MIPS QUALITY: OTHER

## 2023-09-18 PROCEDURE — OTHER COUNSELING: OTHER

## 2023-09-18 ASSESSMENT — LOCATION ZONE DERM: LOCATION ZONE: TRUNK

## 2023-09-18 ASSESSMENT — LOCATION SIMPLE DESCRIPTION DERM: LOCATION SIMPLE: LEFT UPPER BACK

## 2023-09-18 ASSESSMENT — LOCATION DETAILED DESCRIPTION DERM
LOCATION DETAILED: LEFT MEDIAL UPPER BACK
LOCATION DETAILED: LEFT SUPERIOR MEDIAL UPPER BACK

## 2024-01-03 ENCOUNTER — PATIENT OUTREACH (OUTPATIENT)
Dept: ONCOLOGY | Facility: CLINIC | Age: 79
End: 2024-01-03
Payer: COMMERCIAL

## 2024-01-03 ENCOUNTER — TRANSCRIBE ORDERS (OUTPATIENT)
Dept: ONCOLOGY | Facility: CLINIC | Age: 79
End: 2024-01-03
Payer: COMMERCIAL

## 2024-01-03 DIAGNOSIS — R91.8 PULMONARY NODULES: Primary | ICD-10-CM

## 2024-01-03 NOTE — PROGRESS NOTES
New IP (Interventional Pulmonology) referral rec'd.  Chart reviewed.       New Patient: Interventional Pulmonary (Lung nodule) Nurse Navigator Note    Referring provider: self referral       Referred to (specialty): Interventional Pulmonary (Lung nodule)    Requested provider (if applicable):Prefers [place] for follow-up    Date Referral Received: 1/3/2024    Evaluation for :  Lung nodule-2 nodules on lungs that needs monitoring    Clinical History (per Nurse review of records provided):    **BOOK MARKED**    CT CHEST AND ABDOMEN WITH CONTRAST January 31, 2023 12:28 PM      HISTORY: Esophageal obstruction. Ulcer of esophagus without bleeding.  Dysphagia, unspecified.     COMPARISON: December 11, 2022.     TECHNIQUE: Volumetric helical acquisition of CT images of the chest  and abdomen after the administration of 101ML isovue 370 intravenous   contrast. Radiation dose for this scan was reduced using automated  exposure control, adjustment of the mA and/or kV according to patient  size, or iterative reconstruction technique.     FINDINGS:      LUNGS AND PLEURA: 4 mm nodule in left lower lobe image 108. 3 mm  nodule left upper lobe image 72. These were in areas of infiltrate on  the comparison study. No infiltrates or effusions.     MEDIASTINUM/AXILLAE: No lymphadenopathy. No thoracic aortic aneurysms.  Small amount of fluid in the esophagus with areas of increased  density.     CORONARY ARTERY CALCIFICATIONS: Severe and/or stents.     HEPATOBILIARY: No significant mass or bile duct dilatation. No  calcified gallstones. Low-attenuation subcentimeter liver lesion(s)  compatible with benign cysts or other benign lesions. No specific  evaluation or follow-up is recommended in a low risk patient.     PANCREAS: No significant mass, duct dilatation, or inflammatory  change.     SPLEEN: Normal size.     ADRENAL GLANDS: No significant nodules.     KIDNEYS/BLADDER: No significant mass, stones, or hydronephrosis.     BOWEL:  No obstruction or inflammatory change.     ADDITIONAL FINDINGS: No ascites.     MUSCULOSKELETAL: No frankly destructive bony lesions. Multifocal  thoracolumbar compression deformities. These appear similar to  previous.                                                                      IMPRESSION:   1. Small amount of fluid in the esophagus. Hyperdensity within the  esophageal lumen is noted which may be ingested contents. Active  extravasation given history would be difficult to exclude in this  setting.  2. Pulmonary nodules measuring up to 4 mm noted, in areas of  infiltrate on the comparison study.  [facility]     Records Needed: none    Additional testing needed prior to consult: updated CT Chest

## 2024-01-07 ENCOUNTER — HEALTH MAINTENANCE LETTER (OUTPATIENT)
Age: 79
End: 2024-01-07

## 2024-02-01 ENCOUNTER — MEDICAL CORRESPONDENCE (OUTPATIENT)
Dept: SCHEDULING | Facility: CLINIC | Age: 79
End: 2024-02-01
Payer: COMMERCIAL

## 2024-02-23 ENCOUNTER — HOSPITAL ENCOUNTER (OUTPATIENT)
Dept: CT IMAGING | Facility: CLINIC | Age: 79
Discharge: HOME | End: 2024-02-23
Attending: INTERNAL MEDICINE | Admitting: INTERNAL MEDICINE
Payer: COMMERCIAL

## 2024-02-23 DIAGNOSIS — R91.8 OTHER NONSPECIFIC ABNORMAL FINDING OF LUNG FIELD: ICD-10-CM

## 2024-02-23 PROCEDURE — 71250 CT THORAX DX C-: CPT

## 2024-02-27 ENCOUNTER — HOSPITAL ENCOUNTER (OUTPATIENT)
Dept: GENERAL RADIOLOGY | Facility: CLINIC | Age: 79
Discharge: HOME | End: 2024-02-27
Attending: INTERNAL MEDICINE | Admitting: INTERNAL MEDICINE
Payer: COMMERCIAL

## 2024-02-27 DIAGNOSIS — D47.2 MONOCLONAL GAMMOPATHIES: ICD-10-CM

## 2024-02-27 PROCEDURE — 77075 RADEX OSSEOUS SURVEY COMPL: CPT

## 2024-05-08 ENCOUNTER — HOSPITAL ENCOUNTER (INPATIENT)
Facility: CLINIC | Age: 79
LOS: 4 days | Discharge: HOME | DRG: 193 | End: 2024-05-12
Attending: EMERGENCY MEDICINE | Admitting: STUDENT IN AN ORGANIZED HEALTH CARE EDUCATION/TRAINING PROGRAM
Payer: COMMERCIAL

## 2024-05-08 ENCOUNTER — APPOINTMENT (OUTPATIENT)
Dept: GENERAL RADIOLOGY | Facility: CLINIC | Age: 79
DRG: 193 | End: 2024-05-08
Attending: EMERGENCY MEDICINE
Payer: COMMERCIAL

## 2024-05-08 DIAGNOSIS — D47.2 MGUS (MONOCLONAL GAMMOPATHY OF UNKNOWN SIGNIFICANCE): ICD-10-CM

## 2024-05-08 DIAGNOSIS — I10 PRIMARY HYPERTENSION: ICD-10-CM

## 2024-05-08 DIAGNOSIS — M17.12 PRIMARY OSTEOARTHRITIS OF LEFT KNEE: ICD-10-CM

## 2024-05-08 DIAGNOSIS — I21.4 NSTEMI (NON-ST ELEVATED MYOCARDIAL INFARCTION) (H): ICD-10-CM

## 2024-05-08 DIAGNOSIS — J96.21 ACUTE ON CHRONIC RESPIRATORY FAILURE WITH HYPOXIA AND HYPERCAPNIA (H): Primary | ICD-10-CM

## 2024-05-08 DIAGNOSIS — E55.9 VITAMIN D DEFICIENCY: ICD-10-CM

## 2024-05-08 DIAGNOSIS — I25.10 CORONARY ARTERY DISEASE INVOLVING NATIVE CORONARY ARTERY OF NATIVE HEART WITHOUT ANGINA PECTORIS: ICD-10-CM

## 2024-05-08 DIAGNOSIS — E87.1 HYPONATREMIA: ICD-10-CM

## 2024-05-08 DIAGNOSIS — J96.22 ACUTE ON CHRONIC RESPIRATORY FAILURE WITH HYPOXIA AND HYPERCAPNIA (H): Primary | ICD-10-CM

## 2024-05-08 DIAGNOSIS — E78.00 PURE HYPERCHOLESTEROLEMIA: ICD-10-CM

## 2024-05-08 PROBLEM — M25.551 PAIN IN RIGHT HIP: Status: ACTIVE | Noted: 2017-12-18

## 2024-05-08 PROBLEM — Z79.52 LONG TERM CURRENT USE OF SYSTEMIC STEROIDS: Status: ACTIVE | Noted: 2018-02-19

## 2024-05-08 PROBLEM — K22.2 ESOPHAGEAL STRICTURE: Status: ACTIVE | Noted: 2023-05-05

## 2024-05-08 PROBLEM — M25.559 PAIN IN JOINT INVOLVING PELVIC REGION AND THIGH: Status: ACTIVE | Noted: 2017-12-18

## 2024-05-08 PROBLEM — M19.011 PRIMARY OSTEOARTHRITIS OF BOTH SHOULDERS: Status: ACTIVE | Noted: 2023-05-05

## 2024-05-08 PROBLEM — M19.012 PRIMARY OSTEOARTHRITIS OF BOTH SHOULDERS: Status: ACTIVE | Noted: 2023-05-05

## 2024-05-08 PROBLEM — D64.9 ANEMIA: Status: ACTIVE | Noted: 2023-05-05

## 2024-05-08 LAB
ANION GAP SERPL CALCULATED.3IONS-SCNC: 4 MMOL/L (ref 7–15)
BASE EXCESS BLDV CALC-SCNC: 13.1 MMOL/L (ref -3–3)
BASOPHILS # BLD AUTO: 0 10E3/UL (ref 0–0.2)
BASOPHILS NFR BLD AUTO: 0 %
BUN SERPL-MCNC: 15.5 MG/DL (ref 8–23)
CALCIUM SERPL-MCNC: 8.7 MG/DL (ref 8.8–10.2)
CHLORIDE SERPL-SCNC: 91 MMOL/L (ref 98–107)
CREAT SERPL-MCNC: 0.59 MG/DL (ref 0.67–1.17)
CREAT SERPL-MCNC: 0.6 MG/DL (ref 0.67–1.17)
DEPRECATED HCO3 PLAS-SCNC: 35 MMOL/L (ref 22–29)
EGFRCR SERPLBLD CKD-EPI 2021: >90 ML/MIN/1.73M2
EGFRCR SERPLBLD CKD-EPI 2021: >90 ML/MIN/1.73M2
EOSINOPHIL # BLD AUTO: 0.1 10E3/UL (ref 0–0.7)
EOSINOPHIL NFR BLD AUTO: 6 %
ERYTHROCYTE [DISTWIDTH] IN BLOOD BY AUTOMATED COUNT: 12.4 % (ref 10–15)
FLUAV RNA SPEC QL NAA+PROBE: NEGATIVE
FLUBV RNA RESP QL NAA+PROBE: NEGATIVE
GLUCOSE SERPL-MCNC: 95 MG/DL (ref 70–99)
HCO3 BLDV-SCNC: 41 MMOL/L (ref 21–28)
HCT VFR BLD AUTO: 30.7 % (ref 40–53)
HGB BLD-MCNC: 9.9 G/DL (ref 13.3–17.7)
HOLD SPECIMEN: NORMAL
IMM GRANULOCYTES # BLD: 0 10E3/UL
IMM GRANULOCYTES NFR BLD: 0 %
LYMPHOCYTES # BLD AUTO: 0.3 10E3/UL (ref 0.8–5.3)
LYMPHOCYTES NFR BLD AUTO: 12 %
MCH RBC QN AUTO: 35.9 PG (ref 26.5–33)
MCHC RBC AUTO-ENTMCNC: 32.2 G/DL (ref 31.5–36.5)
MCV RBC AUTO: 111 FL (ref 78–100)
MONOCYTES # BLD AUTO: 0.4 10E3/UL (ref 0–1.3)
MONOCYTES NFR BLD AUTO: 17 %
NEUTROPHILS # BLD AUTO: 1.5 10E3/UL (ref 1.6–8.3)
NEUTROPHILS NFR BLD AUTO: 65 %
NRBC # BLD AUTO: 0 10E3/UL
NRBC BLD AUTO-RTO: 0 /100
NT-PROBNP SERPL-MCNC: 666 PG/ML (ref 0–1800)
O2/TOTAL GAS SETTING VFR VENT: 20 %
OXYHGB MFR BLDV: 61 % (ref 70–75)
PCO2 BLDV: 72 MM HG (ref 40–50)
PH BLDV: 7.37 [PH] (ref 7.32–7.43)
PLATELET # BLD AUTO: 233 10E3/UL (ref 150–450)
PO2 BLDV: 33 MM HG (ref 25–47)
POTASSIUM SERPL-SCNC: 4.6 MMOL/L (ref 3.4–5.3)
PROCALCITONIN SERPL IA-MCNC: 0.07 NG/ML
RBC # BLD AUTO: 2.76 10E6/UL (ref 4.4–5.9)
RSV RNA SPEC NAA+PROBE: NEGATIVE
SAO2 % BLDV: 62.1 % (ref 70–75)
SARS-COV-2 RNA RESP QL NAA+PROBE: NEGATIVE
SODIUM SERPL-SCNC: 130 MMOL/L (ref 135–145)
TROPONIN T SERPL HS-MCNC: 20 NG/L
TROPONIN T SERPL HS-MCNC: 20 NG/L
WBC # BLD AUTO: 2.3 10E3/UL (ref 4–11)

## 2024-05-08 PROCEDURE — 83880 ASSAY OF NATRIURETIC PEPTIDE: CPT | Performed by: EMERGENCY MEDICINE

## 2024-05-08 PROCEDURE — 80048 BASIC METABOLIC PNL TOTAL CA: CPT | Performed by: EMERGENCY MEDICINE

## 2024-05-08 PROCEDURE — 71046 X-RAY EXAM CHEST 2 VIEWS: CPT

## 2024-05-08 PROCEDURE — 36415 COLL VENOUS BLD VENIPUNCTURE: CPT | Performed by: EMERGENCY MEDICINE

## 2024-05-08 PROCEDURE — 84484 ASSAY OF TROPONIN QUANT: CPT | Performed by: EMERGENCY MEDICINE

## 2024-05-08 PROCEDURE — 250N000011 HC RX IP 250 OP 636: Performed by: STUDENT IN AN ORGANIZED HEALTH CARE EDUCATION/TRAINING PROGRAM

## 2024-05-08 PROCEDURE — 96374 THER/PROPH/DIAG INJ IV PUSH: CPT

## 2024-05-08 PROCEDURE — 250N000009 HC RX 250: Performed by: EMERGENCY MEDICINE

## 2024-05-08 PROCEDURE — 258N000003 HC RX IP 258 OP 636: Performed by: EMERGENCY MEDICINE

## 2024-05-08 PROCEDURE — 999N000105 HC STATISTIC NO DOCUMENTATION TO SUPPORT CHARGE

## 2024-05-08 PROCEDURE — 94640 AIRWAY INHALATION TREATMENT: CPT

## 2024-05-08 PROCEDURE — 82805 BLOOD GASES W/O2 SATURATION: CPT | Performed by: EMERGENCY MEDICINE

## 2024-05-08 PROCEDURE — 120N000001 HC R&B MED SURG/OB

## 2024-05-08 PROCEDURE — 250N000013 HC RX MED GY IP 250 OP 250 PS 637: Performed by: STUDENT IN AN ORGANIZED HEALTH CARE EDUCATION/TRAINING PROGRAM

## 2024-05-08 PROCEDURE — 99291 CRITICAL CARE FIRST HOUR: CPT | Mod: 25

## 2024-05-08 PROCEDURE — 250N000009 HC RX 250: Performed by: STUDENT IN AN ORGANIZED HEALTH CARE EDUCATION/TRAINING PROGRAM

## 2024-05-08 PROCEDURE — 85048 AUTOMATED LEUKOCYTE COUNT: CPT | Performed by: EMERGENCY MEDICINE

## 2024-05-08 PROCEDURE — 84145 PROCALCITONIN (PCT): CPT | Performed by: EMERGENCY MEDICINE

## 2024-05-08 PROCEDURE — 250N000011 HC RX IP 250 OP 636: Performed by: EMERGENCY MEDICINE

## 2024-05-08 PROCEDURE — 82565 ASSAY OF CREATININE: CPT | Performed by: STUDENT IN AN ORGANIZED HEALTH CARE EDUCATION/TRAINING PROGRAM

## 2024-05-08 PROCEDURE — 99223 1ST HOSP IP/OBS HIGH 75: CPT | Performed by: STUDENT IN AN ORGANIZED HEALTH CARE EDUCATION/TRAINING PROGRAM

## 2024-05-08 PROCEDURE — 87637 SARSCOV2&INF A&B&RSV AMP PRB: CPT | Performed by: EMERGENCY MEDICINE

## 2024-05-08 PROCEDURE — 93005 ELECTROCARDIOGRAM TRACING: CPT

## 2024-05-08 RX ORDER — IPRATROPIUM BROMIDE AND ALBUTEROL SULFATE 2.5; .5 MG/3ML; MG/3ML
3 SOLUTION RESPIRATORY (INHALATION) EVERY 4 HOURS PRN
Status: DISCONTINUED | OUTPATIENT
Start: 2024-05-08 | End: 2024-05-12 | Stop reason: HOSPADM

## 2024-05-08 RX ORDER — LENALIDOMIDE 25 MG/1
25 CAPSULE ORAL DAILY
Qty: 8 CAPSULE | Refills: 0 | Status: DISCONTINUED | OUTPATIENT
Start: 2024-05-09 | End: 2024-05-12 | Stop reason: HOSPADM

## 2024-05-08 RX ORDER — AMOXICILLIN 250 MG
1 CAPSULE ORAL 2 TIMES DAILY PRN
Status: DISCONTINUED | OUTPATIENT
Start: 2024-05-08 | End: 2024-05-12 | Stop reason: HOSPADM

## 2024-05-08 RX ORDER — MAGNESIUM SULFATE HEPTAHYDRATE 40 MG/ML
2 INJECTION, SOLUTION INTRAVENOUS ONCE
Status: COMPLETED | OUTPATIENT
Start: 2024-05-08 | End: 2024-05-08

## 2024-05-08 RX ORDER — SERTRALINE HYDROCHLORIDE 100 MG/1
100 TABLET, FILM COATED ORAL DAILY
Status: DISCONTINUED | OUTPATIENT
Start: 2024-05-09 | End: 2024-05-12 | Stop reason: HOSPADM

## 2024-05-08 RX ORDER — ATORVASTATIN CALCIUM 40 MG/1
40 TABLET, FILM COATED ORAL EVERY EVENING
Status: DISCONTINUED | OUTPATIENT
Start: 2024-05-08 | End: 2024-05-12 | Stop reason: HOSPADM

## 2024-05-08 RX ORDER — CEFTRIAXONE 2 G/1
2 INJECTION, POWDER, FOR SOLUTION INTRAMUSCULAR; INTRAVENOUS ONCE
Status: COMPLETED | OUTPATIENT
Start: 2024-05-08 | End: 2024-05-08

## 2024-05-08 RX ORDER — AMOXICILLIN 250 MG
2 CAPSULE ORAL 2 TIMES DAILY PRN
Status: DISCONTINUED | OUTPATIENT
Start: 2024-05-08 | End: 2024-05-12 | Stop reason: HOSPADM

## 2024-05-08 RX ORDER — PANTOPRAZOLE SODIUM 40 MG/1
40 TABLET, DELAYED RELEASE ORAL
Status: DISCONTINUED | OUTPATIENT
Start: 2024-05-08 | End: 2024-05-12 | Stop reason: HOSPADM

## 2024-05-08 RX ORDER — PROCHLORPERAZINE MALEATE 5 MG
5 TABLET ORAL EVERY 6 HOURS PRN
Status: DISCONTINUED | OUTPATIENT
Start: 2024-05-08 | End: 2024-05-12 | Stop reason: HOSPADM

## 2024-05-08 RX ORDER — IPRATROPIUM BROMIDE AND ALBUTEROL SULFATE 2.5; .5 MG/3ML; MG/3ML
3 SOLUTION RESPIRATORY (INHALATION) ONCE
Status: COMPLETED | OUTPATIENT
Start: 2024-05-08 | End: 2024-05-08

## 2024-05-08 RX ORDER — AZITHROMYCIN 500 MG/5ML
500 INJECTION, POWDER, LYOPHILIZED, FOR SOLUTION INTRAVENOUS EVERY 24 HOURS
Status: COMPLETED | OUTPATIENT
Start: 2024-05-09 | End: 2024-05-10

## 2024-05-08 RX ORDER — SERTRALINE HYDROCHLORIDE 100 MG/1
100 TABLET, FILM COATED ORAL DAILY
COMMUNITY

## 2024-05-08 RX ORDER — TAMSULOSIN HYDROCHLORIDE 0.4 MG/1
0.4 CAPSULE ORAL AT BEDTIME
Status: DISCONTINUED | OUTPATIENT
Start: 2024-05-08 | End: 2024-05-12 | Stop reason: HOSPADM

## 2024-05-08 RX ORDER — ACETAMINOPHEN 325 MG/1
650 TABLET ORAL EVERY 4 HOURS PRN
Status: DISCONTINUED | OUTPATIENT
Start: 2024-05-08 | End: 2024-05-12 | Stop reason: HOSPADM

## 2024-05-08 RX ORDER — VITAMIN B COMPLEX
50 TABLET ORAL DAILY
Status: DISCONTINUED | OUTPATIENT
Start: 2024-05-09 | End: 2024-05-12 | Stop reason: HOSPADM

## 2024-05-08 RX ORDER — ACETAMINOPHEN 650 MG/1
650 SUPPOSITORY RECTAL EVERY 4 HOURS PRN
Status: DISCONTINUED | OUTPATIENT
Start: 2024-05-08 | End: 2024-05-12 | Stop reason: HOSPADM

## 2024-05-08 RX ORDER — CALCIUM CARBONATE 500(1250)
2 TABLET ORAL DAILY
Status: DISCONTINUED | OUTPATIENT
Start: 2024-05-09 | End: 2024-05-12 | Stop reason: HOSPADM

## 2024-05-08 RX ORDER — LENALIDOMIDE 10 MG/1
10 CAPSULE ORAL SEE ADMIN INSTRUCTIONS
COMMUNITY

## 2024-05-08 RX ORDER — PROCHLORPERAZINE 25 MG
12.5 SUPPOSITORY, RECTAL RECTAL EVERY 12 HOURS PRN
Status: DISCONTINUED | OUTPATIENT
Start: 2024-05-08 | End: 2024-05-12 | Stop reason: HOSPADM

## 2024-05-08 RX ORDER — ASPIRIN 81 MG/1
81 TABLET ORAL EVERY EVENING
Status: DISCONTINUED | OUTPATIENT
Start: 2024-05-09 | End: 2024-05-12 | Stop reason: HOSPADM

## 2024-05-08 RX ORDER — METHYLPREDNISOLONE SODIUM SUCCINATE 125 MG/2ML
125 INJECTION, POWDER, LYOPHILIZED, FOR SOLUTION INTRAMUSCULAR; INTRAVENOUS ONCE
Status: COMPLETED | OUTPATIENT
Start: 2024-05-08 | End: 2024-05-08

## 2024-05-08 RX ORDER — AZITHROMYCIN 500 MG/5ML
500 INJECTION, POWDER, LYOPHILIZED, FOR SOLUTION INTRAVENOUS ONCE
Status: COMPLETED | OUTPATIENT
Start: 2024-05-08 | End: 2024-05-08

## 2024-05-08 RX ORDER — LANOLIN ALCOHOL/MO/W.PET/CERES
1000 CREAM (GRAM) TOPICAL DAILY
Status: DISCONTINUED | OUTPATIENT
Start: 2024-05-09 | End: 2024-05-12 | Stop reason: HOSPADM

## 2024-05-08 RX ORDER — MULTIPLE VITAMINS W/ MINERALS TAB 9MG-400MCG
1 TAB ORAL DAILY
Status: DISCONTINUED | OUTPATIENT
Start: 2024-05-09 | End: 2024-05-12 | Stop reason: HOSPADM

## 2024-05-08 RX ORDER — PREGABALIN 25 MG/1
25 CAPSULE ORAL AT BEDTIME
COMMUNITY
End: 2024-08-15

## 2024-05-08 RX ORDER — CARVEDILOL 12.5 MG/1
12.5 TABLET ORAL 2 TIMES DAILY WITH MEALS
Status: DISCONTINUED | OUTPATIENT
Start: 2024-05-08 | End: 2024-05-12 | Stop reason: HOSPADM

## 2024-05-08 RX ORDER — LIDOCAINE 40 MG/G
CREAM TOPICAL
Status: DISCONTINUED | OUTPATIENT
Start: 2024-05-08 | End: 2024-05-12 | Stop reason: HOSPADM

## 2024-05-08 RX ORDER — CALCIUM CARBONATE 500 MG/1
1000 TABLET, CHEWABLE ORAL 4 TIMES DAILY PRN
Status: DISCONTINUED | OUTPATIENT
Start: 2024-05-08 | End: 2024-05-12 | Stop reason: HOSPADM

## 2024-05-08 RX ORDER — LISINOPRIL 10 MG/1
10 TABLET ORAL EVERY EVENING
Status: DISCONTINUED | OUTPATIENT
Start: 2024-05-08 | End: 2024-05-12 | Stop reason: HOSPADM

## 2024-05-08 RX ORDER — BUDESONIDE 3 MG/1
3 CAPSULE, COATED PELLETS ORAL EVERY OTHER DAY
COMMUNITY

## 2024-05-08 RX ORDER — ONDANSETRON 2 MG/ML
4 INJECTION INTRAMUSCULAR; INTRAVENOUS EVERY 6 HOURS PRN
Status: DISCONTINUED | OUTPATIENT
Start: 2024-05-08 | End: 2024-05-12 | Stop reason: HOSPADM

## 2024-05-08 RX ORDER — ONDANSETRON 4 MG/1
4 TABLET, ORALLY DISINTEGRATING ORAL EVERY 6 HOURS PRN
Status: DISCONTINUED | OUTPATIENT
Start: 2024-05-08 | End: 2024-05-12 | Stop reason: HOSPADM

## 2024-05-08 RX ORDER — PREDNISONE 20 MG/1
40 TABLET ORAL DAILY
Status: DISCONTINUED | OUTPATIENT
Start: 2024-05-09 | End: 2024-05-09

## 2024-05-08 RX ORDER — ENOXAPARIN SODIUM 100 MG/ML
40 INJECTION SUBCUTANEOUS EVERY 24 HOURS
Status: DISCONTINUED | OUTPATIENT
Start: 2024-05-08 | End: 2024-05-12 | Stop reason: HOSPADM

## 2024-05-08 RX ORDER — BUDESONIDE 3 MG/1
6 CAPSULE, COATED PELLETS ORAL EVERY MORNING
Status: DISCONTINUED | OUTPATIENT
Start: 2024-05-09 | End: 2024-05-09

## 2024-05-08 RX ORDER — CARVEDILOL 12.5 MG/1
12.5 TABLET ORAL 2 TIMES DAILY WITH MEALS
COMMUNITY

## 2024-05-08 RX ORDER — GUAIFENESIN 200 MG/10ML
200 LIQUID ORAL EVERY 4 HOURS PRN
Status: DISCONTINUED | OUTPATIENT
Start: 2024-05-08 | End: 2024-05-12 | Stop reason: HOSPADM

## 2024-05-08 RX ORDER — PREGABALIN 25 MG/1
25 CAPSULE ORAL AT BEDTIME
Status: DISCONTINUED | OUTPATIENT
Start: 2024-05-08 | End: 2024-05-12 | Stop reason: HOSPADM

## 2024-05-08 RX ORDER — DEXAMETHASONE 4 MG/1
40 TABLET ORAL WEEKLY
COMMUNITY

## 2024-05-08 RX ORDER — CEFTRIAXONE 1 G/1
1 INJECTION, POWDER, FOR SOLUTION INTRAMUSCULAR; INTRAVENOUS EVERY 24 HOURS
Status: DISCONTINUED | OUTPATIENT
Start: 2024-05-09 | End: 2024-05-10

## 2024-05-08 RX ADMIN — ENOXAPARIN SODIUM 40 MG: 40 INJECTION SUBCUTANEOUS at 22:01

## 2024-05-08 RX ADMIN — TAMSULOSIN HYDROCHLORIDE 0.4 MG: 0.4 CAPSULE ORAL at 22:01

## 2024-05-08 RX ADMIN — LISINOPRIL 10 MG: 10 TABLET ORAL at 22:01

## 2024-05-08 RX ADMIN — ATORVASTATIN CALCIUM 40 MG: 40 TABLET, FILM COATED ORAL at 22:01

## 2024-05-08 RX ADMIN — AZITHROMYCIN MONOHYDRATE 500 MG: 500 INJECTION, POWDER, LYOPHILIZED, FOR SOLUTION INTRAVENOUS at 18:31

## 2024-05-08 RX ADMIN — PREGABALIN 25 MG: 25 CAPSULE ORAL at 22:01

## 2024-05-08 RX ADMIN — PANTOPRAZOLE SODIUM 40 MG: 40 TABLET, DELAYED RELEASE ORAL at 22:01

## 2024-05-08 RX ADMIN — CEFTRIAXONE 2 G: 2 INJECTION, POWDER, FOR SOLUTION INTRAMUSCULAR; INTRAVENOUS at 17:52

## 2024-05-08 RX ADMIN — IPRATROPIUM BROMIDE AND ALBUTEROL SULFATE 3 ML: .5; 3 SOLUTION RESPIRATORY (INHALATION) at 15:55

## 2024-05-08 RX ADMIN — MAGNESIUM SULFATE HEPTAHYDRATE 2 G: 2 INJECTION, SOLUTION INTRAVENOUS at 18:31

## 2024-05-08 RX ADMIN — CARVEDILOL 12.5 MG: 12.5 TABLET, FILM COATED ORAL at 22:01

## 2024-05-08 RX ADMIN — IPRATROPIUM BROMIDE AND ALBUTEROL SULFATE 3 ML: .5; 3 SOLUTION RESPIRATORY (INHALATION) at 22:42

## 2024-05-08 RX ADMIN — METHYLPREDNISOLONE SODIUM SUCCINATE 125 MG: 125 INJECTION, POWDER, FOR SOLUTION INTRAMUSCULAR; INTRAVENOUS at 15:57

## 2024-05-08 RX ADMIN — IPRATROPIUM BROMIDE AND ALBUTEROL SULFATE 3 ML: .5; 3 SOLUTION RESPIRATORY (INHALATION) at 17:58

## 2024-05-08 ASSESSMENT — ACTIVITIES OF DAILY LIVING (ADL)
ADLS_ACUITY_SCORE: 38
ADLS_ACUITY_SCORE: 38
ADLS_ACUITY_SCORE: 27
ADLS_ACUITY_SCORE: 27
ADLS_ACUITY_SCORE: 38
ADLS_ACUITY_SCORE: 27
ADLS_ACUITY_SCORE: 38
ADLS_ACUITY_SCORE: 38

## 2024-05-08 ASSESSMENT — COLUMBIA-SUICIDE SEVERITY RATING SCALE - C-SSRS
1. IN THE PAST MONTH, HAVE YOU WISHED YOU WERE DEAD OR WISHED YOU COULD GO TO SLEEP AND NOT WAKE UP?: NO
2. HAVE YOU ACTUALLY HAD ANY THOUGHTS OF KILLING YOURSELF IN THE PAST MONTH?: NO
6. HAVE YOU EVER DONE ANYTHING, STARTED TO DO ANYTHING, OR PREPARED TO DO ANYTHING TO END YOUR LIFE?: NO

## 2024-05-08 NOTE — PHARMACY-ADMISSION MEDICATION HISTORY
Pharmacist Admission Medication History    Admission medication history is complete. The information provided in this note is only as accurate as the sources available at the time of the update.    Information Source(s): Patient via in-person    Pertinent Information: outside meds    Changes made to PTA medication list:  Added: zoloft, lyrica, revlimid, entocort EC, dexamethasone, jose martin-sequel  Deleted: seroquel, cogentin, flexeril, voltaren gel, duoneb, xopenex neb  Changed: coreg    Allergies reviewed with patient and updates made in EHR: yes    Medication History Completed By: Jewel Michel [facility] 5/8/2024 6:38 PM    PTA Med List   Medication Sig Last Dose    acetaminophen (TYLENOL) 500 MG tablet Take 1,000 mg by mouth every 6 hours as needed for pain prn    aspirin (ASA) 81 MG EC tablet Take 81 mg by mouth daily 5/7/2024 at hs    atorvastatin (LIPITOR) 40 MG tablet Take 1 tablet (40 mg) by mouth every evening 5/7/2024    budesonide (ENTOCORT EC) 3 MG EC capsule Take 6 mg by mouth every morning 5/7/2024    calcium carbonate (OS-TAVO) 500 MG tablet Take 2 tablets by mouth daily 5/8/2024    carvedilol (COREG) 12.5 MG tablet Take 12.5 mg by mouth 2 times daily (with meals) 5/8/2024 at am    cyanocobalamin (VITAMIN B-12) 1000 MCG tablet Take 1,000 mcg by mouth daily 5/8/2024    dexAMETHasone (DECADRON) 4 MG tablet Take 40 mg by mouth once a week Past Week at fridays    ferrous fumarate-vitamin C ER (JOSE MARTIN-SEQUELS) 65-25 MG CR tablet Take 1 tablet by mouth daily (with breakfast) 5/8/2024 at am    LENalidomide (REVLIMID) 25 MG CAPS capsule Take 25 mg by mouth daily 15mg daily for 14 days, then off 7 days ---started 15mg daily on 5/3/2024 5/7/2024 at 25mg    lisinopril (ZESTRIL) 10 MG tablet Take 10 mg by mouth daily 5/7/2024 at hs    multivitamin w/minerals (THERA-VIT-M) tablet Take 1 tablet by mouth daily 5/8/2024 at am    nitroGLYcerin (NITROSTAT) 0.4 MG sublingual tablet Place 0.4 mg under the tongue every 5 minutes as  needed for chest pain prn    pantoprazole (PROTONIX) 40 MG EC tablet Take 1 tablet (40 mg) by mouth 2 times daily (before meals) 5/8/2024 at am    pregabalin (LYRICA) 25 MG capsule Take 25 mg by mouth at bedtime 5/7/2024 at hs    sertraline (ZOLOFT) 100 MG tablet Take 100 mg by mouth daily 5/7/2024    tamsulosin (FLOMAX) 0.4 MG capsule Take 0.4 mg by mouth daily 5/7/2024 at hs    thiamine (B-1) 100 MG tablet Take 1 tablet (100 mg) by mouth daily 5/8/2024    vitamin D3 (CHOLECALCIFEROL) 50 mcg (2000 units) tablet Take 1 tablet by mouth daily 5/8/2024

## 2024-05-08 NOTE — ED TRIAGE NOTES
Pt brought in by [facility] for worsening SOB the past 2 days, but having a hard time sleeping last night d/t breathing.  Per EMS Oxygen 84% on arrival and pt on oxygen @ 2 liters when he want it.  Denies history of breathing problems  or issues.  Pt describes as feeling oxygen deprived last night.  DuoNeb infusing on arrival.     Triage Assessment (Adult)       Row Name 05/08/24 1528          Triage Assessment    Airway WDL WDL        Respiratory WDL    Respiratory WDL rhythm/pattern     Rhythm/Pattern, Respiratory shortness of breath;labored;pursed lip breathing;tachypneic        Cardiac WDL    Cardiac WDL WDL

## 2024-05-08 NOTE — ED NOTES
"[facility]  ED Nurse Handoff Report    ED Chief complaint: Shortness of Breath  . ED Diagnosis:   Final diagnoses:   Acute on chronic respiratory failure with hypoxia and hypercapnia (H)   Hyponatremia       Allergies:   Allergies   Allergen Reactions    Gabapentin Visual Disturbance       Code Status: Full Code    Activity level - Baseline/Home:  independent.  Activity Level - Current:   standby.   Lift room needed: No.   Bariatric: No   Needed: No   Isolation: No.   Infection: Not Applicable.     Respiratory status: Oximask or NC @ 2 Liters    Vital Signs (within 30 minutes):   Vitals:    05/08/24 1530 05/08/24 1545 05/08/24 1600 05/08/24 1615   BP: 133/84 128/81  137/86   Pulse: 66 64 61 81   Resp: 26 (!) 39 22 29   Temp:       TempSrc:       SpO2: 91% 97% 100% 99%   Weight:       Height:           Cardiac Rhythm:  ,      Pain level:    Patient confused: No.   Patient Falls Risk: patient and family education.   Elimination Status: Has voided     Patient Report - Initial Complaint: SOB.   Focused Assessment: Per Provider Note:  Chief Complaint  Shortness of Breath     HPI  Pacheco Torres is a 78 year old male with history of hypertension, hyperlipidemia, NSTEMI, septic shock, tobacco abuse, and esophageal stricture who presents to the ED via EMS for evaluation of shortness of breath. Nurse present reports Daniel's shortness of breath started 2 days ago. Patient states that he was starting to feel short of breath Monday (2 days prior) which significantly worsened last night. He was unable to find a comfortable position in bed and had to sleep propped up. He has not been using nebs at home as he doesn't have a neb machine at home. Endorses productive cough with \"opaque\" phlegm and slightly worse lower extremity edema. Denies chest pain, nausea, vomiting, abdominal pain, dysuria, hematuria, melena, or hematochezia. Of note, patient uses baseline 2L of oxygen at home both at day and " night. States he does not take portable tanks with him when he goes out. He has not had this type of severe shortness of breath before. Patient reports a notable history of esophagus stricture and sepsis, which he was hospitalized for. He does take iron supplements.      Independent Historian  Patient and nurse present as detailed above.    Abnormal Results:   Labs Ordered and Resulted from Time of ED Arrival to Time of ED Departure   BLOOD GAS VENOUS - Abnormal       Result Value    pH Venous 7.37      pCO2 Venous 72 (*)     pO2 Venous 33      Bicarbonate Venous 41 (*)     Base Excess/Deficit Venous 13.1 (*)     FIO2 20      Oxyhemoglobin Venous 61 (*)     O2 Sat, Venous 62.1 (*)    BASIC METABOLIC PANEL - Abnormal    Sodium 130 (*)     Potassium 4.6      Chloride 91 (*)     Carbon Dioxide (CO2) 35 (*)     Anion Gap 4 (*)     Urea Nitrogen 15.5      Creatinine 0.60 (*)     GFR Estimate >90      Calcium 8.7 (*)     Glucose 95     CBC WITH PLATELETS AND DIFFERENTIAL - Abnormal    WBC Count 2.3 (*)     RBC Count 2.76 (*)     Hemoglobin 9.9 (*)     Hematocrit 30.7 (*)      (*)     MCH 35.9 (*)     MCHC 32.2      RDW 12.4      Platelet Count 233      % Neutrophils 65      % Lymphocytes 12      % Monocytes 17      % Eosinophils 6      % Basophils 0      % Immature Granulocytes 0      NRBCs per 100 WBC 0      Absolute Neutrophils 1.5 (*)     Absolute Lymphocytes 0.3 (*)     Absolute Monocytes 0.4      Absolute Eosinophils 0.1      Absolute Basophils 0.0      Absolute Immature Granulocytes 0.0      Absolute NRBCs 0.0     INFLUENZA A/B, RSV, & SARS-COV2 PCR - Normal    Influenza A PCR Negative      Influenza B PCR Negative      RSV PCR Negative      SARS CoV2 PCR Negative     TROPONIN T, HIGH SENSITIVITY - Normal    Troponin T, High Sensitivity 20     NT PROBNP INPATIENT - Normal    N terminal Pro BNP Inpatient 666     TROPONIN T, HIGH SENSITIVITY - Normal    Troponin T, High Sensitivity 20     PROCALCITONIN - Normal     Procalcitonin 0.07          XR Chest 2 Views   Final Result   IMPRESSION: Shallow inspiration. Scattered bibasilar opacities may be atelectasis. Early pneumonia thought to be less likely. Ribs appear slightly irregular in the lateral right hemithorax, with perhaps pleural thickening, likely present on previous exam    and may relate to old trauma. Clinically correlate for any possibility of acute trauma. No pleural effusion or pneumothorax. Prominent aortic arch, unchanged.          Treatments provided: See Mar  Family Comments: NA  OBS brochure/video discussed/provided to patient:  No  ED Medications:   Medications   azithromycin 500 mg (ZITHROMAX) in 0.9% NaCl 250 mL intermittent infusion 500 mg (500 mg Intravenous $New Bag 5/8/24 1831)   magnesium sulfate 2 g in 50 mL sterile water intermittent infusion (2 g Intravenous $New Bag 5/8/24 1831)   cefTRIAXone (ROCEPHIN) 1 g vial to attach to  mL bag for ADULTS or NS 50 mL bag for PEDS (has no administration in time range)   azithromycin 500 mg (ZITHROMAX) in 0.9% NaCl 250 mL intermittent infusion 500 mg (has no administration in time range)   ipratropium - albuterol 0.5 mg/2.5 mg/3 mL (DUONEB) neb solution 3 mL ( Nebulization Canceled Entry 5/8/24 1756)   predniSONE (DELTASONE) tablet 40 mg (has no administration in time range)   ipratropium - albuterol 0.5 mg/2.5 mg/3 mL (DUONEB) neb solution 3 mL (3 mLs Nebulization $Given 5/8/24 5935)   methylPREDNISolone sodium succinate (solu-MEDROL) injection 125 mg (125 mg Intravenous $Given 5/8/24 1557)   cefTRIAXone (ROCEPHIN) 2 g vial to attach to  ml bag for ADULTS or NS 50 ml bag for PEDS (0 g Intravenous Stopped 5/8/24 1831)   ipratropium - albuterol 0.5 mg/2.5 mg/3 mL (DUONEB) neb solution 3 mL (3 mLs Nebulization $Given 5/8/24 1758)       Drips infusing:  Yes  For the majority of the shift this patient was Green.   Interventions performed were na.    Sepsis treatment initiated:  No    Cares/treatment/interventions/medications to be completed following ED care: Is need to infuse Mg+ & Zithromax    ED Nurse Name: Germaine Monte RN  6:09 PM    RECEIVING UNIT ED HANDOFF REVIEW    Above ED Nurse Handoff Report was reviewed: Yes  Reviewed by: NATE LING RN on May 8, 2024 at 8:13 PM   ADELAIDA Kearns called the ED to inform them the note was read: Yes

## 2024-05-08 NOTE — ED PROVIDER NOTES
"  Emergency Department Note      History of Present Illness     Chief Complaint  Shortness of Breath    HPI  Pacheco Torres is a 78 year old male with history of hypertension, hyperlipidemia, NSTEMI, septic shock, tobacco abuse, and esophageal stricture who presents to the ED via EMS for evaluation of shortness of breath. Nurse present reports Daniel's shortness of breath started 2 days ago. Patient states that he was starting to feel short of breath Monday (2 days prior) which significantly worsened last night. He was unable to find a comfortable position in bed and had to sleep propped up. He has not been using nebs at home as he doesn't have a neb machine at home. Endorses productive cough with \"opaque\" phlegm and slightly worse lower extremity edema. Denies chest pain, nausea, vomiting, abdominal pain, dysuria, hematuria, melena, or hematochezia. Of note, patient uses baseline 2L of oxygen at home both at day and night. States he does not take portable tanks with him when he goes out. He has not had this type of severe shortness of breath before. Patient reports a notable history of esophagus stricture and sepsis, which he was hospitalized for. He does take iron supplements.     Independent Historian  Patient and nurse present as detailed above.    Review of External Notes  Monday Note   Past Medical History   Medical History and Problem List  Hypertension  CAD  MDD  GERD  Hyperlipidemia   Anemia  Monoclonal gammopathy  Obesity  Polymyalgia rheumatica  Tobacco abuse  Vitamin D deficiency  Primary malignant neoplasm of prostate   Osteoarthritis   Hypovolemia  NSTEMI  Septic shock  Altered mental status   Esophageal stricture  Impotence of organic origin   Cervicalgia     Medications  Aspirin 81 mg  Atorvastatin  Benztropine  Carvedilol  Cyclobenzaprine   Lisinopril   Nitroglycerin  Pantoprazole  Quetiapine  Tamsulosin  Iron polysaccharide complex  Pregabalin   Sertraline     Surgical History   Coronary angioplasty " "  Herniorrhaphy   Coronary stent placement  [place] teeth extraction  Meniscectomy   [facility] cataract surgery   Physical Exam   Patient Vitals for the past 24 hrs:   BP Temp Temp src Pulse Resp SpO2 Height Weight   05/08/24 2340 117/66 98.2  F (36.8  C) Temporal 69 16 97 % -- --   05/08/24 2003 (!) 149/73 98.5  F (36.9  C) Tympanic 74 20 93 % 1.511 m (4' 11.5\") 88 kg (193 lb 14.4 oz)   05/08/24 1931 (!) 146/87 -- -- 104 -- 90 % -- --   05/08/24 1930 -- -- -- 78 -- -- -- --   05/08/24 1920 -- -- -- 76 -- 93 % -- --   05/08/24 1900 -- -- -- -- 20 -- -- --   05/08/24 1830 -- -- -- 68 -- 90 % -- --   05/08/24 1615 137/86 -- -- 81 29 99 % -- --   05/08/24 1600 -- -- -- 61 22 100 % -- --   05/08/24 1545 128/81 -- -- 64 (!) 39 97 % -- --   05/08/24 1530 133/84 -- -- 66 26 91 % -- --   05/08/24 1526 (!) 156/79 98.5  F (36.9  C) Oral 68 28 (!) 81 % 1.499 m (4' 11\") 86.6 kg (191 lb)     Physical Exam  Constitutional:       Appearance: Normal appearance.   HENT:      Head: Normocephalic and atraumatic.   Eyes:      Extraocular Movements: Extraocular movements intact.      Conjunctiva/sclera: Conjunctivae normal.   Cardiovascular:      Rate and Rhythm: Normal rate and regular rhythm.   Pulmonary:      Effort: Pulmonary effort is tachypneic.  Pursed lip respirations.      Breath sounds: Diminished lung sounds bilaterally with end expiratory wheezing.  Abdominal:      General: Abdomen is flat. There is no distension.      Palpations: Abdomen is soft.      Tenderness: There is no abdominal tenderness.   Musculoskeletal:      Cervical back: Normal range of motion. No rigidity.       Right lower leg: Mild edema.      Left lower leg: Mild edema.   Skin:     General: Skin is warm and dry.   Neurological:      General: No focal deficit present.      Mental Status: Alert and oriented to person, place, and time.   Psychiatric:         Mood and Affect: Mood normal.         Behavior: Behavior normal.    Diagnostics   Lab Results   Labs Ordered " and Resulted from Time of ED Arrival to Time of ED Departure   BLOOD GAS VENOUS - Abnormal       Result Value    pH Venous 7.37      pCO2 Venous 72 (*)     pO2 Venous 33      Bicarbonate Venous 41 (*)     Base Excess/Deficit Venous 13.1 (*)     FIO2 20      Oxyhemoglobin Venous 61 (*)     O2 Sat, Venous 62.1 (*)    BASIC METABOLIC PANEL - Abnormal    Sodium 130 (*)     Potassium 4.6      Chloride 91 (*)     Carbon Dioxide (CO2) 35 (*)     Anion Gap 4 (*)     Urea Nitrogen 15.5      Creatinine 0.60 (*)     GFR Estimate >90      Calcium 8.7 (*)     Glucose 95     CBC WITH PLATELETS AND DIFFERENTIAL - Abnormal    WBC Count 2.3 (*)     RBC Count 2.76 (*)     Hemoglobin 9.9 (*)     Hematocrit 30.7 (*)      (*)     MCH 35.9 (*)     MCHC 32.2      RDW 12.4      Platelet Count 233      % Neutrophils 65      % Lymphocytes 12      % Monocytes 17      % Eosinophils 6      % Basophils 0      % Immature Granulocytes 0      NRBCs per 100 WBC 0      Absolute Neutrophils 1.5 (*)     Absolute Lymphocytes 0.3 (*)     Absolute Monocytes 0.4      Absolute Eosinophils 0.1      Absolute Basophils 0.0      Absolute Immature Granulocytes 0.0      Absolute NRBCs 0.0     INFLUENZA A/B, RSV, & SARS-COV2 PCR - Normal    Influenza A PCR Negative      Influenza B PCR Negative      RSV PCR Negative      SARS CoV2 PCR Negative     TROPONIN T, HIGH SENSITIVITY - Normal    Troponin T, High Sensitivity 20     NT PROBNP INPATIENT - Normal    N terminal Pro BNP Inpatient 666     TROPONIN T, HIGH SENSITIVITY - Normal    Troponin T, High Sensitivity 20     PROCALCITONIN - Normal    Procalcitonin 0.07         Imaging  XR Chest 2 Views   Final Result   IMPRESSION: Shallow inspiration. Scattered bibasilar opacities may be atelectasis. Early pneumonia thought to be less likely. Ribs appear slightly irregular in the lateral right hemithorax, with perhaps pleural thickening, likely present on previous exam    and may relate to old trauma. Clinically  correlate for any possibility of acute trauma. No pleural effusion or pneumothorax. Prominent aortic arch, unchanged.        EKG   ECG results from 05/08/24   EKG 12-lead, tracing only     Value    Systolic Blood Pressure     Diastolic Blood Pressure     Ventricular Rate 63    Atrial Rate 63    LA Interval 164    QRS Duration 64        QTc 392    P Axis 13    R AXIS -2    T Axis 28    Interpretation ECG      Sinus rhythm  Increased R/S ratio in V1, consider early transition or posterior infarct  Abnormal ECG  When compared with ECG of 11-DEC-2022 15:44,  Vent. rate has decreased BY  51 BPM  Criteria for Inferior infarct are no longer Present  Non-specific change in ST segment in Anterior leads         Independent Interpretation  On my independent interpretation of chest x-ray, there is right greater than left lower lung opacities.  No mediastinal widening.  No pneumothorax.    ED Course    Medications Administered  Medications   ipratropium - albuterol 0.5 mg/2.5 mg/3 mL (DUONEB) neb solution 3 mL (3 mLs Nebulization $Given 5/8/24 1555)   methylPREDNISolone sodium succinate (solu-MEDROL) injection 125 mg (125 mg Intravenous $Given 5/8/24 1557)   cefTRIAXone (ROCEPHIN) 2 g vial to attach to  ml bag for ADULTS or NS 50 ml bag for PEDS (0 g Intravenous Stopped 5/8/24 1831)   azithromycin 500 mg (ZITHROMAX) in 0.9% NaCl 250 mL intermittent infusion 500 mg (500 mg Intravenous $New Bag 5/8/24 1831)   ipratropium - albuterol 0.5 mg/2.5 mg/3 mL (DUONEB) neb solution 3 mL (3 mLs Nebulization $Given 5/8/24 1758)   magnesium sulfate 2 g in 50 mL sterile water intermittent infusion (2 g Intravenous $New Bag 5/8/24 1831)     Procedures  Procedures     Discussion of Management  None    Social Determinants of Health adding to complexity of care  None    ED Course  ED Course as of 05/09/24 0002   Wed May 08, 2024   1529 I obtained patient history and performed a physical exam.    1538 EKG 12-lead, tracing only  Sinus  rhythm rate of 63.  Normal CO and QRS.  Normal QTc.  No acute ST elevation or depression as compared with 12/11/2022 EKG.   174 Discussed patient with hospitalist Dr. Kurtz who accepted the patient for admission.     Medical Decision Making / Diagnosis   CMS Diagnoses: None    MIPS     None    MDM  Pacheco Torres is a 78 year old male as described above presents to the emergency department with increased shortness of breath.  Patient hemodynamically stable at time of evaluation.  Afebrile.  Patient however presents with pursed lip respirations, mild tachypnea, and hypoxia in the low 80s requiring application of oxy mask at 2 L.  Bilateral end expiratory wheezing present on auscultation.  Patient denies formal diagnosis of COPD in the past.  Patient reports that he is supposed to be on 2 L of oxygen at home, but recently was told by outpatient provider that he may not necessarily need oxygen anymore so he had returned to his portable oxygen tank.  Patient reports that he has been traveling without his oxygen tank.  Will trial IV DuoNeb and Solu-Medrol.  Patient did rebound well with saturation greater than 90% upon application of OxyMask given 1 dose of DuoNeb.  Did note some symptomatic relief.  Especially given CO2 retention on initial VBG in conjunction to wheezing, symptoms likely secondary to reactive airway type disease process such as COPD versus acute bronchitis.  Lower suspicion for pulmonary embolism especially given wheezing and good response to nebulizer treatment and oxy mask.  Nonetheless, if development of persistent hypoxia or new tachycardia, consider D-dimer versus CT PE imaging.  Dyspnea workup ordered.  Additional differential diagnosis considered includes, but not limited to, pneumonia, pleural effusion, CHF, and viral URI.  Will obtain chest x-ray for evaluation of above discussed differentials.  Viral swab.  Discussed care plan with patient who voiced understanding and agreement with plan.   Answered all questions.  Additional workup and orders as listed in chart.    Please refer to ED course above as part of continuation of MDM for details on the patient's treatment course and any changes or updates in care plan beyond my initial evaluation and MDM creation.    Disposition  The patient was admitted to the hospital under the care of Dr. Kurtz.     ICD-10 Codes:    ICD-10-CM    1. Acute on chronic respiratory failure with hypoxia and hypercapnia (H)  J96.21     J96.22       2. Hyponatremia  E87.1            Discharge Medications  Current Discharge Medication List            Scribe Disclosure:  ADELAIDA, Ruth Patel, am serving as a scribe at 3:43 PM on 5/8/2024 to document services personally performed by John Weiss DO based on my observations and the provider's statements to me.     [employer]      John Weiss DO  05/09/24 0007

## 2024-05-08 NOTE — H&P
[facility]    History and Physical - Hospitalist Service       Date of Admission:  5/8/2024    Assessment & Plan      Pacheco Torres is a 78 year old male with PMH significant for CAD, hypertension, hyperlipidemia, MGUS, PMR, prostate cancer, esophageal stricture, MDD, macrocytic anemia admitted on 5/8/2024 with shortness of breath.    Acute on chronic hypoxemic respiratory failure thought 2/2 COPD exacerbation with possible superimposed pneumonia:   Denies pulm history but follows with pulmonologist.  I see history of COPD.  Baseline uses 2L NC oxygen.  Increased shortness of breath mostly over the recent 3 days.  He mostly seems to notice this when trying to sleep but he is quite sedentary at baseline it seems.  Exam notable for wheezing.  CXR with possible PNA.  ProBNP and troponin are normal.  COVID, influenza, RSV negative.  Most likely COPD exacerbation with possible superimposed pneumonia.    -Received solumedrol in ED, continue prednisone 40 x 4 more days  -Continue ceftriaxone x5 days, and azithromycin  x3 days  -Duoneb q4h WA  -Continuous oximetry, may need new order for home oxygen on discharge    MGUS with progression to multiple myeloma:  Follows with [facility].  Currently taking velcade and revlimid.  I believe both of these medications are associated with pulmonary toxicity.  Unclear if these are contributing to the above.    -If symptoms fail to improve with above treatments, consider discussion with oncology    Hyponatremia:  Sodium 130.  Previously normal.  Mild edema on exam but overall appears to have lost weight over the past 2 years.  Suspect some poor oral intake.    -Repeat BMP in AM    Leukopenia  Macrocytic anemia, chronic:  Chronic and likely related to chronic illness.  Repeat CBC in AM.    CAD  HTN  HLD:  BNP and troponin normal.  Normotensive on arrival.  Continue PTA lisinopril, atorvastatin, ASA    Prostate cancer:  Prior radiation tx at [place]    PMR:    "     Diet:  Regular  DVT Prophylaxis: Enoxaparin (Lovenox) SQ  Blanchard Catheter: Not present  Lines: None     Cardiac Monitoring: None  Code Status:  FULL code    Clinically Significant Risk Factors Present on Admission                # Drug Induced Platelet Defect: home medication list includes an antiplatelet medication   # Hypertension: Noted on problem list      # Obesity: Estimated body mass index is 38.58 kg/m  as calculated from the following:    Height as of this encounter: 1.499 m (4' 11\").    Weight as of this encounter: 86.6 kg (191 lb).              Disposition Plan     Medically Ready for Discharge: Anticipated in 2-4 Days           Carl Kurtz DO  Hospitalist Service  [facility]  Securely message with [employer] (more info)  Text page via [employer] Paging/Directory     ______________________________________________________________________    Chief Complaint   Shortness of breath    History is obtained from the patient    History of Present Illness   Pacheco Torres is a 78 year old male with PMH significant for CAD, hypertension, hyperlipidemia, MGUS, PMR, esophageal stricture, MDD, macrocytic anemia admitted on 5/8/2024 with shortness of breath.    Poor historian.  Patient denies lung disease but then notes that he follows with a lung doctor.  He reports that he uses chronic oxygen 2 L nasal cannula at all times.  Occasionally will not use this when he is out and about.  More recently over the past 3 to 4 days he has had progressive shortness of breath.  He mostly complains of dyspnea trying to sleep but seems to have consistent shortness of breath all the time.  Endorses occasional productive cough.  Endorses wheezing.  Denies fevers, chills, chest pain.  Some chronic lower extremity edema.      Past Medical History    Past Medical History:   Diagnosis Date    Benign essential hypertension     Coronary artery disease involving autologous artery coronary bypass graft without angina " pectoris     s/p DAYAN LAD 5/08    Depression     Gastroesophageal reflux disease with esophagitis     Hyperlipidemia LDL goal <70     Macrocytic anemia     11-12 range with MCV in the 105-107 range.  normal B12 and folate    Monoclonal gammopathy     Obesity     PMR (polymyalgia rheumatica) (H)     Tobacco abuse     Vitamin D deficiency        Past Surgical History   Past Surgical History:   Procedure Laterality Date    CORONARY ANGIOPLASTY  05/2008    with DAYAN to the LAD    HERNIA REPAIR         Prior to Admission Medications   Prior to Admission Medications   Prescriptions Last Dose Informant Patient Reported? Taking?   QUEtiapine (SEROQUEL) 25 MG tablet   No No   Sig: Take 0.5 tablets (12.5 mg) by mouth At Bedtime   acetaminophen (TYLENOL) 500 MG tablet   Yes No   Sig: Take 1,000 mg by mouth every 6 hours as needed for pain   aspirin (ASA) 81 MG EC tablet   Yes No   Sig: Take 81 mg by mouth daily   atorvastatin (LIPITOR) 40 MG tablet   No No   Sig: Take 1 tablet (40 mg) by mouth every evening   benztropine (COGENTIN) 1 MG tablet   No No   Sig: Take 1 tablet (1 mg) by mouth 3 times daily as needed for other (for extrapyramidal effects)   calcium carbonate (OS-TAVO) 500 MG tablet   Yes No   Sig: Take 2 tablets by mouth daily   carvedilol (COREG) 25 MG tablet   No No   Sig: Take 1 tablet (25 mg) by mouth 2 times daily (with meals)   cyanocobalamin (VITAMIN B-12) 1000 MCG tablet   Yes No   Sig: Take 1,000 mcg by mouth daily   cyclobenzaprine (FLEXERIL) 5 MG tablet   Yes No   Sig: Take 5 mg by mouth nightly as needed   diclofenac (VOLTAREN) 1 % topical gel   No No   Sig: Apply 4 g topically 4 times daily   ipratropium - albuterol 0.5 mg/2.5 mg/3 mL (DUONEB) 0.5-2.5 (3) MG/3ML neb solution   No No   Sig: Take 1 vial (3 mLs) by nebulization 4 times daily   levalbuterol (XOPENEX) 0.63 MG/3ML neb solution   Yes No   Sig: Take 3 mLs (0.63 mg) by nebulization every 4 hours as needed for wheezing   lisinopril (ZESTRIL) 10 MG  tablet   Yes No   Sig: Take 10 mg by mouth daily   multivitamin w/minerals (THERA-VIT-M) tablet   No No   Sig: Take 1 tablet by mouth daily   nitroGLYcerin (NITROSTAT) 0.4 MG sublingual tablet   Yes No   Sig: Place 0.4 mg under the tongue every 5 minutes as needed for chest pain   pantoprazole (PROTONIX) 40 MG EC tablet   No No   Sig: Take 1 tablet (40 mg) by mouth 2 times daily (before meals)   tamsulosin (FLOMAX) 0.4 MG capsule   Yes No   Sig: Take 0.4 mg by mouth daily   thiamine (B-1) 100 MG tablet   No No   Sig: Take 1 tablet (100 mg) by mouth daily   vitamin D3 (CHOLECALCIFEROL) 50 mcg (2000 units) tablet   Yes No   Sig: Take 1 tablet by mouth daily      Facility-Administered Medications: None        Review of Systems    The 10 point Review of Systems is negative other than noted in the HPI or here.     Social History   I have reviewed this patient's social history and updated it with pertinent information if needed.  Social History     Tobacco Use    Smoking status: Former     Current packs/day: 0.00     Types: Cigarettes     Quit date: 2010     Years since quittin.3   Substance Use Topics    Alcohol use: Not Currently    Drug use: Not Currently         Family History   I have reviewed this patient's family history and updated it with pertinent information if needed.  Family History   Problem Relation Age of Onset    Coronary Artery Disease Mother     Coronary Artery Disease Father     Heart Failure Father     Alcoholism Brother          Allergies   Allergies   Allergen Reactions    Gabapentin Visual Disturbance        Physical Exam   Vital Signs: Temp: 98.5  F (36.9  C) Temp src: Oral BP: 137/86 Pulse: 81   Resp: 29 SpO2: 99 % O2 Device: Oxymask Oxygen Delivery: 2 LPM  Weight: 191 lbs 0 oz    General Appearance: Patient awake & alert.  No apparent distress.   Respiratory: Lungs with diffuse expiratory wheezing but good airway movement  Work of breathing appears normal on NC  Cardiovascular: Regular  rate and rhythm.  No murmurs rubs or gallops.  There is no edema present.  GI: Benign.  Soft.  Non-tender.  Bowel sounds active.   Skin: No rashes or lesions exposed skin.  Neuro:  The patient is moving all extremities.  No obvious focal asymmetries.   Other: Patient is appropriate.  Rylan historian.       Medical Decision Making       75 MINUTES SPENT BY ME on the date of service doing chart review, history, exam, documentation & further activities per the note.      Data   ------------------------- PAST 24 HR DATA REVIEWED -----------------------------------------------    I have personally reviewed the following data over the past 24 hrs:    2.3 (L)  \   9.9 (L)   / 233     130 (L) 91 (L) 15.5 /  95   4.6 35 (H) 0.60 (L) \     Trop: 20 BNP: 666     Procal: 0.07 CRP: N/A Lactic Acid: N/A         Imaging results reviewed over the past 24 hrs:   Recent Results (from the past 24 hour(s))   XR Chest 2 Views    Narrative    EXAM: XR CHEST 2 VIEWS  LOCATION: [facility]  DATE: 5/8/2024    INDICATION: Shortness of breath, wheezing, hypoxia.  COMPARISON: CT exam performed January 24, 2023.      Impression    IMPRESSION: Shallow inspiration. Scattered bibasilar opacities may be atelectasis. Early pneumonia thought to be less likely. Ribs appear slightly irregular in the lateral right hemithorax, with perhaps pleural thickening, likely present on previous exam   and may relate to old trauma. Clinically correlate for any possibility of acute trauma. No pleural effusion or pneumothorax. Prominent aortic arch, unchanged.

## 2024-05-08 NOTE — ED NOTES
Answered pt call light, wanting to sit on the side of the bed. Did explain right now we did not want him too d/t risk for falling.  Did bring cup of ice water.

## 2024-05-09 LAB
ALLEN'S TEST: YES
ANION GAP SERPL CALCULATED.3IONS-SCNC: 3 MMOL/L (ref 7–15)
ATRIAL RATE - MUSE: 63 BPM
BASE EXCESS BLDA CALC-SCNC: 8.6 MMOL/L (ref -3–3)
BASE EXCESS BLDV CALC-SCNC: 9.1 MMOL/L (ref -3–3)
BUN SERPL-MCNC: 15.1 MG/DL (ref 8–23)
CALCIUM SERPL-MCNC: 8.7 MG/DL (ref 8.8–10.2)
CHLORIDE SERPL-SCNC: 90 MMOL/L (ref 98–107)
COHGB MFR BLD: 92.5 % (ref 95–96)
CREAT SERPL-MCNC: 0.66 MG/DL (ref 0.67–1.17)
DEPRECATED HCO3 PLAS-SCNC: 37 MMOL/L (ref 22–29)
DIASTOLIC BLOOD PRESSURE - MUSE: NORMAL MMHG
EGFRCR SERPLBLD CKD-EPI 2021: >90 ML/MIN/1.73M2
ERYTHROCYTE [DISTWIDTH] IN BLOOD BY AUTOMATED COUNT: 12.5 % (ref 10–15)
GLUCOSE BLDC GLUCOMTR-MCNC: 124 MG/DL (ref 70–99)
GLUCOSE SERPL-MCNC: 135 MG/DL (ref 70–99)
HCO3 BLD-SCNC: 39 MMOL/L (ref 21–28)
HCO3 BLDV-SCNC: 40 MMOL/L (ref 21–28)
HCT VFR BLD AUTO: 35.5 % (ref 40–53)
HGB BLD-MCNC: 11.2 G/DL (ref 13.3–17.7)
INTERPRETATION ECG - MUSE: NORMAL
LACTATE SERPL-SCNC: 0.7 MMOL/L (ref 0.7–2)
MAGNESIUM SERPL-MCNC: 2.6 MG/DL (ref 1.7–2.3)
MCH RBC QN AUTO: 35.7 PG (ref 26.5–33)
MCHC RBC AUTO-ENTMCNC: 31.5 G/DL (ref 31.5–36.5)
MCV RBC AUTO: 113 FL (ref 78–100)
O2/TOTAL GAS SETTING VFR VENT: 40 %
O2/TOTAL GAS SETTING VFR VENT: 40 %
OXYHGB MFR BLDV: 26 % (ref 70–75)
P AXIS - MUSE: 13 DEGREES
PCO2 BLD: 92 MM HG (ref 35–45)
PCO2 BLDV: 105 MM HG (ref 40–50)
PEEP: 8 CM H2O
PH BLD: 7.23 [PH] (ref 7.35–7.45)
PH BLDV: 7.19 [PH] (ref 7.32–7.43)
PLATELET # BLD AUTO: 271 10E3/UL (ref 150–450)
PO2 BLD: 70 MM HG (ref 80–105)
PO2 BLDV: 21 MM HG (ref 25–47)
POTASSIUM SERPL-SCNC: 5.2 MMOL/L (ref 3.4–5.3)
PR INTERVAL - MUSE: 164 MS
QRS DURATION - MUSE: 64 MS
QT - MUSE: 384 MS
QTC - MUSE: 392 MS
R AXIS - MUSE: -2 DEGREES
RBC # BLD AUTO: 3.14 10E6/UL (ref 4.4–5.9)
SAO2 % BLDA: 91 % (ref 92–100)
SAO2 % BLDV: 26.3 % (ref 70–75)
SODIUM SERPL-SCNC: 130 MMOL/L (ref 135–145)
SYSTOLIC BLOOD PRESSURE - MUSE: NORMAL MMHG
T AXIS - MUSE: 28 DEGREES
VENTRICULAR RATE- MUSE: 63 BPM
WBC # BLD AUTO: 2.2 10E3/UL (ref 4–11)

## 2024-05-09 PROCEDURE — 94640 AIRWAY INHALATION TREATMENT: CPT | Mod: 76

## 2024-05-09 PROCEDURE — 250N000012 HC RX MED GY IP 250 OP 636 PS 637: Performed by: STUDENT IN AN ORGANIZED HEALTH CARE EDUCATION/TRAINING PROGRAM

## 2024-05-09 PROCEDURE — 36415 COLL VENOUS BLD VENIPUNCTURE: CPT | Performed by: STUDENT IN AN ORGANIZED HEALTH CARE EDUCATION/TRAINING PROGRAM

## 2024-05-09 PROCEDURE — 82805 BLOOD GASES W/O2 SATURATION: CPT | Performed by: STUDENT IN AN ORGANIZED HEALTH CARE EDUCATION/TRAINING PROGRAM

## 2024-05-09 PROCEDURE — 999N000157 HC STATISTIC RCP TIME EA 10 MIN

## 2024-05-09 PROCEDURE — 99233 SBSQ HOSP IP/OBS HIGH 50: CPT | Performed by: INTERNAL MEDICINE

## 2024-05-09 PROCEDURE — 5A09357 ASSISTANCE WITH RESPIRATORY VENTILATION, LESS THAN 24 CONSECUTIVE HOURS, CONTINUOUS POSITIVE AIRWAY PRESSURE: ICD-10-PCS | Performed by: INTERNAL MEDICINE

## 2024-05-09 PROCEDURE — 250N000011 HC RX IP 250 OP 636: Performed by: STUDENT IN AN ORGANIZED HEALTH CARE EDUCATION/TRAINING PROGRAM

## 2024-05-09 PROCEDURE — 250N000009 HC RX 250: Performed by: INTERNAL MEDICINE

## 2024-05-09 PROCEDURE — 999N000156 HC STATISTIC RCP CONSULT EA 30 MIN

## 2024-05-09 PROCEDURE — 258N000003 HC RX IP 258 OP 636: Performed by: STUDENT IN AN ORGANIZED HEALTH CARE EDUCATION/TRAINING PROGRAM

## 2024-05-09 PROCEDURE — 80048 BASIC METABOLIC PNL TOTAL CA: CPT | Performed by: STUDENT IN AN ORGANIZED HEALTH CARE EDUCATION/TRAINING PROGRAM

## 2024-05-09 PROCEDURE — 85041 AUTOMATED RBC COUNT: CPT | Performed by: STUDENT IN AN ORGANIZED HEALTH CARE EDUCATION/TRAINING PROGRAM

## 2024-05-09 PROCEDURE — 36415 COLL VENOUS BLD VENIPUNCTURE: CPT | Performed by: INTERNAL MEDICINE

## 2024-05-09 PROCEDURE — 94660 CPAP INITIATION&MGMT: CPT

## 2024-05-09 PROCEDURE — 250N000013 HC RX MED GY IP 250 OP 250 PS 637: Performed by: STUDENT IN AN ORGANIZED HEALTH CARE EDUCATION/TRAINING PROGRAM

## 2024-05-09 PROCEDURE — 83605 ASSAY OF LACTIC ACID: CPT | Performed by: INTERNAL MEDICINE

## 2024-05-09 PROCEDURE — 94640 AIRWAY INHALATION TREATMENT: CPT

## 2024-05-09 PROCEDURE — 83735 ASSAY OF MAGNESIUM: CPT | Performed by: STUDENT IN AN ORGANIZED HEALTH CARE EDUCATION/TRAINING PROGRAM

## 2024-05-09 PROCEDURE — 250N000009 HC RX 250: Performed by: STUDENT IN AN ORGANIZED HEALTH CARE EDUCATION/TRAINING PROGRAM

## 2024-05-09 PROCEDURE — 120N000013 HC R&B IMCU

## 2024-05-09 PROCEDURE — 36600 WITHDRAWAL OF ARTERIAL BLOOD: CPT

## 2024-05-09 RX ORDER — METHYLPREDNISOLONE SODIUM SUCCINATE 125 MG/2ML
125 INJECTION, POWDER, LYOPHILIZED, FOR SOLUTION INTRAMUSCULAR; INTRAVENOUS EVERY 12 HOURS
Status: DISCONTINUED | OUTPATIENT
Start: 2024-05-09 | End: 2024-05-12

## 2024-05-09 RX ORDER — CARBOXYMETHYLCELLULOSE SODIUM 5 MG/ML
1 SOLUTION/ DROPS OPHTHALMIC
Status: DISCONTINUED | OUTPATIENT
Start: 2024-05-09 | End: 2024-05-10

## 2024-05-09 RX ORDER — CARBOXYMETHYLCELLULOSE SODIUM 5 MG/ML
1 SOLUTION/ DROPS OPHTHALMIC
Status: DISCONTINUED | OUTPATIENT
Start: 2024-05-09 | End: 2024-05-12 | Stop reason: HOSPADM

## 2024-05-09 RX ORDER — LIDOCAINE 40 MG/G
CREAM TOPICAL
Status: DISCONTINUED | OUTPATIENT
Start: 2024-05-09 | End: 2024-05-12 | Stop reason: HOSPADM

## 2024-05-09 RX ORDER — BUDESONIDE 3 MG/1
3 CAPSULE, COATED PELLETS ORAL EVERY MORNING
Status: DISCONTINUED | OUTPATIENT
Start: 2024-05-10 | End: 2024-05-12 | Stop reason: HOSPADM

## 2024-05-09 RX ORDER — IPRATROPIUM BROMIDE AND ALBUTEROL SULFATE 2.5; .5 MG/3ML; MG/3ML
3 SOLUTION RESPIRATORY (INHALATION)
Status: DISCONTINUED | OUTPATIENT
Start: 2024-05-09 | End: 2024-05-12 | Stop reason: HOSPADM

## 2024-05-09 RX ADMIN — Medication 50 MCG: at 08:32

## 2024-05-09 RX ADMIN — CARVEDILOL 12.5 MG: 12.5 TABLET, FILM COATED ORAL at 17:30

## 2024-05-09 RX ADMIN — IPRATROPIUM BROMIDE AND ALBUTEROL SULFATE 3 ML: .5; 3 SOLUTION RESPIRATORY (INHALATION) at 21:06

## 2024-05-09 RX ADMIN — IPRATROPIUM BROMIDE AND ALBUTEROL SULFATE 3 ML: .5; 3 SOLUTION RESPIRATORY (INHALATION) at 11:48

## 2024-05-09 RX ADMIN — CEFTRIAXONE 1 G: 1 INJECTION, POWDER, FOR SOLUTION INTRAMUSCULAR; INTRAVENOUS at 13:53

## 2024-05-09 RX ADMIN — BUDESONIDE 6 MG: 3 CAPSULE ORAL at 08:32

## 2024-05-09 RX ADMIN — CALCIUM 1000 MG: 500 TABLET ORAL at 08:32

## 2024-05-09 RX ADMIN — PREDNISONE 40 MG: 20 TABLET ORAL at 08:31

## 2024-05-09 RX ADMIN — METHYLPREDNISOLONE SODIUM SUCCINATE 125 MG: 125 INJECTION, POWDER, FOR SOLUTION INTRAMUSCULAR; INTRAVENOUS at 23:30

## 2024-05-09 RX ADMIN — GUAIFENESIN 200 MG: 100 LIQUID ORAL at 11:48

## 2024-05-09 RX ADMIN — SERTRALINE HYDROCHLORIDE 100 MG: 100 TABLET ORAL at 08:32

## 2024-05-09 RX ADMIN — CYANOCOBALAMIN TAB 1000 MCG 1000 MCG: 1000 TAB at 08:32

## 2024-05-09 RX ADMIN — PANTOPRAZOLE SODIUM 40 MG: 40 TABLET, DELAYED RELEASE ORAL at 06:49

## 2024-05-09 RX ADMIN — ENOXAPARIN SODIUM 40 MG: 40 INJECTION SUBCUTANEOUS at 23:29

## 2024-05-09 RX ADMIN — PANTOPRAZOLE SODIUM 40 MG: 40 TABLET, DELAYED RELEASE ORAL at 17:30

## 2024-05-09 RX ADMIN — IPRATROPIUM BROMIDE AND ALBUTEROL SULFATE 3 ML: .5; 3 SOLUTION RESPIRATORY (INHALATION) at 17:57

## 2024-05-09 RX ADMIN — THIAMINE HCL TAB 100 MG 100 MG: 100 TAB at 08:32

## 2024-05-09 RX ADMIN — CARVEDILOL 12.5 MG: 12.5 TABLET, FILM COATED ORAL at 08:32

## 2024-05-09 RX ADMIN — AZITHROMYCIN MONOHYDRATE 500 MG: 500 INJECTION, POWDER, LYOPHILIZED, FOR SOLUTION INTRAVENOUS at 17:49

## 2024-05-09 RX ADMIN — Medication 1 TABLET: at 08:32

## 2024-05-09 ASSESSMENT — ACTIVITIES OF DAILY LIVING (ADL)
ADLS_ACUITY_SCORE: 28
ADLS_ACUITY_SCORE: 27
ADLS_ACUITY_SCORE: 28
DEPENDENT_IADLS:: INDEPENDENT
ADLS_ACUITY_SCORE: 28

## 2024-05-09 NOTE — PROGRESS NOTES
[facility]    Medicine Progress Note - Hospitalist Service    Date of Admission:  5/8/2024    Assessment & Plan      Pacheco Torres is a 78 year old male with PMH significant for CAD, hypertension, hyperlipidemia, MGUS, PMR, prostate cancer, esophageal stricture, MDD, macrocytic anemia admitted on 5/8/2024 with shortness of breath.    Acute on chronic hypoxemic respiratory failure thought 2/2 COPD exacerbation with possible superimposed pneumonia:  Tobacco abuse   Denies pulm history but follows with pulmonologist.    -Prior history of known COPD at baseline home oxygen 2 L by nasal cannula   -Getting some improvement with current approach with breathing treatments, antibiotics and corticosteroids   -Will continue with this regimen   -I believe he will still benefit for at least another day of inpatient care   -He mentioned that he is improving and hoping for hospital discharge but is still dyspneic even with normal conversation   -Discussed with him importance of tobacco cessation in the setting of his chronic respiratory failure with underlying COPD      Baseline uses 2L NC oxygen.  Increased shortness of breath mostly over the recent 3 days.  He mostly seems to notice this when trying to sleep but he is quite sedentary at baseline it seems.  Exam notable for wheezing.  CXR with possible PNA.  ProBNP and troponin are normal.  COVID, influenza, RSV negative.  Most likely COPD exacerbation with possible superimposed pneumonia.    -Received solumedrol in ED, continue prednisone 40 x 4 more days  -Continue ceftriaxone x5 days, and azithromycin  x3 days  -Duoneb q4h WA  -Continuous oximetry, may need new order for home oxygen on discharge    MGUS with progression to multiple myeloma:  Follows with [facility].    -If symptoms fail to improve with above treatments, consider discussion with oncology    Hyponatremia:  -Stable  Sodium 130.  Previously normal.  Mild edema on exam but overall  "appears to have lost weight over the past 2 years.  Suspect some poor oral intake.    -Repeat BMP in AM    Leukopenia  Macrocytic anemia, chronic:  Chronic and likely related to chronic illness.  Repeat CBC showing stable levels    CAD  HTN  HLD:  BNP and troponin normal.  Normotensive on arrival.  Continue PTA lisinopril, atorvastatin, ASA    Prostate cancer:  Prior radiation tx at [place]    PMR:          Diet: Combination Diet Regular Diet Adult    DVT Prophylaxis: Enoxaparin (Lovenox) SQ  Blanchard Catheter: Not present  Lines: None     Cardiac Monitoring: None  Code Status: Full Code      Clinically Significant Risk Factors Present on Admission                # Drug Induced Platelet Defect: home medication list includes an antiplatelet medication   # Hypertension: Noted on problem list      # Obesity: Estimated body mass index is 38.23 kg/m  as calculated from the following:    Height as of this encounter: 1.511 m (4' 11.5\").    Weight as of this encounter: 87.3 kg (192 lb 8 oz).              Disposition Plan     Medically Ready for Discharge: Anticipated Tomorrow  If continues to improve with his shortness of breath and no worsening hypoxia             Mani Lincoln MD, MD  Hospitalist Service  [facility]  Securely message with [employer] (more info)  Text page via [facility] Paging/Directory   ______________________________________________________________________    Interval History   I assumed medicine service care for this pleasant gentleman whom I met this morning while he is sitting comfortably in the hospital recliner watching television.  He mentioned that he is improving in terms of earlier wheezing and shortness of breath.  However still has notable dyspnea as he keeps having pausing while talking in full sentences.  He is endorsing desire for hospital discharge but also understand the need for confirmation of hospitalization.  No reported nausea or vomiting.  Able to tolerate oral diet. "  Afebrile.  No agitation or being combative overnight.      Physical Exam   Vital Signs: Temp: 97.9  F (36.6  C) Temp src: Temporal BP: (!) 147/75 Pulse: 80   Resp: 20 SpO2: 94 % O2 Device: Nasal cannula Oxygen Delivery: 3 LPM  Weight: 192 lbs 8 oz    HEENT; Atraumatic, normocephalic, pinkish conjuctiva, pupils bilateral reactive   Skin: warm and moist, no rashes  Lungs: equal chest expansion, fair air entry, multiple scattered wheezes  Heart: normal rate, normal rhythm, no rubs or gallops.   Abdomen: normal bowel sounds, no tenderness, no peritoneal signs, no guarding  Extremities: no deformities, bilateral lower extremity nonpitting edema   Neuro; follow commands, alert and oriented x3, spontaneous speech, coherent, moves all extremities spontaneously  Psych; no hallucination, euthymic mood, not agitated      Medical Decision Making       50 MINUTES SPENT BY ME on the date of service doing chart review, history, exam, documentation & further activities per the note.  MANAGEMENT DISCUSSED with the following over the past 24 hours: Yes   NOTE(S)/MEDICAL RECORDS REVIEWED over the past 24 hours: Yes       Data     I have personally reviewed the following data over the past 24 hrs:    2.2 (L)  \   11.2 (L)   / 271     130 (L) 90 (L) 15.1 /  135 (H)   5.2 37 (H) 0.66 (L) \     Trop: 20 BNP: 666     Procal: 0.07 CRP: N/A Lactic Acid: N/A         Imaging results reviewed over the past 24 hrs:   Recent Results (from the past 24 hour(s))   XR Chest 2 Views    Narrative    EXAM: XR CHEST 2 VIEWS  LOCATION: [facility]  DATE: 5/8/2024    INDICATION: Shortness of breath, wheezing, hypoxia.  COMPARISON: CT exam performed January 24, 2023.      Impression    IMPRESSION: Shallow inspiration. Scattered bibasilar opacities may be atelectasis. Early pneumonia thought to be less likely. Ribs appear slightly irregular in the lateral right hemithorax, with perhaps pleural thickening, likely present on previous  exam   and may relate to old trauma. Clinically correlate for any possibility of acute trauma. No pleural effusion or pneumothorax. Prominent aortic arch, unchanged.

## 2024-05-09 NOTE — PLAN OF CARE
"Goal Outcome Evaluation:  Patient alert, oriented x 4. Continues to report increased SOB with acitivity sats dropping t low 80% on 3L. Wheezing noted.  PRN nebu and robitussin was given with some relief. Up in the chair most of the shift.     BP (!) 164/90 (BP Location: Right arm, Patient Position: Sitting, Cuff Size: Adult Regular)   Pulse 90   Temp 98.1  F (36.7  C) (Temporal)   Resp 20   Ht 1.511 m (4' 11.5\")   Wt 87.3 kg (192 lb 8 oz)   SpO2 97%   BMI 38.23 kg/m        Plan of Care Reviewed With: patient    Overall Patient Progress: no changeOverall Patient Progress: no change    Outcome Evaluation: . SOB reported. Sats drops to 70% on oxygen 3L NC, requiring  4-5L  with activity. PRN nebu and cough syrub given with some relief.    Problem: Adult Inpatient Plan of Care  Goal: Plan of Care Review  Description: The Plan of Care Review/Shift note should be completed every shift.  The Outcome Evaluation is a brief statement about your assessment that the patient is improving, declining, or no change.  This information will be displayed automatically on your shift  note.  Outcome: Not Progressing  Flowsheets (Taken 5/9/2024 5194)  Outcome Evaluation: . SOB reported. Sats drops to 70% on oxygen 3L NC, requiring  4-5L  with activity. PRN nebu and cough syrub given with some relief.  Plan of Care Reviewed With: patient  Overall Patient Progress: no change  Goal: Patient-Specific Goal (Individualized)  Description: You can add care plan individualizations to a care plan. Examples of Individualization might be:  \"Parent requests to be called daily at 9am for status\", \"I have a hard time hearing out of my right ear\", or \"Do not touch me to wake me up as it startles  me\".  Outcome: Not Progressing  Goal: Absence of Hospital-Acquired Illness or Injury  Outcome: Not Progressing  Intervention: Identify and Manage Fall Risk  Recent Flowsheet Documentation  Taken 5/9/2024 4531 by Twyla Barragan RN  Safety Promotion/Fall " Prevention:   activity supervised   assistive device/personal items within reach   clutter free environment maintained   lighting adjusted   nonskid shoes/slippers when out of bed   safety round/check completed  Intervention: Prevent Skin Injury  Recent Flowsheet Documentation  Taken 5/9/2024 0831 by Twyla Barragan RN  Body Position:   position changed independently   supine, head elevated  Device Skin Pressure Protection:   adhesive use limited   tubing/devices free from skin contact  Intervention: Prevent and Manage VTE (Venous Thromboembolism) Risk  Recent Flowsheet Documentation  Taken 5/9/2024 0831 by Twyla Barragan RN  VTE Prevention/Management: SCDs (sequential compression devices) off  Intervention: Prevent Infection  Recent Flowsheet Documentation  Taken 5/9/2024 0831 by Twyla Barragan RN  Infection Prevention:   rest/sleep promoted   hand hygiene promoted  Goal: Optimal Comfort and Wellbeing  Outcome: Not Progressing  Goal: Readiness for Transition of Care  Outcome: Not Progressing     Problem: Comorbidity Management  Goal: Maintenance of Asthma Control  Outcome: Not Progressing  Intervention: Maintain Asthma Symptom Control  Recent Flowsheet Documentation  Taken 5/9/2024 0831 by Twyla Barragan RN  Medication Review/Management: medications reviewed  Goal: Maintenance of Behavioral Health Symptom Control  Outcome: Not Progressing  Intervention: Maintain Behavioral Health Symptom Control  Recent Flowsheet Documentation  Taken 5/9/2024 0831 by Twyla Barragan RN  Medication Review/Management: medications reviewed  Goal: Maintenance of COPD Symptom Control  Outcome: Not Progressing  Intervention: Maintain COPD Symptom Control  Recent Flowsheet Documentation  Taken 5/9/2024 0831 by Twyla Barragan RN  Supportive Measures: self-care encouraged  Medication Review/Management: medications reviewed  Goal: Blood Glucose Levels Within Targeted Range  Outcome: Not Progressing  Intervention: Monitor and Manage  Glycemia  Recent Flowsheet Documentation  Taken 5/9/2024 0831 by Twyla Barragan RN  Medication Review/Management: medications reviewed  Goal: Maintenance of Heart Failure Symptom Control  Outcome: Not Progressing  Intervention: Maintain Heart Failure Management  Recent Flowsheet Documentation  Taken 5/9/2024 0831 by Twyla Barragan RN  Medication Review/Management: medications reviewed  Goal: Blood Pressure in Desired Range  Outcome: Not Progressing  Intervention: Maintain Blood Pressure Management  Recent Flowsheet Documentation  Taken 5/9/2024 0831 by Twyla Barragan RN  Medication Review/Management: medications reviewed  Goal: Maintenance of Osteoarthritis Symptom Control  Outcome: Not Progressing  Intervention: Maintain Osteoarthritis Symptom Control  Recent Flowsheet Documentation  Taken 5/9/2024 0831 by Twyla Barragan RN  Assistive Device Utilized:   gait belt   walker  Activity Management:   ambulated to bathroom   back to bed  Medication Review/Management: medications reviewed  Goal: Bariatric Home Regimen Maintained  Outcome: Not Progressing  Intervention: Maintain and Manage Postbariatric Surgery Care  Recent Flowsheet Documentation  Taken 5/9/2024 0831 by Twyla Barragan RN  Medication Review/Management: medications reviewed  Goal: Maintenance of Seizure Control  Outcome: Not Progressing  Intervention: Maintain Seizure Symptom Control  Recent Flowsheet Documentation  Taken 5/9/2024 0831 by Twyla Barragan RN  Medication Review/Management: medications reviewed     Problem: Fall Injury Risk  Goal: Absence of Fall and Fall-Related Injury  Outcome: Not Progressing  Intervention: Identify and Manage Contributors  Recent Flowsheet Documentation  Taken 5/9/2024 0831 by Twyla Barragan RN  Medication Review/Management: medications reviewed  Intervention: Promote Injury-Free Environment  Recent Flowsheet Documentation  Taken 5/9/2024 0831 by Twyla Barragan RN  Safety Promotion/Fall Prevention:   activity  supervised   assistive device/personal items within reach   clutter free environment maintained   lighting adjusted   nonskid shoes/slippers when out of bed   safety round/check completed     Problem: Skin Injury Risk Increased  Goal: Skin Health and Integrity  Outcome: Not Progressing  Intervention: Plan: Nurse Driven Intervention: Moisture Management  Recent Flowsheet Documentation  Taken 5/9/2024 0831 by Twyla Barragan, RN  Moisture Interventions:   Encourage regular toileting   No brief in bed  Taken 5/9/2024 0800 by Twyla Barragan RN  Moisture Interventions: Encourage regular toileting  Bathing/Skin Care:   dressed/undressed   linen changed  Intervention: Optimize Skin Protection  Recent Flowsheet Documentation  Taken 5/9/2024 0831 by Twyla Barragan, RN  Activity Management:   ambulated to bathroom   back to bed  Head of Bed (HOB) Positioning: HOB at 30-45 degrees

## 2024-05-09 NOTE — CONSULTS
Care Management Initial Consult    General Information  Assessment completed with: Patient, Spouse or significant other, Family, -chart review, KRISHNA Alfred       Primary Care Provider verified and updated as needed: Yes   Readmission within the last 30 days: no previous admission in last 30 days      Reason for Consult: discharge planning  Advance Care Planning:            Communication Assessment  Patient's communication style: spoken language (English or Bilingual)    Hearing Difficulty or Deaf: no   Wear Glasses or Blind: yes    Cognitive  Cognitive/Neuro/Behavioral: WDL                      Living Environment:   People in home: significant other  Nelida  Current living Arrangements: house      Able to return to prior arrangements: yes       Family/Social Support:  Care provided by: self  Provides care for: no one                Description of Support System:           Current Resources:   Patient receiving home care services: No     Community Resources:    Equipment currently used at home: walker, rolling  Supplies currently used at home:      Lifestyle & Psychosocial Needs:  Social Determinants of Health     Food Insecurity: No Food Insecurity (2/8/2024)    Received from [facility]    Hunger Vital Sign     Worried About Running Out of Food in the Last Year: Never true     Ran Out of Food in the Last Year: Never true   Depression: Not at risk (5/6/2024)    [facility]    PHQ-2     PHQ-2 TOTAL SCORE: 2   Housing Stability: Low Risk  (2/8/2024)    Received from [facility]    Housing Stability     What is your living situation today?: I have a steady place to live   Tobacco Use: High Risk (5/6/2024)    Received from [facility]    Patient History     Smoking Tobacco Use: Every Day     Smokeless Tobacco Use: Never     Passive Exposure: Not on file   Financial Resource Strain: Low Risk  (5/29/2023)     Received from [facility]    Overall Financial Resource Strain (CARDIA)     Difficulty of Paying Living Expenses: Not hard at all   Alcohol Use: Alcohol Misuse (1/27/2023)    Received from [facility]    AUDIT-C     Frequency of Alcohol Consumption: 2-3 times a week     Average Number of Drinks: 1 or 2     Frequency of Binge Drinking: Less than monthly   Transportation Needs: No Transportation Needs (2/8/2024)    Received from [facility]    PRAPARE - Transportation     Lack of Transportation (Medical): No     Lack of Transportation (Non-Medical): No   Physical Activity: Inactive (2/8/2024)    Received from [facility]    Exercise Vital Sign     Days of Exercise per Week: 0 days     Minutes of Exercise per Session: 0 min   Interpersonal Safety: Not At Risk (5/29/2023)    Received from [facility]    Humiliation, Afraid, Rape, and Kick questionnaire     Fear of Current or Ex-Partner: No     Emotionally Abused: No     Physically Abused: No     Sexually Abused: No   Stress: Stress Concern Present (1/27/2023)    Received from [facility]    [employer] [place] [employer] - Occupational Stress Questionnaire     Feeling of Stress : Rather much   Social Connections: Moderately Isolated (1/27/2023)    Received from [facility]    Social Connection and Isolation Panel [NHANES]     Frequency of Communication with Friends and Family: Twice a week     Frequency of Social Gatherings with Friends and Family: Once a week     Attends [employer] Services: Never     Active Member of Clubs or Organizations: No     Attends Club or Organization Meetings: Never     Marital Status: Living with partner   Health Literacy: Not on file       Functional Status:  Prior to admission patient needed assistance:   Dependent ADLs:: Ambulation-walker  Dependent IADLs:: Independent    Additional Information:  Per chart pt admitted with PMH significant  for CAD, hypertension, hyperlipidemia, MGUS, PMR, prostate cancer, esophageal stricture, MDD, macrocytic anemia admitted on 5/8/2024 with shortness of breath. , noted to have unplanned readmission risk of 32%.      PORFIRIO received a message to call pt's KRISHNA. Porfirio met with pt to assess due to URR and to obtain permission to contact Aubrie KELLER.  Pt agreed to have SW contact her and provided phone, [phone].  Pt denies having services or supports in the home and that he's independent with IADL/ADLs.  PORFIRIO informed him SW/CM will follow hospital stay to assist with discharge planning, if needed.     SW spoke with KRISHNA and his s.o. was with her, Nelida. They requested pt get help at home with mediation management/meds set-up due to having numerous medications, requested [place] if able to, pt needs to be willing too. They shared how pt is independent and prefers to handle things himself.     Aubrie asked for resources for meal prep, PORFIRIO shared Mom's Meals and Meals on Wheels. Aubrie requested assistance with getting pt a purse type O2 portable bag since the tanks are too heavy for him to transport. RNCC directed her to the O2 agency, [employer] that he uses since they'll need to pay for it.     Aubrie asked about laundry services, PORFIRIO informed her of private duty.    Aubrie expressed concern for pt and her mother living alone together and wants in a facility that has supports. Aubrie shared pt is adamant about living at home. Pt has no children and his NOK is his brother who is older then him; concerns that he needs to have someone else be a HCA and/or making decisions for him.    PORFIRIO met with pt after the phone call to discuss HCA and possible HC. Porfirio began discussing HCA, pt confirmed his brother is older than him and lives in [place]. Porfirio shared how he would make medical decisions for him if he's not able to.  Pt asked to talk about this later since he wants to sleep.    PORFIRIO will meet with pt tomorrow to continue  conversation.    YVONNE Delvalle, [facility]  Inpatient Care Coordination  [facility]  [phone]    SAM AZEVEDO, [facility] HospitalSW

## 2024-05-09 NOTE — PLAN OF CARE
"Goal Outcome Evaluation:    Plan of Care Reviewed With: patient    Overall Patient Progress: improving    Outcome Evaluation: Received patient from the ED around 1950 with O2 at 2LPM via NC. Complaints of increased SOB 3days PTA. Up on SBA GB/walker. Wheezing on inspiration and expiration noted. PRN duoneb given. Calls appropriately.  O2 increased to 3LPM especially when sleeping due to O2 dropping less than 90. Oriented to room and call light system. Made comfortable in bed.    Problem: Adult Inpatient Plan of Care  Goal: Plan of Care Review  Description: The Plan of Care Review/Shift note should be completed every shift.  The Outcome Evaluation is a brief statement about your assessment that the patient is improving, declining, or no change.  This information will be displayed automatically on your shift  note.  Outcome: Progressing  Flowsheets (Taken 5/9/2024 0656)  Outcome Evaluation: Received patient from the ED around 1950 with O2 at 2LPM via NC. Complaints of increased SOB 3days PTA. Up on SBA GB/walker. Wheezing on inspiration and expiration noted. PRN duoneb given. Calls appropriately.  O2 increased to 3LPM especially when sleeping due to O2 dropping less than 90. Oriented to room and call light system. Made comfortable in bed.  Plan of Care Reviewed With: patient  Overall Patient Progress: improving  Goal: Patient-Specific Goal (Individualized)  Description: You can add care plan individualizations to a care plan. Examples of Individualization might be:  \"Parent requests to be called daily at 9am for status\", \"I have a hard time hearing out of my right ear\", or \"Do not touch me to wake me up as it startles  me\".  Outcome: Progressing  Goal: Absence of Hospital-Acquired Illness or Injury  Outcome: Progressing  Intervention: Identify and Manage Fall Risk  Recent Flowsheet Documentation  Taken 5/9/2024 0102 by Tejinder Kc RN  Safety Promotion/Fall Prevention:   activity supervised   assistive " device/personal items within reach   clutter free environment maintained   lighting adjusted   nonskid shoes/slippers when out of bed   safety round/check completed  Intervention: Prevent Skin Injury  Recent Flowsheet Documentation  Taken 5/9/2024 0102 by Tejinder Kc RN  Body Position:   position changed independently   supine, head elevated  Device Skin Pressure Protection:   adhesive use limited   tubing/devices free from skin contact  Intervention: Prevent and Manage VTE (Venous Thromboembolism) Risk  Recent Flowsheet Documentation  Taken 5/9/2024 0102 by Tejinder Kc RN  VTE Prevention/Management: SCDs (sequential compression devices) off  Intervention: Prevent Infection  Recent Flowsheet Documentation  Taken 5/9/2024 0102 by Tejinder Kc RN  Infection Prevention:   rest/sleep promoted   hand hygiene promoted  Goal: Optimal Comfort and Wellbeing  Outcome: Progressing  Goal: Readiness for Transition of Care  Outcome: Progressing  Intervention: Mutually Develop Transition Plan  Recent Flowsheet Documentation  Taken 5/8/2024 2017 by Tejinder Kc RN  Equipment Currently Used at Home: walker, rolling

## 2024-05-09 NOTE — PROGRESS NOTES
"[facility] RCAT     Date: 5/9/2024  Admission Dx: Pneumonia and COPD  Pulmonary History: COPD  Home Nebulizer/MDI Use: None  Home Oxygen: 2 Lpm   Acuity Level (RCAT flow sheet): 3  Aerosol Therapy initiated: Added Duoneb QID, continue Duoneb Q4 hours prn      Pulmonary Hygiene initiated: Deep breath and cough TID      Volume Expansion initiated: IS TID      Current Oxygen Requirements: 3 Lpm NC  Current SpO2: 93%    Re-evaluation date: 5/12/2024    Patient Education:    Informed Pt of RCAT, discussed the effects of the nebulizers.      See \"RT Assessments\" flow sheet for patient assessment scoring and Acuity Level Details.           "

## 2024-05-10 LAB
BASE EXCESS BLDV CALC-SCNC: 12.4 MMOL/L (ref -3–3)
BASE EXCESS BLDV CALC-SCNC: 9.3 MMOL/L (ref -3–3)
GLUCOSE BLDC GLUCOMTR-MCNC: 103 MG/DL (ref 70–99)
GLUCOSE BLDC GLUCOMTR-MCNC: 103 MG/DL (ref 70–99)
GLUCOSE BLDC GLUCOMTR-MCNC: 118 MG/DL (ref 70–99)
GLUCOSE BLDC GLUCOMTR-MCNC: 124 MG/DL (ref 70–99)
HCO3 BLDV-SCNC: 38 MMOL/L (ref 21–28)
HCO3 BLDV-SCNC: 39 MMOL/L (ref 21–28)
MAGNESIUM SERPL-MCNC: 2.2 MG/DL (ref 1.7–2.3)
O2/TOTAL GAS SETTING VFR VENT: 30 %
O2/TOTAL GAS SETTING VFR VENT: 35 %
OXYHGB MFR BLDV: 56 % (ref 70–75)
OXYHGB MFR BLDV: 78 % (ref 70–75)
PCO2 BLDV: 69 MM HG (ref 40–50)
PCO2 BLDV: 92 MM HG (ref 40–50)
PH BLDV: 7.24 [PH] (ref 7.32–7.43)
PH BLDV: 7.35 [PH] (ref 7.32–7.43)
PO2 BLDV: 33 MM HG (ref 25–47)
PO2 BLDV: 38 MM HG (ref 25–47)
POTASSIUM SERPL-SCNC: 5.2 MMOL/L (ref 3.4–5.3)
SAO2 % BLDV: 56.9 % (ref 70–75)
SAO2 % BLDV: 79.7 % (ref 70–75)

## 2024-05-10 PROCEDURE — 94660 CPAP INITIATION&MGMT: CPT

## 2024-05-10 PROCEDURE — 250N000009 HC RX 250: Performed by: STUDENT IN AN ORGANIZED HEALTH CARE EDUCATION/TRAINING PROGRAM

## 2024-05-10 PROCEDURE — 250N000009 HC RX 250: Performed by: HOSPITALIST

## 2024-05-10 PROCEDURE — 999N000185 HC STATISTIC TRANSPORT TIME EA 15 MIN

## 2024-05-10 PROCEDURE — 94640 AIRWAY INHALATION TREATMENT: CPT

## 2024-05-10 PROCEDURE — 250N000011 HC RX IP 250 OP 636: Performed by: HOSPITALIST

## 2024-05-10 PROCEDURE — 36415 COLL VENOUS BLD VENIPUNCTURE: CPT | Performed by: HOSPITALIST

## 2024-05-10 PROCEDURE — 94640 AIRWAY INHALATION TREATMENT: CPT | Mod: 76

## 2024-05-10 PROCEDURE — 36415 COLL VENOUS BLD VENIPUNCTURE: CPT | Performed by: STUDENT IN AN ORGANIZED HEALTH CARE EDUCATION/TRAINING PROGRAM

## 2024-05-10 PROCEDURE — 258N000003 HC RX IP 258 OP 636: Performed by: HOSPITALIST

## 2024-05-10 PROCEDURE — 82805 BLOOD GASES W/O2 SATURATION: CPT | Performed by: HOSPITALIST

## 2024-05-10 PROCEDURE — 87449 NOS EACH ORGANISM AG IA: CPT | Performed by: STUDENT IN AN ORGANIZED HEALTH CARE EDUCATION/TRAINING PROGRAM

## 2024-05-10 PROCEDURE — 250N000013 HC RX MED GY IP 250 OP 250 PS 637: Performed by: HOSPITALIST

## 2024-05-10 PROCEDURE — 120N000001 HC R&B MED SURG/OB

## 2024-05-10 PROCEDURE — 84132 ASSAY OF SERUM POTASSIUM: CPT | Performed by: HOSPITALIST

## 2024-05-10 PROCEDURE — 999N000157 HC STATISTIC RCP TIME EA 10 MIN

## 2024-05-10 PROCEDURE — 99233 SBSQ HOSP IP/OBS HIGH 50: CPT | Performed by: STUDENT IN AN ORGANIZED HEALTH CARE EDUCATION/TRAINING PROGRAM

## 2024-05-10 PROCEDURE — 83735 ASSAY OF MAGNESIUM: CPT | Performed by: HOSPITALIST

## 2024-05-10 RX ORDER — CEFTRIAXONE 2 G/1
2 INJECTION, POWDER, FOR SOLUTION INTRAMUSCULAR; INTRAVENOUS EVERY 24 HOURS
Status: COMPLETED | OUTPATIENT
Start: 2024-05-10 | End: 2024-05-12

## 2024-05-10 RX ADMIN — SERTRALINE HYDROCHLORIDE 100 MG: 100 TABLET ORAL at 08:30

## 2024-05-10 RX ADMIN — CARVEDILOL 12.5 MG: 12.5 TABLET, FILM COATED ORAL at 08:30

## 2024-05-10 RX ADMIN — IPRATROPIUM BROMIDE AND ALBUTEROL SULFATE 3 ML: .5; 3 SOLUTION RESPIRATORY (INHALATION) at 11:55

## 2024-05-10 RX ADMIN — ATORVASTATIN CALCIUM 40 MG: 40 TABLET, FILM COATED ORAL at 20:37

## 2024-05-10 RX ADMIN — ASPIRIN 81 MG: 81 TABLET, COATED ORAL at 20:37

## 2024-05-10 RX ADMIN — CALCIUM 1000 MG: 500 TABLET ORAL at 08:30

## 2024-05-10 RX ADMIN — CYANOCOBALAMIN TAB 1000 MCG 1000 MCG: 1000 TAB at 08:30

## 2024-05-10 RX ADMIN — Medication 50 MCG: at 08:31

## 2024-05-10 RX ADMIN — THIAMINE HCL TAB 100 MG 100 MG: 100 TAB at 08:30

## 2024-05-10 RX ADMIN — ENOXAPARIN SODIUM 40 MG: 40 INJECTION SUBCUTANEOUS at 22:24

## 2024-05-10 RX ADMIN — BUDESONIDE 3 MG: 3 CAPSULE, COATED PELLETS ORAL at 09:17

## 2024-05-10 RX ADMIN — TAMSULOSIN HYDROCHLORIDE 0.4 MG: 0.4 CAPSULE ORAL at 22:23

## 2024-05-10 RX ADMIN — CARVEDILOL 12.5 MG: 12.5 TABLET, FILM COATED ORAL at 18:40

## 2024-05-10 RX ADMIN — IPRATROPIUM BROMIDE AND ALBUTEROL SULFATE 3 ML: .5; 3 SOLUTION RESPIRATORY (INHALATION) at 15:38

## 2024-05-10 RX ADMIN — LISINOPRIL 10 MG: 10 TABLET ORAL at 20:37

## 2024-05-10 RX ADMIN — CEFTRIAXONE 2 G: 2 INJECTION, POWDER, FOR SOLUTION INTRAMUSCULAR; INTRAVENOUS at 13:32

## 2024-05-10 RX ADMIN — IPRATROPIUM BROMIDE AND ALBUTEROL SULFATE 3 ML: .5; 3 SOLUTION RESPIRATORY (INHALATION) at 19:29

## 2024-05-10 RX ADMIN — PREGABALIN 25 MG: 25 CAPSULE ORAL at 22:41

## 2024-05-10 RX ADMIN — METHYLPREDNISOLONE SODIUM SUCCINATE 125 MG: 125 INJECTION, POWDER, FOR SOLUTION INTRAMUSCULAR; INTRAVENOUS at 22:33

## 2024-05-10 RX ADMIN — METHYLPREDNISOLONE SODIUM SUCCINATE 125 MG: 125 INJECTION, POWDER, FOR SOLUTION INTRAMUSCULAR; INTRAVENOUS at 11:14

## 2024-05-10 RX ADMIN — PANTOPRAZOLE SODIUM 40 MG: 40 TABLET, DELAYED RELEASE ORAL at 15:59

## 2024-05-10 RX ADMIN — IPRATROPIUM BROMIDE AND ALBUTEROL SULFATE 3 ML: .5; 3 SOLUTION RESPIRATORY (INHALATION) at 08:40

## 2024-05-10 RX ADMIN — IPRATROPIUM BROMIDE AND ALBUTEROL SULFATE 3 ML: .5; 3 SOLUTION RESPIRATORY (INHALATION) at 00:21

## 2024-05-10 RX ADMIN — Medication 1 TABLET: at 08:30

## 2024-05-10 RX ADMIN — Medication 1 MG: at 22:23

## 2024-05-10 RX ADMIN — AZITHROMYCIN MONOHYDRATE 500 MG: 500 INJECTION, POWDER, LYOPHILIZED, FOR SOLUTION INTRAVENOUS at 15:57

## 2024-05-10 RX ADMIN — PANTOPRAZOLE SODIUM 40 MG: 40 TABLET, DELAYED RELEASE ORAL at 08:30

## 2024-05-10 ASSESSMENT — ACTIVITIES OF DAILY LIVING (ADL)
ADLS_ACUITY_SCORE: 40
ADLS_ACUITY_SCORE: 30
ADLS_ACUITY_SCORE: 40
ADLS_ACUITY_SCORE: 40
ADLS_ACUITY_SCORE: 28
ADLS_ACUITY_SCORE: 40
ADLS_ACUITY_SCORE: 28
ADLS_ACUITY_SCORE: 40
ADLS_ACUITY_SCORE: 40
ADLS_ACUITY_SCORE: 30
ADLS_ACUITY_SCORE: 30
ADLS_ACUITY_SCORE: 40
ADLS_ACUITY_SCORE: 40

## 2024-05-10 NOTE — PROGRESS NOTES
Patient placed on BiPAP for increased work of breathing. RR in 40s. Appeared comfortable on BiPAP. Diffuse wheezing on exam.   - Switch to methylpred 125 q12h  - Continue duonebs  - Transfer to IMC for closer monitoring    Ruth Martinez, DO

## 2024-05-10 NOTE — PROGRESS NOTES
Pt arrived to unit as IMC, Previous VBG showed a pH of 7.23 and a pCO2 of 92 at approximately 9:30 PM. Follow-up VBG at 11:30 PM showed pH of 7.19 and a pCO2 of 105. Provider notified and plan to transfer to ICU to close monitoring.

## 2024-05-10 NOTE — PROGRESS NOTES
XC    Patient seen at the bedside with RT and RN for worsening blood gas.  Previous VBG showed a pH of 7.23 and a pCO2 of 92 at approximately 9:30 PM.  Follow-up VBG at 11:30 PM showed pH of 7.19 and a pCO2 of 105.  At the bedside he is awake, alert, interactive and fully oriented.  No lethargy nor confusion noted.  I did confirm full CODE STATUS and he would be okay with intubation/mechanical ventilation.  Given his reassuring mental status we will transfer him to the ICU for close monitoring.  BiPAP settings are currently 16/8.  We will redraw a VBG at 1 AM.  If there is further worsening or any concerns regarding mental status issues we will intubate him.    Jewel Mejía, DO  May 10, 2024

## 2024-05-10 NOTE — PROGRESS NOTES
[facility]    Medicine Progress Note - Hospitalist Service    Date of Admission:  5/8/2024    Assessment & Plan      Pacheco Torres is a 78 year old male with PMH significant for CAD, hypertension, hyperlipidemia, MGUS, PMR, prostate cancer, esophageal stricture, MDD, macrocytic anemia admitted on 5/8/2024 with shortness of breath.  Likely COPD exacerbation, possible superimposed pneumonia.  Is at risk of opportunistic infection such as PJP with ongoing treatment of multiple myeloma, will attempt to obtain sputum culture.  Treating empirically with antibiotics, continuing IV steroids and as needed BiPAP.    Acute on chronic hypoxemic respiratory failure thought 2/2 COPD exacerbation with possible superimposed pneumonia  Tobacco abuse  Does not have robust pulmonary history, but does follow with a pulmonologist.  Is on 2 L of oxygen by nasal cannula at home.  Was initially responding well to steroids and antibiotics, however increased work of breathing overnight 5/9 requiring BiPAP and transferred to ICU.  Switch back to IV Solu-Medrol, continued antibiotics.  Respiratory status improved 5/10, weaned to nasal cannula.  Continuing IV steroids for now, continuing IV antibiotics.  Discussed with hematology/oncology who recommend sputum collection and PJP testing as patient is at risk with ongoing treatment of multiple myeloma.  -Continue Solu-Medrol 125 mg twice daily  - Continue empiric antibiotics with ceftriaxone, azithromycin  - Continue DuoNebs every 4 hours while awake  - BiPAP as needed, supplemental O2 through NC 2 L at baseline  - Sputum culture, beta D glucan ordered to assess for possible pneumocystis pneumonia    MGUS with progression to multiple myeloma:  Follows with [facility] oncology.  Eren with oncologist, will hold PTA lenalidomide during admission.    Hyponatremia-Stable  Sodium 130.  Previously normal.  Mild edema on exam but overall appears to have lost weight over the past 2  "years.  Suspect some poor oral intake.    -Repeat BMP in AM    Leukopenia  Macrocytic anemia, chronic:  Chronic and likely related to chronic illness.  Repeat CBC showing stable levels.    CAD  HTN  HLD:    BNP and troponin normal.  Normotensive on arrival.  Continue PTA lisinopril, atorvastatin, ASA    Prostate cancer:  Prior radiation tx at [place]          Diet: Regular Diet Adult    DVT Prophylaxis: Enoxaparin (Lovenox) SQ  Blanchard Catheter: Not present  Lines: None     Cardiac Monitoring: ACTIVE order. Indication: bipap  Code Status: Full Code      Clinically Significant Risk Factors                  # Hypertension: Noted on problem list        # Obesity: Estimated body mass index is 39.41 kg/m  as calculated from the following:    Height as of this encounter: 1.499 m (4' 11\").    Weight as of this encounter: 88.5 kg (195 lb 1.7 oz)., PRESENT ON ADMISSION            Disposition Plan     Medically Ready for Discharge: Anticipated Tomorrow             Juan Garcia MD  Hospitalist Service  [facility]  Securely message with Interview (more info)  Text page via [facility] Paging/Directory   ______________________________________________________________________    Interval History   Yesterday evening and overnight developed worsening respiratory status with increased work of breathing, tachypnea, hypoxia.  Initially transferred to IMC, but subsequently transferred to ICU with continuous BiPAP.  VBG initially showed worsening pH to 7.19, but BiPAP overnight and pH improved to normal on a.m. labs.  Comfortable at time of my exam, speaking in full sentences and breathing comfortably on nasal cannula.      Physical Exam   Vital Signs: Temp: 98  F (36.7  C) Temp src: Oral BP: 138/78 Pulse: 60   Resp: 22 SpO2: 93 % O2 Device: Nasal cannula Oxygen Delivery: 3 LPM  Weight: 195 lbs 1.71 oz    HEENT; Atraumatic, normocephalic, pinkish conjuctiva, pupils bilateral reactive   Skin: warm and moist, no rashes  Lungs: " equal chest expansion, fair air entry, multiple scattered wheezes present throughout lungs  Heart: normal rate, normal rhythm, no rubs or gallops.   Abdomen: normal bowel sounds, no tenderness, no peritoneal signs, no guarding  Extremities: no deformities, bilateral lower extremity nonpitting edema   Neuro; follow commands, alert and oriented x3, spontaneous speech, coherent, moves all extremities spontaneously  Psych; no hallucination, euthymic mood, not agitated      Medical Decision Making       50 MINUTES SPENT BY ME on the date of service doing chart review, history, exam, documentation & further activities per the note.  MANAGEMENT DISCUSSED with the following over the past 24 hours: Yes   NOTE(S)/MEDICAL RECORDS REVIEWED over the past 24 hours: Yes       Data     I have personally reviewed the following data over the past 24 hrs:    N/A  \   N/A   / N/A     N/A N/A N/A /  124 (H)   5.2 N/A N/A \     Procal: N/A CRP: N/A Lactic Acid: 0.7         Imaging results reviewed over the past 24 hrs:   No results found for this or any previous visit (from the past 24 hour(s)).

## 2024-05-10 NOTE — PLAN OF CARE
"  Shift Events: tolerated 3L NC all day    Neuro: A&Ox4, moves all extremities independently  CV: SR in 80s no ectopy, mild HTN  ID: afebrile, need sputum sample still  Pulm: LS wheezy on expiration, VILLAR  GI: tolerating diet, unsure of last BM but abd soft nontender and BS +  : voids spontaneously with urinal  Lines/gtts: PIV infusing abx or SL, no gtts  Skin: inact  Labs: WDL    For vital signs and complete assessments, see documentation flowsheets.     Plan: transfer to tele floor, possible discharge home tomorrow    Problem: Adult Inpatient Plan of Care  Goal: Plan of Care Review  Description: The Plan of Care Review/Shift note should be completed every shift.  The Outcome Evaluation is a brief statement about your assessment that the patient is improving, declining, or no change.  This information will be displayed automatically on your shift  note.  Outcome: Progressing  Goal: Patient-Specific Goal (Individualized)  Description: You can add care plan individualizations to a care plan. Examples of Individualization might be:  \"Parent requests to be called daily at 9am for status\", \"I have a hard time hearing out of my right ear\", or \"Do not touch me to wake me up as it startles  me\".  Outcome: Progressing  Goal: Absence of Hospital-Acquired Illness or Injury  Outcome: Progressing  Intervention: Identify and Manage Fall Risk  Recent Flowsheet Documentation  Taken 5/10/2024 1600 by Kaylen Graves, RN  Safety Promotion/Fall Prevention:   activity supervised   assistive device/personal items within reach   clutter free environment maintained   nonskid shoes/slippers when out of bed   room near nurse's station   safety round/check completed  Intervention: Prevent Skin Injury  Recent Flowsheet Documentation  Taken 5/10/2024 1600 by Kaylen Graves, RN  Body Position: position changed independently  Intervention: Prevent and Manage VTE (Venous Thromboembolism) Risk  Recent Flowsheet Documentation  Taken 5/10/2024 " 1600 by Kaylen Graves RN  VTE Prevention/Management:   SCDs (sequential compression devices) off   patient refused intervention  Intervention: Prevent Infection  Recent Flowsheet Documentation  Taken 5/10/2024 1600 by Kaylen Graves RN  Infection Prevention: hand hygiene promoted  Goal: Optimal Comfort and Wellbeing  Outcome: Progressing  Intervention: Provide Person-Centered Care  Recent Flowsheet Documentation  Taken 5/10/2024 1600 by Kaylen Graves RN  Trust Relationship/Rapport:   care explained   choices provided  Goal: Readiness for Transition of Care  Outcome: Progressing     Problem: Comorbidity Management  Goal: Maintenance of COPD Symptom Control  Outcome: Progressing  Intervention: Maintain COPD Symptom Control  Recent Flowsheet Documentation  Taken 5/10/2024 1600 by Kaylen Graves RN  Medication Review/Management: medications reviewed  Goal: Blood Glucose Levels Within Targeted Range  Outcome: Progressing  Intervention: Monitor and Manage Glycemia  Recent Flowsheet Documentation  Taken 5/10/2024 1600 by Kaylen Graves RN  Medication Review/Management: medications reviewed  Goal: Blood Pressure in Desired Range  Outcome: Progressing  Intervention: Maintain Blood Pressure Management  Recent Flowsheet Documentation  Taken 5/10/2024 1600 by Kaylen Graves RN  Medication Review/Management: medications reviewed  Goal: Bariatric Home Regimen Maintained  Outcome: Progressing  Intervention: Maintain and Manage Postbariatric Surgery Care  Recent Flowsheet Documentation  Taken 5/10/2024 1600 by Kaylen Graves RN  Medication Review/Management: medications reviewed     Problem: Fall Injury Risk  Goal: Absence of Fall and Fall-Related Injury  Outcome: Progressing  Intervention: Identify and Manage Contributors  Recent Flowsheet Documentation  Taken 5/10/2024 1600 by Kaylen Graves RN  Medication Review/Management: medications reviewed  Intervention: Promote Injury-Free Environment  Recent Flowsheet  Documentation  Taken 5/10/2024 1600 by Kaylen Graves, RN  Safety Promotion/Fall Prevention:   activity supervised   assistive device/personal items within reach   clutter free environment maintained   nonskid shoes/slippers when out of bed   room near nurse's station   safety round/check completed     Problem: Skin Injury Risk Increased  Goal: Skin Health and Integrity  Outcome: Progressing  Intervention: Plan: Nurse Driven Intervention: Moisture Management  Recent Flowsheet Documentation  Taken 5/10/2024 1600 by Kaylen Graves, RN  Moisture Interventions: Encourage regular toileting  Intervention: Optimize Skin Protection  Recent Flowsheet Documentation  Taken 5/10/2024 1600 by Kaylen Graves, RN  Activity Management: activity adjusted per tolerance  Head of Bed (HOB) Positioning: HOB at 20-30 degrees   Goal Outcome Evaluation:

## 2024-05-10 NOTE — PROGRESS NOTES
RT called for neb treatment. Pt in respiratory distress tripoding on edge of bed/accessory muscle and abdominal use, RR 40's, HR 80's, audible expiratory wheezing and c/o severe SOB. Neb given with no improvement. Pt placed on BIPAP16/8 and 40%. BS still wheezy but WOB is improving, RR 24, HR 63, and pt is now sleeping. Skin integrity is good/intact. MD notified. RT will monitor.    Addendum: An ABG was completed on the pt's right wrist @ 2135 on an FIO2 of 40%.  Pressure was held until bleeding stopped.  No complications noted during the procedure.    Marilin Wolfe, RT

## 2024-05-10 NOTE — PROVIDER NOTIFICATION
"Pt switched to AVAPS mode (previously on BiPAP of 18/8), mode change OK with MD Lynn. Tolerating the change fine, nebulizer given for wheezing, plan to transfer to ICU and a repeat gas at 0100. Pt's exhaled volumes consistently upper 400's to upper 500's. 30% Fio2.       05/10/24 0021   AVAPS Settings   Min P (cmH20) AVAPS 10   Max P (cmH20) AVAPS 30   EPAP cmH20 Bilevel (Res Med) 8   Target VT (mL) AVAPS 500   Set Rate (breath/min) 20 breath/min   Timed Inspiration (Sec) 0.65   Rise Time 3       BP (!) 152/79 (BP Location: Left arm)   Pulse 82   Temp 98.2  F (36.8  C) (Axillary)   Resp 26   Ht 1.511 m (4' 11.5\")   Wt 87.3 kg (192 lb 8 oz)   SpO2 95%   BMI 38.23 kg/m      Henna Orlando, RT    "

## 2024-05-10 NOTE — PLAN OF CARE
"Goal Outcome Evaluation:      Plan of Care Reviewed With: patient    Overall Patient Progress: improving Overall Patient Progress: improving    Outcome Evaluation: blood gases improving after Bipap applied. Pt denies feeling SOB. A/Ox4.    ICU End of Shift Summary.  For vital signs and complete assessments, please see documentation flowsheets.      Pertinent assessments: A/Ox4. VSS on current BiPAP settings. Tele: SR. Inspiratory and expiratory wheezes throughout lungs. Patient reports feeling constipated; declined meds over night. External cath in place. No skin issues noted.    Major Shift Events: pt transferred to ICU. Notified MD of 0053 blood gas; received order for redraw at 0600.    Plan (Upcoming Events): continue BiPAP. Monitor blood gases.     Discharge/Transfer Needs: discharge TBD.     Bedside Shift Report Completed : Y  Bedside Safety Check Completed: Y    Problem: Adult Inpatient Plan of Care  Goal: Plan of Care Review  Description: The Plan of Care Review/Shift note should be completed every shift.  The Outcome Evaluation is a brief statement about your assessment that the patient is improving, declining, or no change.  This information will be displayed automatically on your shift  note.  Outcome: Progressing  Flowsheets (Taken 5/10/2024 0452)  Outcome Evaluation: blood gases improving after Bipap applied. Pt denies feeling SOB. A/Ox4.  Plan of Care Reviewed With: patient  Overall Patient Progress: improving  Goal: Patient-Specific Goal (Individualized)  Description: You can add care plan individualizations to a care plan. Examples of Individualization might be:  \"Parent requests to be called daily at 9am for status\", \"I have a hard time hearing out of my right ear\", or \"Do not touch me to wake me up as it startles  me\".  Outcome: Progressing  Flowsheets (Taken 5/10/2024 0452)  Individualized Care Needs: none  Anxieties, Fears or Concerns: none  Goal: Absence of Hospital-Acquired Illness or " Injury  Outcome: Progressing  Intervention: Identify and Manage Fall Risk  Recent Flowsheet Documentation  Taken 5/10/2024 0400 by Leonardo Elmore RN  Safety Promotion/Fall Prevention:   activity supervised   assistive device/personal items within reach   increased rounding and observation   increase visualization of patient   nonskid shoes/slippers when out of bed   patient and family education   safety round/check completed   supervised activity  Taken 5/10/2024 0135 by Leonardo Elmore RN  Safety Promotion/Fall Prevention:   activity supervised   assistive device/personal items within reach   increased rounding and observation   increase visualization of patient   nonskid shoes/slippers when out of bed   patient and family education   safety round/check completed   supervised activity  Intervention: Prevent Skin Injury  Recent Flowsheet Documentation  Taken 5/10/2024 0400 by Leonardo Elmore RN  Body Position:   position changed independently   weight shifting  Skin Protection:   adhesive use limited   incontinence pads utilized   transparent dressing maintained  Device Skin Pressure Protection:   adhesive use limited   tubing/devices free from skin contact  Taken 5/10/2024 0135 by Leonardo Elmore RN  Body Position:   position changed independently   weight shifting  Skin Protection:   adhesive use limited   incontinence pads utilized   transparent dressing maintained  Device Skin Pressure Protection:   adhesive use limited   tubing/devices free from skin contact  Intervention: Prevent and Manage VTE (Venous Thromboembolism) Risk  Recent Flowsheet Documentation  Taken 5/10/2024 0400 by Leonardo Elmore RN  VTE Prevention/Management: SCDs (sequential compression devices) off  Taken 5/10/2024 0135 by Leonardo Elmore RN  VTE Prevention/Management: SCDs (sequential compression devices) off  Intervention: Prevent Infection  Recent Flowsheet Documentation  Taken 5/10/2024 0400 by Leonardo Elmore  RN  Infection Prevention:   hand hygiene promoted   rest/sleep promoted  Taken 5/10/2024 0135 by Leonardo Elmore RN  Infection Prevention:   hand hygiene promoted   rest/sleep promoted  Goal: Optimal Comfort and Wellbeing  Outcome: Progressing  Intervention: Monitor Pain and Promote Comfort  Recent Flowsheet Documentation  Taken 5/10/2024 0135 by Leonardo Elmore RN  Pain Management Interventions: declines  Intervention: Provide Person-Centered Care  Recent Flowsheet Documentation  Taken 5/10/2024 0400 by Leonardo Elmore RN  Trust Relationship/Rapport:   care explained   choices provided   questions answered   questions encouraged   reassurance provided   thoughts/feelings acknowledged  Taken 5/10/2024 0135 by Leonardo Elmore RN  Trust Relationship/Rapport:   care explained   choices provided   questions answered   questions encouraged   reassurance provided   thoughts/feelings acknowledged  Goal: Readiness for Transition of Care  Outcome: Progressing  Flowsheets (Taken 5/10/2024 0452)  Anticipated Changes Related to Illness: inability to care for self  Transportation Anticipated: (TBD) other (see comments)  Concerns to be Addressed:   care coordination/care conferences   compliance issue   discharge planning   substance/tobacco abuse/use  Barriers to Discharge: requiring bipap, blood gases still need some work  Intervention: Mutually Develop Transition Plan  Recent Flowsheet Documentation  Taken 5/10/2024 0452 by Leonardo Elmore RN  Anticipated Changes Related to Illness: inability to care for self  Transportation Anticipated: (TBD) other (see comments)  Concerns to be Addressed:   care coordination/care conferences   compliance issue   discharge planning   substance/tobacco abuse/use     Problem: Comorbidity Management  Goal: Maintenance of COPD Symptom Control  Outcome: Progressing  Intervention: Maintain COPD Symptom Control  Recent Flowsheet Documentation  Taken 5/10/2024 0400 by Leonardo Elmore  E, RN  Medication Review/Management: medications reviewed  Taken 5/10/2024 0135 by Leonardo Elmore RN  Medication Review/Management: medications reviewed  Goal: Blood Glucose Levels Within Targeted Range  Outcome: Progressing  Intervention: Monitor and Manage Glycemia  Recent Flowsheet Documentation  Taken 5/10/2024 0400 by Leonardo Elmore RN  Medication Review/Management: medications reviewed  Taken 5/10/2024 0135 by Leonardo Elmore RN  Medication Review/Management: medications reviewed  Goal: Blood Pressure in Desired Range  Outcome: Progressing  Intervention: Maintain Blood Pressure Management  Recent Flowsheet Documentation  Taken 5/10/2024 0400 by Leonardo Elmore RN  Medication Review/Management: medications reviewed  Taken 5/10/2024 0135 by Leonardo Elmore RN  Medication Review/Management: medications reviewed  Goal: Bariatric Home Regimen Maintained  Outcome: Progressing  Intervention: Maintain and Manage Postbariatric Surgery Care  Recent Flowsheet Documentation  Taken 5/10/2024 0400 by Leonardo Elmore RN  Medication Review/Management: medications reviewed  Taken 5/10/2024 0135 by Leonardo Elmore RN  Medication Review/Management: medications reviewed     Problem: Fall Injury Risk  Goal: Absence of Fall and Fall-Related Injury  Outcome: Progressing  Intervention: Identify and Manage Contributors  Recent Flowsheet Documentation  Taken 5/10/2024 0400 by Leonardo Elmore RN  Medication Review/Management: medications reviewed  Taken 5/10/2024 0135 by Leonardo Elmore RN  Medication Review/Management: medications reviewed  Intervention: Promote Injury-Free Environment  Recent Flowsheet Documentation  Taken 5/10/2024 0400 by Leonardo Elmore RN  Safety Promotion/Fall Prevention:   activity supervised   assistive device/personal items within reach   increased rounding and observation   increase visualization of patient   nonskid shoes/slippers when out of bed   patient and family  education   safety round/check completed   supervised activity  Taken 5/10/2024 0135 by Leonardo Elmore RN  Safety Promotion/Fall Prevention:   activity supervised   assistive device/personal items within reach   increased rounding and observation   increase visualization of patient   nonskid shoes/slippers when out of bed   patient and family education   safety round/check completed   supervised activity     Problem: Skin Injury Risk Increased  Goal: Skin Health and Integrity  Outcome: Progressing  Intervention: Plan: Nurse Driven Intervention: Moisture Management  Recent Flowsheet Documentation  Taken 5/10/2024 0400 by Leonardo Elmore RN  Moisture Interventions:   Encourage regular toileting   Urinary collection device  Bathing/Skin Care:   wipes, CHG   dressed/undressed   electrode patches/site rotation   linen changed  Plan: Moisture Management:   encourage regular toileting   no brief in bed   incontinence pad   urinary collection device  Taken 5/10/2024 0135 by Leonardo Elmore RN  Moisture Interventions:   Encourage regular toileting   Urinary collection device  Bathing/Skin Care:   wipes, CHG   dressed/undressed   electrode patches/site rotation   linen changed  Plan: Moisture Management:   encourage regular toileting   no brief in bed   incontinence pad   urinary collection device  Intervention: Optimize Skin Protection  Recent Flowsheet Documentation  Taken 5/10/2024 0400 by Leonardo Elmore RN  Pressure Reduction Techniques: frequent weight shift encouraged  Pressure Reduction Devices: pressure-redistributing mattress utilized  Skin Protection:   adhesive use limited   incontinence pads utilized   transparent dressing maintained  Activity Management: activity adjusted per tolerance  Head of Bed (HOB) Positioning: HOB at 30 degrees  Taken 5/10/2024 0135 by Leonardo Elmore RN  Pressure Reduction Techniques: frequent weight shift encouraged  Pressure Reduction Devices: pressure-redistributing  mattress utilized  Skin Protection:   adhesive use limited   incontinence pads utilized   transparent dressing maintained  Activity Management: activity adjusted per tolerance  Head of Bed (HOB) Positioning: HOB at 20-30 degrees

## 2024-05-10 NOTE — PLAN OF CARE
Goal Outcome Evaluation:    RT paged for scheduled neb. Patient has been pacing changing position from standing, sitting at the edge of the bed and laying in bed trying to make himself comfortable with increased SOB. RT gave scheduled neb with little to no improvement. MD notified and BiPAP started. MD assessed patient and ordered IMC transfer. Awaiting bed.

## 2024-05-11 LAB
1,3 BETA GLUCAN SER-MCNC: <31 PG/ML
ANION GAP SERPL CALCULATED.3IONS-SCNC: 6 MMOL/L (ref 7–15)
BUN SERPL-MCNC: 21.5 MG/DL (ref 8–23)
CALCIUM SERPL-MCNC: 8.2 MG/DL (ref 8.8–10.2)
CHLORIDE SERPL-SCNC: 89 MMOL/L (ref 98–107)
CREAT SERPL-MCNC: 0.61 MG/DL (ref 0.67–1.17)
DEPRECATED HCO3 PLAS-SCNC: 33 MMOL/L (ref 22–29)
EGFRCR SERPLBLD CKD-EPI 2021: >90 ML/MIN/1.73M2
ERYTHROCYTE [DISTWIDTH] IN BLOOD BY AUTOMATED COUNT: 12.7 % (ref 10–15)
GLUCOSE SERPL-MCNC: 136 MG/DL (ref 70–99)
HCT VFR BLD AUTO: 32.5 % (ref 40–53)
HGB BLD-MCNC: 10.2 G/DL (ref 13.3–17.7)
MAGNESIUM SERPL-MCNC: 2.3 MG/DL (ref 1.7–2.3)
MCH RBC QN AUTO: 35.3 PG (ref 26.5–33)
MCHC RBC AUTO-ENTMCNC: 31.4 G/DL (ref 31.5–36.5)
MCV RBC AUTO: 113 FL (ref 78–100)
OBSERVATION IMP: NEGATIVE
PLATELET # BLD AUTO: 254 10E3/UL (ref 150–450)
POTASSIUM SERPL-SCNC: 5.3 MMOL/L (ref 3.4–5.3)
RBC # BLD AUTO: 2.89 10E6/UL (ref 4.4–5.9)
SODIUM SERPL-SCNC: 128 MMOL/L (ref 135–145)
WBC # BLD AUTO: 4.2 10E3/UL (ref 4–11)

## 2024-05-11 PROCEDURE — 85027 COMPLETE CBC AUTOMATED: CPT | Performed by: STUDENT IN AN ORGANIZED HEALTH CARE EDUCATION/TRAINING PROGRAM

## 2024-05-11 PROCEDURE — 94640 AIRWAY INHALATION TREATMENT: CPT | Mod: 76

## 2024-05-11 PROCEDURE — 250N000011 HC RX IP 250 OP 636: Performed by: HOSPITALIST

## 2024-05-11 PROCEDURE — 99232 SBSQ HOSP IP/OBS MODERATE 35: CPT | Performed by: STUDENT IN AN ORGANIZED HEALTH CARE EDUCATION/TRAINING PROGRAM

## 2024-05-11 PROCEDURE — 83735 ASSAY OF MAGNESIUM: CPT | Performed by: STUDENT IN AN ORGANIZED HEALTH CARE EDUCATION/TRAINING PROGRAM

## 2024-05-11 PROCEDURE — 120N000001 HC R&B MED SURG/OB

## 2024-05-11 PROCEDURE — 250N000013 HC RX MED GY IP 250 OP 250 PS 637: Performed by: HOSPITALIST

## 2024-05-11 PROCEDURE — 94640 AIRWAY INHALATION TREATMENT: CPT

## 2024-05-11 PROCEDURE — 999N000157 HC STATISTIC RCP TIME EA 10 MIN

## 2024-05-11 PROCEDURE — 250N000009 HC RX 250: Performed by: HOSPITALIST

## 2024-05-11 PROCEDURE — 36415 COLL VENOUS BLD VENIPUNCTURE: CPT | Performed by: STUDENT IN AN ORGANIZED HEALTH CARE EDUCATION/TRAINING PROGRAM

## 2024-05-11 PROCEDURE — 80048 BASIC METABOLIC PNL TOTAL CA: CPT | Performed by: STUDENT IN AN ORGANIZED HEALTH CARE EDUCATION/TRAINING PROGRAM

## 2024-05-11 RX ADMIN — CEFTRIAXONE 2 G: 2 INJECTION, POWDER, FOR SOLUTION INTRAMUSCULAR; INTRAVENOUS at 16:06

## 2024-05-11 RX ADMIN — LISINOPRIL 10 MG: 10 TABLET ORAL at 21:09

## 2024-05-11 RX ADMIN — Medication 50 MCG: at 09:52

## 2024-05-11 RX ADMIN — TAMSULOSIN HYDROCHLORIDE 0.4 MG: 0.4 CAPSULE ORAL at 21:09

## 2024-05-11 RX ADMIN — CALCIUM 1000 MG: 500 TABLET ORAL at 09:51

## 2024-05-11 RX ADMIN — THIAMINE HCL TAB 100 MG 100 MG: 100 TAB at 09:53

## 2024-05-11 RX ADMIN — METHYLPREDNISOLONE SODIUM SUCCINATE 125 MG: 125 INJECTION, POWDER, FOR SOLUTION INTRAMUSCULAR; INTRAVENOUS at 22:25

## 2024-05-11 RX ADMIN — CARVEDILOL 12.5 MG: 12.5 TABLET, FILM COATED ORAL at 21:15

## 2024-05-11 RX ADMIN — IPRATROPIUM BROMIDE AND ALBUTEROL SULFATE 3 ML: .5; 3 SOLUTION RESPIRATORY (INHALATION) at 12:09

## 2024-05-11 RX ADMIN — PREGABALIN 25 MG: 25 CAPSULE ORAL at 21:09

## 2024-05-11 RX ADMIN — PANTOPRAZOLE SODIUM 40 MG: 40 TABLET, DELAYED RELEASE ORAL at 09:53

## 2024-05-11 RX ADMIN — METHYLPREDNISOLONE SODIUM SUCCINATE 125 MG: 125 INJECTION, POWDER, FOR SOLUTION INTRAMUSCULAR; INTRAVENOUS at 10:36

## 2024-05-11 RX ADMIN — ENOXAPARIN SODIUM 40 MG: 40 INJECTION SUBCUTANEOUS at 21:09

## 2024-05-11 RX ADMIN — IPRATROPIUM BROMIDE AND ALBUTEROL SULFATE 3 ML: .5; 3 SOLUTION RESPIRATORY (INHALATION) at 16:26

## 2024-05-11 RX ADMIN — Medication 1 MG: at 22:25

## 2024-05-11 RX ADMIN — Medication 1 TABLET: at 09:52

## 2024-05-11 RX ADMIN — PANTOPRAZOLE SODIUM 40 MG: 40 TABLET, DELAYED RELEASE ORAL at 16:07

## 2024-05-11 RX ADMIN — ASPIRIN 81 MG: 81 TABLET, COATED ORAL at 21:09

## 2024-05-11 RX ADMIN — CYANOCOBALAMIN TAB 1000 MCG 1000 MCG: 1000 TAB at 10:39

## 2024-05-11 RX ADMIN — BUDESONIDE 3 MG: 3 CAPSULE, COATED PELLETS ORAL at 09:51

## 2024-05-11 RX ADMIN — CARVEDILOL 12.5 MG: 12.5 TABLET, FILM COATED ORAL at 10:55

## 2024-05-11 RX ADMIN — IPRATROPIUM BROMIDE AND ALBUTEROL SULFATE 3 ML: .5; 3 SOLUTION RESPIRATORY (INHALATION) at 19:31

## 2024-05-11 RX ADMIN — ATORVASTATIN CALCIUM 40 MG: 40 TABLET, FILM COATED ORAL at 21:09

## 2024-05-11 RX ADMIN — SERTRALINE HYDROCHLORIDE 100 MG: 100 TABLET ORAL at 09:53

## 2024-05-11 ASSESSMENT — ACTIVITIES OF DAILY LIVING (ADL)
ADLS_ACUITY_SCORE: 29
ADLS_ACUITY_SCORE: 28
ADLS_ACUITY_SCORE: 30
ADLS_ACUITY_SCORE: 30
ADLS_ACUITY_SCORE: 29
ADLS_ACUITY_SCORE: 28
ADLS_ACUITY_SCORE: 30
ADLS_ACUITY_SCORE: 28
ADLS_ACUITY_SCORE: 28
ADLS_ACUITY_SCORE: 30
ADLS_ACUITY_SCORE: 29
ADLS_ACUITY_SCORE: 30
ADLS_ACUITY_SCORE: 30
ADLS_ACUITY_SCORE: 29
ADLS_ACUITY_SCORE: 30
ADLS_ACUITY_SCORE: 30
ADLS_ACUITY_SCORE: 28
ADLS_ACUITY_SCORE: 30
ADLS_ACUITY_SCORE: 28
ADLS_ACUITY_SCORE: 30
ADLS_ACUITY_SCORE: 30

## 2024-05-11 NOTE — PLAN OF CARE
"Goal Outcome Evaluation:      Plan of Care Reviewed With: patient    Overall Patient Progress: no changeOverall Patient Progress: no change    Outcome Evaluation: Pt A&Ox4, SBA, O2 NC at 3L, dyspnea with exertion and sats dip to mid 80s. Sputum collect required, pt aware to provide sputum if able. Patient Care Order for self admin of esophageal varices dilation.    Visit Vitals  /65 (BP Location: Left arm)   Pulse 70   Temp 98.4  F (36.9  C) (Oral)   Resp 20        Problem: Adult Inpatient Plan of Care  Goal: Patient-Specific Goal (Individualized)  Description: You can add care plan individualizations to a care plan. Examples of Individualization might be:  \"Parent requests to be called daily at 9am for status\", \"I have a hard time hearing out of my right ear\", or \"Do not touch me to wake me up as it startles  me\".  Outcome: Progressing  Goal: Absence of Hospital-Acquired Illness or Injury  Outcome: Progressing  Intervention: Identify and Manage Fall Risk  Recent Flowsheet Documentation  Taken 5/10/2024 1720 by Chavez Shipman RN  Safety Promotion/Fall Prevention:   activity supervised   supervised activity  Goal: Optimal Comfort and Wellbeing  Outcome: Progressing  Goal: Readiness for Transition of Care  Outcome: Progressing     Problem: Comorbidity Management  Goal: Maintenance of COPD Symptom Control  Outcome: Progressing  Intervention: Maintain COPD Symptom Control  Recent Flowsheet Documentation  Taken 5/10/2024 1720 by Chavez Shipman RN  Medication Review/Management: medications reviewed  Goal: Blood Glucose Levels Within Targeted Range  Outcome: Progressing  Intervention: Monitor and Manage Glycemia  Recent Flowsheet Documentation  Taken 5/10/2024 1720 by Chavez Shipman RN  Medication Review/Management: medications reviewed  Goal: Blood Pressure in Desired Range  Outcome: Progressing  Intervention: Maintain Blood Pressure Management  Recent Flowsheet Documentation  Taken 5/10/2024 1720 by Chavez Shipman" RN  Medication Review/Management: medications reviewed  Goal: Bariatric Home Regimen Maintained  Outcome: Progressing  Intervention: Maintain and Manage Postbariatric Surgery Care  Recent Flowsheet Documentation  Taken 5/10/2024 1720 by Chavez Shipman RN  Medication Review/Management: medications reviewed     Problem: Fall Injury Risk  Goal: Absence of Fall and Fall-Related Injury  Outcome: Progressing  Intervention: Identify and Manage Contributors  Recent Flowsheet Documentation  Taken 5/10/2024 1720 by Chavez Shipman RN  Medication Review/Management: medications reviewed  Intervention: Promote Injury-Free Environment  Recent Flowsheet Documentation  Taken 5/10/2024 1720 by Chavez Shipman RN  Safety Promotion/Fall Prevention:   activity supervised   supervised activity     Problem: Skin Injury Risk Increased  Goal: Skin Health and Integrity  Outcome: Progressing  Intervention: Plan: Nurse Driven Intervention: Moisture Management  Recent Flowsheet Documentation  Taken 5/10/2024 1720 by Chavez Shipman RN  Moisture Interventions:   Encourage regular toileting   No brief in bed   Incontinence pad  Intervention: Optimize Skin Protection  Recent Flowsheet Documentation  Taken 5/10/2024 1720 by Chavez Shipman RN  Activity Management: activity adjusted per tolerance     Problem: Pulmonary Impairment  Goal: Improved Activity Tolerance  Outcome: Progressing  Goal: Effective Airway Clearance  Outcome: Progressing  Goal: Optimal Gas Exchange  Outcome: Progressing       Problem: Adult Inpatient Plan of Care  Goal: Plan of Care Review  Description: The Plan of Care Review/Shift note should be completed every shift.  The Outcome Evaluation is a brief statement about your assessment that the patient is improving, declining, or no change.  This information will be displayed automatically on your shift  note.  Flowsheets (Taken 5/10/2024 2346)  Outcome Evaluation: Pt A&Ox4, SBA, O2 NC at 3L, dyspnea with exertion and sats dip to  mid 80s. Sputum collect required, pt aware to provide sputum if able. Patient Care Order for self admin of esophageal varices dilation.  Plan of Care Reviewed With: patient  Overall Patient Progress: no change     Problem: Comorbidity Management  Goal: Maintenance of Asthma Control  Intervention: Maintain Asthma Symptom Control  Recent Flowsheet Documentation  Taken 5/10/2024 1720 by Chavez Shipman, RN  Medication Review/Management: medications reviewed  Goal: Maintenance of Behavioral Health Symptom Control  Intervention: Maintain Behavioral Health Symptom Control  Recent Flowsheet Documentation  Taken 5/10/2024 1720 by Chavez Shipman, RN  Medication Review/Management: medications reviewed  Goal: Maintenance of Heart Failure Symptom Control  Intervention: Maintain Heart Failure Management  Recent Flowsheet Documentation  Taken 5/10/2024 1720 by Chavez Shipman RN  Medication Review/Management: medications reviewed  Goal: Maintenance of Osteoarthritis Symptom Control  Intervention: Maintain Osteoarthritis Symptom Control  Recent Flowsheet Documentation  Taken 5/10/2024 1720 by Chavez Shipman, RN  Assistive Device Utilized: gait belt  Activity Management: activity adjusted per tolerance  Medication Review/Management: medications reviewed  Goal: Maintenance of Seizure Control  Intervention: Maintain Seizure Symptom Control  Recent Flowsheet Documentation  Taken 5/10/2024 1720 by Chavez Shipman, RN  Medication Review/Management: medications reviewed

## 2024-05-11 NOTE — PLAN OF CARE
Goal Outcome Evaluation:VSS, afebrile and O2 sats upper 90's on 3L.  Tele SR.  Still need sputum sample. Denies pain.  Using urinal at bedside, otherwise up with A of 1.  Slept most of noc.  POC reviewed with pt, questions answered.      Plan of Care Reviewed With: patient

## 2024-05-12 VITALS
DIASTOLIC BLOOD PRESSURE: 80 MMHG | BODY MASS INDEX: 38.31 KG/M2 | TEMPERATURE: 98.2 F | WEIGHT: 195.11 LBS | HEIGHT: 60 IN | HEART RATE: 60 BPM | RESPIRATION RATE: 24 BRPM | OXYGEN SATURATION: 95 % | SYSTOLIC BLOOD PRESSURE: 164 MMHG

## 2024-05-12 LAB
ANION GAP SERPL CALCULATED.3IONS-SCNC: 10 MMOL/L (ref 7–15)
BUN SERPL-MCNC: 22.8 MG/DL (ref 8–23)
CALCIUM SERPL-MCNC: 8.4 MG/DL (ref 8.8–10.2)
CHLORIDE SERPL-SCNC: 88 MMOL/L (ref 98–107)
CREAT SERPL-MCNC: 0.69 MG/DL (ref 0.67–1.17)
DEPRECATED HCO3 PLAS-SCNC: 33 MMOL/L (ref 22–29)
EGFRCR SERPLBLD CKD-EPI 2021: >90 ML/MIN/1.73M2
GLUCOSE SERPL-MCNC: 134 MG/DL (ref 70–99)
MAGNESIUM SERPL-MCNC: 2.3 MG/DL (ref 1.7–2.3)
POTASSIUM SERPL-SCNC: 5.4 MMOL/L (ref 3.4–5.3)
POTASSIUM SERPL-SCNC: 5.4 MMOL/L (ref 3.4–5.3)
POTASSIUM SERPL-SCNC: 5.5 MMOL/L (ref 3.4–5.3)
SODIUM SERPL-SCNC: 131 MMOL/L (ref 135–145)

## 2024-05-12 PROCEDURE — 250N000011 HC RX IP 250 OP 636: Performed by: HOSPITALIST

## 2024-05-12 PROCEDURE — 99239 HOSP IP/OBS DSCHRG MGMT >30: CPT | Performed by: STUDENT IN AN ORGANIZED HEALTH CARE EDUCATION/TRAINING PROGRAM

## 2024-05-12 PROCEDURE — 250N000013 HC RX MED GY IP 250 OP 250 PS 637: Performed by: HOSPITALIST

## 2024-05-12 PROCEDURE — 83735 ASSAY OF MAGNESIUM: CPT | Performed by: STUDENT IN AN ORGANIZED HEALTH CARE EDUCATION/TRAINING PROGRAM

## 2024-05-12 PROCEDURE — 36415 COLL VENOUS BLD VENIPUNCTURE: CPT | Performed by: STUDENT IN AN ORGANIZED HEALTH CARE EDUCATION/TRAINING PROGRAM

## 2024-05-12 PROCEDURE — 87798 DETECT AGENT NOS DNA AMP: CPT | Performed by: STUDENT IN AN ORGANIZED HEALTH CARE EDUCATION/TRAINING PROGRAM

## 2024-05-12 PROCEDURE — 80048 BASIC METABOLIC PNL TOTAL CA: CPT | Performed by: STUDENT IN AN ORGANIZED HEALTH CARE EDUCATION/TRAINING PROGRAM

## 2024-05-12 PROCEDURE — 999N000157 HC STATISTIC RCP TIME EA 10 MIN

## 2024-05-12 PROCEDURE — 999N000156 HC STATISTIC RCP CONSULT EA 30 MIN

## 2024-05-12 PROCEDURE — 94640 AIRWAY INHALATION TREATMENT: CPT

## 2024-05-12 PROCEDURE — 87106 FUNGI IDENTIFICATION YEAST: CPT | Performed by: STUDENT IN AN ORGANIZED HEALTH CARE EDUCATION/TRAINING PROGRAM

## 2024-05-12 PROCEDURE — 94640 AIRWAY INHALATION TREATMENT: CPT | Mod: 76

## 2024-05-12 PROCEDURE — 999N000178 HC STATISTIC SUCTION SPUTUM

## 2024-05-12 PROCEDURE — 250N000009 HC RX 250: Performed by: HOSPITALIST

## 2024-05-12 RX ADMIN — PANTOPRAZOLE SODIUM 40 MG: 40 TABLET, DELAYED RELEASE ORAL at 09:44

## 2024-05-12 RX ADMIN — CYANOCOBALAMIN TAB 1000 MCG 1000 MCG: 1000 TAB at 09:44

## 2024-05-12 RX ADMIN — IPRATROPIUM BROMIDE AND ALBUTEROL SULFATE 3 ML: .5; 3 SOLUTION RESPIRATORY (INHALATION) at 15:20

## 2024-05-12 RX ADMIN — CARVEDILOL 12.5 MG: 12.5 TABLET, FILM COATED ORAL at 09:43

## 2024-05-12 RX ADMIN — METHYLPREDNISOLONE SODIUM SUCCINATE 125 MG: 125 INJECTION, POWDER, FOR SOLUTION INTRAMUSCULAR; INTRAVENOUS at 11:26

## 2024-05-12 RX ADMIN — THIAMINE HCL TAB 100 MG 100 MG: 100 TAB at 09:44

## 2024-05-12 RX ADMIN — CALCIUM 1000 MG: 500 TABLET ORAL at 09:47

## 2024-05-12 RX ADMIN — BUDESONIDE 3 MG: 3 CAPSULE, COATED PELLETS ORAL at 09:47

## 2024-05-12 RX ADMIN — Medication 1 TABLET: at 09:47

## 2024-05-12 RX ADMIN — SERTRALINE HYDROCHLORIDE 100 MG: 100 TABLET ORAL at 09:43

## 2024-05-12 RX ADMIN — IPRATROPIUM BROMIDE AND ALBUTEROL SULFATE 3 ML: .5; 3 SOLUTION RESPIRATORY (INHALATION) at 07:48

## 2024-05-12 RX ADMIN — CEFTRIAXONE 2 G: 2 INJECTION, POWDER, FOR SOLUTION INTRAMUSCULAR; INTRAVENOUS at 14:19

## 2024-05-12 RX ADMIN — Medication 50 MCG: at 09:43

## 2024-05-12 RX ADMIN — IPRATROPIUM BROMIDE AND ALBUTEROL SULFATE 3 ML: .5; 3 SOLUTION RESPIRATORY (INHALATION) at 11:45

## 2024-05-12 ASSESSMENT — ACTIVITIES OF DAILY LIVING (ADL)
ADLS_ACUITY_SCORE: 28

## 2024-05-12 NOTE — PROVIDER NOTIFICATION
Walked in the hallway with and without O2: walked from room to front nurse station to east nurse station and back to room. Sats without O2 dropped to 85%, with 2L O2 came up to 89%. Recovered in a few minutes after sitting in chair.

## 2024-05-12 NOTE — PLAN OF CARE
Goal Outcome Evaluation:      Plan of Care Reviewed With: patient    Overall Patient Progress: improvingOverall Patient Progress: improving    Outcome Evaluation: Alert and oriented x4. Up in chair this shift. Walked in hallway with staff. A1w. O2 weaned from 2.5Lpm to 2Lpm. Left PIV SL. VSS. VILLAR. Plan to discharge home via WC transport today at 1700. 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      Problem: Comorbidity Management  Goal: Maintenance of COPD Symptom Control  Outcome: Progressing  Intervention: Maintain COPD Symptom Control  Flowsheets  Taken 5/12/2024 1547  Supportive Measures:   self-care encouraged   self-responsibility promoted   verbalization of feelings encouraged  Medication Review/Management: medications reviewed  Taken 5/12/2024 0938  Supportive Measures:   self-care encouraged   self-responsibility promoted   verbalization of feelings encouraged  Medication Review/Management: medications reviewed     Problem: Pulmonary Impairment  Goal: Improved Activity Tolerance  Outcome: Progressing  Goal: Effective Airway Clearance  Outcome: Progressing  Goal: Optimal Gas Exchange  Outcome: Progressing

## 2024-05-12 NOTE — PLAN OF CARE
Goal Outcome Evaluation:     All discharge paperwork reviewed and questions answered. Belongings packed, IV removed. WC transport here to transport at 1710. Off unit at 1711.       Plan of Care Reviewed With: patient    Overall Patient Progress: improvingOverall Patient Progress: improving    Outcome Evaluation: Alert and oriented x4. Up in chair this shift. Walked in hallway with staff. A1w. O2 weaned from 2.5Lpm to 2Lpm. Left PIV SL. VSS. VILLAR. Plan to discharge home via WC transport today at 1700. 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

## 2024-05-12 NOTE — DISCHARGE SUMMARY
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    Weight as of this encounter: 88.5 kg (195 lb 1.7 oz).       Follow-ups Needed After Discharge   Follow-up Appointments     Follow-up and recommended labs and tests       Please follow-up with your primary care doctor within the next 2-4 weeks   for hospital follow-up.    Please also follow-up with your oncologist and pulmonologist regarding   your myeloma and COPD.    We have ordered a basic metabolic panel labs to be checked next week to   check your kidney function and potassium.  You can have this done at any   [place] lab.            Unresulted Labs Ordered in the Past 30 Days of this Admission       Date and Time Order Name Status Description    5/10/2024  1:28 PM Pneumocystis jirovecil by PCR In process     5/10/2024  1:28 PM Respiratory Aerobic Bacterial Culture with Gram Stain Preliminary           Discharge Disposition   Discharged to home  Condition at discharge: Stable    Hospital Course   Pacheco Torres is a 78 year old male with PMH significant for CAD, hypertension, hyperlipidemia, MGUS, PMR, prostate cancer, esophageal stricture, MDD, macrocytic anemia admitted on 5/8/2024 with shortness of breath.  Likely COPD exacerbation, possible superimposed pneumonia.  Is at risk of opportunistic infection such as PJP with ongoing treatment of multiple myeloma, beta D glucan was checked and negative, respiratory " culture and PJP PCR pending.  Treated pneumonia empirically with course of antibiotics and steroids for COPD exacerbation.  Respiratory status improved to baseline, 2 L of oxygen continuously through nasal cannula prior to discharge.     Acute on chronic hypoxemic respiratory failure thought 2/2 COPD exacerbation with possible superimposed pneumonia  Tobacco abuse  Does not have robust pulmonary history, but does follow with a pulmonologist.  Is on 2 L of oxygen by nasal cannula at home.  Was initially responding well to steroids and antibiotics, however increased work of breathing overnight 5/9 requiring BiPAP and transferred to ICU.  Switch back to IV Solu-Medrol with improvement in respiratory status, did not require BiPAP for remainder of hospitalization.  Back to baseline 2 L of supplemental oxygen continuously prior to discharge.    Of note, at risk for PJP as patient is technically immunosuppressed for myeloma treatment.  Beta D glucan obtained which was negative, respiratory culture obtained with no growth at time of discharge.  Will contact patient if culture ultimately grows anything, but did complete course of treatment for CAP.    Hyperkalemia  Potassium was high end of normal during most of hospitalization, somewhat elevated day of discharge at 5.4 and 5.5 on recheck.  Creatinine stable and remainder of labs were unremarkable.  Is on lisinopril for hypertension, and isolated hyperkalemia could be due to this medication.  Will hold lisinopril, ordered BMP to be done later this week and recommend patient follow-up these results with PCP in 1-2 weeks in clinic.  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  Eren with oncologist, will hold PTA lenalidomide during admission.     Hyponatremia-Stable  Sodium 130.   Previously normal.  Mild edema on exam but overall appears to have lost weight over the past 2 years.    -Repeat BMP later this week as above     Leukopenia  Macrocytic anemia, chronic:  Chronic and likely related to chronic illness.  Repeat CBC showing stable levels.     CAD  HTN  HLD:    BNP and troponin normal.  Normotensive on arrival.  Continue PTA lisinopril, atorvastatin, ASA    Consultations This Hospital Stay   CARE MANAGEMENT / SOCIAL WORK IP CONSULT    Code Status   Full Code    Time Spent on this Encounter   I, Juan Garcia MD, personally saw the patient today and spent greater than 30 minutes discharging this patient.       Juan Garcia MD  [place] [address] [facility]  [address] [place] [address]  Phone: [phone]  Fax: [phone]  ______________________________________________________________________    Physical Exam   Vital Signs: Temp: 98.2  F (36.8  C) Temp src: Oral BP: (!) 164/80 Pulse: 60   Resp: 24 SpO2: 95 % O2 Device: Nasal cannula Oxygen Delivery: 2 LPM  Weight: 195 lbs 1.71 oz    HEENT; Atraumatic, normocephalic, pinkish conjuctiva, pupils bilateral reactive   Skin: warm and moist, no rashes  Lungs: equal chest expansion, fair air entry, multiple scattered wheezes present throughout lungs  Heart: normal rate, normal rhythm, no rubs or gallops.   Abdomen: normal bowel sounds, no tenderness, no peritoneal signs, no guarding  Extremities: no deformities, bilateral lower extremity nonpitting edema   Neuro; follow commands, alert and oriented x3, spontaneous speech, coherent, moves all extremities spontaneously  Psych; no hallucination, euthymic mood, not agitated       Primary Care Physician   Stuart Wolfe    Discharge Orders      Basic metabolic panel     Reason for your hospital stay    Respiratory failure due to exacerbation of your COPD.  You were treated with steroids and antibiotics.     Follow-up and recommended labs and tests     Please  follow-up with your primary care doctor within the next 2-4 weeks for hospital follow-up.    Please also follow-up with your oncologist and pulmonologist regarding your myeloma and COPD.    We have ordered a basic metabolic panel labs to be checked next week to check your kidney function and potassium.  You can have this done at any [place] lab.     Activity    Your activity upon discharge: activity as tolerated     Oxygen Adult/Peds    Oxygen Documentation  I certify that this patient, Pacheco Torres has been under my care (or a nurse practitioner or physican's assistant working with me). This is the face-to-face encounter for oxygen medical necessity.      At the time of this encounter, I have reviewed the qualifying testing and have determined that supplemental oxygen is reasonable and necessary and is expected to improve the patient's condition in a home setting.         Patient has continued oxygen desaturation due to Chronic Respiratory Failure with Hypoxia J96.11  COPD J44.9.    If portability is ordered, is the patient mobile within the home? yes    Was this visit performed as a telehealth visit: No     Diet    Follow this diet upon discharge: Orders Placed This Encounter      Regular Diet Adult       Significant Results and Procedures   Most Recent 3 CBC's:  Recent Labs   Lab Test 05/11/24  0728 05/09/24  0622 05/08/24  1524   WBC 4.2 2.2* 2.3*   HGB 10.2* 11.2* 9.9*   * 113* 111*    271 233     Most Recent 3 BMP's:  Recent Labs   Lab Test 05/12/24  1531 05/12/24  0633 05/11/24  0728 05/10/24  1206 05/09/24  2329 05/09/24  0622   NA  --  131* 128*  --   --  130*   POTASSIUM 5.5* 5.4*  5.4* 5.3  --    < > 5.2   CHLORIDE  --  88* 89*  --   --  90*   CO2  --  33* 33*  --   --  37*   BUN  --  22.8 21.5  --   --  15.1   CR  --  0.69 0.61*  --   --  0.66*   ANIONGAP  --  10 6*  --   --  3*   TAVO  --  8.4* 8.2*  --   --  8.7*   GLC  --  134* 136* 124*   < > 135*    < > = values in this interval  not displayed.       Discharge Medications   Current Discharge Medication List        CONTINUE these medications which have NOT CHANGED    Details   acetaminophen (TYLENOL) 500 MG tablet Take 1,000 mg by mouth every 6 hours as needed for pain      aspirin (ASA) 81 MG EC tablet Take 81 mg by mouth daily      atorvastatin (LIPITOR) 40 MG tablet Take 1 tablet (40 mg) by mouth every evening    Associated Diagnoses: NSTEMI (non-ST elevated myocardial infarction) (H)      budesonide (ENTOCORT EC) 3 MG EC capsule Take 3 mg by mouth every morning Add contents of 1 capsule to 10 mL of honey,chocolate or pancake syrup. Stir well      calcium carbonate (OS-TAVO) 500 MG tablet Take 2 tablets by mouth daily      carvedilol (COREG) 12.5 MG tablet Take 12.5 mg by mouth 2 times daily (with meals)      cyanocobalamin (VITAMIN B-12) 1000 MCG tablet Take 1,000 mcg by mouth daily      dexAMETHasone (DECADRON) 4 MG tablet Take 40 mg by mouth once a week      ferrous fumarate-vitamin C ER (JUAN DANIEL-SEQUELS) 65-25 MG CR tablet Take 1 tablet by mouth daily (with breakfast)      LENalidomide (REVLIMID) 25 MG CAPS capsule Take 25 mg by mouth daily 25mg daily for 14 days, then off 7 days ---started 25mg daily on 5/3/2024      multivitamin w/minerals (THERA-VIT-M) tablet Take 1 tablet by mouth daily    Associated Diagnoses: MGUS (monoclonal gammopathy of unknown significance)      nitroGLYcerin (NITROSTAT) 0.4 MG sublingual tablet Place 0.4 mg under the tongue every 5 minutes as needed for chest pain      pantoprazole (PROTONIX) 40 MG EC tablet Take 1 tablet (40 mg) by mouth 2 times daily (before meals)    Associated Diagnoses: Gastroesophageal reflux disease, unspecified whether esophagitis present      pregabalin (LYRICA) 25 MG capsule Take 25 mg by mouth at bedtime      sertraline (ZOLOFT) 100 MG tablet Take 100 mg by mouth daily      tamsulosin (FLOMAX) 0.4 MG capsule Take 0.4 mg by mouth daily      thiamine (B-1) 100 MG tablet Take 1 tablet  (100 mg) by mouth daily    Associated Diagnoses: MGUS (monoclonal gammopathy of unknown significance)      vitamin D3 (CHOLECALCIFEROL) 50 mcg (2000 units) tablet Take 1 tablet by mouth daily           STOP taking these medications       lisinopril (ZESTRIL) 10 MG tablet Comments:   Reason for Stopping:             Allergies   Allergies   Allergen Reactions    Gabapentin Visual Disturbance

## 2024-05-12 NOTE — PLAN OF CARE
"Goal Outcome Evaluation:      Plan of Care Reviewed With: patient    Overall Patient Progress: no changeOverall Patient Progress: no change    Outcome Evaluation: A&Ox4. Up in chair all day. Tele: SR. Assist of one with walker. Need sputum sample; induced sputum sample ordered. Urinal at bedside.      Problem: Adult Inpatient Plan of Care  Goal: Plan of Care Review  Description: The Plan of Care Review/Shift note should be completed every shift.  The Outcome Evaluation is a brief statement about your assessment that the patient is improving, declining, or no change.  This information will be displayed automatically on your shift  note.  5/11/2024 1937 by Qing Kwon RN  Outcome: Progressing  Flowsheets (Taken 5/11/2024 1937)  Outcome Evaluation:   A&Ox4. Up in chair all day. Tele: SR. Assist of one with walker. Need sputum sample   induced sputum sample ordered. Urinal at bedside.  5/11/2024 1937 by Qing Kwon RN  Outcome: Progressing  Flowsheets (Taken 5/11/2024 1937)  Outcome Evaluation:   A&Ox4. Up in chair all day. Tele: SR. Assist of one with walker. Need sputum sample   induced sputum sample ordered. Urinal at bedside.  Plan of Care Reviewed With: patient  Overall Patient Progress: no change  Goal: Patient-Specific Goal (Individualized)  Description: You can add care plan individualizations to a care plan. Examples of Individualization might be:  \"Parent requests to be called daily at 9am for status\", \"I have a hard time hearing out of my right ear\", or \"Do not touch me to wake me up as it startles  me\".  5/11/2024 1937 by Qing Kwon RN  Outcome: Progressing  5/11/2024 1937 by Qing Kwon RN  Outcome: Progressing  Goal: Absence of Hospital-Acquired Illness or Injury  5/11/2024 1937 by Qing Kwon RN  Outcome: Progressing  5/11/2024 1937 by Qing Kwon, RN  Outcome: Progressing  Intervention: Identify and Manage Fall Risk  Flowsheets  Taken 5/11/2024 " 1937  Safety Promotion/Fall Prevention:   activity supervised   assistive device/personal items within reach   nonskid shoes/slippers when out of bed   safety round/check completed  Taken 5/11/2024 0950  Safety Promotion/Fall Prevention:   activity supervised   safety round/check completed  Intervention: Prevent Skin Injury  Flowsheets  Taken 5/11/2024 1937  Body Position: position changed independently  Skin Protection:   adhesive use limited   incontinence pads utilized   transparent dressing maintained  Device Skin Pressure Protection:   adhesive use limited   tubing/devices free from skin contact  Taken 5/11/2024 0950  Body Position: position changed independently  Device Skin Pressure Protection:   adhesive use limited   tubing/devices free from skin contact  Intervention: Prevent and Manage VTE (Venous Thromboembolism) Risk  Flowsheets  Taken 5/11/2024 1937  VTE Prevention/Management: SCDs (sequential compression devices) off  Taken 5/11/2024 0950  VTE Prevention/Management: SCDs (sequential compression devices) off  Intervention: Prevent Infection  Flowsheets  Taken 5/11/2024 1937  Infection Prevention:   rest/sleep promoted   single patient room provided  Taken 5/11/2024 0950  Infection Prevention:   rest/sleep promoted   single patient room provided  Goal: Optimal Comfort and Wellbeing  5/11/2024 1937 by Qing Kwon, RN  Outcome: Progressing  5/11/2024 1937 by Qing Kwon, RN  Outcome: Progressing  Intervention: Monitor Pain and Promote Comfort  Flowsheets (Taken 5/11/2024 1937)  Pain Management Interventions: declines  Intervention: Provide Person-Centered Care  Flowsheets  Taken 5/11/2024 1937  Trust Relationship/Rapport:   care explained   emotional support provided   questions encouraged   questions answered   reassurance provided  Taken 5/11/2024 0950  Trust Relationship/Rapport:   care explained   emotional support provided   questions encouraged   questions answered   reassurance  provided  Goal: Readiness for Transition of Care  5/11/2024 1937 by Qing Kwon RN  Outcome: Progressing  Flowsheets (Taken 5/11/2024 1937)  Anticipated Changes Related to Illness: inability to care for self  Concerns to be Addressed:   care coordination/care conferences   compliance issue   discharge planning   substance/tobacco abuse/use  5/11/2024 1937 by Qing Kwon RN  Outcome: Progressing  Flowsheets (Taken 5/11/2024 1937)  Anticipated Changes Related to Illness: inability to care for self  Concerns to be Addressed:   care coordination/care conferences   compliance issue   discharge planning   substance/tobacco abuse/use  Intervention: Mutually Develop Transition Plan  Flowsheets (Taken 5/11/2024 1937)  Anticipated Changes Related to Illness: inability to care for self  Concerns to be Addressed:   care coordination/care conferences   compliance issue   discharge planning   substance/tobacco abuse/use     Problem: Comorbidity Management  Goal: Maintenance of COPD Symptom Control  5/11/2024 1937 by Qing Kwon RN  Outcome: Progressing  5/11/2024 1937 by Qing Kwon RN  Outcome: Progressing  Intervention: Maintain COPD Symptom Control  Flowsheets  Taken 5/11/2024 1937  Supportive Measures: self-care encouraged  Medication Review/Management: medications reviewed  Taken 5/11/2024 0950  Supportive Measures: self-care encouraged  Medication Review/Management: medications reviewed  Goal: Blood Glucose Levels Within Targeted Range  5/11/2024 1937 by Qing Kwon RN  Outcome: Progressing  5/11/2024 1937 by Qing Kwon RN  Outcome: Progressing  Intervention: Monitor and Manage Glycemia  Flowsheets  Taken 5/11/2024 1937  Medication Review/Management: medications reviewed  Taken 5/11/2024 0950  Medication Review/Management: medications reviewed  Goal: Blood Pressure in Desired Range  5/11/2024 1937 by Qing Kwon RN  Outcome: Progressing  5/11/2024 1937  by Qing Kwon RN  Outcome: Progressing  Intervention: Maintain Blood Pressure Management  Flowsheets  Taken 5/11/2024 1937  Medication Review/Management: medications reviewed  Taken 5/11/2024 0950  Medication Review/Management: medications reviewed  Goal: Bariatric Home Regimen Maintained  5/11/2024 1937 by Qing Kwon RN  Outcome: Progressing  5/11/2024 1937 by Qing Kwon RN  Outcome: Progressing  Intervention: Maintain and Manage Postbariatric Surgery Care  Flowsheets  Taken 5/11/2024 1937  Medication Review/Management: medications reviewed  Taken 5/11/2024 0950  Medication Review/Management: medications reviewed     Problem: Fall Injury Risk  Goal: Absence of Fall and Fall-Related Injury  5/11/2024 1937 by Qing Kwon RN  Outcome: Progressing  5/11/2024 1937 by Qing Kwon RN  Outcome: Progressing  Intervention: Identify and Manage Contributors  Recent Flowsheet Documentation  Taken 5/11/2024 1937 by Qing Kwon RN  Medication Review/Management: medications reviewed  Taken 5/11/2024 0950 by Qing Kwon RN  Medication Review/Management: medications reviewed  Intervention: Promote Injury-Free Environment  Recent Flowsheet Documentation  Taken 5/11/2024 1937 by Qing Kwon RN  Safety Promotion/Fall Prevention:   activity supervised   assistive device/personal items within reach   nonskid shoes/slippers when out of bed   safety round/check completed  Taken 5/11/2024 0950 by Qing Kwon RN  Safety Promotion/Fall Prevention:   activity supervised   safety round/check completed     Problem: Skin Injury Risk Increased  Goal: Skin Health and Integrity  5/11/2024 1937 by Qing Kwon RN  Outcome: Progressing  5/11/2024 1937 by Qing Kwon RN  Outcome: Progressing  Intervention: Plan: Nurse Driven Intervention: Moisture Management  Recent Flowsheet Documentation  Taken 5/11/2024 0950 by Qing Kwon  RN  Moisture Interventions: Encourage regular toileting  Plan: Moisture Management: encourage regular toileting  Taken 5/11/2024 0800 by Qing Kwon RN  Moisture Interventions: Encourage regular toileting  Intervention: Optimize Skin Protection  Recent Flowsheet Documentation  Taken 5/11/2024 1937 by Qing Kwon RN  Skin Protection:   adhesive use limited   incontinence pads utilized   transparent dressing maintained  Taken 5/11/2024 0950 by Qing Kwon RN  Pressure Reduction Techniques: frequent weight shift encouraged  Activity Management:   activity adjusted per tolerance   up in chair     Problem: Pulmonary Impairment  Goal: Improved Activity Tolerance  5/11/2024 1937 by Qing Kwon RN  Outcome: Progressing  5/11/2024 1937 by Qing Kwon RN  Outcome: Progressing  Goal: Effective Airway Clearance  5/11/2024 1937 by Qing Kwon RN  Outcome: Progressing  5/11/2024 1937 by Qing Kwon RN  Outcome: Progressing  Goal: Optimal Gas Exchange  5/11/2024 1937 by Qing Kwon RN  Outcome: Progressing  5/11/2024 1937 by Qing Kwon RN  Outcome: Progressing

## 2024-05-12 NOTE — PROGRESS NOTES
Care Management Discharge Note    Discharge Date: 05/12/2024       Discharge Disposition:  home    Discharge Services:  none    Discharge DME:  none    Discharge Transportation: other (see comments)    Private pay costs discussed: transportation costs    Handoff Referral Completed: No    Additional Information:  Patient discharging home with new O2 orders. Now needs continuous. Does not have portability with current oxygen at home, only has large concentrator. Called [facility] (his O2 supplier) to update that he needs portability. They are unable to get portable oxygen to him today. Patient insists upon going home. Agrees to wheelchair transport as they can supply oxygen and he has oxygen at home. Instructed on cost. Patient agrees. Transport set up for [id number]. Faxed new oxygen order to [facility] and provided patient with copy and instructed to also follow up with oxygen provider tomorrow.     Nuvia Arevalo RN  Care Coordinator  [facility]

## 2024-05-12 NOTE — PROGRESS NOTES
[facility]    Medicine Progress Note - Hospitalist Service    Date of Admission:  5/8/2024    Assessment & Plan      Pacheco Torres is a 78 year old male with PMH significant for CAD, hypertension, hyperlipidemia, MGUS, PMR, prostate cancer, esophageal stricture, MDD, macrocytic anemia admitted on 5/8/2024 with shortness of breath.  Likely COPD exacerbation, possible superimposed pneumonia.  Is at risk of opportunistic infection such as PJP with ongoing treatment of multiple myeloma, will attempt to obtain sputum culture.  Treating empirically with antibiotics, continuing IV steroids and as needed BiPAP.    Acute on chronic hypoxemic respiratory failure thought 2/2 COPD exacerbation with possible superimposed pneumonia  Tobacco abuse  Does not have robust pulmonary history, but does follow with a pulmonologist.  Is on 2 L of oxygen by nasal cannula at home.  Was initially responding well to steroids and antibiotics, however increased work of breathing overnight 5/9 requiring BiPAP and transferred to ICU.  Switch back to IV Solu-Medrol, continued antibiotics.  Respiratory status improved 5/10, weaned to nasal cannula.  Continuing IV steroids for now, continuing IV antibiotics.  Discussed with hematology/oncology who recommend sputum collection and PJP testing as patient is at risk with ongoing treatment of multiple myeloma.  -Continue Solu-Medrol 125 mg twice daily  - Continue empiric antibiotics with ceftriaxone, azithromycin  - Continue DuoNebs every 4 hours while awake  - BiPAP as needed, supplemental O2 through NC 2 L at baseline  - Sputum culture, beta D glucan ordered to assess for possible pneumocystis pneumonia    MGUS with progression to multiple myeloma:  Follows with [facility]ology.  Eren with oncologist, will hold PTA lenalidomide during admission.    Hyponatremia-Stable  Sodium 130.  Previously normal.  Mild edema on exam but overall appears to have lost weight over the past 2  "years.  Suspect some poor oral intake.    -Repeat BMP in AM    Leukopenia  Macrocytic anemia, chronic:  Chronic and likely related to chronic illness.  Repeat CBC showing stable levels.    CAD  HTN  HLD:    BNP and troponin normal.  Normotensive on arrival.  Continue PTA lisinopril, atorvastatin, ASA    Prostate cancer:  Prior radiation tx at [facility]          Diet: Regular Diet Adult    DVT Prophylaxis: Enoxaparin (Lovenox) SQ  Blanchard Catheter: Not present  Lines: None     Cardiac Monitoring: None  Code Status: Full Code      Clinically Significant Risk Factors        # Hyperkalemia: Highest K = 5.4 mmol/L in last 2 days, will monitor as appropriate  # Hyponatremia: Lowest Na = 128 mmol/L in last 2 days, will monitor as appropriate          # Hypertension: Noted on problem list        # Obesity: Estimated body mass index is 39.41 kg/m  as calculated from the following:    Height as of this encounter: 1.499 m (4' 11\").    Weight as of this encounter: 88.5 kg (195 lb 1.7 oz)., PRESENT ON ADMISSION            Disposition Plan     Medically Ready for Discharge: Anticipated Tomorrow             Juan Garcia MD  Hospitalist Service  [facility]  Securely message with Angella Joy (more info)  Text page via [facility] Paging/Directory   ______________________________________________________________________    Interval History   NAEO, breathing similar to previous day. No use of BiPAP needed overnight. Will attempt to collect sputum culture today.       Physical Exam   Vital Signs: Temp: 98.3  F (36.8  C) Temp src: Oral BP: (!) 142/54 Pulse: 58   Resp: 20 SpO2: 90 % O2 Device: Nasal cannula Oxygen Delivery: 2.5 LPM  Weight: 195 lbs 1.71 oz    HEENT; Atraumatic, normocephalic, pinkish conjuctiva, pupils bilateral reactive   Skin: warm and moist, no rashes  Lungs: equal chest expansion, fair air entry, multiple scattered wheezes present throughout lungs  Heart: normal rate, normal rhythm, no rubs or gallops. "   Abdomen: normal bowel sounds, no tenderness, no peritoneal signs, no guarding  Extremities: no deformities, bilateral lower extremity nonpitting edema   Neuro; follow commands, alert and oriented x3, spontaneous speech, coherent, moves all extremities spontaneously  Psych; no hallucination, euthymic mood, not agitated      Medical Decision Making       50 MINUTES SPENT BY ME on the date of service doing chart review, history, exam, documentation & further activities per the note.  MANAGEMENT DISCUSSED with the following over the past 24 hours: Yes   NOTE(S)/MEDICAL RECORDS REVIEWED over the past 24 hours: Yes       Data     I have personally reviewed the following data over the past 24 hrs:    N/A  \   N/A   / N/A     N/A N/A N/A /  N/A   5.4 (H) N/A N/A \       Imaging results reviewed over the past 24 hrs:   No results found for this or any previous visit (from the past 24 hour(s)).     Face to face Azelaic Acid Pregnancy And Lactation Text: This medication is considered safe during pregnancy and breast feeding.

## 2024-05-12 NOTE — PLAN OF CARE
Pt is alert and oriented x4. Pt denies pain or discomfort. VSS. Pt is on 02 2.5L NC. Pt denies shortness of breath. Pt is on potassium and magnesium protocol. Recheck in the morning. Pt has BLE edema. Pt is on Rocephin for pneumonia. Pt is assist of one in transfer and cares. pt is sleeping in bed and call light within reach. Will continue to monitor and assess pt.

## 2024-05-12 NOTE — PROVIDER NOTIFICATION
05/12/24 0500   RCAT Assessment   Reason for Assessment COPD   Pulmonary Status 4   Surgical Status 0   Chest X-ray 4   Respiratory Pattern 0   Mental Status 0   Breath Sounds 4   Cough Effectiveness 1   Level of Activity 1   O2 Required for SpO2>=92% 1   Acuity Level (points) 15   Acuity Level  3   Re-eval Interval Guideline Every 7 days if 2 consecutive evals unchanged   Re-evaluation Date 05/19/24   Clinical Indications/Symptoms   Aerosol Therapy RCAT protocol   Broncho-pulmonary Hygiene History of mucous producing disease   Volume Expansion Prevent atelectasis   Aerosol Therapy Plan   RT Treatment Nebulizer   Anticholinergic/Beta-Andrenergic Agonist Duoneb soln (0.5mg/3mg per 3mL) neb Max 6 doses/24h   Aerosol Treatment Frequency Acuity Level 3: QID/PRN @noc-Mod wheezing/Hx asthma/secretion removal   Broncho-Pulmonary Hygiene Plan   Broncho-Pulmonary Hygiene Treatment Coughing techniques   Broncho-Pulm Hygiene Frequency Acuity Level 3: TID-Small amounts secretions/poor cough, hx of secretions   Volume Expansion Plan   Volume Expansion Treatment Incentive Spirometer   Volume Expansion Frequency Acuity Level 3: TID-At risk for developing atelectasis

## 2024-05-14 LAB
BACTERIA SPT CULT: ABNORMAL
GRAM STAIN RESULT: ABNORMAL

## 2024-05-17 LAB — P JIROVECII DNA SPEC QL NAA+PROBE: NOT DETECTED

## 2024-07-11 ENCOUNTER — HOSPITAL ENCOUNTER (OUTPATIENT)
Dept: CARDIOLOGY | Facility: CLINIC | Age: 79
Discharge: HOME | End: 2024-07-11
Attending: INTERNAL MEDICINE | Admitting: INTERNAL MEDICINE
Payer: COMMERCIAL

## 2024-07-11 DIAGNOSIS — R06.02 SOB (SHORTNESS OF BREATH): ICD-10-CM

## 2024-07-11 DIAGNOSIS — I25.9 CHRONIC ISCHEMIC HEART DISEASE, UNSPECIFIED: ICD-10-CM

## 2024-07-11 LAB — LVEF ECHO: NORMAL

## 2024-07-11 PROCEDURE — 255N000002 HC RX 255 OP 636: Performed by: INTERNAL MEDICINE

## 2024-07-11 PROCEDURE — C8929 TTE W OR WO FOL WCON,DOPPLER: HCPCS

## 2024-07-11 PROCEDURE — 93306 TTE W/DOPPLER COMPLETE: CPT | Mod: 26 | Performed by: INTERNAL MEDICINE

## 2024-07-11 RX ADMIN — HUMAN ALBUMIN MICROSPHERES AND PERFLUTREN 3 ML: 10; .22 INJECTION, SOLUTION INTRAVENOUS at 14:00

## 2024-07-12 ENCOUNTER — HOSPITAL ENCOUNTER (OUTPATIENT)
Dept: CT IMAGING | Facility: CLINIC | Age: 79
Discharge: HOME | End: 2024-07-12
Attending: INTERNAL MEDICINE | Admitting: INTERNAL MEDICINE
Payer: COMMERCIAL

## 2024-07-12 DIAGNOSIS — R06.02 SOB (SHORTNESS OF BREATH): ICD-10-CM

## 2024-07-12 PROCEDURE — 71275 CT ANGIOGRAPHY CHEST: CPT

## 2024-07-12 PROCEDURE — 250N000011 HC RX IP 250 OP 636: Performed by: INTERNAL MEDICINE

## 2024-07-12 RX ORDER — IOPAMIDOL 755 MG/ML
66 INJECTION, SOLUTION INTRAVASCULAR ONCE
Status: COMPLETED | OUTPATIENT
Start: 2024-07-12 | End: 2024-07-12

## 2024-07-12 RX ADMIN — IOPAMIDOL 66 ML: 755 INJECTION, SOLUTION INTRAVENOUS at 08:54

## 2024-08-15 ENCOUNTER — HOSPITAL ENCOUNTER (INPATIENT)
Facility: CLINIC | Age: 79
LOS: 1 days | Discharge: HOME | DRG: 190 | End: 2024-08-16
Attending: EMERGENCY MEDICINE | Admitting: INTERNAL MEDICINE
Payer: COMMERCIAL

## 2024-08-15 ENCOUNTER — APPOINTMENT (OUTPATIENT)
Dept: GENERAL RADIOLOGY | Facility: CLINIC | Age: 79
DRG: 190 | End: 2024-08-15
Attending: EMERGENCY MEDICINE
Payer: COMMERCIAL

## 2024-08-15 DIAGNOSIS — J44.1 COPD EXACERBATION (H): ICD-10-CM

## 2024-08-15 DIAGNOSIS — J96.22 ACUTE ON CHRONIC RESPIRATORY FAILURE WITH HYPERCAPNIA (H): ICD-10-CM

## 2024-08-15 LAB
ANION GAP SERPL CALCULATED.3IONS-SCNC: 8 MMOL/L (ref 7–15)
ATRIAL RATE - MUSE: 79 BPM
BASE EXCESS BLDV CALC-SCNC: 10.2 MMOL/L (ref -3–3)
BASE EXCESS BLDV CALC-SCNC: 11.3 MMOL/L (ref -3–3)
BASOPHILS # BLD AUTO: 0.1 10E3/UL (ref 0–0.2)
BASOPHILS NFR BLD AUTO: 1 %
BUN SERPL-MCNC: 17.4 MG/DL (ref 8–23)
CALCIUM SERPL-MCNC: 8.8 MG/DL (ref 8.8–10.4)
CHLORIDE SERPL-SCNC: 98 MMOL/L (ref 98–107)
CREAT SERPL-MCNC: 0.65 MG/DL (ref 0.67–1.17)
DIASTOLIC BLOOD PRESSURE - MUSE: NORMAL MMHG
EGFRCR SERPLBLD CKD-EPI 2021: >90 ML/MIN/1.73M2
EOSINOPHIL # BLD AUTO: 1.2 10E3/UL (ref 0–0.7)
EOSINOPHIL NFR BLD AUTO: 15 %
ERYTHROCYTE [DISTWIDTH] IN BLOOD BY AUTOMATED COUNT: 14.1 % (ref 10–15)
GLUCOSE BLDC GLUCOMTR-MCNC: 130 MG/DL (ref 70–99)
GLUCOSE BLDC GLUCOMTR-MCNC: 151 MG/DL (ref 70–99)
GLUCOSE SERPL-MCNC: 103 MG/DL (ref 70–99)
HCO3 BLDV-SCNC: 39 MMOL/L (ref 21–28)
HCO3 BLDV-SCNC: 40 MMOL/L (ref 21–28)
HCO3 SERPL-SCNC: 34 MMOL/L (ref 22–29)
HCT VFR BLD AUTO: 36.9 % (ref 40–53)
HGB BLD-MCNC: 10.9 G/DL (ref 13.3–17.7)
IMM GRANULOCYTES # BLD: 0 10E3/UL
IMM GRANULOCYTES NFR BLD: 0 %
INTERPRETATION ECG - MUSE: NORMAL
LYMPHOCYTES # BLD AUTO: 0.7 10E3/UL (ref 0.8–5.3)
LYMPHOCYTES NFR BLD AUTO: 9 %
MCH RBC QN AUTO: 32.2 PG (ref 26.5–33)
MCHC RBC AUTO-ENTMCNC: 29.5 G/DL (ref 31.5–36.5)
MCV RBC AUTO: 109 FL (ref 78–100)
MONOCYTES # BLD AUTO: 0.7 10E3/UL (ref 0–1.3)
MONOCYTES NFR BLD AUTO: 9 %
NEUTROPHILS # BLD AUTO: 5.2 10E3/UL (ref 1.6–8.3)
NEUTROPHILS NFR BLD AUTO: 66 %
NRBC # BLD AUTO: 0 10E3/UL
NRBC BLD AUTO-RTO: 0 /100
NT-PROBNP SERPL-MCNC: 291 PG/ML (ref 0–1800)
O2/TOTAL GAS SETTING VFR VENT: 2 %
O2/TOTAL GAS SETTING VFR VENT: 30 %
OXYHGB MFR BLDV: 55 % (ref 70–75)
OXYHGB MFR BLDV: 87 % (ref 70–75)
P AXIS - MUSE: 25 DEGREES
PCO2 BLDV: 67 MM HG (ref 40–50)
PCO2 BLDV: 92 MM HG (ref 40–50)
PH BLDV: 7.25 [PH] (ref 7.32–7.43)
PH BLDV: 7.37 [PH] (ref 7.32–7.43)
PLATELET # BLD AUTO: 291 10E3/UL (ref 150–450)
PO2 BLDV: 34 MM HG (ref 25–47)
PO2 BLDV: 56 MM HG (ref 25–47)
POTASSIUM SERPL-SCNC: 4.6 MMOL/L (ref 3.4–5.3)
PR INTERVAL - MUSE: 180 MS
QRS DURATION - MUSE: 72 MS
QT - MUSE: 352 MS
QTC - MUSE: 403 MS
R AXIS - MUSE: -8 DEGREES
RBC # BLD AUTO: 3.39 10E6/UL (ref 4.4–5.9)
SAO2 % BLDV: 56.3 % (ref 70–75)
SAO2 % BLDV: 89.2 % (ref 70–75)
SODIUM SERPL-SCNC: 140 MMOL/L (ref 135–145)
SYSTOLIC BLOOD PRESSURE - MUSE: NORMAL MMHG
T AXIS - MUSE: 28 DEGREES
TROPONIN T SERPL HS-MCNC: 16 NG/L
VENTRICULAR RATE- MUSE: 79 BPM
WBC # BLD AUTO: 7.9 10E3/UL (ref 4–11)

## 2024-08-15 PROCEDURE — 82805 BLOOD GASES W/O2 SATURATION: CPT | Performed by: EMERGENCY MEDICINE

## 2024-08-15 PROCEDURE — 96374 THER/PROPH/DIAG INJ IV PUSH: CPT

## 2024-08-15 PROCEDURE — 94640 AIRWAY INHALATION TREATMENT: CPT | Mod: 76

## 2024-08-15 PROCEDURE — 250N000011 HC RX IP 250 OP 636: Performed by: EMERGENCY MEDICINE

## 2024-08-15 PROCEDURE — 999N000157 HC STATISTIC RCP TIME EA 10 MIN

## 2024-08-15 PROCEDURE — 250N000011 HC RX IP 250 OP 636: Performed by: INTERNAL MEDICINE

## 2024-08-15 PROCEDURE — 82374 ASSAY BLOOD CARBON DIOXIDE: CPT | Performed by: EMERGENCY MEDICINE

## 2024-08-15 PROCEDURE — 99223 1ST HOSP IP/OBS HIGH 75: CPT | Performed by: INTERNAL MEDICINE

## 2024-08-15 PROCEDURE — 84484 ASSAY OF TROPONIN QUANT: CPT | Performed by: EMERGENCY MEDICINE

## 2024-08-15 PROCEDURE — 94640 AIRWAY INHALATION TREATMENT: CPT

## 2024-08-15 PROCEDURE — 250N000013 HC RX MED GY IP 250 OP 250 PS 637: Performed by: INTERNAL MEDICINE

## 2024-08-15 PROCEDURE — 36415 COLL VENOUS BLD VENIPUNCTURE: CPT | Performed by: EMERGENCY MEDICINE

## 2024-08-15 PROCEDURE — 83880 ASSAY OF NATRIURETIC PEPTIDE: CPT | Performed by: INTERNAL MEDICINE

## 2024-08-15 PROCEDURE — 71045 X-RAY EXAM CHEST 1 VIEW: CPT

## 2024-08-15 PROCEDURE — 120N000013 HC R&B IMCU

## 2024-08-15 PROCEDURE — 99291 CRITICAL CARE FIRST HOUR: CPT | Mod: 25

## 2024-08-15 PROCEDURE — 250N000009 HC RX 250: Performed by: EMERGENCY MEDICINE

## 2024-08-15 PROCEDURE — 250N000013 HC RX MED GY IP 250 OP 250 PS 637: Performed by: STUDENT IN AN ORGANIZED HEALTH CARE EDUCATION/TRAINING PROGRAM

## 2024-08-15 PROCEDURE — 250N000009 HC RX 250: Performed by: INTERNAL MEDICINE

## 2024-08-15 PROCEDURE — 93005 ELECTROCARDIOGRAM TRACING: CPT

## 2024-08-15 PROCEDURE — 85025 COMPLETE CBC W/AUTO DIFF WBC: CPT | Performed by: EMERGENCY MEDICINE

## 2024-08-15 RX ORDER — LIDOCAINE 40 MG/G
CREAM TOPICAL
Status: DISCONTINUED | OUTPATIENT
Start: 2024-08-15 | End: 2024-08-15

## 2024-08-15 RX ORDER — ALBUTEROL SULFATE 0.83 MG/ML
2.5 SOLUTION RESPIRATORY (INHALATION) EVERY 6 HOURS PRN
Status: DISCONTINUED | OUTPATIENT
Start: 2024-08-15 | End: 2024-08-16 | Stop reason: HOSPADM

## 2024-08-15 RX ORDER — SERTRALINE HYDROCHLORIDE 100 MG/1
100 TABLET, FILM COATED ORAL DAILY
Status: DISCONTINUED | OUTPATIENT
Start: 2024-08-15 | End: 2024-08-16 | Stop reason: HOSPADM

## 2024-08-15 RX ORDER — ATORVASTATIN CALCIUM 40 MG/1
40 TABLET, FILM COATED ORAL EVERY EVENING
Status: DISCONTINUED | OUTPATIENT
Start: 2024-08-15 | End: 2024-08-16 | Stop reason: HOSPADM

## 2024-08-15 RX ORDER — NICOTINE POLACRILEX 4 MG
15-30 LOZENGE BUCCAL
Status: DISCONTINUED | OUTPATIENT
Start: 2024-08-15 | End: 2024-08-16 | Stop reason: HOSPADM

## 2024-08-15 RX ORDER — ALBUTEROL SULFATE 90 UG/1
1-2 AEROSOL, METERED RESPIRATORY (INHALATION) EVERY 4 HOURS PRN
Status: DISCONTINUED | OUTPATIENT
Start: 2024-08-15 | End: 2024-08-16 | Stop reason: HOSPADM

## 2024-08-15 RX ORDER — AMOXICILLIN 250 MG
2 CAPSULE ORAL 2 TIMES DAILY PRN
Status: DISCONTINUED | OUTPATIENT
Start: 2024-08-15 | End: 2024-08-16 | Stop reason: HOSPADM

## 2024-08-15 RX ORDER — POLYETHYLENE GLYCOL 3350 17 G/17G
17 POWDER, FOR SOLUTION ORAL DAILY
Status: DISCONTINUED | OUTPATIENT
Start: 2024-08-15 | End: 2024-08-16 | Stop reason: HOSPADM

## 2024-08-15 RX ORDER — ONDANSETRON 4 MG/1
4 TABLET, ORALLY DISINTEGRATING ORAL EVERY 6 HOURS PRN
Status: DISCONTINUED | OUTPATIENT
Start: 2024-08-15 | End: 2024-08-16 | Stop reason: HOSPADM

## 2024-08-15 RX ORDER — AMOXICILLIN 250 MG
1 CAPSULE ORAL 2 TIMES DAILY PRN
Status: DISCONTINUED | OUTPATIENT
Start: 2024-08-15 | End: 2024-08-16 | Stop reason: HOSPADM

## 2024-08-15 RX ORDER — IPRATROPIUM BROMIDE AND ALBUTEROL SULFATE 2.5; .5 MG/3ML; MG/3ML
3 SOLUTION RESPIRATORY (INHALATION)
Status: DISCONTINUED | OUTPATIENT
Start: 2024-08-15 | End: 2024-08-16 | Stop reason: HOSPADM

## 2024-08-15 RX ORDER — ALBUTEROL SULFATE 90 UG/1
1-2 AEROSOL, METERED RESPIRATORY (INHALATION) EVERY 4 HOURS PRN
COMMUNITY
Start: 2024-07-01

## 2024-08-15 RX ORDER — UMECLIDINIUM BROMIDE AND VILANTEROL TRIFENATATE 62.5; 25 UG/1; UG/1
1 POWDER RESPIRATORY (INHALATION) DAILY
COMMUNITY
Start: 2024-06-03

## 2024-08-15 RX ORDER — ACETAMINOPHEN 500 MG
1000 TABLET ORAL EVERY 6 HOURS PRN
Status: DISCONTINUED | OUTPATIENT
Start: 2024-08-15 | End: 2024-08-16 | Stop reason: HOSPADM

## 2024-08-15 RX ORDER — ONDANSETRON 2 MG/ML
4 INJECTION INTRAMUSCULAR; INTRAVENOUS EVERY 6 HOURS PRN
Status: DISCONTINUED | OUTPATIENT
Start: 2024-08-15 | End: 2024-08-16 | Stop reason: HOSPADM

## 2024-08-15 RX ORDER — ASPIRIN 81 MG/1
81 TABLET ORAL DAILY
Status: DISCONTINUED | OUTPATIENT
Start: 2024-08-15 | End: 2024-08-16 | Stop reason: HOSPADM

## 2024-08-15 RX ORDER — CARVEDILOL 12.5 MG/1
12.5 TABLET ORAL 2 TIMES DAILY WITH MEALS
Status: DISCONTINUED | OUTPATIENT
Start: 2024-08-15 | End: 2024-08-16 | Stop reason: HOSPADM

## 2024-08-15 RX ORDER — METHYLPREDNISOLONE SODIUM SUCCINATE 125 MG/2ML
125 INJECTION, POWDER, LYOPHILIZED, FOR SOLUTION INTRAMUSCULAR; INTRAVENOUS ONCE
Status: COMPLETED | OUTPATIENT
Start: 2024-08-15 | End: 2024-08-15

## 2024-08-15 RX ORDER — LANOLIN ALCOHOL/MO/W.PET/CERES
1000 CREAM (GRAM) TOPICAL DAILY
Status: DISCONTINUED | OUTPATIENT
Start: 2024-08-15 | End: 2024-08-16 | Stop reason: HOSPADM

## 2024-08-15 RX ORDER — LIDOCAINE 40 MG/G
CREAM TOPICAL
Status: DISCONTINUED | OUTPATIENT
Start: 2024-08-15 | End: 2024-08-16 | Stop reason: HOSPADM

## 2024-08-15 RX ORDER — TAMSULOSIN HYDROCHLORIDE 0.4 MG/1
0.4 CAPSULE ORAL DAILY
Status: DISCONTINUED | OUTPATIENT
Start: 2024-08-15 | End: 2024-08-16 | Stop reason: HOSPADM

## 2024-08-15 RX ORDER — IPRATROPIUM BROMIDE AND ALBUTEROL SULFATE 2.5; .5 MG/3ML; MG/3ML
3 SOLUTION RESPIRATORY (INHALATION) ONCE
Status: COMPLETED | OUTPATIENT
Start: 2024-08-15 | End: 2024-08-15

## 2024-08-15 RX ORDER — CETIRIZINE HYDROCHLORIDE 5 MG/1
5 TABLET ORAL DAILY
Status: DISCONTINUED | OUTPATIENT
Start: 2024-08-16 | End: 2024-08-16 | Stop reason: HOSPADM

## 2024-08-15 RX ORDER — BUDESONIDE 3 MG/1
3 CAPSULE, COATED PELLETS ORAL EVERY OTHER DAY
Status: DISCONTINUED | OUTPATIENT
Start: 2024-08-16 | End: 2024-08-16 | Stop reason: HOSPADM

## 2024-08-15 RX ORDER — CALCIUM CARBONATE 500 MG/1
1000 TABLET, CHEWABLE ORAL 2 TIMES DAILY PRN
Status: DISCONTINUED | OUTPATIENT
Start: 2024-08-15 | End: 2024-08-16 | Stop reason: HOSPADM

## 2024-08-15 RX ORDER — PANTOPRAZOLE SODIUM 40 MG/1
40 TABLET, DELAYED RELEASE ORAL
Status: DISCONTINUED | OUTPATIENT
Start: 2024-08-15 | End: 2024-08-16 | Stop reason: HOSPADM

## 2024-08-15 RX ORDER — METHYLPREDNISOLONE SODIUM SUCCINATE 40 MG/ML
40 INJECTION, POWDER, LYOPHILIZED, FOR SOLUTION INTRAMUSCULAR; INTRAVENOUS EVERY 12 HOURS
Status: DISCONTINUED | OUTPATIENT
Start: 2024-08-15 | End: 2024-08-16 | Stop reason: HOSPADM

## 2024-08-15 RX ORDER — CARBOXYMETHYLCELLULOSE SODIUM 5 MG/ML
1 SOLUTION/ DROPS OPHTHALMIC
Status: DISCONTINUED | OUTPATIENT
Start: 2024-08-15 | End: 2024-08-16 | Stop reason: HOSPADM

## 2024-08-15 RX ORDER — DEXTROSE MONOHYDRATE 25 G/50ML
25-50 INJECTION, SOLUTION INTRAVENOUS
Status: DISCONTINUED | OUTPATIENT
Start: 2024-08-15 | End: 2024-08-16 | Stop reason: HOSPADM

## 2024-08-15 RX ADMIN — IPRATROPIUM BROMIDE AND ALBUTEROL SULFATE 3 ML: .5; 3 SOLUTION RESPIRATORY (INHALATION) at 08:39

## 2024-08-15 RX ADMIN — THIAMINE HCL TAB 100 MG 100 MG: 100 TAB at 18:20

## 2024-08-15 RX ADMIN — ATORVASTATIN CALCIUM 40 MG: 40 TABLET, FILM COATED ORAL at 20:10

## 2024-08-15 RX ADMIN — IPRATROPIUM BROMIDE AND ALBUTEROL SULFATE 3 ML: .5; 3 SOLUTION RESPIRATORY (INHALATION) at 20:46

## 2024-08-15 RX ADMIN — ASPIRIN 81 MG: 81 TABLET, COATED ORAL at 18:21

## 2024-08-15 RX ADMIN — METHYLPREDNISOLONE SODIUM SUCCINATE 125 MG: 125 INJECTION, POWDER, FOR SOLUTION INTRAMUSCULAR; INTRAVENOUS at 08:57

## 2024-08-15 RX ADMIN — Medication 5 MG: at 23:11

## 2024-08-15 RX ADMIN — CARVEDILOL 12.5 MG: 12.5 TABLET, FILM COATED ORAL at 18:20

## 2024-08-15 RX ADMIN — PANTOPRAZOLE SODIUM 40 MG: 40 TABLET, DELAYED RELEASE ORAL at 18:20

## 2024-08-15 RX ADMIN — METHYLPREDNISOLONE SODIUM SUCCINATE 40 MG: 40 INJECTION, POWDER, FOR SOLUTION INTRAMUSCULAR; INTRAVENOUS at 18:26

## 2024-08-15 RX ADMIN — Medication 1000 MCG: at 18:20

## 2024-08-15 RX ADMIN — SERTRALINE 100 MG: 100 TABLET, FILM COATED ORAL at 18:20

## 2024-08-15 ASSESSMENT — ACTIVITIES OF DAILY LIVING (ADL)
ADLS_ACUITY_SCORE: 25
ADLS_ACUITY_SCORE: 38
ADLS_ACUITY_SCORE: 40
ADLS_ACUITY_SCORE: 40
ADLS_ACUITY_SCORE: 38
ADLS_ACUITY_SCORE: 40
ADLS_ACUITY_SCORE: 25
ADLS_ACUITY_SCORE: 38

## 2024-08-15 NOTE — ED PROVIDER NOTES
<<<<<Critical care time greater than 30 minutes>>>>      Emergency Department Note      History of Present Illness     Chief Complaint   Shortness of Breath    HPI   Pacheco Torres is a 78 year old male with a history of multiple myeloma, hypertension, tobacco abuse, NSTEMI, acute on chronic respiratory failure, and COPD on 2 L supplemental oxygen at baseline who presents via EMS for evaluation of shortness of breath. The patient reports that he suddenly became short of breath last night and throughout the night, was only able to breath comfortably by sitting on the edge of a chair. States that he has had gradually worsening shortness of breath since starting treatment for myeloma a few months ago. Per EMS, the patients oxygen was in the low 80s on 5 L NC just after transfer onto the stretcher. The patient adds that he has a left inguinal hernia that is usually reducible but he has not been able to reduce it in the past few days. He does not wear a sling for his hernia and has not consulted surgery recently. He does not use a nebulizer or inhalers. He denies chest pain, cough, fever, lower extremity edema, abdominal pain, nausea, vomiting, and diarrhea. No known sick exposures.     Independent Historian   EMS as detailed above.    Review of External Notes   I reviewed family medicine visit from 5/24/24 when the patient had follow up after he was hospitalized for a COPD exacerbation.     Past Medical History     Medical History and Problem List   Hypertension  CAD  Depression  GERD  Hyperlipidemia  Macrocytic anemia  Obesity   COPD  Polymyalgia rheumatica  Multiple myeloma   Tobacco abuse   Vitamin D deficiency   Osteoarthritis  MUGS  Prostate cancer  NSTEMI  Septic shock  Esophageal stricture  Acute on chronic respiratory failure     Medications   Atorvastatin  Pantoprazole  Aspirin 81 mg  Budesonide  Carvedilol  Dexamethasone  Lenalidomide  Nitroglycerin  Sertraline  Tamsulosin     Surgical History   Coronary  angioplasty with stenting  Hernia repair, bilateral   [place] teeth extraction  Cataract removal  Meniscectomy, left    Physical Exam     Patient Vitals for the past 24 hrs:   BP Temp Temp src Pulse Resp SpO2 Height Weight   08/15/24 1445 (!) 140/86 -- -- 73 19 94 % -- --   08/15/24 1345 (!) 144/83 -- -- 65 20 96 % -- --   08/15/24 1334 124/67 -- -- 85 13 97 % -- --   08/15/24 1319 136/75 -- -- 68 (!) 31 93 % -- --   08/15/24 1304 136/73 -- -- 56 21 95 % -- --   08/15/24 1249 (!) 143/80 -- -- 62 16 94 % -- --   08/15/24 1234 136/77 -- -- 77 22 94 % -- --   08/15/24 1219 135/69 -- -- 75 19 95 % -- --   08/15/24 1200 (!) 144/77 -- -- 78 22 95 % -- --   08/15/24 1145 128/75 -- -- 72 19 96 % -- --   08/15/24 1130 (!) 147/85 -- -- 87 21 96 % -- --   08/15/24 1115 135/82 -- -- 90 15 96 % -- --   08/15/24 1100 138/81 -- -- 68 24 92 % -- --   08/15/24 1045 130/71 -- -- 59 20 94 % -- --   08/15/24 1034 (!) 136/95 -- -- 81 22 95 % -- --   08/15/24 1019 123/88 -- -- 62 19 94 % -- --   08/15/24 1000 112/72 -- -- 55 10 96 % -- --   08/15/24 0951 104/72 -- -- 63 26 98 % -- --   08/15/24 0936 137/78 -- -- 70 23 96 % -- --   08/15/24 0915 126/78 -- -- 65 15 94 % -- --   08/15/24 0906 128/70 -- -- 64 15 95 % -- --   08/15/24 0845 127/78 -- -- 77 19 96 % -- --   08/15/24 0839 -- -- -- -- 30 -- -- --   08/15/24 0717 -- 97.4  F (36.3  C) Tympanic -- -- -- 1.524 m (5') 86.2 kg (190 lb)   08/15/24 0714 (!) 172/92 -- -- 84 24 96 % -- --     Physical Exam  GENERAL: Appears dyspneic, pursed lip breathing, speaking in 3-4 word sentences  CARDIOVASCULAR: Regular rate and rhythm  LUNGS: Clear bilaterally, no rales or rhonchi. Pursed lip breathing. Diffuse expiratory wheezing but good air movement.   ABDOMEN: Soft, nontender, no rebound or guarding. Left inguinal hernia that is reducible  EXTREMITIES: No peripheral edema  NEURO: Awake and alert, moves all extremities    Diagnostics     Lab Results   Labs Ordered and Resulted from Time of ED  Arrival to Time of ED Departure   BLOOD GAS VENOUS - Abnormal       Result Value    pH Venous 7.25 (*)     pCO2 Venous 92 (*)     pO2 Venous 34      Bicarbonate Venous 40 (*)     Base Excess/Deficit Venous 10.2 (*)     FIO2 2      Oxyhemoglobin Venous 55 (*)     O2 Sat, Venous 56.3 (*)    BASIC METABOLIC PANEL - Abnormal    Sodium 140      Potassium 4.6      Chloride 98      Carbon Dioxide (CO2) 34 (*)     Anion Gap 8      Urea Nitrogen 17.4      Creatinine 0.65 (*)     GFR Estimate >90      Calcium 8.8      Glucose 103 (*)    CBC WITH PLATELETS AND DIFFERENTIAL - Abnormal    WBC Count 7.9      RBC Count 3.39 (*)     Hemoglobin 10.9 (*)     Hematocrit 36.9 (*)      (*)     MCH 32.2      MCHC 29.5 (*)     RDW 14.1      Platelet Count 291      % Neutrophils 66      % Lymphocytes 9      % Monocytes 9      % Eosinophils 15      % Basophils 1      % Immature Granulocytes 0      NRBCs per 100 WBC 0      Absolute Neutrophils 5.2      Absolute Lymphocytes 0.7 (*)     Absolute Monocytes 0.7      Absolute Eosinophils 1.2 (*)     Absolute Basophils 0.1      Absolute Immature Granulocytes 0.0      Absolute NRBCs 0.0     BLOOD GAS VENOUS - Abnormal    pH Venous 7.37      pCO2 Venous 67 (*)     pO2 Venous 56 (*)     Bicarbonate Venous 39 (*)     Base Excess/Deficit Venous 11.3 (*)     FIO2 30      Oxyhemoglobin Venous 87 (*)     O2 Sat, Venous 89.2 (*)    TROPONIN T, HIGH SENSITIVITY - Normal    Troponin T, High Sensitivity 16     NT PROBNP INPATIENT - Normal    N terminal Pro BNP Inpatient 291         Imaging   XR Chest Port 1 View   Final Result   IMPRESSION: Mild linear opacities in the lung bases, likely   atelectasis. No focal airspace opacities, pleural effusion or   pneumothorax. Stable mild cardiomegaly and tortuous aortic arch. Old   healed right-sided rib fracture deformities.      EMELY MARTINEZ MD            SYSTEM ID:  [id number]        EKG   ECG taken at 0713, ECG read at 0724  Normal sinus rhythm   Rate 79 bpm.  MD interval 180 ms. QRS duration 72 ms. QT/QTc 352/403 ms. P-R-T axes 25 -8 28.    Independent Interpretation   None    ED Course      Medications Administered   Medications   No lozenges or gum should be given while patient on BIPAP/AVAPS/AVAPS AE (has no administration in time range)   carboxymethylcellulose PF (REFRESH PLUS) 0.5 % ophthalmic solution 1 drop (has no administration in time range)   Patient may continue current oral medications (has no administration in time range)   lidocaine 1 % 0.1-1 mL (has no administration in time range)   lidocaine (LMX4) cream (has no administration in time range)   sodium chloride (PF) 0.9% PF flush 3 mL (has no administration in time range)   sodium chloride (PF) 0.9% PF flush 3 mL (has no administration in time range)   polyethylene glycol (MIRALAX) Packet 17 g (has no administration in time range)   senna-docusate (SENOKOT-S/PERICOLACE) 8.6-50 MG per tablet 1 tablet (has no administration in time range)     Or   senna-docusate (SENOKOT-S/PERICOLACE) 8.6-50 MG per tablet 2 tablet (has no administration in time range)   ondansetron (ZOFRAN ODT) ODT tab 4 mg (has no administration in time range)     Or   ondansetron (ZOFRAN) injection 4 mg (has no administration in time range)   calcium carbonate (TUMS) chewable tablet 1,000 mg (has no administration in time range)   ipratropium - albuterol 0.5 mg/2.5 mg/3 mL (DUONEB) neb solution 3 mL (has no administration in time range)   albuterol (PROVENTIL) neb solution 2.5 mg (has no administration in time range)   methylPREDNISolone sodium succinate (SOLU-MEDROL) injection 40 mg (has no administration in time range)   ipratropium - albuterol 0.5 mg/2.5 mg/3 mL (DUONEB) neb solution 3 mL (3 mLs Nebulization $Given 8/15/24 0839)   methylPREDNISolone Na Suc (solu-MEDROL) injection 125 mg (125 mg Intravenous $Given 8/15/24 0857)     Procedures   Procedures   Hernia reduction  Indication: left inguinal hernia.    Technique: Gentle upward  pressure applied to the hernia with the patient in reverse Trendelenberg.     Outcome: Successful clinical reduction of the hernia. Patient tolerated the procedure well without complications.     Discussion of Management   Admitting HospitalistLeah    ED Course   ED Course as of 08/15/24 1534   Thu Aug 15, 2024   0709 I obtained history and examined the patient as noted above.    0829 Patient started on BiPAP given elevated pCO2. No altered mental status.    0839 I rechecked and updated the patient on the plan for admission.    0901 I spoke with Dr. Lynn of the hospitalist service regarding admission.      Additional Documentation  None    Medical Decision Making / Diagnosis     CMS Diagnoses: None    MIPS       None    [facility]  Pacheco Torres is a 78-year-old male with history of COPD who presents emergency department for evaluation of worsening shortness of breath overnight.  On arrival, patient was saturating at 96% on his home 2 L.  He was initially recorded as hypertensive but this improved without any intervention.  He was afebrile with normal heart rate.  His EKG showed no ischemic changes, troponin was normal, ruling him out for ACS per protocol.  He has no significant cough or fever or infectious symptoms, no leukocytosis, some stable anemia at hemoglobin of 10.9, similar to baseline, VBG showed acute on chronic respiratory failure with pH of 7.25, CO2 of 92 compared to around baseline of 60.  Given this he was placed on BiPAP, reassessment of VBG was improving, was given steroids and nebs and will be admitted for further monitoring to the hospitalist    Disposition   The patient was admitted to the hospital under the care of Dr. Lynn.     Diagnosis     ICD-10-CM    1. COPD exacerbation (H)  J44.1       2. Acute on chronic respiratory failure with hypercapnia (H)  J96.22          <<<<<Critical care time greater than 30 minutes>>>>    Scribe Disclosure:  Svetlana SON, am serving as a scribe at 7:34  AM on 8/15/2024 to document services personally performed by Kristina Arnold MD based on my observations and the provider's statements to me.        Kristina Arnold MD  08/15/24 3938       Kristina Arnold MD  08/15/24 3975

## 2024-08-15 NOTE — PHARMACY-ADMISSION MEDICATION HISTORY
"Pharmacist Admission Medication History    Admission medication history is complete. The information provided in this note is only as accurate as the sources available at the time of the update.    Information Source(s): Patient and CareEverywhere/SureScripts via in-person    Pertinent Information: Per patient, his acyclovir & Bactrim meds for HSV & PJP ppx are on hold (did not add to list). He also reports his Revlimid is \"off\" until he sees his oncologist tomorrow on 8/16 to assess if his rash is from the Revlimid. He could not tell me when he last had a dose of Revlimid.     Changes made to PTA medication list:  Added: melatonin, albuterol, Anoro Ellipta   Deleted: pregabalin  Changed:   Budesonide 3mg daily --> 3mg every other day   Revlimid 25mg x14d, then 7d off --> 10mg x14d, then 7d off    Allergies reviewed with patient and updates made in EHR: no    Medication History Completed By: GWEN ABDUL [facility] 8/15/2024 10:44 AM    PTA Med List   Medication Sig Last Dose    acetaminophen (TYLENOL) 500 MG tablet Take 1,000 mg by mouth every 6 hours as needed for pain Unknown at PRN    albuterol (PROAIR HFA/PROVENTIL HFA/VENTOLIN HFA) 108 (90 Base) MCG/ACT inhaler Inhale 1-2 puffs into the lungs every 4 hours as needed for wheezing or shortness of breath Unknown at PRN    ANORO ELLIPTA 62.5-25 MCG/ACT oral inhaler Inhale 1 puff into the lungs daily 8/14/2024 at AM    aspirin (ASA) 81 MG EC tablet Take 81 mg by mouth daily 8/14/2024 at AM    atorvastatin (LIPITOR) 40 MG tablet Take 1 tablet (40 mg) by mouth every evening 8/14/2024 at PM    budesonide (ENTOCORT EC) 3 MG EC capsule Take 3 mg by mouth every other day Add contents of 1 capsule to 10 mL of honey,chocolate or pancake syrup. Stir well 8/14/2024 at AM    calcium carbonate (OS-TAVO) 500 MG tablet Take 2 tablets by mouth daily 8/14/2024 at AM    carvedilol (COREG) 12.5 MG tablet Take 12.5 mg by mouth 2 times daily (with meals) 8/14/2024 at PM    cyanocobalamin " (VITAMIN B-12) 1000 MCG tablet Take 1,000 mcg by mouth daily 8/14/2024 at AM    ferrous fumarate-vitamin C ER (JUAN DANIEL-SEQUELS) 65-25 MG CR tablet Take 1 tablet by mouth daily (with breakfast) 8/14/2024 at AM    LENalidomide (REVLIMID) 10 MG CAPS capsule Take 10 mg by mouth See Admin Instructions 10 mg daily for 14 days, then off 7 days Unknown at off until oncology team reviews on 8/16/24    melatonin 5 MG tablet Take 5 mg by mouth at bedtime 8/14/2024 at HS    multivitamin w/minerals (THERA-VIT-M) tablet Take 1 tablet by mouth daily 8/14/2024 at AM    nitroGLYcerin (NITROSTAT) 0.4 MG sublingual tablet Place 0.4 mg under the tongue every 5 minutes as needed for chest pain Unknown at PRN    pantoprazole (PROTONIX) 40 MG EC tablet Take 1 tablet (40 mg) by mouth 2 times daily (before meals) 8/14/2024 at PM    sertraline (ZOLOFT) 100 MG tablet Take 100 mg by mouth daily 8/14/2024 at AM    tamsulosin (FLOMAX) 0.4 MG capsule Take 0.4 mg by mouth daily 8/14/2024 at AM    thiamine (B-1) 100 MG tablet Take 1 tablet (100 mg) by mouth daily 8/14/2024 at AM    vitamin D3 (CHOLECALCIFEROL) 50 mcg (2000 units) tablet Take 1 tablet by mouth daily 8/14/2024 at AM

## 2024-08-15 NOTE — ED NOTES
"[facility]  ED Nurse Handoff Report    ED Chief complaint: Shortness of Breath  . ED Diagnosis:   Final diagnoses:   None       Allergies:   Allergies   Allergen Reactions    Gabapentin Visual Disturbance       Code Status: Full Code    Activity level - Baseline/Home:  independent.  Activity Level - Current:   standby.   Lift room needed: No.   Bariatric: No   Needed: No   Isolation: No.   Infection: Not Applicable.     Respiratory status: BiPap    Vital Signs (within 30 minutes):   Vitals:    08/15/24 0915 08/15/24 0936 08/15/24 0951 08/15/24 1000   BP: 126/78 137/78 104/72 112/72   Pulse: 65 70 63 55   Resp: 15 23 26 10   Temp:       TempSrc:       SpO2: 94% 96% 98% 96%   Weight:       Height:           Cardiac Rhythm:  ,   Cardiac  Cardiac Rhythm: Normal sinus rhythm  Pain level: 0/10   Patient confused: No.   Patient Falls Risk: nonskid shoes/slippers when out of bed.   Elimination Status: Has voided     Patient Report - Initial Complaint: Unable to sleep last night due to SOB   Focused Assessment: Patient came in by EMS on 5 liters nasal cannula. Was given a duo neb en route with improvement per patient . O2 sats per EMS low 80 SpO2. EMS increased O2 to 5 liters which SpO2 came to mid 90's. Patient ambulated to bed from stretcher with EMS help SpO2 did drop to 85%. Once patient returned to bed SpO2 came up to mid 90's. Slowly decreased FiO2 from 5 liters to 1 liter. Received another neb here for continued wheezing which respiratory rate and rhythm improved. PCO2 elevated to 92 patient placed on BiPAP as ordered. Denies any pain, no cough noted. Patient stated \"I'm a puffer breather\" Tolerating BiPAP well. Patient also has a hernia which MD reduced scrotal sling placed on patient.  Abnormal Results:   Labs Ordered and Resulted from Time of ED Arrival to Time of ED Departure   BLOOD GAS VENOUS - Abnormal       Result Value    pH Venous 7.25 (*)     pCO2 Venous 92 (*)     pO2 " Venous 34      Bicarbonate Venous 40 (*)     Base Excess/Deficit Venous 10.2 (*)     FIO2 2      Oxyhemoglobin Venous 55 (*)     O2 Sat, Venous 56.3 (*)    BASIC METABOLIC PANEL - Abnormal    Sodium 140      Potassium 4.6      Chloride 98      Carbon Dioxide (CO2) 34 (*)     Anion Gap 8      Urea Nitrogen 17.4      Creatinine 0.65 (*)     GFR Estimate >90      Calcium 8.8      Glucose 103 (*)    CBC WITH PLATELETS AND DIFFERENTIAL - Abnormal    WBC Count 7.9      RBC Count 3.39 (*)     Hemoglobin 10.9 (*)     Hematocrit 36.9 (*)      (*)     MCH 32.2      MCHC 29.5 (*)     RDW 14.1      Platelet Count 291      % Neutrophils 66      % Lymphocytes 9      % Monocytes 9      % Eosinophils 15      % Basophils 1      % Immature Granulocytes 0      NRBCs per 100 WBC 0      Absolute Neutrophils 5.2      Absolute Lymphocytes 0.7 (*)     Absolute Monocytes 0.7      Absolute Eosinophils 1.2 (*)     Absolute Basophils 0.1      Absolute Immature Granulocytes 0.0      Absolute NRBCs 0.0     TROPONIN T, HIGH SENSITIVITY - Normal    Troponin T, High Sensitivity 16          XR Chest Port 1 View   Final Result   IMPRESSION: Mild linear opacities in the lung bases, likely   atelectasis. No focal airspace opacities, pleural effusion or   pneumothorax. Stable mild cardiomegaly and tortuous aortic arch. Old   healed right-sided rib fracture deformities.      EMELY MARTINEZ MD            SYSTEM ID:  [id number]          Treatments provided: BiPAP, nebs,scrotal sling  Family Comments: None here at this time  OBS brochure/video discussed/provided to patient:  N/A  ED Medications:   Medications   No lozenges or gum should be given while patient on BIPAP/AVAPS/AVAPS AE (has no administration in time range)   carboxymethylcellulose PF (REFRESH PLUS) 0.5 % ophthalmic solution 1 drop (has no administration in time range)   Patient may continue current oral medications (has no administration in time range)   ipratropium - albuterol 0.5 mg/2.5  mg/3 mL (DUONEB) neb solution 3 mL (3 mLs Nebulization $Given 8/15/24 0839)   methylPREDNISolone Na Suc (solu-MEDROL) injection 125 mg (125 mg Intravenous $Given 8/15/24 0857)       Drips infusing:  No  For the majority of the shift this patient was Green.   Interventions performed were None.    Sepsis treatment initiated: No    Cares/treatment/interventions/medications to be completed following ED care: None at this time    ED Nurse Name: Halina Menjivar RN  10:07 AM   RECEIVING UNIT ED HANDOFF REVIEW    Above ED Nurse Handoff Report was reviewed: Yes  Reviewed by: Alexis Jerez RN on August 15, 2024 at 3:07 PM   ADELAIDA Kearns called the ED to inform them the note was read: Yes

## 2024-08-15 NOTE — ED NOTES
Patient off BiPAP around 1440 tolerating well. SpO2 running 94-96%. Color pink skin warm and dry. Eating and drinking at this time. Spoke with Dr Lynn patient is to be admitted to ICU.

## 2024-08-15 NOTE — H&P
History and Physical     Pacheco Torres MRN# [id number]   YOB: 1945 Age: 78 year old      Date of Admission:  8/15/2024    Primary care provider: Stuart Wolfe          Assessment and Plan:     Summary of Stay: Pacheco Torres is a 78 year old male with a history of  htn/hlp, remote hx of CAD s/p DAYAN to the LAD-most recent echo 6/2024 with EF 55-60% w/G1dd, , hx of prostate cancer from earlier this year treated with XRT through [place], macrocytic anemia with hgb in the 10-11 range (normal B12 and folate), monoclonal gammopathy , PMR, obesity, hx of tob abuse, hx of esophageal stricture requiring dilation, suspected COPD on chronic O2 2L/nc with PFTs 5/2024 with e/o both restriction and obstruction and mild pulm htn on echo admitted on 8/15/2024 with acute hypercapnic respiratory failure felt related to an Acute COPD exacerbation    He woke up in his normal state of health the day prior to admission but then when he went to lay down in bed he felt sob, matter of fact the only position that was comfortable was sitting on the edge of the bed. He reports that he is chronically SOB with minimal exertion but is typically not SOB at rest. He denies any antecedent NC/EA/ST/fevers, he reports a hx of hayfever but denies any recent allergy sx, doesn't believe that his breathing is affected by the weather, denies chest pain/palpitations, reports some chronic mild bilateral le edema which is stable     ER VS fairly stable, occasionally hypertensive, came in on O2, level not documented but then was put on BiPAP for acute hypercapnic respiratory failure with VBG 7.25/92    BMP with elevated bicarb to 34  BNP wnl 291, trop 16, EKG NSR - ST looks chay high in II as it has in the past so no change  CBC at baseline     CXR without infiltrate    I am asked to admit to ICU for acute hypercapnic respiratory failure       Problem List:   Acute hypercapnic respiratory failure   COPD vs restrictive lung dz vs  overlap   Chronic hypoxic respiratory failure chronically on 2l/nc   Mild pulmonary htn   His exam is consistent with COPD flare with diffuse expiratory wheezing.  He does not have any crackles and minimal LE edema as well as a normal BNP so I don't think that diastolic dysfunction is the issue. Etiology of exacerbation is unclear-no e/o infectious process, denies recent allergies, denies aspiration, no weather related symptoms usually, and reports compliance with inhalers  Titus VBG with resolution of hypercapnia   Transitioned off Bipap to 3L/nc       *ok to admit to IMC, no longer needs ICU   Methylpred 40 bid, duonebs  Currently without much cough or sputum production but if that develops consider flutter valve       Htn  Hlp  Remote hx of CAD with DAYAN to LAD (2008)  G1 diastolic dysfunction   Pta regimen is asa 81 mg every day, atorva 40 every day, carvedilol 12.5 bid  -all resumed      Chronic macrocytic anemia baseline hgb 10-11  MGUS   Follows with [facility] at [facility], currently on episodic lenalidomide  -requested heme/onc consult (chart check ok) to determine if he should be on it at this time     Obesity   Complicates cares     Tobacco abuse   Smokes 1 cigarette a day     BPH  Resumed tamsulosin     DVT Prophylaxis: Pneumatic Compression Devices  Code Status: DNR but ok to be intubated   Functional Status: [place], ambulates with the use of a walker   [place]: not needed  Access:  PIV              Time spent 75 minutes reviewing epic including notes/labs/prior hx, current medications.  In addition to interviewing and examining the patient, updated patient and family regarding plan of care          Chief Complaint:     SOB        History of Present Illness:   Summary of Stay: Pacheco Torres is a 78 year old male with a history of  htn/hlp, remote hx of CAD s/p DAYAN to the LAD-most recent echo 6/2024 with EF 55-60% w/G1dd, , hx of prostate cancer from earlier this  year treated with XRT through [facility], macrocytic anemia with hgb in the 10-11 range (normal B12 and folate), monoclonal gammopathy , PMR, obesity, hx of tob abuse, hx of esophageal stricture requiring dilation, suspected COPD on chronic O2 2L/nc with PFTs 5/2024 with e/o both restriction and obstruction and mild pulm htn on echo admitted on 8/15/2024 with acute hypercapnic respiratory failure felt related to an Acute COPD exacerbation    He woke up in his normal state of health the day prior to admission but then when he went to lay down in bed he felt sob, matter of fact the only position that was comfortable was sitting on the edge of the bed. He reports that he is chronically SOB with minimal exertion but is typically not SOB at rest. He denies any antecedent NC/EA/ST/fevers, he reports a hx of hayfever but denies any recent allergy sx, doesn't believe that his breathing is affected by the weather, denies chest pain/palpitations, reports some chronic mild bilateral le edema which is stable     ER VS fairly stable, occasionally hypertensive, came in on O2, level not documented but then was put on BiPAP for acute hypercapnic respiratory failure with VBG 7.25/92    BMP with elevated bicarb to 34  BNP wnl 291, trop 16, EKG NSR - ST looks chay high in II as it has in the past so no change  CBC at baseline     CXR without infiltrate    I am asked to admit to ICU for acute hypercapnic respiratory failure     The history is obtained in discussion with the ER provider  and the patient with good reliability      Epic and Care everywhere were extensively reviewed        Past Medical History:     Past Medical History:   Diagnosis Date    Acute and chronic respiratory failure with hypoxia (H)     on 2l/nc, suspect overlap with COPD/restrictive lung dz    Benign essential hypertension     Coronary artery disease involving autologous artery coronary bypass graft without angina pectoris     s/p DAYAN LAD 5/08    Depression      Gastroesophageal reflux disease with esophagitis     Hyperlipidemia LDL goal <70     Macrocytic anemia     11-12 range with MCV in the 105-107 range.  normal B12 and folate    Monoclonal gammopathy     Obesity     PMR (polymyalgia rheumatica) (H24)     Tobacco abuse     Vitamin D deficiency              Past Surgical History:     Past Surgical History:   Procedure Laterality Date    CORONARY ANGIOPLASTY  2008    with DAYAN to the LAD    HERNIA REPAIR               Social History:     Social History     Tobacco Use    Smoking status: Former     Current packs/day: 0.00     Types: Cigarettes     Quit date: 2010     Years since quittin.5    Smokeless tobacco: Not on file   Substance Use Topics    Alcohol use: Not Currently             Family History:     Family History   Problem Relation Age of Onset    Coronary Artery Disease Mother     Coronary Artery Disease Father     Heart Failure Father     Alcoholism Brother             Allergies:     Allergies   Allergen Reactions    Gabapentin Visual Disturbance             Medications:     Prior to Admission medications    Medication Sig Last Dose Taking? Auth Provider Long Term End Date   acetaminophen (TYLENOL) 500 MG tablet Take 1,000 mg by mouth every 6 hours as needed for pain Unknown at PRN Yes Reported, Patient     albuterol (PROAIR HFA/PROVENTIL HFA/VENTOLIN HFA) 108 (90 Base) MCG/ACT inhaler Inhale 1-2 puffs into the lungs every 4 hours as needed for wheezing or shortness of breath Unknown at PRN Yes Unknown, Entered By History     ANORO ELLIPTA 62.5-25 MCG/ACT oral inhaler Inhale 1 puff into the lungs daily 2024 at AM Yes Unknown, Entered By History     aspirin (ASA) 81 MG EC tablet Take 81 mg by mouth daily 2024 at AM Yes Reported, Patient     atorvastatin (LIPITOR) 40 MG tablet Take 1 tablet (40 mg) by mouth every evening 2024 at PM Yes Musa Gray MD Yes    budesonide (ENTOCORT EC) 3 MG EC capsule Take 3 mg by mouth every other day  Add contents of 1 capsule to 10 mL of honey,chocolate or pancake syrup. Stir well 8/14/2024 at AM Yes Unknown, Entered By History Yes    calcium carbonate (OS-TAVO) 500 MG tablet Take 2 tablets by mouth daily 8/14/2024 at AM Yes Unknown, Entered By History     carvedilol (COREG) 12.5 MG tablet Take 12.5 mg by mouth 2 times daily (with meals) 8/14/2024 at PM Yes Unknown, Entered By History No    cyanocobalamin (VITAMIN B-12) 1000 MCG tablet Take 1,000 mcg by mouth daily 8/14/2024 at AM Yes Unknown, Entered By History     ferrous fumarate-vitamin C ER (JUAN DANIEL-SEQUELS) 65-25 MG CR tablet Take 1 tablet by mouth daily (with breakfast) 8/14/2024 at AM Yes Unknown, Entered By History     LENalidomide (REVLIMID) 10 MG CAPS capsule Take 10 mg by mouth See Admin Instructions 10 mg daily for 14 days, then off 7 days Unknown at off until oncology team reviews on 8/16/24 Yes Unknown, Entered By History Yes    melatonin 5 MG tablet Take 5 mg by mouth at bedtime 8/14/2024 at HS Yes Unknown, Entered By History     multivitamin w/minerals (THERA-VIT-M) tablet Take 1 tablet by mouth daily 8/14/2024 at AM Yes Musa Gray MD     nitroGLYcerin (NITROSTAT) 0.4 MG sublingual tablet Place 0.4 mg under the tongue every 5 minutes as needed for chest pain Unknown at PRN Yes Reported, Patient Yes    pantoprazole (PROTONIX) 40 MG EC tablet Take 1 tablet (40 mg) by mouth 2 times daily (before meals) 8/14/2024 at PM Yes Musa Gray MD     sertraline (ZOLOFT) 100 MG tablet Take 100 mg by mouth daily 8/14/2024 at AM Yes Unknown, Entered By History No    tamsulosin (FLOMAX) 0.4 MG capsule Take 0.4 mg by mouth daily 8/14/2024 at AM Yes Reported, Patient     thiamine (B-1) 100 MG tablet Take 1 tablet (100 mg) by mouth daily 8/14/2024 at AM Yes Musa Gary MD     vitamin D3 (CHOLECALCIFEROL) 50 mcg (2000 units) tablet Take 1 tablet by mouth daily 8/14/2024 at AM Yes Unknown, Entered By History     dexAMETHasone (DECADRON) 4 MG tablet  Take 40 mg by mouth once a week  Patient not taking: Reported on 8/15/2024 Not Taking  Unknown, Entered By History Yes                Review of Systems:     A Comprehensive greater than 10 system review of systems was carried out.  Pertinent positives and negatives are noted above.  Otherwise negative for contributory information.           Physical Exam:   Blood pressure 127/78, pulse 77, temperature 97.4  F (36.3  C), temperature source Tympanic, resp. rate 19, height 1.524 m (5'), weight 86.2 kg (190 lb), SpO2 96%.  Exam:    General:  Pleasant nad looks stated age awake and alert on Bipap   HEENT:  Head nc/at sclera clear PERRL O/P:  Moist mucus membranes no posterior pharyngeal erythema or exudate.  Neck is supple  Lungs: diffuse exp wheezing, moderate air movement   CV:  RRR no m/r/g no le edema  Abd:  S/nt/nd no r/g  Neuro:  Cn 2-12 grossly intact and coleman  Alert and oriented affect appropriate   Skin:  W/d no c/c    I re-examined him at 330 pm and looks well satting 97 % on 3 L by NC, ate lunch, and is feeling dramatically improved.  Wheezing also improved                Data:     Results for orders placed or performed during the hospital encounter of 08/15/24   XR Chest Port 1 View     Status: None    Narrative    XR CHEST PORT 1 VIEW 8/15/2024 8:57 AM    HISTORY: Shortness of breath    COMPARISON: 7/12/2024      Impression    IMPRESSION: Mild linear opacities in the lung bases, likely  atelectasis. No focal airspace opacities, pleural effusion or  pneumothorax. Stable mild cardiomegaly and tortuous aortic arch. Old  healed right-sided rib fracture deformities.    EMELY MARTINEZ MD         SYSTEM ID:  [id number]   Blood gas venous     Status: Abnormal   Result Value Ref Range    pH Venous 7.25 (L) 7.32 - 7.43    pCO2 Venous 92 (HH) 40 - 50 mm Hg    pO2 Venous 34 25 - 47 mm Hg    Bicarbonate Venous 40 (H) 21 - 28 mmol/L    Base Excess/Deficit Venous 10.2 (H) -3.0 - 3.0 mmol/L    FIO2 2     Oxyhemoglobin Venous 55  (L) 70 - 75 %    O2 Sat, Venous 56.3 (L) 70.0 - 75.0 %    Narrative    In healthy individuals, oxyhemoglobin (O2Hb) and oxygen saturation (SO2) are approximately equal. In the presence of dyshemoglobins, oxyhemoglobin can be considerably lower than oxygen saturation.   Basic metabolic panel (BMP)     Status: Abnormal   Result Value Ref Range    Sodium 140 135 - 145 mmol/L    Potassium 4.6 3.4 - 5.3 mmol/L    Chloride 98 98 - 107 mmol/L    Carbon Dioxide (CO2) 34 (H) 22 - 29 mmol/L    Anion Gap 8 7 - 15 mmol/L    Urea Nitrogen 17.4 8.0 - 23.0 mg/dL    Creatinine 0.65 (L) 0.67 - 1.17 mg/dL    GFR Estimate >90 >60 mL/min/1.73m2    Calcium 8.8 8.8 - 10.4 mg/dL    Glucose 103 (H) 70 - 99 mg/dL   Troponin T, High Sensitivity     Status: Normal   Result Value Ref Range    Troponin T, High Sensitivity 16 <=22 ng/L   CBC with platelets and differential     Status: Abnormal   Result Value Ref Range    WBC Count 7.9 4.0 - 11.0 10e3/uL    RBC Count 3.39 (L) 4.40 - 5.90 10e6/uL    Hemoglobin 10.9 (L) 13.3 - 17.7 g/dL    Hematocrit 36.9 (L) 40.0 - 53.0 %     (H) 78 - 100 fL    MCH 32.2 26.5 - 33.0 pg    MCHC 29.5 (L) 31.5 - 36.5 g/dL    RDW 14.1 10.0 - 15.0 %    Platelet Count 291 150 - 450 10e3/uL    % Neutrophils 66 %    % Lymphocytes 9 %    % Monocytes 9 %    % Eosinophils 15 %    % Basophils 1 %    % Immature Granulocytes 0 %    NRBCs per 100 WBC 0 <1 /100    Absolute Neutrophils 5.2 1.6 - 8.3 10e3/uL    Absolute Lymphocytes 0.7 (L) 0.8 - 5.3 10e3/uL    Absolute Monocytes 0.7 0.0 - 1.3 10e3/uL    Absolute Eosinophils 1.2 (H) 0.0 - 0.7 10e3/uL    Absolute Basophils 0.1 0.0 - 0.2 10e3/uL    Absolute Immature Granulocytes 0.0 <=0.4 10e3/uL    Absolute NRBCs 0.0 10e3/uL   Blood gas venous     Status: Abnormal   Result Value Ref Range    pH Venous 7.37 7.32 - 7.43    pCO2 Venous 67 (H) 40 - 50 mm Hg    pO2 Venous 56 (H) 25 - 47 mm Hg    Bicarbonate Venous 39 (H) 21 - 28 mmol/L    Base Excess/Deficit Venous 11.3 (H) -3.0 -  3.0 mmol/L    FIO2 30     Oxyhemoglobin Venous 87 (H) 70 - 75 %    O2 Sat, Venous 89.2 (H) 70.0 - 75.0 %    Narrative    In healthy individuals, oxyhemoglobin (O2Hb) and oxygen saturation (SO2) are approximately equal. In the presence of dyshemoglobins, oxyhemoglobin can be considerably lower than oxygen saturation.   Nt probnp inpatient     Status: Normal   Result Value Ref Range    N terminal Pro BNP Inpatient 291 0 - 1,800 pg/mL   EKG 12-lead, tracing only     Status: None   Result Value Ref Range    Systolic Blood Pressure  mmHg    Diastolic Blood Pressure  mmHg    Ventricular Rate 79 BPM    Atrial Rate 79 BPM    MI Interval 180 ms    QRS Duration 72 ms     ms    QTc 403 ms    P Axis 25 degrees    R AXIS -8 degrees    T Axis 28 degrees    Interpretation ECG       Sinus rhythm  Normal ECG  When compared with ECG of 15-Aug-2024 07:11, (unconfirmed)  No significant change was found  Unconfirmed report - interpretation of this ECG is computer generated - see medical record for final interpretation  Confirmed by Martin Castellano MD, Arben ([id number]),  Nahum Whatley ([id number]) on 8/15/2024 9:41:34 AM     CBC + differential     Status: Abnormal    Narrative    The following orders were created for panel order CBC + differential.  Procedure                               Abnormality         Status                     ---------                               -----------         ------                     CBC with platelets and d...[[id number]]  Abnormal            Final result                 Please view results for these tests on the individual orders.

## 2024-08-16 VITALS
SYSTOLIC BLOOD PRESSURE: 139 MMHG | TEMPERATURE: 98 F | OXYGEN SATURATION: 96 % | HEART RATE: 62 BPM | BODY MASS INDEX: 37.87 KG/M2 | WEIGHT: 192.9 LBS | DIASTOLIC BLOOD PRESSURE: 68 MMHG | RESPIRATION RATE: 18 BRPM | HEIGHT: 60 IN

## 2024-08-16 LAB
ANION GAP SERPL CALCULATED.3IONS-SCNC: 8 MMOL/L (ref 7–15)
BUN SERPL-MCNC: 20.1 MG/DL (ref 8–23)
CALCIUM SERPL-MCNC: 9 MG/DL (ref 8.8–10.4)
CHLORIDE SERPL-SCNC: 96 MMOL/L (ref 98–107)
CREAT SERPL-MCNC: 0.65 MG/DL (ref 0.67–1.17)
EGFRCR SERPLBLD CKD-EPI 2021: >90 ML/MIN/1.73M2
ERYTHROCYTE [DISTWIDTH] IN BLOOD BY AUTOMATED COUNT: 13.9 % (ref 10–15)
GLUCOSE BLDC GLUCOMTR-MCNC: 102 MG/DL (ref 70–99)
GLUCOSE BLDC GLUCOMTR-MCNC: 132 MG/DL (ref 70–99)
GLUCOSE BLDC GLUCOMTR-MCNC: 190 MG/DL (ref 70–99)
GLUCOSE SERPL-MCNC: 120 MG/DL (ref 70–99)
HCO3 SERPL-SCNC: 34 MMOL/L (ref 22–29)
HCT VFR BLD AUTO: 34.2 % (ref 40–53)
HGB BLD-MCNC: 10.4 G/DL (ref 13.3–17.7)
MCH RBC QN AUTO: 32.4 PG (ref 26.5–33)
MCHC RBC AUTO-ENTMCNC: 30.4 G/DL (ref 31.5–36.5)
MCV RBC AUTO: 107 FL (ref 78–100)
PLATELET # BLD AUTO: 311 10E3/UL (ref 150–450)
POTASSIUM SERPL-SCNC: 4.8 MMOL/L (ref 3.4–5.3)
RBC # BLD AUTO: 3.21 10E6/UL (ref 4.4–5.9)
SODIUM SERPL-SCNC: 138 MMOL/L (ref 135–145)
WBC # BLD AUTO: 5.8 10E3/UL (ref 4–11)

## 2024-08-16 PROCEDURE — 94640 AIRWAY INHALATION TREATMENT: CPT | Mod: 76

## 2024-08-16 PROCEDURE — 250N000011 HC RX IP 250 OP 636: Performed by: INTERNAL MEDICINE

## 2024-08-16 PROCEDURE — 250N000013 HC RX MED GY IP 250 OP 250 PS 637: Performed by: INTERNAL MEDICINE

## 2024-08-16 PROCEDURE — 82374 ASSAY BLOOD CARBON DIOXIDE: CPT | Performed by: INTERNAL MEDICINE

## 2024-08-16 PROCEDURE — 250N000013 HC RX MED GY IP 250 OP 250 PS 637: Performed by: STUDENT IN AN ORGANIZED HEALTH CARE EDUCATION/TRAINING PROGRAM

## 2024-08-16 PROCEDURE — 82565 ASSAY OF CREATININE: CPT | Performed by: INTERNAL MEDICINE

## 2024-08-16 PROCEDURE — 99239 HOSP IP/OBS DSCHRG MGMT >30: CPT | Performed by: INTERNAL MEDICINE

## 2024-08-16 PROCEDURE — 36415 COLL VENOUS BLD VENIPUNCTURE: CPT | Performed by: INTERNAL MEDICINE

## 2024-08-16 PROCEDURE — 85027 COMPLETE CBC AUTOMATED: CPT | Performed by: INTERNAL MEDICINE

## 2024-08-16 PROCEDURE — 250N000009 HC RX 250: Performed by: INTERNAL MEDICINE

## 2024-08-16 PROCEDURE — 999N000157 HC STATISTIC RCP TIME EA 10 MIN

## 2024-08-16 PROCEDURE — 94640 AIRWAY INHALATION TREATMENT: CPT

## 2024-08-16 RX ADMIN — CARVEDILOL 12.5 MG: 12.5 TABLET, FILM COATED ORAL at 08:53

## 2024-08-16 RX ADMIN — CETIRIZINE HYDROCHLORIDE 5 MG: 5 TABLET ORAL at 08:52

## 2024-08-16 RX ADMIN — ASPIRIN 81 MG: 81 TABLET, COATED ORAL at 08:52

## 2024-08-16 RX ADMIN — SERTRALINE 100 MG: 100 TABLET, FILM COATED ORAL at 08:52

## 2024-08-16 RX ADMIN — IPRATROPIUM BROMIDE AND ALBUTEROL SULFATE 3 ML: .5; 3 SOLUTION RESPIRATORY (INHALATION) at 08:09

## 2024-08-16 RX ADMIN — Medication 1000 MCG: at 08:53

## 2024-08-16 RX ADMIN — IPRATROPIUM BROMIDE AND ALBUTEROL SULFATE 3 ML: .5; 3 SOLUTION RESPIRATORY (INHALATION) at 15:45

## 2024-08-16 RX ADMIN — IPRATROPIUM BROMIDE AND ALBUTEROL SULFATE 3 ML: .5; 3 SOLUTION RESPIRATORY (INHALATION) at 11:34

## 2024-08-16 RX ADMIN — METHYLPREDNISOLONE SODIUM SUCCINATE 40 MG: 40 INJECTION, POWDER, FOR SOLUTION INTRAMUSCULAR; INTRAVENOUS at 06:23

## 2024-08-16 RX ADMIN — PANTOPRAZOLE SODIUM 40 MG: 40 TABLET, DELAYED RELEASE ORAL at 06:27

## 2024-08-16 RX ADMIN — TAMSULOSIN HYDROCHLORIDE 0.4 MG: 0.4 CAPSULE ORAL at 08:52

## 2024-08-16 RX ADMIN — THIAMINE HCL TAB 100 MG 100 MG: 100 TAB at 08:52

## 2024-08-16 ASSESSMENT — ACTIVITIES OF DAILY LIVING (ADL)
ADLS_ACUITY_SCORE: 25

## 2024-08-16 NOTE — DISCHARGE SUMMARY
Medicine Discharge Summary       Pacheoc Torres MRN# [id number]   YOB: 1945 Age: 78 year old     Date of Admission:  8/15/2024  Date of Discharge:  8/16/2024  Admitting Physician:  Manan Ram MD  Discharge Physician:  Tanvi Rodriguez  Discharging Service:  Hospital Medicine     Primary Provider: Stuart Wolfe              Discharge Diagnosis:   COPD exacerbation  Acute on chronic respiratory failure with hypercapnia          Consultations:   none         Hospital Course       Summary of Stay: Pacheco Torres is a 78 year old male with a history of  htn/hlp, remote hx of CAD s/p DAYAN to the LAD-most recent echo 6/2024 with EF 55-60% w/G1dd, , hx of prostate cancer from earlier this year treated with XRT through [place], macrocytic anemia with hgb in the 10-11 range (normal B12 and folate), monoclonal gammopathy , PMR, obesity, hx of tob abuse, hx of esophageal stricture requiring dilation, suspected COPD on chronic O2 2L/nc with PFTs 5/2024 with e/o both restriction and obstruction and mild pulm htn on echo admitted on 8/15/2024 with acute hypercapnic respiratory failure felt related to an Acute COPD exacerbation          Problem List:   Acute hypercapnic respiratory failure , PCO2 at admission was 92 down to 67. V PH improved from 7.25 to 7.37.  Was on BIPAP support. Returned to baseline and discharged home on PTA medications. Advised to keep oxygen saturation at 90-92% and titrate oxygen supplementation accordingly   COPD vs restrictive lung dz vs overlap   Chronic hypoxic respiratory failure chronically on 2l/nc   Mild pulmonary htn   His exam is consistent with COPD flare with diffuse expiratory wheezing.  He does not have any crackles and minimal LE edema as well as a normal BNP so I don't think that diastolic dysfunction is the issue. Etiology of exacerbation is unclear-no e/o infectious process, denies recent allergies, denies aspiration, no weather related symptoms usually, and  reports compliance with inhalers       Htn  Hlp  Remote hx of CAD with DAYAN to LAD (2008)  G1 diastolic dysfunction   Pta regimen is asa 81 mg every day, atorva 40 every day, carvedilol 12.5 bid  -all resumed       Chronic macrocytic anemia baseline hgb 10-11  MGUS   Follows with Dr Cochran at [facility], currently on episodic lenalidomide  -requested heme/onc consult (chart check ok) to determine if he should be on it at this time      Obesity   Complicates cares      Tobacco abuse   Smokes 1 cigarette a day      BPH  Resumed tamsulosin      DVT Prophylaxis: Pneumatic Compression Devices  Code Status: DNR but ok to be intubated   Functional Status: [place], ambulates with the use of a walker   [place]: not needed  Access:  PIV            On Exam ;   Alert and oriented . No acute distress  Vital signs:  Temp: 97.8  F (36.6  C) Temp src: Oral BP: (!) 153/86 Pulse: 58   Resp: 18 SpO2: 95 % O2 Device: Nasal cannula Oxygen Delivery: 3 LPM Height: 152.4 cm (5') Weight: 87.5 kg (192 lb 14.4 oz)  Estimated body mass index is 37.67 kg/m  as calculated from the following:    Height as of this encounter: 1.524 m (5').    Weight as of this encounter: 87.5 kg (192 lb 14.4 oz).  Neck ; supple , no JVD  Chest ; equal BS bilaterally , no rales or rhonchi   CVS; RRR, no murmur /rubs or gallops  GI ; soft abdomen, non tender, BS positive  Ext ; no edema , no cynosis  Neuro ; CN 2 to 12 grossly intact , No Facial asymmetry noticed  .  Psych ; appropriate mood and effect  Skin ; no rash or purpura on exposed skin     ROUTINE IP LABS (Last four results)  BMP  Recent Labs   Lab 08/16/24  0945 08/16/24  0654 08/16/24  0619 08/16/24  0159 08/15/24  1837 08/15/24  0747   NA  --  138  --   --   --  140   POTASSIUM  --  4.8  --   --   --  4.6   CHLORIDE  --  96*  --   --   --  98   TAVO  --  9.0  --   --   --  8.8   CO2  --  34*  --   --   --  34*   BUN  --  20.1  --   --   --  17.4   CR  --  0.65*  --   --    --  0.65*   * 120* 102* 132*   < > 103*    < > = values in this interval not displayed.     CBC  Recent Labs   Lab 08/16/24  0654 08/15/24  0747   WBC 5.8 7.9   RBC 3.21* 3.39*   HGB 10.4* 10.9*   HCT 34.2* 36.9*   * 109*   MCH 32.4 32.2   MCHC 30.4* 29.5*   RDW 13.9 14.1    291     INRNo lab results found in last 7 days.                 Discharge Medications:     Current Discharge Medication List        CONTINUE these medications which have NOT CHANGED    Details   acetaminophen (TYLENOL) 500 MG tablet Take 1,000 mg by mouth every 6 hours as needed for pain      albuterol (PROAIR HFA/PROVENTIL HFA/VENTOLIN HFA) 108 (90 Base) MCG/ACT inhaler Inhale 1-2 puffs into the lungs every 4 hours as needed for wheezing or shortness of breath      ANORO ELLIPTA 62.5-25 MCG/ACT oral inhaler Inhale 1 puff into the lungs daily      aspirin (ASA) 81 MG EC tablet Take 81 mg by mouth daily      atorvastatin (LIPITOR) 40 MG tablet Take 1 tablet (40 mg) by mouth every evening    Associated Diagnoses: NSTEMI (non-ST elevated myocardial infarction) (H)      budesonide (ENTOCORT EC) 3 MG EC capsule Take 3 mg by mouth every other day Add contents of 1 capsule to 10 mL of honey,chocolate or pancake syrup. Stir well      calcium carbonate (OS-TAVO) 500 MG tablet Take 2 tablets by mouth daily      carvedilol (COREG) 12.5 MG tablet Take 12.5 mg by mouth 2 times daily (with meals)      cyanocobalamin (VITAMIN B-12) 1000 MCG tablet Take 1,000 mcg by mouth daily      ferrous fumarate-vitamin C ER (JUAN DANIEL-SEQUELS) 65-25 MG CR tablet Take 1 tablet by mouth daily (with breakfast)      LENalidomide (REVLIMID) 10 MG CAPS capsule Take 10 mg by mouth See Admin Instructions 10 mg daily for 14 days, then off 7 days      melatonin 5 MG tablet Take 5 mg by mouth at bedtime      multivitamin w/minerals (THERA-VIT-M) tablet Take 1 tablet by mouth daily    Associated Diagnoses: MGUS (monoclonal gammopathy of unknown significance)       nitroGLYcerin (NITROSTAT) 0.4 MG sublingual tablet Place 0.4 mg under the tongue every 5 minutes as needed for chest pain      pantoprazole (PROTONIX) 40 MG EC tablet Take 1 tablet (40 mg) by mouth 2 times daily (before meals)    Associated Diagnoses: Gastroesophageal reflux disease, unspecified whether esophagitis present      sertraline (ZOLOFT) 100 MG tablet Take 100 mg by mouth daily      tamsulosin (FLOMAX) 0.4 MG capsule Take 0.4 mg by mouth daily      thiamine (B-1) 100 MG tablet Take 1 tablet (100 mg) by mouth daily    Associated Diagnoses: MGUS (monoclonal gammopathy of unknown significance)      vitamin D3 (CHOLECALCIFEROL) 50 mcg (2000 units) tablet Take 1 tablet by mouth daily      dexAMETHasone (DECADRON) 4 MG tablet Take 40 mg by mouth once a week                  Discharge Instructions and Follow-Up:     Discharge Procedure Orders   Reason for your hospital stay   Order Comments: COPD exacerbation     Follow-up and recommended labs and tests    Order Comments: Follow up with primary care provider, Stuart Wolfe, within 7 days for hospital follow- up.  No follow up labs or test are needed.     Activity   Order Comments: Your activity upon discharge: activity as tolerated     Order Specific Question Answer Comments   Is discharge order? Yes      Diet   Order Comments: Follow this diet upon discharge: Orders Placed This Encounter      Combination Diet Regular Diet Adult     Order Specific Question Answer Comments   Is discharge order? Yes          Reason for your hospital stay    COPD exacerbation     Follow-up and recommended labs and tests     Follow up with primary care provider, Stuart Wolfe, within 7 days for hospital follow- up.  No follow up labs or test are needed.     Activity    Your activity upon discharge: activity as tolerated     Diet    Follow this diet upon discharge: Orders Placed This Encounter      Combination Diet Regular Diet Adult                Discharge  Disposition:   35 minutes spent in discharge, including >50% in counseling and coordination of care, medication review and plan of care recommended on follow up. Questions were answered to satisfaction       Manan Ram MD  Internal Medicine Hospitalist & Staff Physician  [facility]

## 2024-08-16 NOTE — CONSULTS
Care Management Note    Discharge Date: 08/16/2024     Discharge Disposition:  Anticipate home      Additional Information:  CM/SW consulted for care coordination/discharge planning and an elevated risk score. Per chart review pt is going home, no discharge need identified at this time. Discharge order in place, no home care orders present. Please re-consult care management if discharge needs arise.     Sania Gómez RN, BSN  Inpatient Care Coordination  [facility]  [phone]

## 2024-08-16 NOTE — PLAN OF CARE
"Blood pressure (!) 155/91, pulse 82, temperature 97.9  F (36.6  C), temperature source Oral, resp. rate 18, height 1.524 m (5'), weight 86.2 kg (190 lb), SpO2 96%.    Assumed care of patient at 1556.  A&Ox4 while on O2 at 3 Lpm via oxymask.  Denies pain, cp, and SOB.  Scheduled nebs.  Solumedrol iv provided.  Pt off BiPAP for shift. SBA with GB.  Regular diet.  VS Q2.  With significant other through part of shfit.  LS diminished.  Continue with POC.     Goal Outcome Evaluation:      Plan of Care Reviewed With: patient    Overall Patient Progress: improving    Outcome Evaluation: Denies pain and denies SOB.  LS diminished and shallow breathing while on Oxygen at 3 Lpm via NC.      Problem: Adult Inpatient Plan of Care  Goal: Plan of Care Review  Description: The Plan of Care Review/Shift note should be completed every shift.  The Outcome Evaluation is a brief statement about your assessment that the patient is improving, declining, or no change.  This information will be displayed automatically on your shift  note.  Outcome: Progressing  Flowsheets (Taken 8/15/2024 1902)  Outcome Evaluation: Denies pain and denies SOB.  LS diminished and shallow breathing while on Oxygen at 3 Lpm via NC.  Plan of Care Reviewed With: patient  Overall Patient Progress: improving  Goal: Patient-Specific Goal (Individualized)  Description: You can add care plan individualizations to a care plan. Examples of Individualization might be:  \"Parent requests to be called daily at 9am for status\", \"I have a hard time hearing out of my right ear\", or \"Do not touch me to wake me up as it startles  me\".  Outcome: Progressing  Goal: Absence of Hospital-Acquired Illness or Injury  Outcome: Progressing  Intervention: Identify and Manage Fall Risk  Recent Flowsheet Documentation  Taken 8/15/2024 1826 by Colton Reilly RN  Safety Promotion/Fall Prevention: safety round/check completed  Intervention: Prevent Skin Injury  Recent Flowsheet " Documentation  Taken 8/15/2024 1826 by Colton Reilly RN  Skin Protection: adhesive use limited  Device Skin Pressure Protection: adhesive use limited  Intervention: Prevent and Manage VTE (Venous Thromboembolism) Risk  Recent Flowsheet Documentation  Taken 8/15/2024 1826 by Colton Reilly RN  VTE Prevention/Management: SCDs off (sequential compression devices)  Intervention: Prevent Infection  Recent Flowsheet Documentation  Taken 8/15/2024 1826 by Colton Reilly RN  Infection Prevention: rest/sleep promoted  Goal: Optimal Comfort and Wellbeing  Outcome: Progressing  Goal: Readiness for Transition of Care  Outcome: Progressing     Problem: Comorbidity Management  Goal: Maintenance of COPD Symptom Control  Outcome: Progressing     Problem: Gas Exchange Impaired  Goal: Optimal Gas Exchange  Outcome: Progressing

## 2024-08-16 NOTE — DISCHARGE SUMMARY
Patient discharged home via private car, transported via wheelchair with support staff.  Patient verbalized and received copy of discharge instructions.  Patient received medications filled by discharge Pharmacy.  Personal belongings gathered and sent with patient.  VSS. IV removed prior to discharge.

## 2024-08-16 NOTE — PLAN OF CARE
"A&Ox4, up with assist of 1 and walker, Tele: SR, did briefly drop to the 40's to 50's per tele tech, but then back to the 60's. Plan for Hem/onc consult today, monitor resp. Status, continues on solumedrol. Pt. Denies any needs at this time. Will continue with POC.    Goal Outcome Evaluation:      Plan of Care Reviewed With: patient    Overall Patient Progress: no changeOverall Patient Progress: no change    Outcome Evaluation: O2 on at 3L (baseline per pt.), Lung sounds diminished, with exp. wheezes.      Problem: Adult Inpatient Plan of Care  Goal: Plan of Care Review  Description: The Plan of Care Review/Shift note should be completed every shift.  The Outcome Evaluation is a brief statement about your assessment that the patient is improving, declining, or no change.  This information will be displayed automatically on your shift  note.  Outcome: Progressing  Flowsheets (Taken 8/16/2024 0641)  Outcome Evaluation: O2 on at 3L (baseline per pt.), Lung sounds diminished, with exp. wheezes.  Plan of Care Reviewed With: patient  Overall Patient Progress: no change  Goal: Patient-Specific Goal (Individualized)  Description: You can add care plan individualizations to a care plan. Examples of Individualization might be:  \"Parent requests to be called daily at 9am for status\", \"I have a hard time hearing out of my right ear\", or \"Do not touch me to wake me up as it startles  me\".  Outcome: Progressing  Goal: Absence of Hospital-Acquired Illness or Injury  Outcome: Progressing  Intervention: Identify and Manage Fall Risk  Recent Flowsheet Documentation  Taken 8/15/2024 2315 by Ya Agrawal, RN  Safety Promotion/Fall Prevention:   activity supervised   clutter free environment maintained   increased rounding and observation   increase visualization of patient   lighting adjusted   mobility aid in reach   nonskid shoes/slippers when out of bed   safety round/check completed  Taken 8/15/2024 2006 by Ya Agrawal, " RN  Safety Promotion/Fall Prevention:   activity supervised   clutter free environment maintained   increased rounding and observation   increase visualization of patient   lighting adjusted   nonskid shoes/slippers when out of bed   safety round/check completed  Intervention: Prevent Skin Injury  Recent Flowsheet Documentation  Taken 8/15/2024 2315 by Ya Agrawal RN  Body Position: position changed independently  Skin Protection: adhesive use limited  Device Skin Pressure Protection: absorbent pad utilized/changed  Taken 8/15/2024 2006 by Ya Agrawal RN  Body Position: position changed independently  Skin Protection: adhesive use limited  Device Skin Pressure Protection:   absorbent pad utilized/changed   adhesive use limited  Intervention: Prevent and Manage VTE (Venous Thromboembolism) Risk  Recent Flowsheet Documentation  Taken 8/15/2024 2315 by Ya Agrawal RN  VTE Prevention/Management: SCDs off (sequential compression devices)  Taken 8/15/2024 2006 by Ya Agrawal RN  VTE Prevention/Management: SCDs off (sequential compression devices)  Intervention: Prevent Infection  Recent Flowsheet Documentation  Taken 8/15/2024 2315 by Ya Agrawal RN  Infection Prevention: rest/sleep promoted  Taken 8/15/2024 2006 by Ya Agrawal RN  Infection Prevention: rest/sleep promoted  Goal: Optimal Comfort and Wellbeing  Outcome: Progressing  Goal: Readiness for Transition of Care  Outcome: Progressing  Intervention: Mutually Develop Transition Plan  Recent Flowsheet Documentation  Taken 8/15/2024 2116 by Ya Agrawal RN  Equipment Currently Used at Home: walker, rolling     Problem: Comorbidity Management  Goal: Maintenance of COPD Symptom Control  Outcome: Progressing  Intervention: Maintain COPD Symptom Control  Recent Flowsheet Documentation  Taken 8/15/2024 2315 by Ya Agrawal RN  Supportive Measures: active listening utilized  Medication Review/Management: medications reviewed  Taken  8/15/2024 2006 by Ya Agrawal, RN  Supportive Measures: active listening utilized  Medication Review/Management: medications reviewed     Problem: Gas Exchange Impaired  Goal: Optimal Gas Exchange  Outcome: Progressing  Intervention: Optimize Oxygenation and Ventilation  Recent Flowsheet Documentation  Taken 8/15/2024 2315 by Ya Agrawal, RN  Head of Bed (HOB) Positioning: HOB at 30-45 degrees  Taken 8/15/2024 2006 by Ya Agrawal RN  Head of Bed (HOB) Positioning: HOB at 30-45 degrees

## 2024-08-16 NOTE — PLAN OF CARE
"Temp: 97.8  F (36.6  C) Temp src: Oral BP: (!) 153/86 Pulse: 58   Resp: 18 SpO2: 95 % O2 Device: Nasal cannula Oxygen Delivery: 3 LPM    A&Ox4, denies pain. Tele-NSR. RPIV saline locked. A1 walker up to bathroom and in hallway. Reg diet-fair appetite. Q4 bg but no sliding scale ordered. Scheduled nebs. Had hemoc consult today. [facility] dc'd. Discharging home this afternoon.        Goal Outcome Evaluation:           Overall Patient Progress: improvingOverall Patient Progress: improving    Outcome Evaluation: At baseline O2. Denies pain, increasing SOB, n/v. Pt states he feeling better.      Problem: Adult Inpatient Plan of Care  Goal: Plan of Care Review  Description: The Plan of Care Review/Shift note should be completed every shift.  The Outcome Evaluation is a brief statement about your assessment that the patient is improving, declining, or no change.  This information will be displayed automatically on your shift  note.  Outcome: Met  Flowsheets (Taken 8/16/2024 1502)  Outcome Evaluation: At baseline O2. Denies pain, increasing SOB, n/v. Pt states he feeling better.  Overall Patient Progress: improving  Goal: Patient-Specific Goal (Individualized)  Description: You can add care plan individualizations to a care plan. Examples of Individualization might be:  \"Parent requests to be called daily at 9am for status\", \"I have a hard time hearing out of my right ear\", or \"Do not touch me to wake me up as it startles  me\".  Outcome: Met  Goal: Absence of Hospital-Acquired Illness or Injury  Outcome: Met  Intervention: Prevent Skin Injury  Recent Flowsheet Documentation  Taken 8/16/2024 0858 by Trace Weiner RN  Body Position: position changed independently  Intervention: Prevent and Manage VTE (Venous Thromboembolism) Risk  Recent Flowsheet Documentation  Taken 8/16/2024 0858 by Trace Weiner RN  VTE Prevention/Management: SCDs off (sequential compression devices)  Intervention: Prevent Infection  Recent Flowsheet " Documentation  Taken 8/16/2024 0858 by Trace Weiner, RN  Infection Prevention: hand hygiene promoted  Goal: Optimal Comfort and Wellbeing  Outcome: Met  Goal: Readiness for Transition of Care  Outcome: Met     Problem: Comorbidity Management  Goal: Maintenance of COPD Symptom Control  Outcome: Met     Problem: Gas Exchange Impaired  Goal: Optimal Gas Exchange  Outcome: Met  Intervention: Optimize Oxygenation and Ventilation  Recent Flowsheet Documentation  Taken 8/16/2024 0858 by Trace Weiner, RN  Head of Bed (HOB) Positioning: HOB at 30-45 degrees

## 2024-09-17 ENCOUNTER — APPOINTMENT (OUTPATIENT)
Dept: URBAN - METROPOLITAN AREA CLINIC 253 | Age: 79
Setting detail: DERMATOLOGY
End: 2024-09-17

## 2024-09-17 VITALS — RESPIRATION RATE: 14 BRPM | WEIGHT: 180 LBS | HEIGHT: 62 IN

## 2024-09-17 DIAGNOSIS — D22 MELANOCYTIC NEVI: ICD-10-CM

## 2024-09-17 DIAGNOSIS — L57.8 OTHER SKIN CHANGES DUE TO CHRONIC EXPOSURE TO NONIONIZING RADIATION: ICD-10-CM

## 2024-09-17 DIAGNOSIS — D18.0 HEMANGIOMA: ICD-10-CM

## 2024-09-17 DIAGNOSIS — L82.1 OTHER SEBORRHEIC KERATOSIS: ICD-10-CM

## 2024-09-17 DIAGNOSIS — Z71.89 OTHER SPECIFIED COUNSELING: ICD-10-CM

## 2024-09-17 PROBLEM — D18.01 HEMANGIOMA OF SKIN AND SUBCUTANEOUS TISSUE: Status: ACTIVE | Noted: 2024-09-17

## 2024-09-17 PROBLEM — D22.5 MELANOCYTIC NEVI OF TRUNK: Status: ACTIVE | Noted: 2024-09-17

## 2024-09-17 PROBLEM — D22.61 MELANOCYTIC NEVI OF RIGHT UPPER LIMB, INCLUDING SHOULDER: Status: ACTIVE | Noted: 2024-09-17

## 2024-09-17 PROBLEM — D22.71 MELANOCYTIC NEVI OF RIGHT LOWER LIMB, INCLUDING HIP: Status: ACTIVE | Noted: 2024-09-17

## 2024-09-17 PROBLEM — D22.62 MELANOCYTIC NEVI OF LEFT UPPER LIMB, INCLUDING SHOULDER: Status: ACTIVE | Noted: 2024-09-17

## 2024-09-17 PROBLEM — D22.72 MELANOCYTIC NEVI OF LEFT LOWER LIMB, INCLUDING HIP: Status: ACTIVE | Noted: 2024-09-17

## 2024-09-17 PROCEDURE — OTHER SUNSCREEN RECOMMENDATIONS: OTHER

## 2024-09-17 PROCEDURE — 99213 OFFICE O/P EST LOW 20 MIN: CPT

## 2024-09-17 PROCEDURE — OTHER MIPS QUALITY: OTHER

## 2024-09-17 PROCEDURE — OTHER PATIENT SPECIFIC COUNSELING: OTHER

## 2024-09-17 PROCEDURE — OTHER COUNSELING: OTHER

## 2024-09-17 ASSESSMENT — LOCATION DETAILED DESCRIPTION DERM
LOCATION DETAILED: INFERIOR THORACIC SPINE
LOCATION DETAILED: EPIGASTRIC SKIN
LOCATION DETAILED: RIGHT VENTRAL DISTAL FOREARM
LOCATION DETAILED: LEFT DISTAL DORSAL FOREARM
LOCATION DETAILED: MIDDLE STERNUM
LOCATION DETAILED: LEFT INFERIOR LATERAL MIDBACK
LOCATION DETAILED: LEFT ANTERIOR PROXIMAL THIGH
LOCATION DETAILED: RIGHT ANTERIOR PROXIMAL THIGH
LOCATION DETAILED: LEFT VENTRAL DISTAL FOREARM
LOCATION DETAILED: RIGHT VENTRAL PROXIMAL FOREARM
LOCATION DETAILED: LEFT ANTECUBITAL SKIN
LOCATION DETAILED: RIGHT ANTERIOR DISTAL THIGH
LOCATION DETAILED: LEFT INFERIOR CENTRAL MALAR CHEEK
LOCATION DETAILED: LEFT ANTERIOR DISTAL THIGH
LOCATION DETAILED: LEFT VENTRAL PROXIMAL FOREARM
LOCATION DETAILED: LEFT MEDIAL UPPER BACK
LOCATION DETAILED: RIGHT PROXIMAL DORSAL FOREARM

## 2024-09-17 ASSESSMENT — LOCATION ZONE DERM
LOCATION ZONE: FACE
LOCATION ZONE: LEG
LOCATION ZONE: TRUNK
LOCATION ZONE: ARM

## 2024-09-17 ASSESSMENT — LOCATION SIMPLE DESCRIPTION DERM
LOCATION SIMPLE: LEFT THIGH
LOCATION SIMPLE: LEFT FOREARM
LOCATION SIMPLE: LEFT UPPER BACK
LOCATION SIMPLE: CHEST
LOCATION SIMPLE: UPPER BACK
LOCATION SIMPLE: LEFT CHEEK
LOCATION SIMPLE: RIGHT THIGH
LOCATION SIMPLE: LEFT UPPER ARM
LOCATION SIMPLE: RIGHT FOREARM
LOCATION SIMPLE: ABDOMEN
LOCATION SIMPLE: LEFT LOWER BACK

## 2024-09-17 NOTE — HPI: FULL BODY SKIN EXAMINATION
What Type Of Note Output Would You Prefer (Optional)?: Standard Output
What Is The Reason For Today's Visit?: Full Body Skin Examination
What Is The Reason For Today's Visit? (Being Monitored For X): concerning skin lesions on an annual basis
Additional History: Patient reports not using sun protection. Patient does not have history with tanning beds.

## 2024-10-23 ENCOUNTER — TRANSCRIBE ORDERS (OUTPATIENT)
Dept: OTHER | Age: 79
End: 2024-10-23

## 2024-10-23 DIAGNOSIS — J96.11 CHRONIC HYPOXIC RESPIRATORY FAILURE (H): Primary | ICD-10-CM

## 2024-10-23 DIAGNOSIS — R06.00 DYSPNEA: ICD-10-CM

## 2024-10-30 ENCOUNTER — HOSPITAL ENCOUNTER (OUTPATIENT)
Dept: CARDIAC REHAB | Facility: CLINIC | Age: 79
Discharge: HOME | End: 2024-10-30
Attending: INTERNAL MEDICINE
Payer: COMMERCIAL

## 2024-10-30 PROCEDURE — G0238 OTH RESP PROC, INDIV: HCPCS

## 2024-10-31 ENCOUNTER — HOSPITAL ENCOUNTER (OUTPATIENT)
Dept: CARDIAC REHAB | Facility: CLINIC | Age: 79
Discharge: HOME | End: 2024-10-31
Attending: INTERNAL MEDICINE
Payer: COMMERCIAL

## 2024-10-31 PROCEDURE — G0238 OTH RESP PROC, INDIV: HCPCS

## 2024-11-07 ENCOUNTER — HOSPITAL ENCOUNTER (OUTPATIENT)
Dept: CARDIAC REHAB | Facility: CLINIC | Age: 79
Discharge: HOME | End: 2024-11-07
Attending: INTERNAL MEDICINE
Payer: COMMERCIAL

## 2024-11-07 PROCEDURE — G0239 OTH RESP PROC, GROUP: HCPCS

## 2024-11-21 ENCOUNTER — HOSPITAL ENCOUNTER (OUTPATIENT)
Dept: CARDIAC REHAB | Facility: CLINIC | Age: 79
Discharge: HOME | End: 2024-11-21
Attending: INTERNAL MEDICINE
Payer: COMMERCIAL

## 2024-11-21 PROCEDURE — G0239 OTH RESP PROC, GROUP: HCPCS

## 2024-11-26 ENCOUNTER — HOSPITAL ENCOUNTER (OUTPATIENT)
Dept: CARDIAC REHAB | Facility: CLINIC | Age: 79
Discharge: HOME | End: 2024-11-26
Attending: INTERNAL MEDICINE
Payer: COMMERCIAL

## 2024-11-26 PROCEDURE — G0239 OTH RESP PROC, GROUP: HCPCS

## 2024-12-03 ENCOUNTER — HOSPITAL ENCOUNTER (OUTPATIENT)
Dept: CARDIAC REHAB | Facility: CLINIC | Age: 79
Discharge: HOME | End: 2024-12-03
Attending: INTERNAL MEDICINE
Payer: COMMERCIAL

## 2024-12-03 PROCEDURE — G0239 OTH RESP PROC, GROUP: HCPCS

## 2024-12-05 ENCOUNTER — HOSPITAL ENCOUNTER (OUTPATIENT)
Dept: CARDIAC REHAB | Facility: CLINIC | Age: 79
Discharge: HOME | End: 2024-12-05
Attending: INTERNAL MEDICINE
Payer: COMMERCIAL

## 2024-12-05 PROCEDURE — G0239 OTH RESP PROC, GROUP: HCPCS

## 2024-12-10 ENCOUNTER — HOSPITAL ENCOUNTER (OUTPATIENT)
Dept: CARDIAC REHAB | Facility: CLINIC | Age: 79
Discharge: HOME | End: 2024-12-10
Attending: INTERNAL MEDICINE
Payer: COMMERCIAL

## 2024-12-10 PROCEDURE — G0238 OTH RESP PROC, INDIV: HCPCS

## 2024-12-12 ENCOUNTER — HOSPITAL ENCOUNTER (OUTPATIENT)
Dept: CARDIAC REHAB | Facility: CLINIC | Age: 79
Discharge: HOME | End: 2024-12-12
Attending: INTERNAL MEDICINE
Payer: COMMERCIAL

## 2024-12-12 PROCEDURE — G0239 OTH RESP PROC, GROUP: HCPCS

## 2024-12-17 ENCOUNTER — HOSPITAL ENCOUNTER (OUTPATIENT)
Dept: CARDIAC REHAB | Facility: CLINIC | Age: 79
Discharge: HOME | End: 2024-12-17
Attending: INTERNAL MEDICINE
Payer: COMMERCIAL

## 2024-12-17 PROCEDURE — G0239 OTH RESP PROC, GROUP: HCPCS

## 2024-12-30 ENCOUNTER — HOSPITAL ENCOUNTER (EMERGENCY)
Facility: CLINIC | Age: 79
Discharge: HOME | End: 2024-12-30
Attending: EMERGENCY MEDICINE
Payer: COMMERCIAL

## 2024-12-30 ENCOUNTER — APPOINTMENT (OUTPATIENT)
Dept: GENERAL RADIOLOGY | Facility: CLINIC | Age: 79
End: 2024-12-30
Attending: EMERGENCY MEDICINE
Payer: COMMERCIAL

## 2024-12-30 VITALS
DIASTOLIC BLOOD PRESSURE: 66 MMHG | TEMPERATURE: 99.8 F | SYSTOLIC BLOOD PRESSURE: 109 MMHG | WEIGHT: 181.88 LBS | HEIGHT: 60 IN | HEART RATE: 66 BPM | RESPIRATION RATE: 20 BRPM | BODY MASS INDEX: 35.71 KG/M2 | OXYGEN SATURATION: 98 %

## 2024-12-30 DIAGNOSIS — J10.1 INFLUENZA A: ICD-10-CM

## 2024-12-30 DIAGNOSIS — J96.11 CHRONIC RESPIRATORY FAILURE WITH HYPOXIA AND HYPERCAPNIA (H): ICD-10-CM

## 2024-12-30 DIAGNOSIS — J96.12 CHRONIC RESPIRATORY FAILURE WITH HYPOXIA AND HYPERCAPNIA (H): ICD-10-CM

## 2024-12-30 LAB
ANION GAP SERPL CALCULATED.3IONS-SCNC: 9 MMOL/L (ref 7–15)
BASE EXCESS BLDV CALC-SCNC: 8.9 MMOL/L (ref -3–3)
BASOPHILS # BLD AUTO: 0 10E3/UL (ref 0–0.2)
BASOPHILS NFR BLD AUTO: 0 %
BUN SERPL-MCNC: 13.3 MG/DL (ref 8–23)
CALCIUM SERPL-MCNC: 9.1 MG/DL (ref 8.8–10.4)
CHLORIDE SERPL-SCNC: 96 MMOL/L (ref 98–107)
CREAT SERPL-MCNC: 0.84 MG/DL (ref 0.67–1.17)
EGFRCR SERPLBLD CKD-EPI 2021: 89 ML/MIN/1.73M2
EOSINOPHIL # BLD AUTO: 0.2 10E3/UL (ref 0–0.7)
EOSINOPHIL NFR BLD AUTO: 4 %
ERYTHROCYTE [DISTWIDTH] IN BLOOD BY AUTOMATED COUNT: 13.2 % (ref 10–15)
FLUAV RNA SPEC QL NAA+PROBE: POSITIVE
FLUBV RNA RESP QL NAA+PROBE: NEGATIVE
GLUCOSE SERPL-MCNC: 100 MG/DL (ref 70–99)
HCO3 BLDV-SCNC: 37 MMOL/L (ref 21–28)
HCO3 SERPL-SCNC: 31 MMOL/L (ref 22–29)
HCT VFR BLD AUTO: 40.4 % (ref 40–53)
HGB BLD-MCNC: 12.6 G/DL (ref 13.3–17.7)
IMM GRANULOCYTES # BLD: 0 10E3/UL
IMM GRANULOCYTES NFR BLD: 0 %
LACTATE SERPL-SCNC: 0.7 MMOL/L (ref 0.7–2)
LYMPHOCYTES # BLD AUTO: 0.5 10E3/UL (ref 0.8–5.3)
LYMPHOCYTES NFR BLD AUTO: 15 %
MCH RBC QN AUTO: 31.5 PG (ref 26.5–33)
MCHC RBC AUTO-ENTMCNC: 31.2 G/DL (ref 31.5–36.5)
MCV RBC AUTO: 101 FL (ref 78–100)
MONOCYTES # BLD AUTO: 0.6 10E3/UL (ref 0–1.3)
MONOCYTES NFR BLD AUTO: 18 %
NEUTROPHILS # BLD AUTO: 2.1 10E3/UL (ref 1.6–8.3)
NEUTROPHILS NFR BLD AUTO: 61 %
NRBC # BLD AUTO: 0 10E3/UL
NRBC BLD AUTO-RTO: 0 /100
O2/TOTAL GAS SETTING VFR VENT: 3 %
OXYHGB MFR BLDV: 25 % (ref 70–75)
PCO2 BLDV: 67 MM HG (ref 40–50)
PH BLDV: 7.35 [PH] (ref 7.32–7.43)
PLATELET # BLD AUTO: 172 10E3/UL (ref 150–450)
PO2 BLDV: 20 MM HG (ref 25–47)
POTASSIUM SERPL-SCNC: 4.4 MMOL/L (ref 3.4–5.3)
RBC # BLD AUTO: 4 10E6/UL (ref 4.4–5.9)
RSV RNA SPEC NAA+PROBE: NEGATIVE
SAO2 % BLDV: 25.6 % (ref 70–75)
SARS-COV-2 RNA RESP QL NAA+PROBE: NEGATIVE
SODIUM SERPL-SCNC: 136 MMOL/L (ref 135–145)
WBC # BLD AUTO: 3.4 10E3/UL (ref 4–11)

## 2024-12-30 PROCEDURE — 87637 SARSCOV2&INF A&B&RSV AMP PRB: CPT | Performed by: EMERGENCY MEDICINE

## 2024-12-30 PROCEDURE — 250N000013 HC RX MED GY IP 250 OP 250 PS 637: Performed by: EMERGENCY MEDICINE

## 2024-12-30 PROCEDURE — 80048 BASIC METABOLIC PNL TOTAL CA: CPT | Performed by: EMERGENCY MEDICINE

## 2024-12-30 PROCEDURE — 85014 HEMATOCRIT: CPT | Performed by: EMERGENCY MEDICINE

## 2024-12-30 PROCEDURE — 83605 ASSAY OF LACTIC ACID: CPT | Performed by: EMERGENCY MEDICINE

## 2024-12-30 PROCEDURE — 82805 BLOOD GASES W/O2 SATURATION: CPT | Performed by: EMERGENCY MEDICINE

## 2024-12-30 PROCEDURE — 36415 COLL VENOUS BLD VENIPUNCTURE: CPT | Performed by: EMERGENCY MEDICINE

## 2024-12-30 PROCEDURE — 93005 ELECTROCARDIOGRAM TRACING: CPT

## 2024-12-30 PROCEDURE — 85025 COMPLETE CBC W/AUTO DIFF WBC: CPT | Performed by: EMERGENCY MEDICINE

## 2024-12-30 PROCEDURE — 71046 X-RAY EXAM CHEST 2 VIEWS: CPT

## 2024-12-30 PROCEDURE — 99285 EMERGENCY DEPT VISIT HI MDM: CPT | Mod: 25

## 2024-12-30 RX ORDER — OSELTAMIVIR PHOSPHATE 75 MG/1
75 CAPSULE ORAL ONCE
Status: COMPLETED | OUTPATIENT
Start: 2024-12-30 | End: 2024-12-30

## 2024-12-30 RX ORDER — OSELTAMIVIR PHOSPHATE 75 MG/1
75 CAPSULE ORAL 2 TIMES DAILY
Qty: 10 CAPSULE | Refills: 0 | Status: SHIPPED | OUTPATIENT
Start: 2024-12-30 | End: 2025-01-04

## 2024-12-30 RX ADMIN — OSELTAMIVIR PHOSPHATE 75 MG: 75 CAPSULE ORAL at 16:50

## 2024-12-30 ASSESSMENT — COLUMBIA-SUICIDE SEVERITY RATING SCALE - C-SSRS
2. HAVE YOU ACTUALLY HAD ANY THOUGHTS OF KILLING YOURSELF IN THE PAST MONTH?: NO
1. IN THE PAST MONTH, HAVE YOU WISHED YOU WERE DEAD OR WISHED YOU COULD GO TO SLEEP AND NOT WAKE UP?: NO
6. HAVE YOU EVER DONE ANYTHING, STARTED TO DO ANYTHING, OR PREPARED TO DO ANYTHING TO END YOUR LIFE?: NO

## 2024-12-30 ASSESSMENT — ACTIVITIES OF DAILY LIVING (ADL): ADLS_ACUITY_SCORE: 59

## 2024-12-30 NOTE — ED PROVIDER NOTES
Emergency Department Note      History of Present Illness     Chief Complaint   Shortness of Breath    HPI   Pacheco Torres is a 79 year old male on oxygen with a history of smoking, multiple myeloma, prostate cancer, HTN, HLD, CAD s/p CABG and stent and respiratory failure presenting with shortness of breath. The patient stated that he started feeling sick two days ago with a sore throat, myalgias and shortness of breath. Yesterday his wife noticed that he slept all afternoon. Also notes wheezing that started last night and some slight confusion. He is no longer confused. States he feels better today. He has been on 2 L oxygen since April for COPD. Notes that 2.5 L O2 keeps him at 92-93% oxygen. He is currently in pulmonary rehab, but no breathing treatments at home.  He has an appointment with Pulmonologist in January. The patient is also on Prednisone for PMR. The patient also noted concern for his venous oxygen levels, notes history of high CO2 levels.  No chest pain, nausea, vomiting or diarrhea.  The patient had his flu shot this year.     Independent Historian   None      Past Medical History     Medical History and Problem List   Respiratory failure   HTN   CAD    Depression   GERD with esophagitis   HLD   Macrocytic anemia   Monoclonal gammopathy   PMR   Tobacco use disorder  Osteoarthritis of the hip, bilateral knees, bilateral shoulders   Polymyalgia rheumatica  Malignant neoplasm of prostate   Hypovolemia   NSTEMI  Septic shock   Altered mental status   Anemia   Esophageal stricture   Use of systemic steroids  Hypercholesterolemia   Hyponatremia   COPD    Morbid obesity   Atherosclerotic heart disease   Cervicalgia   Erectile dysfunction     Medications   Atorvastatin   Pantoprazole   Albuterol   Aspirin 81 mg   Budesonide   Carvedilol   Lenalidomide   Nitroglycerin   Sertraline   Albuterol   Umeclidinium-vilanterol   Zoloft   Flomax       Surgical History   Coronary angioplasty   Hernia repair    CABG  Coronary stent placement   Meniscectomy   [place] tooth extraction   Cataract surgery     Physical Exam     No data found.    Physical Exam    General: Patient is alert and cooperative.  HENT:  Normal nose, oropharynx. Moist oral mucosa.  Eyes: EOMI. Normal conjunctiva.  Neck:  Normal range of motion and appearance.   Cardiovascular:  Normal rate, regular rhythm and normal heart sounds.   Pulmonary/Chest: Baseline supplemental oxygen.  Speaking complete sentences.  Effort appears to be slightly increased.  He is exhaling through pursed lips.  Abdominal: Soft. No distension or tenderness.     Musculoskeletal: Normal range of motion. No edema or tenderness.   Neurological: oriented, normal strength, sensation, and coordination.   Skin: Warm and dry. No rash or bruising.   Psychiatric: Normal mood and affect. Normal behavior and judgement.      Diagnostics     Lab Results   Labs Ordered and Resulted from Time of ED Arrival to Time of ED Departure   BASIC METABOLIC PANEL - Abnormal       Result Value    Sodium 136      Potassium 4.4      Chloride 96 (*)     Carbon Dioxide (CO2) 31 (*)     Anion Gap 9      Urea Nitrogen 13.3      Creatinine 0.84      GFR Estimate 89      Calcium 9.1      Glucose 100 (*)    BLOOD GAS VENOUS - Abnormal    pH Venous 7.35      pCO2 Venous 67 (*)     pO2 Venous 20 (*)     Bicarbonate Venous 37 (*)     Base Excess/Deficit Venous 8.9 (*)     FIO2 3      Oxyhemoglobin Venous 25 (*)     O2 Sat, Venous 25.6 (*)    INFLUENZA A/B, RSV AND SARS-COV2 PCR - Abnormal    Influenza A PCR Positive (*)     Influenza B PCR Negative      RSV PCR Negative      SARS CoV2 PCR Negative     CBC WITH PLATELETS AND DIFFERENTIAL - Abnormal    WBC Count 3.4 (*)     RBC Count 4.00 (*)     Hemoglobin 12.6 (*)     Hematocrit 40.4       (*)     MCH 31.5      MCHC 31.2 (*)     RDW 13.2      Platelet Count 172      % Neutrophils 61      % Lymphocytes 15      % Monocytes 18      % Eosinophils 4      % Basophils  0      % Immature Granulocytes 0      NRBCs per 100 WBC 0      Absolute Neutrophils 2.1      Absolute Lymphocytes 0.5 (*)     Absolute Monocytes 0.6      Absolute Eosinophils 0.2      Absolute Basophils 0.0      Absolute Immature Granulocytes 0.0      Absolute NRBCs 0.0     LACTIC ACID WHOLE BLOOD WITH 1X REPEAT IN 2 HR WHEN >2 - Normal    Lactic Acid, Initial 0.7         Imaging   Chest XR,  PA & LAT   Final Result   IMPRESSION: No new airspace disease identified. Suggestion of mild   atelectasis or scarring at the left lung base. Stable cardiac   silhouette. Subacute rib fractures.      AHL BOGGS MD            SYSTEM ID:  [id number]          EKG     ECG results from 12/30/24   EKG 12 lead     Value    Systolic Blood Pressure     Diastolic Blood Pressure     Ventricular Rate 56    Atrial Rate 56    [place] Interval 162    QRS Duration 72        QTc 397    P Axis 45    R AXIS 28    T Axis 33    Interpretation ECG      Sinus bradycardia  Otherwise normal ECG  When compared with ECG of 15-Aug-2024 07:13,  No significant change was found         Independent Interpretation   The chest x-ray shows no infiltrate.    ED Course      Medications Administered   Medications   oseltamivir (TAMIFLU) capsule 75 mg (75 mg Oral $Given 12/30/24 1650)       Procedures   Procedures     Discussion of Management   None    ED Course   ED Course as of 01/01/25 2002   Mon Dec 30, 2024   1626 I obtained the history and examined the patient as noted above.      1855 I disscussed dicharge instructions, patient is comfortable with discharge.    Wed Jan 01, 2025 2000 Blood gas venous(!)       Additional Documentation  None    Medical Decision Making / Diagnosis     CMS Diagnoses: None    MIPS       None    [facility]   Pacheco Torres is a 79 year old male with a history of chronic hypoxic and hypercapnic respiratory failure, on COPD, has a history of former tobaccoism is presented with acute URI symptoms.  He is afebrile and has normal oxygen  saturations on his baseline oxygen supplement.  Viral swabs are positive for influenza A.  He has a normal lactic acid.  White blood cell count is 3.4, hemoglobin 12.6, platelets 172.  Venous blood gas notable for a pCO2 of 67 and a pO2 of 20.  Chest x-ray shows no infiltrates or other acute findings.    Review of the EMR shows baseline hypercapnia.  He has been hospitalized in respiratory distress on several occasions, but it appears his baseline pCO2 is in the 60s.    Test results were discussed with Daniel and his wife.  Specifically, his pCO2 level was discussed.  He is mentating normally and feels comfortable with discharge home.  He is at high risk for complications from influenza and is within the treatment window.  Therefore he is being prescribed a 5-day course of Tamiflu.  He is advised to return if he develops increasing respiratory distress, fatigue, confusion, or other concerns.  Otherwise follow-up with his clinic when able    Disposition   The patient was discharged.     Diagnosis     ICD-10-CM    1. Influenza A  J10.1       2. Chronic respiratory failure with hypoxia and hypercapnia (H)  J96.11     J96.12            Discharge Medications   Discharge Medication List as of 12/30/2024  5:33 PM        START taking these medications    Details   oseltamivir (TAMIFLU) 75 MG capsule Take 1 capsule (75 mg) by mouth 2 times daily for 5 days., Disp-10 capsule, R-0, Local Print               Scribe Disclosure:  IKaya, am serving as a scribe at 7:00 PM on 12/30/2024 to document services personally performed by Kelly FLEMING MD, based on my observations and the provider's statements to me.   Scribe Disclosure:  I, Ruth Gupta, am serving as a scribe at 7:55 PM on 12/30/2024 to document services personally performed by Kelly FLEMING MD, based on my observations and the provider's statements to me.          Vasu Mendoza MD  01/01/25 2002

## 2024-12-30 NOTE — ED TRIAGE NOTES
Pt c/o fever, shortness of breath and cough since Saturday- pt on 2L o2 concentrator, needed 5L o2 Concentrator in triage after ambulation. Denies Cp. Denies N/V/Diarrhea. VSS a/ox4     Triage Assessment (Adult)       Row Name 12/30/24 1220          Triage Assessment    Airway WDL WDL        Respiratory WDL    Respiratory WDL X  cough/shortness of breath- increase in 02- normally 2L02 concentrator, now on 5 L w exertion        Cardiac WDL    Cardiac WDL WDL

## 2024-12-31 LAB
ATRIAL RATE - MUSE: 56 BPM
DIASTOLIC BLOOD PRESSURE - MUSE: NORMAL MMHG
INTERPRETATION ECG - MUSE: NORMAL
P AXIS - MUSE: 45 DEGREES
PR INTERVAL - MUSE: 162 MS
QRS DURATION - MUSE: 72 MS
QT - MUSE: 412 MS
QTC - MUSE: 397 MS
R AXIS - MUSE: 28 DEGREES
SYSTOLIC BLOOD PRESSURE - MUSE: NORMAL MMHG
T AXIS - MUSE: 33 DEGREES
VENTRICULAR RATE- MUSE: 56 BPM

## 2025-06-14 ENCOUNTER — HEALTH MAINTENANCE LETTER (OUTPATIENT)
Age: 80
End: 2025-06-14

## (undated) DEVICE — CATH BALLOON MERIT ESOPH FIVE-STAGE 11X16MMX180CM EX12

## (undated) DEVICE — ENDO BITE BLOCK ADULT OLYMPUS LATEX FREE MAJ-1632

## (undated) DEVICE — INFLATION DEVICE BIG 60 ENDO-AN6012

## (undated) DEVICE — KIT ENDO TURNOVER/PROCEDURE W/CLEAN A SCOPE LINERS 103888

## (undated) RX ORDER — FENTANYL CITRATE 50 UG/ML
INJECTION, SOLUTION INTRAMUSCULAR; INTRAVENOUS
Status: DISPENSED
Start: 2023-02-21